# Patient Record
Sex: MALE | Race: BLACK OR AFRICAN AMERICAN | NOT HISPANIC OR LATINO | URBAN - METROPOLITAN AREA
[De-identification: names, ages, dates, MRNs, and addresses within clinical notes are randomized per-mention and may not be internally consistent; named-entity substitution may affect disease eponyms.]

---

## 2019-12-12 ENCOUNTER — INPATIENT (INPATIENT)
Facility: HOSPITAL | Age: 49
LOS: 18 days | Discharge: NURSING, REHAB OR LONG-TERM CARE | End: 2019-12-31
Attending: SURGERY | Admitting: SURGERY
Payer: COMMERCIAL

## 2019-12-12 ENCOUNTER — TRANSCRIPTION ENCOUNTER (OUTPATIENT)
Age: 49
End: 2019-12-12

## 2019-12-12 VITALS
DIASTOLIC BLOOD PRESSURE: 87 MMHG | TEMPERATURE: 98 F | SYSTOLIC BLOOD PRESSURE: 167 MMHG | HEART RATE: 113 BPM | OXYGEN SATURATION: 99 % | RESPIRATION RATE: 18 BRPM

## 2019-12-12 DIAGNOSIS — M72.6 NECROTIZING FASCIITIS: ICD-10-CM

## 2019-12-12 DIAGNOSIS — I70.209 UNSPECIFIED ATHEROSCLEROSIS OF NATIVE ARTERIES OF EXTREMITIES, UNSPECIFIED EXTREMITY: Chronic | ICD-10-CM

## 2019-12-12 LAB
ALBUMIN SERPL ELPH-MCNC: 2.6 G/DL — LOW (ref 3.3–5)
ALP SERPL-CCNC: 344 U/L — HIGH (ref 40–120)
ALT FLD-CCNC: 100 U/L — HIGH (ref 4–41)
ANION GAP SERPL CALC-SCNC: 17 MMO/L — HIGH (ref 7–14)
APPEARANCE UR: CLEAR — SIGNIFICANT CHANGE UP
AST SERPL-CCNC: 45 U/L — HIGH (ref 4–40)
B-OH-BUTYR SERPL-SCNC: 1.9 MMOL/L — HIGH (ref 0–0.4)
BACTERIA # UR AUTO: NEGATIVE — SIGNIFICANT CHANGE UP
BASE EXCESS BLDV CALC-SCNC: -2.2 MMOL/L — SIGNIFICANT CHANGE UP
BASOPHILS # BLD AUTO: 0.03 K/UL — SIGNIFICANT CHANGE UP (ref 0–0.2)
BASOPHILS # BLD AUTO: 0.05 K/UL — SIGNIFICANT CHANGE UP (ref 0–0.2)
BASOPHILS NFR BLD AUTO: 0.2 % — SIGNIFICANT CHANGE UP (ref 0–2)
BASOPHILS NFR BLD AUTO: 0.2 % — SIGNIFICANT CHANGE UP (ref 0–2)
BILIRUB SERPL-MCNC: 0.4 MG/DL — SIGNIFICANT CHANGE UP (ref 0.2–1.2)
BILIRUB UR-MCNC: NEGATIVE — SIGNIFICANT CHANGE UP
BLD GP AB SCN SERPL QL: NEGATIVE — SIGNIFICANT CHANGE UP
BLOOD GAS VENOUS - CREATININE: 1.62 MG/DL — HIGH (ref 0.5–1.3)
BLOOD UR QL VISUAL: SIGNIFICANT CHANGE UP
BUN SERPL-MCNC: 25 MG/DL — HIGH (ref 7–23)
CALCIUM SERPL-MCNC: 9 MG/DL — SIGNIFICANT CHANGE UP (ref 8.4–10.5)
CHLORIDE BLDV-SCNC: 104 MMOL/L — SIGNIFICANT CHANGE UP (ref 96–108)
CHLORIDE SERPL-SCNC: 96 MMOL/L — LOW (ref 98–107)
CO2 SERPL-SCNC: 20 MMOL/L — LOW (ref 22–31)
COLOR SPEC: SIGNIFICANT CHANGE UP
CREAT SERPL-MCNC: 1.67 MG/DL — HIGH (ref 0.5–1.3)
CRP SERPL-MCNC: 372.3 MG/L — HIGH
EOSINOPHIL # BLD AUTO: 0.04 K/UL — SIGNIFICANT CHANGE UP (ref 0–0.5)
EOSINOPHIL # BLD AUTO: 0.08 K/UL — SIGNIFICANT CHANGE UP (ref 0–0.5)
EOSINOPHIL NFR BLD AUTO: 0.2 % — SIGNIFICANT CHANGE UP (ref 0–6)
EOSINOPHIL NFR BLD AUTO: 0.4 % — SIGNIFICANT CHANGE UP (ref 0–6)
ERYTHROCYTE [SEDIMENTATION RATE] IN BLOOD: 94 MM/HR — HIGH (ref 1–15)
GAS PNL BLDV: 132 MMOL/L — LOW (ref 136–146)
GLUCOSE BLDV-MCNC: 355 MG/DL — HIGH (ref 70–99)
GLUCOSE SERPL-MCNC: 400 MG/DL — HIGH (ref 70–99)
GLUCOSE UR-MCNC: >1000 — HIGH
HCO3 BLDV-SCNC: 22 MMOL/L — SIGNIFICANT CHANGE UP (ref 20–27)
HCT VFR BLD CALC: 25.9 % — LOW (ref 39–50)
HCT VFR BLD CALC: 30.7 % — LOW (ref 39–50)
HCT VFR BLDV CALC: 30.5 % — LOW (ref 39–51)
HGB BLD-MCNC: 8.7 G/DL — LOW (ref 13–17)
HGB BLD-MCNC: 9.9 G/DL — LOW (ref 13–17)
HGB BLDV-MCNC: 9.8 G/DL — LOW (ref 13–17)
HYALINE CASTS # UR AUTO: SIGNIFICANT CHANGE UP
IMM GRANULOCYTES NFR BLD AUTO: 0.7 % — SIGNIFICANT CHANGE UP (ref 0–1.5)
IMM GRANULOCYTES NFR BLD AUTO: 1 % — SIGNIFICANT CHANGE UP (ref 0–1.5)
INR BLD: 1.34 — HIGH (ref 0.88–1.17)
KETONES UR-MCNC: HIGH
LACTATE BLDV-MCNC: 2 MMOL/L — SIGNIFICANT CHANGE UP (ref 0.5–2)
LEUKOCYTE ESTERASE UR-ACNC: NEGATIVE — SIGNIFICANT CHANGE UP
LYMPHOCYTES # BLD AUTO: 1.37 K/UL — SIGNIFICANT CHANGE UP (ref 1–3.3)
LYMPHOCYTES # BLD AUTO: 1.42 K/UL — SIGNIFICANT CHANGE UP (ref 1–3.3)
LYMPHOCYTES # BLD AUTO: 6.7 % — LOW (ref 13–44)
LYMPHOCYTES # BLD AUTO: 7.6 % — LOW (ref 13–44)
MCHC RBC-ENTMCNC: 28 PG — SIGNIFICANT CHANGE UP (ref 27–34)
MCHC RBC-ENTMCNC: 28.6 PG — SIGNIFICANT CHANGE UP (ref 27–34)
MCHC RBC-ENTMCNC: 32.2 % — SIGNIFICANT CHANGE UP (ref 32–36)
MCHC RBC-ENTMCNC: 33.6 % — SIGNIFICANT CHANGE UP (ref 32–36)
MCV RBC AUTO: 85.2 FL — SIGNIFICANT CHANGE UP (ref 80–100)
MCV RBC AUTO: 87 FL — SIGNIFICANT CHANGE UP (ref 80–100)
MONOCYTES # BLD AUTO: 1.39 K/UL — HIGH (ref 0–0.9)
MONOCYTES # BLD AUTO: 1.8 K/UL — HIGH (ref 0–0.9)
MONOCYTES NFR BLD AUTO: 6.8 % — SIGNIFICANT CHANGE UP (ref 2–14)
MONOCYTES NFR BLD AUTO: 9.6 % — SIGNIFICANT CHANGE UP (ref 2–14)
NEUTROPHILS # BLD AUTO: 15.28 K/UL — HIGH (ref 1.8–7.4)
NEUTROPHILS # BLD AUTO: 17.35 K/UL — HIGH (ref 1.8–7.4)
NEUTROPHILS NFR BLD AUTO: 81.7 % — HIGH (ref 43–77)
NEUTROPHILS NFR BLD AUTO: 84.9 % — HIGH (ref 43–77)
NITRITE UR-MCNC: NEGATIVE — SIGNIFICANT CHANGE UP
NRBC # FLD: 0 K/UL — SIGNIFICANT CHANGE UP (ref 0–0)
NRBC # FLD: 0 K/UL — SIGNIFICANT CHANGE UP (ref 0–0)
PCO2 BLDV: 44 MMHG — SIGNIFICANT CHANGE UP (ref 41–51)
PH BLDV: 7.34 PH — SIGNIFICANT CHANGE UP (ref 7.32–7.43)
PH UR: 6 — SIGNIFICANT CHANGE UP (ref 5–8)
PLATELET # BLD AUTO: 345 K/UL — SIGNIFICANT CHANGE UP (ref 150–400)
PLATELET # BLD AUTO: 409 K/UL — HIGH (ref 150–400)
PMV BLD: 11.1 FL — SIGNIFICANT CHANGE UP (ref 7–13)
PMV BLD: 11.3 FL — SIGNIFICANT CHANGE UP (ref 7–13)
PO2 BLDV: 32 MMHG — LOW (ref 35–40)
POTASSIUM BLDV-SCNC: 3.5 MMOL/L — SIGNIFICANT CHANGE UP (ref 3.4–4.5)
POTASSIUM SERPL-MCNC: 3.6 MMOL/L — SIGNIFICANT CHANGE UP (ref 3.5–5.3)
POTASSIUM SERPL-SCNC: 3.6 MMOL/L — SIGNIFICANT CHANGE UP (ref 3.5–5.3)
PROT SERPL-MCNC: 8.8 G/DL — HIGH (ref 6–8.3)
PROT UR-MCNC: 100 — HIGH
PROTHROM AB SERPL-ACNC: 15.4 SEC — HIGH (ref 9.8–13.1)
RBC # BLD: 3.04 M/UL — LOW (ref 4.2–5.8)
RBC # BLD: 3.53 M/UL — LOW (ref 4.2–5.8)
RBC # FLD: 12.8 % — SIGNIFICANT CHANGE UP (ref 10.3–14.5)
RBC # FLD: 12.9 % — SIGNIFICANT CHANGE UP (ref 10.3–14.5)
RBC CASTS # UR COMP ASSIST: SIGNIFICANT CHANGE UP (ref 0–?)
RH IG SCN BLD-IMP: POSITIVE — SIGNIFICANT CHANGE UP
RH IG SCN BLD-IMP: POSITIVE — SIGNIFICANT CHANGE UP
SAO2 % BLDV: 56.4 % — LOW (ref 60–85)
SODIUM SERPL-SCNC: 133 MMOL/L — LOW (ref 135–145)
SP GR SPEC: 1.03 — SIGNIFICANT CHANGE UP (ref 1–1.04)
SQUAMOUS # UR AUTO: SIGNIFICANT CHANGE UP
UROBILINOGEN FLD QL: NORMAL — SIGNIFICANT CHANGE UP
WBC # BLD: 18.71 K/UL — HIGH (ref 3.8–10.5)
WBC # BLD: 20.45 K/UL — HIGH (ref 3.8–10.5)
WBC # FLD AUTO: 18.71 K/UL — HIGH (ref 3.8–10.5)
WBC # FLD AUTO: 20.45 K/UL — HIGH (ref 3.8–10.5)
WBC UR QL: SIGNIFICANT CHANGE UP (ref 0–?)

## 2019-12-12 PROCEDURE — 99291 CRITICAL CARE FIRST HOUR: CPT

## 2019-12-12 PROCEDURE — 71045 X-RAY EXAM CHEST 1 VIEW: CPT | Mod: 26

## 2019-12-12 PROCEDURE — 73702 CT LWR EXTREMITY W/O&W/DYE: CPT | Mod: 26,RT

## 2019-12-12 PROCEDURE — 73590 X-RAY EXAM OF LOWER LEG: CPT | Mod: 26,RT

## 2019-12-12 PROCEDURE — 73630 X-RAY EXAM OF FOOT: CPT | Mod: 26,RT

## 2019-12-12 RX ORDER — HEPARIN SODIUM 5000 [USP'U]/ML
5000 INJECTION INTRAVENOUS; SUBCUTANEOUS EVERY 8 HOURS
Refills: 0 | Status: DISCONTINUED | OUTPATIENT
Start: 2019-12-12 | End: 2019-12-31

## 2019-12-12 RX ORDER — PIPERACILLIN AND TAZOBACTAM 4; .5 G/20ML; G/20ML
3.38 INJECTION, POWDER, LYOPHILIZED, FOR SOLUTION INTRAVENOUS EVERY 8 HOURS
Refills: 0 | Status: DISCONTINUED | OUTPATIENT
Start: 2019-12-12 | End: 2019-12-24

## 2019-12-12 RX ORDER — HYDROMORPHONE HYDROCHLORIDE 2 MG/ML
0.5 INJECTION INTRAMUSCULAR; INTRAVENOUS; SUBCUTANEOUS ONCE
Refills: 0 | Status: DISCONTINUED | OUTPATIENT
Start: 2019-12-12 | End: 2019-12-13

## 2019-12-12 RX ORDER — PIPERACILLIN AND TAZOBACTAM 4; .5 G/20ML; G/20ML
3.38 INJECTION, POWDER, LYOPHILIZED, FOR SOLUTION INTRAVENOUS ONCE
Refills: 0 | Status: COMPLETED | OUTPATIENT
Start: 2019-12-12 | End: 2019-12-12

## 2019-12-12 RX ORDER — GLUCAGON INJECTION, SOLUTION 0.5 MG/.1ML
1 INJECTION, SOLUTION SUBCUTANEOUS ONCE
Refills: 0 | Status: DISCONTINUED | OUTPATIENT
Start: 2019-12-12 | End: 2019-12-31

## 2019-12-12 RX ORDER — DEXTROSE 50 % IN WATER 50 %
25 SYRINGE (ML) INTRAVENOUS ONCE
Refills: 0 | Status: DISCONTINUED | OUTPATIENT
Start: 2019-12-12 | End: 2019-12-31

## 2019-12-12 RX ORDER — INSULIN HUMAN 100 [IU]/ML
3 INJECTION, SOLUTION SUBCUTANEOUS
Qty: 100 | Refills: 0 | Status: DISCONTINUED | OUTPATIENT
Start: 2019-12-12 | End: 2019-12-13

## 2019-12-12 RX ORDER — DEXTROSE 50 % IN WATER 50 %
12.5 SYRINGE (ML) INTRAVENOUS ONCE
Refills: 0 | Status: DISCONTINUED | OUTPATIENT
Start: 2019-12-12 | End: 2019-12-31

## 2019-12-12 RX ORDER — ACETAMINOPHEN 500 MG
1000 TABLET ORAL ONCE
Refills: 0 | Status: COMPLETED | OUTPATIENT
Start: 2019-12-12 | End: 2019-12-12

## 2019-12-12 RX ORDER — INSULIN HUMAN 100 [IU]/ML
7.6 INJECTION, SOLUTION SUBCUTANEOUS
Qty: 100 | Refills: 0 | Status: DISCONTINUED | OUTPATIENT
Start: 2019-12-12 | End: 2019-12-12

## 2019-12-12 RX ORDER — SODIUM CHLORIDE 9 MG/ML
1000 INJECTION INTRAMUSCULAR; INTRAVENOUS; SUBCUTANEOUS ONCE
Refills: 0 | Status: COMPLETED | OUTPATIENT
Start: 2019-12-12 | End: 2019-12-12

## 2019-12-12 RX ORDER — SODIUM CHLORIDE 9 MG/ML
2000 INJECTION, SOLUTION INTRAVENOUS ONCE
Refills: 0 | Status: COMPLETED | OUTPATIENT
Start: 2019-12-12 | End: 2019-12-12

## 2019-12-12 RX ORDER — VANCOMYCIN HCL 1 G
1000 VIAL (EA) INTRAVENOUS EVERY 12 HOURS
Refills: 0 | Status: DISCONTINUED | OUTPATIENT
Start: 2019-12-12 | End: 2019-12-14

## 2019-12-12 RX ORDER — INFLUENZA VIRUS VACCINE 15; 15; 15; 15 UG/.5ML; UG/.5ML; UG/.5ML; UG/.5ML
0.5 SUSPENSION INTRAMUSCULAR ONCE
Refills: 0 | Status: DISCONTINUED | OUTPATIENT
Start: 2019-12-12 | End: 2019-12-31

## 2019-12-12 RX ORDER — ONDANSETRON 8 MG/1
4 TABLET, FILM COATED ORAL EVERY 8 HOURS
Refills: 0 | Status: DISCONTINUED | OUTPATIENT
Start: 2019-12-12 | End: 2019-12-31

## 2019-12-12 RX ORDER — SODIUM CHLORIDE 9 MG/ML
1000 INJECTION, SOLUTION INTRAVENOUS
Refills: 0 | Status: DISCONTINUED | OUTPATIENT
Start: 2019-12-12 | End: 2019-12-15

## 2019-12-12 RX ORDER — VANCOMYCIN HCL 1 G
VIAL (EA) INTRAVENOUS
Refills: 0 | Status: DISCONTINUED | OUTPATIENT
Start: 2019-12-12 | End: 2019-12-12

## 2019-12-12 RX ORDER — INSULIN LISPRO 100/ML
10 VIAL (ML) SUBCUTANEOUS ONCE
Refills: 0 | Status: COMPLETED | OUTPATIENT
Start: 2019-12-12 | End: 2019-12-12

## 2019-12-12 RX ORDER — HYDROMORPHONE HYDROCHLORIDE 2 MG/ML
0.5 INJECTION INTRAMUSCULAR; INTRAVENOUS; SUBCUTANEOUS EVERY 4 HOURS
Refills: 0 | Status: DISCONTINUED | OUTPATIENT
Start: 2019-12-12 | End: 2019-12-14

## 2019-12-12 RX ORDER — HYDROMORPHONE HYDROCHLORIDE 2 MG/ML
1 INJECTION INTRAMUSCULAR; INTRAVENOUS; SUBCUTANEOUS EVERY 4 HOURS
Refills: 0 | Status: DISCONTINUED | OUTPATIENT
Start: 2019-12-12 | End: 2019-12-14

## 2019-12-12 RX ORDER — INSULIN LISPRO 100/ML
VIAL (ML) SUBCUTANEOUS EVERY 6 HOURS
Refills: 0 | Status: DISCONTINUED | OUTPATIENT
Start: 2019-12-12 | End: 2019-12-12

## 2019-12-12 RX ORDER — CHLORHEXIDINE GLUCONATE 213 G/1000ML
1 SOLUTION TOPICAL
Refills: 0 | Status: DISCONTINUED | OUTPATIENT
Start: 2019-12-12 | End: 2019-12-18

## 2019-12-12 RX ORDER — DEXTROSE 50 % IN WATER 50 %
15 SYRINGE (ML) INTRAVENOUS ONCE
Refills: 0 | Status: DISCONTINUED | OUTPATIENT
Start: 2019-12-12 | End: 2019-12-31

## 2019-12-12 RX ORDER — VANCOMYCIN HCL 1 G
1000 VIAL (EA) INTRAVENOUS ONCE
Refills: 0 | Status: COMPLETED | OUTPATIENT
Start: 2019-12-12 | End: 2019-12-12

## 2019-12-12 RX ORDER — INSULIN HUMAN 100 [IU]/ML
2 INJECTION, SOLUTION SUBCUTANEOUS ONCE
Refills: 0 | Status: COMPLETED | OUTPATIENT
Start: 2019-12-12 | End: 2019-12-12

## 2019-12-12 RX ORDER — SODIUM CHLORIDE 9 MG/ML
1000 INJECTION, SOLUTION INTRAVENOUS
Refills: 0 | Status: DISCONTINUED | OUTPATIENT
Start: 2019-12-12 | End: 2019-12-13

## 2019-12-12 RX ADMIN — Medication 1000 MILLIGRAM(S): at 22:40

## 2019-12-12 RX ADMIN — Medication 400 MILLIGRAM(S): at 21:20

## 2019-12-12 RX ADMIN — INSULIN HUMAN 3 UNIT(S)/HR: 100 INJECTION, SOLUTION SUBCUTANEOUS at 23:42

## 2019-12-12 RX ADMIN — Medication 100 MILLIGRAM(S): at 21:32

## 2019-12-12 RX ADMIN — SODIUM CHLORIDE 1000 MILLILITER(S): 9 INJECTION INTRAMUSCULAR; INTRAVENOUS; SUBCUTANEOUS at 14:59

## 2019-12-12 RX ADMIN — Medication 250 MILLIGRAM(S): at 16:49

## 2019-12-12 RX ADMIN — HYDROMORPHONE HYDROCHLORIDE 0.5 MILLIGRAM(S): 2 INJECTION INTRAMUSCULAR; INTRAVENOUS; SUBCUTANEOUS at 22:39

## 2019-12-12 RX ADMIN — Medication 10 UNIT(S): at 16:49

## 2019-12-12 RX ADMIN — SODIUM CHLORIDE 2000 MILLILITER(S): 9 INJECTION, SOLUTION INTRAVENOUS at 16:49

## 2019-12-12 RX ADMIN — Medication 100 MILLIGRAM(S): at 15:33

## 2019-12-12 RX ADMIN — INSULIN HUMAN 5 UNIT(S)/HR: 100 INJECTION, SOLUTION SUBCUTANEOUS at 22:41

## 2019-12-12 RX ADMIN — PIPERACILLIN AND TAZOBACTAM 200 GRAM(S): 4; .5 INJECTION, POWDER, LYOPHILIZED, FOR SOLUTION INTRAVENOUS at 22:41

## 2019-12-12 RX ADMIN — HYDROMORPHONE HYDROCHLORIDE 0.5 MILLIGRAM(S): 2 INJECTION INTRAMUSCULAR; INTRAVENOUS; SUBCUTANEOUS at 22:42

## 2019-12-12 RX ADMIN — PIPERACILLIN AND TAZOBACTAM 200 GRAM(S): 4; .5 INJECTION, POWDER, LYOPHILIZED, FOR SOLUTION INTRAVENOUS at 15:00

## 2019-12-12 RX ADMIN — SODIUM CHLORIDE 125 MILLILITER(S): 9 INJECTION, SOLUTION INTRAVENOUS at 18:44

## 2019-12-12 NOTE — CONSULT NOTE ADULT - ASSESSMENT
48 yo male patient w/ necrotizing fasciitis of RLE  - Pt seen and evaluated in ED  - Septic, Tachycardic, WBC 20, ESR/CRP pending, LRINEC score positive for Necrotizing Fasciitis  - Right foot extensive necrosis originating from 4/5th digits to proximal ankle with severe crepitus and fluctuance to distal tibia  - Xrays and CT pending but prior Xrays taken at [facility] from patient's phone showing extensive gas tracking from foot to tibia along extensors. Diffuse soft tissue emphysema in all interspace of right foot.  - Patient consulted at bedside with presence of Vascular team and fellow. Extensive and detailed discussion on salvageability of RLE and importance of assessing life-threatening condition with emergent BKA. Patient to consider and vascular to admit accordingly.  - Foot not salvageable from podiatry standpoint and vascular in agreement  - Podiatry in agreement with vascular plan for emergent surgical intervention  - Podiatry signing off; Please reconsult as needed  - Discussed with attending

## 2019-12-12 NOTE — ED PROVIDER NOTE - PHYSICAL EXAMINATION
R foot- necrotic tissue to 4th and 5th toes- no sensation  dorsal foot w/ large necrotic wound [id number]  proximal erythema to mid shin

## 2019-12-12 NOTE — PATIENT PROFILE ADULT - NSPROHMDIABETBLDGLCTARGETFT_GEN_A_NUR
Depending on morning, afternoon, and evening, however patient reports aiming to keep his sugars under 200, with tighter control in the AM

## 2019-12-12 NOTE — ED PROVIDER NOTE - PROGRESS NOTE DETAILS
surgery evaluating pt, podiatry consulted podiatry w pt now. will need a right BKA. pt admitted for emergent right BKA, surgery to book OR now. pt admitted, care transferred.

## 2019-12-12 NOTE — H&P ADULT - PROBLEM SELECTOR PLAN 1
Admit to surgery, Dr. Ramirez  ABHELIO  NPO  IVF  Insulin drip for hyperglycemia  Patient hesitant to sign consent  Will book OR for emergent caroline GROVES  Patient seen and examined with vascular fellow

## 2019-12-12 NOTE — ED ADULT TRIAGE NOTE - CHIEF COMPLAINT QUOTE
Patient has a wound to his right foot that started two weeks ago. Had went to another hospital and got antibiotics and cream and has gotten actually worse. had an xry and was told he needed a BKA but patient needs a second opinion.

## 2019-12-12 NOTE — ED PROVIDER NOTE - OBJECTIVE STATEMENT
48 y/o male pmh DM type 1 c/o R foot infection x1 week. Pt admits to developing blister to R 5th toe x1 week ago. Pt was seen at [facility] and was given Sentyl cream and PO abx. Pt states that over the week, his wound has rapidly progressed. Pt now admit sto wounds to 5th and 4th toes and skin breakdown to R foot. Pt went back to [facility] ER today and had IV abx labs and xray. Pt was told that he has gas on the xray and needed to be admitted for a BKA. Pt did not agree and signed out AMA and presented to [facility]. Pt admits to taking 20 units of humalog today and did not eat. Pt denies chest pain, sob, abd pain, n/v/d, weakness, dizziness, syncope, fever or chills.

## 2019-12-12 NOTE — H&P ADULT - HISTORY OF PRESENT ILLNESS
49M with right 5th toe wound noticed on 12/2. Saw a physician who prescribed Santyl which he has been using topically. Today, was seen at OSH, told that he has subcutaneous gas and needed a BKA, left AMA and came to [facility] ED for second opinion. Here, he denies HA, CP, SOB, F/C/N/V/D/C. Denies pain in his right foot. Says he walked normally today.

## 2019-12-12 NOTE — H&P ADULT - NSHPPHYSICALEXAM_GEN_ALL_CORE
AAO in mild distress 2/2 stress of diagnosis  No respiratory distress  Tachycardic, normal sinus  Abdomen soft, NTND  Palpable radial pulses  Palpable bilateral popliteal pulses  LLE without wounds, well-healed prior lateral debridement  RLE with gangrenous tissue to lateral and dorsal surface. Crepitus. Non-tender. Palpable PT. Erythema to mid-shin.

## 2019-12-12 NOTE — CONSULT NOTE ADULT - ASSESSMENT
ASSESSMENT:  49 yr old male with necrotizing soft tissue infection of the right lower extremity, planned for emergent guillotine BKA    PLAN:    Neurologic:   - Ofirmev, Dilaudid prn for pain control    Respiratory:   - Monitor respiratory status  - OOBTC daily, encourage use of incentive spirometer    Cardiovascular:   - Monitor vitals, maintain MAP>65    Gastrointestinal/Nutrition:   - NPO, IVF resuscitation    Renal/Genitourinary:   - Monitor urine output  - Trend electrolytes, replete as needed    Hematologic:   - C/w Heparin SQ for DVT ppx  - Trend H/H, transfuse if needed    Infectious Disease:   - Zosyn, Clinda, Vanco for necrotizing soft tissue infection  - Monitor for fevers  - Trend WBC    Endocrine:   - Insulin infusion for hyperglycemia with DKA  - Monitor FS Q1h, titrate insulin per protocol    Disposition:   Continued critical care management    Bang Ureña, PGY 2  [id number]

## 2019-12-12 NOTE — CONSULT NOTE ADULT - SUBJECTIVE AND OBJECTIVE BOX
Podiatry pager #: [phone]    Patient is a 49y old  Male who presents with a chief complaint of right foot necrosis    HPI:    50 y/o male pmh DM type 1 c/o R foot infection x1 week. Pt admits to developing blister to R 5th toe x1 week ago. Pt was seen at [facility] and was given Santyl cream and PO abx. Pt states that over the week, his wound has rapidly progressed. Pt now admit sto wounds to 5th and 4th toes and skin breakdown to R foot. Pt went back to [facility] ER today and had IV abx labs and xray. Pt was told that he has gas on the xray and needed to be admitted for a BKA. Pt did not agree and signed out AMA and presented to [facility]. Pt admits to taking 20 units of humalog today and did not eat. Pt denies chest pain, sob, abd pain, n/v/d, weakness, dizziness, syncope, fever or chills.    Podiatry consulted same day for concern for necrotizing fasciitis.    PAST MEDICAL & SURGICAL HISTORY:  DM (diabetes mellitus)      MEDICATIONS  (STANDING):  clindamycin IVPB 900 milliGRAM(s) IV Intermittent once  vancomycin  IVPB 1000 milliGRAM(s) IV Intermittent once    MEDICATIONS  (PRN):      Allergies    No Known Allergies    Intolerances        VITALS:    Vital Signs Last 24 Hrs  T(C): 36.7 (12 Dec 2019 13:22), Max: 36.7 (12 Dec 2019 13:22)  T(F): 98 (12 Dec 2019 13:22), Max: 98 (12 Dec 2019 13:22)  HR: 113 (12 Dec 2019 13:22) (113 - 113)  BP: 167/87 (12 Dec 2019 13:22) (167/87 - 167/87)  BP(mean): --  RR: 18 (12 Dec 2019 13:22) (18 - 18)  SpO2: 99% (12 Dec 2019 13:22) (99% - 99%)    LABS:                          9.9    20.45 )-----------( 409      ( 12 Dec 2019 14:25 )             30.7               CAPILLARY BLOOD GLUCOSE      POCT Blood Glucose.: 422 mg/dL (12 Dec 2019 13:25)      PT/INR - ( 12 Dec 2019 14:25 )   PT: 15.4 SEC;   INR: 1.34              LOWER EXTREMITY PHYSICAL EXAM:    Vasular: Palpable PT RLE, LF DP/PT 2/4 B/L, CFT < delayed RF because of necrosis of digits, Temperature gradient increased RLE to tibia.   Neuro: Epicritic sensation absent to the level of ankle B/L.  Musculoskeletal/Ortho: RLE with severe increased edema and erythema originating from foot.  Skin:  Extensive Right foot dorso-plantar necrotic patch originating from 4th and 5th digits extending to ankle with severe crepitus and fluctuance on palpation. Erythema and edema of foot extending to distal tibia proximal to midtibia. Malodor present.     Left foot intact and palpable pulses. s/p 5th met head resection closed - 2017 per patient.    RADIOLOGY & ADDITIONAL STUDIES:    Xrays and CT of RLE pending  Prior Xrays taken at [facility] from patient's phone showing extensive gas tracking from foot to tibia along extensors. Diffuse soft tissue emphysema in all interspace of right foot.

## 2019-12-12 NOTE — ED PROVIDER NOTE - ATTENDING CONTRIBUTION TO CARE
Attending Statement: I have personally seen and examined this patient. I have fully participated in the care of this patient. I have reviewed all pertinent clinical information, including history physical exam, plan and the Resident's note and agree except as noted  48yo M hx DM type 1 on insulin pw infected right foot. Pt states "started with a small blister on the right toe" a week and half ago. Given Sentyl  by podiatry at [facility] on 12-6-19 over the last 5-6 days the infection "is getting worse" today had a follow up appointment with [facility]. States he was given IV abx, and had a xray done concerning for gas and advised to have right BKA today. Pt signed out and came to ED. Denies any fever/chills. no pain. no trauma. Adherent to meds, noted to have elevated FS over last week at home. no abx. Denies any polyuria/polydipsia. no abdominal pain no cp or sob. States he has not eaten all day.   Vital signs noted. sitting up,. AO3.  tachycardic. No resp distress. able to speak in full and clear sentences. no wheeze, rales or stridor. thin male nt abdomen. right foot +necrotic tissue especially on the lateral right foot/toes extending to the right mid foot +gangrenous, foul smell. warm to touch. +erythema  to knee. +crepitus of the foot.   plan IV abx, IVF, sepsis labs, xr of right foot, surgery and podiatry consult, admit

## 2019-12-12 NOTE — H&P ADULT - ATTENDING COMMENTS
Pt. presented with R foot necrotizing infection and sepsis  R foot is not salvageable    WBC 20, Tachycardia  Glu 420  A/P: R foot necrotizing infection and sepsis  pt. needs R guillotine amputation at the ankle for source control   will need closure one infection resolves.  Risks and benefits of the procedure were discussed with pt. and family and they agree to proceed. All questions were answered.

## 2019-12-12 NOTE — H&P ADULT - DOES THIS PATIENT HAVE A HISTORY OF OR HAS BEEN DX WITH HEART FAILURE?
Orientation: Alert and oriented x4  VSS. 95% on RA.   Tele: N/A. HR 90.   LS: Clear, denies SOB/CASTILLO.  GI: BS audible/active x4, NPO except ice/meds, intermittent abdominal pain/discomfort. Passing gas. No BM this shift. Denies N/V.   : Adequate urine output. Yes  Skin: Intact  Activity: Independent. Pt slept comfortably throughout shift.   Pain: 7/10 abdominal pain, PRN oxycodone given.  Plan: Pain control, supportive cares, IV abx, discharge TBD. Continue with current cares.     no

## 2019-12-13 ENCOUNTER — RESULT REVIEW (OUTPATIENT)
Age: 49
End: 2019-12-13

## 2019-12-13 LAB
ANION GAP SERPL CALC-SCNC: 10 MMO/L — SIGNIFICANT CHANGE UP (ref 7–14)
ANION GAP SERPL CALC-SCNC: 13 MMO/L — SIGNIFICANT CHANGE UP (ref 7–14)
BASE EXCESS BLDA CALC-SCNC: 0.4 MMOL/L — SIGNIFICANT CHANGE UP
BASE EXCESS BLDA CALC-SCNC: 1.1 MMOL/L — SIGNIFICANT CHANGE UP
BASOPHILS # BLD AUTO: 0.04 K/UL — SIGNIFICANT CHANGE UP (ref 0–0.2)
BASOPHILS NFR BLD AUTO: 0.2 % — SIGNIFICANT CHANGE UP (ref 0–2)
BLOOD GAS ARTERIAL - FIO2: 21 — SIGNIFICANT CHANGE UP
BUN SERPL-MCNC: 17 MG/DL — SIGNIFICANT CHANGE UP (ref 7–23)
BUN SERPL-MCNC: 19 MG/DL — SIGNIFICANT CHANGE UP (ref 7–23)
CA-I BLDA-SCNC: 1.16 MMOL/L — SIGNIFICANT CHANGE UP (ref 1.15–1.29)
CA-I BLDA-SCNC: 1.18 MMOL/L — SIGNIFICANT CHANGE UP (ref 1.15–1.29)
CALCIUM SERPL-MCNC: 8 MG/DL — LOW (ref 8.4–10.5)
CALCIUM SERPL-MCNC: 8.3 MG/DL — LOW (ref 8.4–10.5)
CHLORIDE SERPL-SCNC: 101 MMOL/L — SIGNIFICANT CHANGE UP (ref 98–107)
CHLORIDE SERPL-SCNC: 102 MMOL/L — SIGNIFICANT CHANGE UP (ref 98–107)
CK MB BLD-MCNC: 1.54 NG/ML — SIGNIFICANT CHANGE UP (ref 1–6.6)
CK MB BLD-MCNC: SIGNIFICANT CHANGE UP (ref 0–2.5)
CK SERPL-CCNC: 104 U/L — SIGNIFICANT CHANGE UP (ref 30–200)
CO2 SERPL-SCNC: 20 MMOL/L — LOW (ref 22–31)
CO2 SERPL-SCNC: 23 MMOL/L — SIGNIFICANT CHANGE UP (ref 22–31)
CREAT SERPL-MCNC: 1.62 MG/DL — HIGH (ref 0.5–1.3)
CREAT SERPL-MCNC: 1.86 MG/DL — HIGH (ref 0.5–1.3)
EOSINOPHIL # BLD AUTO: 0.1 K/UL — SIGNIFICANT CHANGE UP (ref 0–0.5)
EOSINOPHIL NFR BLD AUTO: 0.5 % — SIGNIFICANT CHANGE UP (ref 0–6)
GLUCOSE BLDA-MCNC: 142 MG/DL — HIGH (ref 70–99)
GLUCOSE BLDA-MCNC: 185 MG/DL — HIGH (ref 70–99)
GLUCOSE SERPL-MCNC: 195 MG/DL — HIGH (ref 70–99)
GLUCOSE SERPL-MCNC: 206 MG/DL — HIGH (ref 70–99)
HBA1C BLD-MCNC: 12 % — HIGH (ref 4–5.6)
HCO3 BLDA-SCNC: 25 MMOL/L — SIGNIFICANT CHANGE UP (ref 22–26)
HCO3 BLDA-SCNC: 25 MMOL/L — SIGNIFICANT CHANGE UP (ref 22–26)
HCT VFR BLD CALC: 25.1 % — LOW (ref 39–50)
HCT VFR BLD CALC: 25.2 % — LOW (ref 39–50)
HCT VFR BLDA CALC: 26.3 % — LOW (ref 39–51)
HCT VFR BLDA CALC: 26.7 % — LOW (ref 39–51)
HGB BLD-MCNC: 8.4 G/DL — LOW (ref 13–17)
HGB BLD-MCNC: 8.5 G/DL — LOW (ref 13–17)
HGB BLDA-MCNC: 8.5 G/DL — LOW (ref 13–17)
HGB BLDA-MCNC: 8.6 G/DL — LOW (ref 13–17)
IMM GRANULOCYTES NFR BLD AUTO: 1.1 % — SIGNIFICANT CHANGE UP (ref 0–1.5)
LACTATE BLDA-SCNC: 0.8 MMOL/L — SIGNIFICANT CHANGE UP (ref 0.5–2)
LACTATE BLDA-SCNC: 1 MMOL/L — SIGNIFICANT CHANGE UP (ref 0.5–2)
LACTATE SERPL-SCNC: 1 MMOL/L — SIGNIFICANT CHANGE UP (ref 0.5–2)
LYMPHOCYTES # BLD AUTO: 1.66 K/UL — SIGNIFICANT CHANGE UP (ref 1–3.3)
LYMPHOCYTES # BLD AUTO: 8.7 % — LOW (ref 13–44)
MAGNESIUM SERPL-MCNC: 1.6 MG/DL — SIGNIFICANT CHANGE UP (ref 1.6–2.6)
MAGNESIUM SERPL-MCNC: 1.9 MG/DL — SIGNIFICANT CHANGE UP (ref 1.6–2.6)
MCHC RBC-ENTMCNC: 28.7 PG — SIGNIFICANT CHANGE UP (ref 27–34)
MCHC RBC-ENTMCNC: 29 PG — SIGNIFICANT CHANGE UP (ref 27–34)
MCHC RBC-ENTMCNC: 33.3 % — SIGNIFICANT CHANGE UP (ref 32–36)
MCHC RBC-ENTMCNC: 33.9 % — SIGNIFICANT CHANGE UP (ref 32–36)
MCV RBC AUTO: 85.7 FL — SIGNIFICANT CHANGE UP (ref 80–100)
MCV RBC AUTO: 86 FL — SIGNIFICANT CHANGE UP (ref 80–100)
MONOCYTES # BLD AUTO: 2.1 K/UL — HIGH (ref 0–0.9)
MONOCYTES NFR BLD AUTO: 11 % — SIGNIFICANT CHANGE UP (ref 2–14)
NEUTROPHILS # BLD AUTO: 15.04 K/UL — HIGH (ref 1.8–7.4)
NEUTROPHILS NFR BLD AUTO: 78.5 % — HIGH (ref 43–77)
NRBC # FLD: 0 K/UL — SIGNIFICANT CHANGE UP (ref 0–0)
NRBC # FLD: 0 K/UL — SIGNIFICANT CHANGE UP (ref 0–0)
PCO2 BLDA: 35 MMHG — SIGNIFICANT CHANGE UP (ref 35–48)
PCO2 BLDA: 44 MMHG — SIGNIFICANT CHANGE UP (ref 35–48)
PH BLDA: 7.38 PH — SIGNIFICANT CHANGE UP (ref 7.35–7.45)
PH BLDA: 7.45 PH — SIGNIFICANT CHANGE UP (ref 7.35–7.45)
PHOSPHATE SERPL-MCNC: 2.4 MG/DL — LOW (ref 2.5–4.5)
PHOSPHATE SERPL-MCNC: 3.1 MG/DL — SIGNIFICANT CHANGE UP (ref 2.5–4.5)
PHOSPHATE SERPL-MCNC: 3.2 MG/DL — SIGNIFICANT CHANGE UP (ref 2.5–4.5)
PLATELET # BLD AUTO: 315 K/UL — SIGNIFICANT CHANGE UP (ref 150–400)
PLATELET # BLD AUTO: 321 K/UL — SIGNIFICANT CHANGE UP (ref 150–400)
PMV BLD: 10.6 FL — SIGNIFICANT CHANGE UP (ref 7–13)
PMV BLD: 11 FL — SIGNIFICANT CHANGE UP (ref 7–13)
PO2 BLDA: 45 MMHG — CRITICAL LOW (ref 83–108)
PO2 BLDA: 67 MMHG — LOW (ref 83–108)
POTASSIUM BLDA-SCNC: 3.5 MMOL/L — SIGNIFICANT CHANGE UP (ref 3.4–4.5)
POTASSIUM BLDA-SCNC: 3.5 MMOL/L — SIGNIFICANT CHANGE UP (ref 3.4–4.5)
POTASSIUM SERPL-MCNC: 3.3 MMOL/L — LOW (ref 3.5–5.3)
POTASSIUM SERPL-MCNC: 3.6 MMOL/L — SIGNIFICANT CHANGE UP (ref 3.5–5.3)
POTASSIUM SERPL-SCNC: 3.3 MMOL/L — LOW (ref 3.5–5.3)
POTASSIUM SERPL-SCNC: 3.6 MMOL/L — SIGNIFICANT CHANGE UP (ref 3.5–5.3)
RBC # BLD: 2.93 M/UL — LOW (ref 4.2–5.8)
RBC # BLD: 2.93 M/UL — LOW (ref 4.2–5.8)
RBC # FLD: 12.9 % — SIGNIFICANT CHANGE UP (ref 10.3–14.5)
RBC # FLD: 13 % — SIGNIFICANT CHANGE UP (ref 10.3–14.5)
SAO2 % BLDA: 83.8 % — LOW (ref 95–99)
SAO2 % BLDA: 93.6 % — LOW (ref 95–99)
SODIUM BLDA-SCNC: 135 MMOL/L — LOW (ref 136–146)
SODIUM BLDA-SCNC: 137 MMOL/L — SIGNIFICANT CHANGE UP (ref 136–146)
SODIUM SERPL-SCNC: 134 MMOL/L — LOW (ref 135–145)
SODIUM SERPL-SCNC: 135 MMOL/L — SIGNIFICANT CHANGE UP (ref 135–145)
SPECIMEN SOURCE: SIGNIFICANT CHANGE UP
SPECIMEN SOURCE: SIGNIFICANT CHANGE UP
WBC # BLD: 19.15 K/UL — HIGH (ref 3.8–10.5)
WBC # BLD: 21.02 K/UL — HIGH (ref 3.8–10.5)
WBC # FLD AUTO: 19.15 K/UL — HIGH (ref 3.8–10.5)
WBC # FLD AUTO: 21.02 K/UL — HIGH (ref 3.8–10.5)

## 2019-12-13 PROCEDURE — 71045 X-RAY EXAM CHEST 1 VIEW: CPT | Mod: 26,76

## 2019-12-13 PROCEDURE — 27882 AMPUTATION OF LOWER LEG: CPT | Mod: RT

## 2019-12-13 PROCEDURE — 88311 DECALCIFY TISSUE: CPT | Mod: 26

## 2019-12-13 PROCEDURE — 88305 TISSUE EXAM BY PATHOLOGIST: CPT | Mod: 26

## 2019-12-13 PROCEDURE — 99291 CRITICAL CARE FIRST HOUR: CPT | Mod: 25

## 2019-12-13 RX ORDER — LABETALOL HCL 100 MG
10 TABLET ORAL ONCE
Refills: 0 | Status: DISCONTINUED | OUTPATIENT
Start: 2019-12-13 | End: 2019-12-14

## 2019-12-13 RX ORDER — INSULIN HUMAN 100 [IU]/ML
1.5 INJECTION, SOLUTION SUBCUTANEOUS
Qty: 100 | Refills: 0 | Status: DISCONTINUED | OUTPATIENT
Start: 2019-12-13 | End: 2019-12-14

## 2019-12-13 RX ORDER — INSULIN GLARGINE 100 [IU]/ML
15 INJECTION, SOLUTION SUBCUTANEOUS AT BEDTIME
Refills: 0 | Status: DISCONTINUED | OUTPATIENT
Start: 2019-12-13 | End: 2019-12-14

## 2019-12-13 RX ORDER — POTASSIUM CHLORIDE 20 MEQ
10 PACKET (EA) ORAL
Refills: 0 | Status: COMPLETED | OUTPATIENT
Start: 2019-12-13 | End: 2019-12-13

## 2019-12-13 RX ORDER — HYDROMORPHONE HYDROCHLORIDE 2 MG/ML
1 INJECTION INTRAMUSCULAR; INTRAVENOUS; SUBCUTANEOUS ONCE
Refills: 0 | Status: DISCONTINUED | OUTPATIENT
Start: 2019-12-13 | End: 2019-12-13

## 2019-12-13 RX ORDER — INSULIN LISPRO 100/ML
VIAL (ML) SUBCUTANEOUS EVERY 6 HOURS
Refills: 0 | Status: DISCONTINUED | OUTPATIENT
Start: 2019-12-13 | End: 2019-12-13

## 2019-12-13 RX ORDER — INSULIN HUMAN 100 [IU]/ML
3 INJECTION, SOLUTION SUBCUTANEOUS ONCE
Refills: 0 | Status: DISCONTINUED | OUTPATIENT
Start: 2019-12-13 | End: 2019-12-13

## 2019-12-13 RX ORDER — INSULIN HUMAN 100 [IU]/ML
2 INJECTION, SOLUTION SUBCUTANEOUS
Qty: 100 | Refills: 0 | Status: DISCONTINUED | OUTPATIENT
Start: 2019-12-13 | End: 2019-12-13

## 2019-12-13 RX ORDER — SODIUM CHLORIDE 9 MG/ML
1000 INJECTION, SOLUTION INTRAVENOUS ONCE
Refills: 0 | Status: COMPLETED | OUTPATIENT
Start: 2019-12-13 | End: 2019-12-13

## 2019-12-13 RX ORDER — FUROSEMIDE 40 MG
40 TABLET ORAL ONCE
Refills: 0 | Status: COMPLETED | OUTPATIENT
Start: 2019-12-13 | End: 2019-12-13

## 2019-12-13 RX ORDER — INSULIN GLARGINE 100 [IU]/ML
12 INJECTION, SOLUTION SUBCUTANEOUS
Refills: 0 | Status: DISCONTINUED | OUTPATIENT
Start: 2019-12-13 | End: 2019-12-13

## 2019-12-13 RX ORDER — MAGNESIUM SULFATE 500 MG/ML
2 VIAL (ML) INJECTION ONCE
Refills: 0 | Status: COMPLETED | OUTPATIENT
Start: 2019-12-13 | End: 2019-12-13

## 2019-12-13 RX ORDER — POTASSIUM PHOSPHATE, MONOBASIC POTASSIUM PHOSPHATE, DIBASIC 236; 224 MG/ML; MG/ML
15 INJECTION, SOLUTION INTRAVENOUS ONCE
Refills: 0 | Status: COMPLETED | OUTPATIENT
Start: 2019-12-13 | End: 2019-12-13

## 2019-12-13 RX ORDER — HUMAN INSULIN 100 [IU]/ML
3 INJECTION, SUSPENSION SUBCUTANEOUS EVERY 6 HOURS
Refills: 0 | Status: DISCONTINUED | OUTPATIENT
Start: 2019-12-13 | End: 2019-12-13

## 2019-12-13 RX ORDER — SODIUM CHLORIDE 9 MG/ML
1000 INJECTION, SOLUTION INTRAVENOUS
Refills: 0 | Status: DISCONTINUED | OUTPATIENT
Start: 2019-12-13 | End: 2019-12-14

## 2019-12-13 RX ORDER — SODIUM CHLORIDE 9 MG/ML
1000 INJECTION, SOLUTION INTRAVENOUS
Refills: 0 | Status: DISCONTINUED | OUTPATIENT
Start: 2019-12-13 | End: 2019-12-13

## 2019-12-13 RX ORDER — DEXTROSE 50 % IN WATER 50 %
25 SYRINGE (ML) INTRAVENOUS ONCE
Refills: 0 | Status: COMPLETED | OUTPATIENT
Start: 2019-12-13 | End: 2019-12-13

## 2019-12-13 RX ORDER — ACETAMINOPHEN 500 MG
1000 TABLET ORAL ONCE
Refills: 0 | Status: COMPLETED | OUTPATIENT
Start: 2019-12-13 | End: 2019-12-13

## 2019-12-13 RX ADMIN — Medication 25 MILLILITER(S): at 05:00

## 2019-12-13 RX ADMIN — CHLORHEXIDINE GLUCONATE 1 APPLICATION(S): 213 SOLUTION TOPICAL at 05:05

## 2019-12-13 RX ADMIN — Medication 50 GRAM(S): at 01:08

## 2019-12-13 RX ADMIN — Medication 100 MILLIEQUIVALENT(S): at 01:08

## 2019-12-13 RX ADMIN — SODIUM CHLORIDE 75 MILLILITER(S): 9 INJECTION, SOLUTION INTRAVENOUS at 04:37

## 2019-12-13 RX ADMIN — HEPARIN SODIUM 5000 UNIT(S): 5000 INJECTION INTRAVENOUS; SUBCUTANEOUS at 22:16

## 2019-12-13 RX ADMIN — PIPERACILLIN AND TAZOBACTAM 25 GRAM(S): 4; .5 INJECTION, POWDER, LYOPHILIZED, FOR SOLUTION INTRAVENOUS at 20:11

## 2019-12-13 RX ADMIN — INSULIN HUMAN 2 UNIT(S): 100 INJECTION, SOLUTION SUBCUTANEOUS at 00:23

## 2019-12-13 RX ADMIN — HYDROMORPHONE HYDROCHLORIDE 1 MILLIGRAM(S): 2 INJECTION INTRAMUSCULAR; INTRAVENOUS; SUBCUTANEOUS at 05:06

## 2019-12-13 RX ADMIN — HYDROMORPHONE HYDROCHLORIDE 0.5 MILLIGRAM(S): 2 INJECTION INTRAMUSCULAR; INTRAVENOUS; SUBCUTANEOUS at 00:22

## 2019-12-13 RX ADMIN — HYDROMORPHONE HYDROCHLORIDE 1 MILLIGRAM(S): 2 INJECTION INTRAMUSCULAR; INTRAVENOUS; SUBCUTANEOUS at 10:10

## 2019-12-13 RX ADMIN — POTASSIUM PHOSPHATE, MONOBASIC POTASSIUM PHOSPHATE, DIBASIC 62.5 MILLIMOLE(S): 236; 224 INJECTION, SOLUTION INTRAVENOUS at 05:04

## 2019-12-13 RX ADMIN — Medication 250 MILLIGRAM(S): at 05:03

## 2019-12-13 RX ADMIN — SODIUM CHLORIDE 75 MILLILITER(S): 9 INJECTION, SOLUTION INTRAVENOUS at 09:05

## 2019-12-13 RX ADMIN — HYDROMORPHONE HYDROCHLORIDE 1 MILLIGRAM(S): 2 INJECTION INTRAMUSCULAR; INTRAVENOUS; SUBCUTANEOUS at 14:32

## 2019-12-13 RX ADMIN — INSULIN GLARGINE 12 UNIT(S): 100 INJECTION, SOLUTION SUBCUTANEOUS at 03:49

## 2019-12-13 RX ADMIN — Medication 1000 MILLIGRAM(S): at 04:41

## 2019-12-13 RX ADMIN — Medication 100 MILLIEQUIVALENT(S): at 02:07

## 2019-12-13 RX ADMIN — HYDROMORPHONE HYDROCHLORIDE 1 MILLIGRAM(S): 2 INJECTION INTRAMUSCULAR; INTRAVENOUS; SUBCUTANEOUS at 10:25

## 2019-12-13 RX ADMIN — Medication 100 MILLIGRAM(S): at 22:16

## 2019-12-13 RX ADMIN — HEPARIN SODIUM 5000 UNIT(S): 5000 INJECTION INTRAVENOUS; SUBCUTANEOUS at 13:29

## 2019-12-13 RX ADMIN — PIPERACILLIN AND TAZOBACTAM 25 GRAM(S): 4; .5 INJECTION, POWDER, LYOPHILIZED, FOR SOLUTION INTRAVENOUS at 12:15

## 2019-12-13 RX ADMIN — Medication 250 MILLIGRAM(S): at 17:45

## 2019-12-13 RX ADMIN — Medication 400 MILLIGRAM(S): at 04:20

## 2019-12-13 RX ADMIN — Medication 4: at 11:41

## 2019-12-13 RX ADMIN — HYDROMORPHONE HYDROCHLORIDE 0.5 MILLIGRAM(S): 2 INJECTION INTRAMUSCULAR; INTRAVENOUS; SUBCUTANEOUS at 00:07

## 2019-12-13 RX ADMIN — Medication 400 MILLIGRAM(S): at 16:42

## 2019-12-13 RX ADMIN — HYDROMORPHONE HYDROCHLORIDE 1 MILLIGRAM(S): 2 INJECTION INTRAMUSCULAR; INTRAVENOUS; SUBCUTANEOUS at 22:30

## 2019-12-13 RX ADMIN — HYDROMORPHONE HYDROCHLORIDE 1 MILLIGRAM(S): 2 INJECTION INTRAMUSCULAR; INTRAVENOUS; SUBCUTANEOUS at 02:59

## 2019-12-13 RX ADMIN — SODIUM CHLORIDE 75 MILLILITER(S): 9 INJECTION, SOLUTION INTRAVENOUS at 20:24

## 2019-12-13 RX ADMIN — SODIUM CHLORIDE 33.33 MILLILITER(S): 9 INJECTION, SOLUTION INTRAVENOUS at 04:37

## 2019-12-13 RX ADMIN — PIPERACILLIN AND TAZOBACTAM 25 GRAM(S): 4; .5 INJECTION, POWDER, LYOPHILIZED, FOR SOLUTION INTRAVENOUS at 05:03

## 2019-12-13 RX ADMIN — HYDROMORPHONE HYDROCHLORIDE 1 MILLIGRAM(S): 2 INJECTION INTRAMUSCULAR; INTRAVENOUS; SUBCUTANEOUS at 02:44

## 2019-12-13 RX ADMIN — Medication 2: at 06:06

## 2019-12-13 RX ADMIN — INSULIN GLARGINE 15 UNIT(S): 100 INJECTION, SOLUTION SUBCUTANEOUS at 23:12

## 2019-12-13 RX ADMIN — INSULIN HUMAN 2 UNIT(S)/HR: 100 INJECTION, SOLUTION SUBCUTANEOUS at 12:30

## 2019-12-13 RX ADMIN — INSULIN HUMAN 1.5 UNIT(S)/HR: 100 INJECTION, SOLUTION SUBCUTANEOUS at 20:24

## 2019-12-13 RX ADMIN — HYDROMORPHONE HYDROCHLORIDE 1 MILLIGRAM(S): 2 INJECTION INTRAMUSCULAR; INTRAVENOUS; SUBCUTANEOUS at 04:54

## 2019-12-13 RX ADMIN — HYDROMORPHONE HYDROCHLORIDE 1 MILLIGRAM(S): 2 INJECTION INTRAMUSCULAR; INTRAVENOUS; SUBCUTANEOUS at 14:17

## 2019-12-13 RX ADMIN — Medication 1000 MILLIGRAM(S): at 17:00

## 2019-12-13 RX ADMIN — Medication 100 MILLIEQUIVALENT(S): at 03:33

## 2019-12-13 RX ADMIN — Medication 100 MILLIGRAM(S): at 13:28

## 2019-12-13 RX ADMIN — INSULIN HUMAN 3 UNIT(S)/HR: 100 INJECTION, SOLUTION SUBCUTANEOUS at 13:48

## 2019-12-13 RX ADMIN — SODIUM CHLORIDE 75 MILLILITER(S): 9 INJECTION, SOLUTION INTRAVENOUS at 22:15

## 2019-12-13 RX ADMIN — HYDROMORPHONE HYDROCHLORIDE 1 MILLIGRAM(S): 2 INJECTION INTRAMUSCULAR; INTRAVENOUS; SUBCUTANEOUS at 22:16

## 2019-12-13 RX ADMIN — Medication 40 MILLIGRAM(S): at 20:23

## 2019-12-13 NOTE — PHYSICAL THERAPY INITIAL EVALUATION ADULT - PATIENT PROFILE REVIEW, REHAB EVAL
PT orders received: No formal activity order. Consult with AZALEA Hampton, patient may participate in PT evaluation./yes

## 2019-12-13 NOTE — PHYSICAL THERAPY INITIAL EVALUATION ADULT - ADDITIONAL COMMENTS
Patient lives in a one story private house with ~3 steps to enter. Prior to admission, patient was independent with all functional mobility.    Patient left as found, all lines intact and RN aware.

## 2019-12-13 NOTE — PHYSICAL THERAPY INITIAL EVALUATION ADULT - PERTINENT HX OF CURRENT PROBLEM, REHAB EVAL
Patient is a 49 year old male presenting with necrotizing soft tissue infection of the right lower extremity. Patient noticed right 5th toe wound on 12/2, was seen at OSH and was told he needed right BKA, left AMA. Patient presented to [facility] for second opinion. PMH: IVAN

## 2019-12-13 NOTE — PHYSICAL THERAPY INITIAL EVALUATION ADULT - MANUAL MUSCLE TESTING RESULTS, REHAB EVAL
grossly assessed due to/pain; no strength deficits in bilateral UEs, bilateral LEs grossly 3+/5 throughout

## 2019-12-13 NOTE — PROGRESS NOTE ADULT - ASSESSMENT
ASSESSMENT:  49 yr old male with necrotizing soft tissue infection of the RLE, admitted to SICU for resuscitation and management of severe sepsis, awaiting emergent caroline Rt caroline GROVES    PLAN:    Neurologic:   - Ofirmev, Dilaudid prn for pain control    Respiratory:   - Monitor respiratory status  - OOBTC daily, encourage use of incentive spirometer    Cardiovascular:   - Monitor vitals, maintain MAP>65    Gastrointestinal/Nutrition:   - NPO, IVF resuscitation    Renal/Genitourinary:   - Monitor urine output  - Trend electrolytes, replete as needed    Hematologic:   - C/w Heparin SQ for DVT ppx  - Trend H/H, transfuse if needed    Infectious Disease:   - Zosyn, Clinda, Vanco for necrotizing soft tissue infection  - Monitor for fevers  - Trend WBC    Endocrine:   - Insulin infusion for hyperglycemia with DKA  - Monitor FS Q1h, titrate insulin per protocol    Disposition:   Continued critical care management, OR for source control    Bang Ureña, PGY 2  [id number] ASSESSMENT:  49 yr old male with necrotizing soft tissue infection of the RLE, admitted to SICU for resuscitation and management of severe sepsis, awaiting emergent caroline Rt caroline BKDAWNA    PLAN:    Neurologic:   - Ofirmev, Dilaudid prn for pain control    Respiratory:   - Monitor respiratory status  - OOBTC daily, encourage use of incentive spirometer    Cardiovascular:   - Monitor vitals, maintain MAP>65    Gastrointestinal/Nutrition:   - NPO, IVF resuscitation    Renal/Genitourinary:   - Monitor urine output  - Trend electrolytes, replete as needed    Hematologic:   - C/w Heparin SQ for DVT ppx  - Trend H/H, transfuse if needed    Infectious Disease:   - Zosyn, Clinda, Vanco for necrotizing soft tissue infection  - Pending urgent BKA for source control with surgery today, patient consented  - Monitor for fevers  - Trend WBC    Endocrine:   - Insulin infusion for hyperglycemia with DKA, recently transitioned to SQ insulin with good Glu control  - Monitor FS, and continue ISS    Disposition:   Continued critical care management, OR for source control    Bang Ureña, PGY 2  [id number]

## 2019-12-13 NOTE — PROGRESS NOTE ADULT - SUBJECTIVE AND OBJECTIVE BOX
Vascular Surgery Progress Note     SUBJECTIVE / 24H EVENTS  Patient seen and examined on morning rounds. No acute events overnight.    OBJECTIVE:    VITAL SIGNS:  T(C): 37 (19 @ 04:00), Max: 39.3 (19 @ 20:47)  HR: 98 (19 @ 06:00) (95 - 113)  BP: 141/72 (19 @ 06:00) (135/65 - 167/87)  RR: 15 (19 @ 06:00) (11 - 18)  SpO2: 93% (19 @ 06:00) (89% - 99%)  Daily     Daily Weight in k.6 (13 Dec 2019 05:00)  POCT Blood Glucose.: 163 mg/dL (19 @ 06:05)  POCT Blood Glucose.: 185 mg/dL (19 @ 05:28)  POCT Blood Glucose.: 86 mg/dL (19 @ 04:56)      PHYSICAL EXAM:  Gen: Mild distress, anxious  Lungs: Nonlabored respirations  Heart: Tachycardic, normal sinus  GI: Abdomen soft, NTND  Extrem: Palpable radial and popliteal pulses bilaterally, LLE without wounds, RLE with gangrenous tissue to lateral and dorsal surface. Crepitus. Non-tender. Palpable PT. Erythema to mid-shin      19 @ 07:01  -  19 @ 07:00  --------------------------------------------------------  IN:    dextrose 5% + lactated ringers.: 225 mL    insulin regular Infusion: 21.5 mL    IV PiggyBack: 2050 mL    lactated ringers.: 500 mL  Total IN: 2796.5 mL    OUT:    Voided: 275 mL  Total OUT: 275 mL    Total NET: 2521.5 mL          LAB VALUES:      135  |  102  |  19  ----------------------------<  206<H>  3.3<L>   |  20<L>  |  1.62<H>    Ca    8.3<L>      12 Dec 2019 23:00  Phos  3.2       Mg     1.6         TPro  8.8<H>  /  Alb  2.6<L>  /  TBili  0.4  /  DBili  x   /  AST  45<H>  /  ALT  100<H>  /  AlkPhos  344<H>                                 8.4    19.15 )-----------( 315      ( 13 Dec 2019 05:10 )             25.2     LIVER FUNCTIONS - ( 12 Dec 2019 14:25 )  Alb: 2.6 g/dL / Pro: 8.8 g/dL / ALK PHOS: 344 u/L / ALT: 100 u/L / AST: 45 u/L / GGT: x           PT/INR - ( 12 Dec 2019 14:25 )   PT: 15.4 SEC;   INR: 1.34                  Urinalysis Basic - ( 12 Dec 2019 14:46 )    Color: LIGHT YELLOW / Appearance: CLEAR / S.033 / pH: 6.0  Gluc: >1000 / Ketone: MODERATE  / Bili: NEGATIVE / Urobili: NORMAL   Blood: SMALL / Protein: 100 / Nitrite: NEGATIVE   Leuk Esterase: NEGATIVE / RBC: 3-5 / WBC 3-5   Sq Epi: OCC / Non Sq Epi: x / Bacteria: NEGATIVE        MICROBIOLOGY:    No new microbiology data for review.     RADIOLOGY:    CT Lower Extremity w/wo IV Cont, Right (19 @ 17:34)   ******PRELIMINARY REPORT******    ******PRELIMINARY REPORT******            INTERPRETATION:  Extensive soft tissue edema with gas tracking along the   anterolateral subcutaneous tissues and extending through multiple fascial   planes including the deep peripheral fascia into the anterior   intramuscular compartment of the leg and the extensor compartment of the   foot through the 2nd-4th toes, consistent with necrotizing fasciitis.    Clinical team aware per documentation.    f/u official report      Xray Chest 1 View AP/PA (19 @ 18:05)   ******PRELIMINARY REPORT******    ******PRELIMINARY REPORT******            INTERPRETATION:  No focal consolidations.            ******PRELIMINARY REPORT******    ******PRELIMINARY REPORT******             Xray Tibia + Fibula 2 Views, Right (19 @ 17:48)   ******PRELIMINARY REPORT******    ******PRELIMINARY REPORT******            INTERPRETATION:  No acute fractureor dislocation.  Gas again seen in the soft tissues of foot better evaluated on prior foot   xrays, correlate clinically.  MR of the foot can be obtained for more sensitive evaluation for   osteomyelitis.          MEDICATIONS  (STANDING):  chlorhexidine 4% Liquid 1 Application(s) Topical <User Schedule>  clindamycin IVPB 600 milliGRAM(s) IV Intermittent every 8 hours  clindamycin IVPB      dextrose 5% + lactated ringers. 1000 milliLiter(s) (75 mL/Hr) IV Continuous <Continuous>  dextrose 5%. 1000 milliLiter(s) (50 mL/Hr) IV Continuous <Continuous>  dextrose 50% Injectable 12.5 Gram(s) IV Push once  dextrose 50% Injectable 25 Gram(s) IV Push once  dextrose 50% Injectable 25 Gram(s) IV Push once  heparin  Injectable 5000 Unit(s) SubCutaneous every 8 hours  influenza   Vaccine 0.5 milliLiter(s) IntraMuscular once  insulin glargine Injectable (LANTUS) 12 Unit(s) SubCutaneous <User Schedule>  insulin lispro (HumaLOG) corrective regimen sliding scale   SubCutaneous every 6 hours  insulin regular Infusion 3 Unit(s)/Hr (3 mL/Hr) IV Continuous <Continuous>  piperacillin/tazobactam IVPB.. 3.375 Gram(s) IV Intermittent every 8 hours  vancomycin  IVPB 1000 milliGRAM(s) IV Intermittent every 12 hours    MEDICATIONS  (PRN):  dextrose 40% Gel 15 Gram(s) Oral once PRN Blood Glucose LESS THAN 70 milliGRAM(s)/deciliter  glucagon  Injectable 1 milliGRAM(s) IntraMuscular once PRN Glucose LESS THAN 70 milligrams/deciliter  HYDROmorphone  Injectable 0.5 milliGRAM(s) IV Push every 4 hours PRN Moderate Pain (4 - 6)  HYDROmorphone  Injectable 1 milliGRAM(s) IV Push every 4 hours PRN Severe Pain (7 - 10)  ondansetron Injectable 4 milliGRAM(s) IV Push every 8 hours PRN Nausea and/or Vomiting

## 2019-12-13 NOTE — PHYSICAL THERAPY INITIAL EVALUATION ADULT - GENERAL OBSERVATIONS, REHAB EVAL
Patient received semisupine in bed, +oxygen, +IV's, in no apparent distress. Patient agreeable to participate in physical therapy evaluation.

## 2019-12-13 NOTE — PHYSICAL THERAPY INITIAL EVALUATION ADULT - RANGE OF MOTION EXAMINATION, REHAB EVAL
right DF ROM slightly limited secondary to swelling/bilateral upper extremity ROM was WFL (within functional limits)/Left LE ROM was WFL (within functional limits)

## 2019-12-13 NOTE — PROGRESS NOTE ADULT - SUBJECTIVE AND OBJECTIVE BOX
SICU Daily Progress Note  =====================================================  Interval/Overnight Events:     - Hemodynamically stable since arrival to SICU, tachycardia improved with pain control  - Awaiting surgical intervention for necrotizing fasciitis of Rt. foot  - WBC slightly downtrending on broad spectrum Abx, no elevation of lactate. Still febrile to 101.6 F  - Glucose control improving with insulin infusion      HPI:   TARA BLAKE is a 49 yr old male presenting c/o worsening RLE infection. Pt states he first noticed a right 5th toe wound on 12/2, and saw a physician at [facility] who prescribed Santyl collagenase which he has been using topically. States foot infection continued to worsen until today, when he was seen at [facility] ED, told that he has subcutaneous gas seen on Xray and needed a BKA. Pt wanted a second opinion and left AMA after which he presented to [facility] ED. Currently states pain in the right foot is worsening, and he was able to walk today prior to arriving in ED this afternoon. Pt also with DKA. States he is amenable to urgent surgical intervention and SICU consulted for resuscitation.     PMH:  DM I     Allergies: No Known Allergies      MEDICATIONS:   --------------------------------------------------------------------------------------  Neurologic Medications  HYDROmorphone  Injectable 0.5 milliGRAM(s) IV Push every 4 hours PRN Moderate Pain (4 - 6)  HYDROmorphone  Injectable 1 milliGRAM(s) IV Push every 4 hours PRN Severe Pain (7 - 10)  ondansetron Injectable 4 milliGRAM(s) IV Push every 8 hours PRN Nausea and/or Vomiting    Respiratory Medications    Cardiovascular Medications    Gastrointestinal Medications  dextrose 5%. 1000 milliLiter(s) IV Continuous <Continuous>  lactated ringers. 1000 milliLiter(s) IV Continuous <Continuous>    Genitourinary Medications    Hematologic/Oncologic Medications  heparin  Injectable 5000 Unit(s) SubCutaneous every 8 hours  influenza   Vaccine 0.5 milliLiter(s) IntraMuscular once    Antimicrobial/Immunologic Medications  clindamycin IVPB 600 milliGRAM(s) IV Intermittent every 8 hours  clindamycin IVPB      piperacillin/tazobactam IVPB.. 3.375 Gram(s) IV Intermittent every 8 hours  vancomycin  IVPB 1000 milliGRAM(s) IV Intermittent every 12 hours    Endocrine/Metabolic Medications  dextrose 40% Gel 15 Gram(s) Oral once PRN Blood Glucose LESS THAN 70 milliGRAM(s)/deciliter  dextrose 50% Injectable 12.5 Gram(s) IV Push once  dextrose 50% Injectable 25 Gram(s) IV Push once  dextrose 50% Injectable 25 Gram(s) IV Push once  glucagon  Injectable 1 milliGRAM(s) IntraMuscular once PRN Glucose LESS THAN 70 milligrams/deciliter  insulin regular  human recombinant. 2 Unit(s) IV Push once  insulin regular Infusion 3 Unit(s)/Hr IV Continuous <Continuous>    Topical/Other Medications  chlorhexidine 4% Liquid 1 Application(s) Topical <User Schedule>    --------------------------------------------------------------------------------------    VITAL SIGNS, INS/OUTS (last 24 hours):  --------------------------------------------------------------------------------------  T(C): 39.3 (12-12-19 @ 20:47), Max: 39.3 (12-12-19 @ 20:47)  HR: 103 (12-12-19 @ 23:00) (103 - 113)  BP: 154/67 (12-12-19 @ 23:00) (141/69 - 167/87)  BP(mean): 87 (12-12-19 @ 23:00) (87 - 93)  ABP: --  ABP(mean): --  RR: 18 (12-12-19 @ 23:00) (15 - 18)  SpO2: 93% (12-12-19 @ 23:00) (93% - 99%)  Wt(kg): --  CVP(mm Hg): --  CI: --  CAPILLARY BLOOD GLUCOSE      POCT Blood Glucose.: 208 mg/dL (13 Dec 2019 00:12)  POCT Blood Glucose.: 205 mg/dL (12 Dec 2019 23:04)  POCT Blood Glucose.: 311 mg/dL (12 Dec 2019 17:47)  POCT Blood Glucose.: 347 mg/dL (12 Dec 2019 15:40)  POCT Blood Glucose.: 422 mg/dL (12 Dec 2019 13:25)   N/A      12-12 @ 07:01  -  12-13 @ 00:17  --------------------------------------------------------  IN:    insulin regular Infusion: 2 mL    lactated ringers.: 125 mL  Total IN: 127 mL    OUT:  Total OUT: 0 mL    Total NET: 127 mL        --------------------------------------------------------------------------------------    EXAM  NEUROLOGY  RASS: 0  	GCS:  15  Exam: Normal, NAD, alert, oriented x3, no focal deficits.     RESPIRATORY  Exam: Nonlabored, CTABL, no wheezes, ronchi, or rales. Normal respiratory effort.       CARDIOVASCULAR  Exam: Normotensive, RRR, no M/R/G     GI/NUTRITION  Exam: Abdomen soft, NT/ND, no rebound or guarding.  Current Diet:  NPO    EXTREMITIES  B/l UE strength and sensation intact. LLE without wounds, well-healed prior lateral debridement noted.   RLE with dorsal and lateral gangrene, with crepitus, significant TTP, and erythema extending to mid-lower leg anteriorly, unchanged from initial exam.     HEMATOLOGIC  [x] VTE Prophylaxis: heparin  Injectable 5000 Unit(s) SubCutaneous every 8 hours        INFECTIOUS DISEASE  Antimicrobials/Immunologic Medications:  clindamycin IVPB 600 milliGRAM(s) IV Intermittent every 8 hours  clindamycin IVPB      influenza   Vaccine 0.5 milliLiter(s) IntraMuscular once  piperacillin/tazobactam IVPB.. 3.375 Gram(s) IV Intermittent every 8 hours  vancomycin  IVPB 1000 milliGRAM(s) IV Intermittent every 12 hours    Tubes/Lines/Drains    [x] Peripheral IV  [] Central Venous Line     	[] R	[] L	[] IJ	[] Fem	[] SC	Date Placed:   [] Arterial Line		[] R	[] L	[] Fem	[] Rad	[] Ax	Date Placed:   [] PICC		[] Midline		[] Mediport  [] Urinary Catheter		  [x] Necessity of urinary, arterial, and venous catheters discussed    LABS  --------------------------------------------------------------------------------------  ((Insert SICU Labs here))***  -------------------------------------------------------------------------------------- SICU Daily Progress Note  =====================================================  Interval/Overnight Events:     - Hemodynamically stable since arrival to SICU, tachycardia improved with pain control  - Awaiting surgical intervention for necrotizing fasciitis of Rt. foot  - WBC slightly downtrending on broad spectrum Abx, no elevation of lactate. Still febrile to 101.6 F  - Glucose control improving with insulin infusion      HPI:   TARA BLAKE is a 49 yr old male presenting c/o worsening RLE infection. Pt states he first noticed a right 5th toe wound on 12/2, and saw a physician at [facility] who prescribed Santyl collagenase which he has been using topically. States foot infection continued to worsen until today, when he was seen at [facility] ED, told that he has subcutaneous gas seen on Xray and needed a BKA. Pt wanted a second opinion and left AMA after which he presented to [facility] ED. Currently states pain in the right foot is worsening, and he was able to walk today prior to arriving in ED this afternoon. Pt also with DKA. States he is amenable to urgent surgical intervention and SICU consulted for resuscitation.     PMH:  DM I     Allergies: No Known Allergies      MEDICATIONS:   --------------------------------------------------------------------------------------  Neurologic Medications  HYDROmorphone  Injectable 0.5 milliGRAM(s) IV Push every 4 hours PRN Moderate Pain (4 - 6)  HYDROmorphone  Injectable 1 milliGRAM(s) IV Push every 4 hours PRN Severe Pain (7 - 10)  ondansetron Injectable 4 milliGRAM(s) IV Push every 8 hours PRN Nausea and/or Vomiting    Respiratory Medications    Cardiovascular Medications    Gastrointestinal Medications  dextrose 5%. 1000 milliLiter(s) IV Continuous <Continuous>  lactated ringers. 1000 milliLiter(s) IV Continuous <Continuous>    Genitourinary Medications    Hematologic/Oncologic Medications  heparin  Injectable 5000 Unit(s) SubCutaneous every 8 hours  influenza   Vaccine 0.5 milliLiter(s) IntraMuscular once    Antimicrobial/Immunologic Medications  clindamycin IVPB 600 milliGRAM(s) IV Intermittent every 8 hours  clindamycin IVPB      piperacillin/tazobactam IVPB.. 3.375 Gram(s) IV Intermittent every 8 hours  vancomycin  IVPB 1000 milliGRAM(s) IV Intermittent every 12 hours    Endocrine/Metabolic Medications  dextrose 40% Gel 15 Gram(s) Oral once PRN Blood Glucose LESS THAN 70 milliGRAM(s)/deciliter  dextrose 50% Injectable 12.5 Gram(s) IV Push once  dextrose 50% Injectable 25 Gram(s) IV Push once  dextrose 50% Injectable 25 Gram(s) IV Push once  glucagon  Injectable 1 milliGRAM(s) IntraMuscular once PRN Glucose LESS THAN 70 milligrams/deciliter  insulin regular  human recombinant. 2 Unit(s) IV Push once  insulin regular Infusion 3 Unit(s)/Hr IV Continuous <Continuous>    Topical/Other Medications  chlorhexidine 4% Liquid 1 Application(s) Topical <User Schedule>    --------------------------------------------------------------------------------------    VITAL SIGNS, INS/OUTS (last 24 hours):  --------------------------------------------------------------------------------------  T(C): 39.3 (12-12-19 @ 20:47), Max: 39.3 (12-12-19 @ 20:47)  HR: 103 (12-12-19 @ 23:00) (103 - 113)  BP: 154/67 (12-12-19 @ 23:00) (141/69 - 167/87)  BP(mean): 87 (12-12-19 @ 23:00) (87 - 93)  ABP: --  ABP(mean): --  RR: 18 (12-12-19 @ 23:00) (15 - 18)  SpO2: 93% (12-12-19 @ 23:00) (93% - 99%)  Wt(kg): --  CVP(mm Hg): --  CI: --  CAPILLARY BLOOD GLUCOSE      POCT Blood Glucose.: 208 mg/dL (13 Dec 2019 00:12)  POCT Blood Glucose.: 205 mg/dL (12 Dec 2019 23:04)  POCT Blood Glucose.: 311 mg/dL (12 Dec 2019 17:47)  POCT Blood Glucose.: 347 mg/dL (12 Dec 2019 15:40)  POCT Blood Glucose.: 422 mg/dL (12 Dec 2019 13:25)   N/A      12-12 @ 07:01  -  12-13 @ 00:17  --------------------------------------------------------  IN:    insulin regular Infusion: 2 mL    lactated ringers.: 125 mL  Total IN: 127 mL    OUT:  Total OUT: 0 mL    Total NET: 127 mL        --------------------------------------------------------------------------------------    EXAM  NEUROLOGY  RASS: 0  	GCS:  15  Exam: Normal, NAD, alert, oriented x3, no focal deficits.     RESPIRATORY  Exam: Nonlabored, CTABL, no wheezes, ronchi, or rales. Normal respiratory effort.       CARDIOVASCULAR  Exam: Normotensive, RRR, no M/R/G     GI/NUTRITION  Exam: Abdomen soft, NT/ND, no rebound or guarding.  Current Diet:  NPO    EXTREMITIES  B/l UE strength and sensation intact. LLE without wounds, well-healed prior lateral debridement noted.   RLE with dorsal and lateral gangrene, with crepitus, significant TTP, and erythema extending to mid-lower leg anteriorly, unchanged from initial exam.     HEMATOLOGIC  [x] VTE Prophylaxis: heparin  Injectable 5000 Unit(s) SubCutaneous every 8 hours        INFECTIOUS DISEASE  Antimicrobials/Immunologic Medications:  clindamycin IVPB 600 milliGRAM(s) IV Intermittent every 8 hours  clindamycin IVPB      influenza   Vaccine 0.5 milliLiter(s) IntraMuscular once  piperacillin/tazobactam IVPB.. 3.375 Gram(s) IV Intermittent every 8 hours  vancomycin  IVPB 1000 milliGRAM(s) IV Intermittent every 12 hours    Tubes/Lines/Drains    [x] Peripheral IV  [] Central Venous Line     	[] R	[] L	[] IJ	[] Fem	[] SC	Date Placed:   [] Arterial Line		[] R	[] L	[] Fem	[] Rad	[] Ax	Date Placed:   [] PICC		[] Midline		[] Mediport  [] Urinary Catheter		  [x] Necessity of urinary, arterial, and venous catheters discussed    LABS  --------------------------------------------------------------------------------------  CBC (12-12 @ 23:00)                              8.7<L>                         18.71<H>  )----------------(  345        81.7<H>% Neutrophils, 7.6<L>% Lymphocytes, ANC: 15.28<H>                              25.9<L>              CBC (12-12 @ 14:25)                              9.9<L>                         20.45<H>  )----------------(  409<H>     84.9<H>% Neutrophils, 6.7<L>% Lymphocytes, ANC: 17.35<H>                              30.7<L>                BMP (12-12 @ 23:00)             135     |  102     |  19    		Ca++ --      Ca 8.3<L>             ---------------------------------( 206<H>		Mg 1.6                3.3<L>  |  20<L>   |  1.62<H>			Ph 2.4<L>  BMP (12-12 @ 14:25)             133<L>  |  96<L>   |  25<H> 		Ca++ --      Ca 9.0                ---------------------------------( 400<H>		Mg --                 3.6     |  20<L>   |  1.67<H>			Ph --        LFTs (12-12 @ 14:25)      TPro 8.8<H> / Alb 2.6<L> / TBili 0.4 / DBili -- / AST 45<H> / <H> / AlkPhos 344<H>    Coags (12-12 @ 14:25)  aPTT -- / INR 1.34<H> / PT 15.4<H>    ABG (12-12 @ 23:00)      /  /  /  /  / %     Lactate:  1.0   ABG (12-12 @ 14:25)      /  /  /  /  / %     Lactate:   2.0    VBG (12-12 @ 14:25)     7.34 / 44 / 32<L> / 22 / -2.2 / 56.4<L>%      -------------------------------------------------------------------------------------- SICU Daily Progress Note  =====================================================  Interval/Overnight Events:     - Hemodynamically stable since arrival to SICU, tachycardia improved with pain control  - Awaiting surgical intervention for necrotizing fasciitis of Rt. foot  - WBC slightly downtrending on broad spectrum Abx, no elevation of lactate. Still febrile to 101.6 F  - Glucose control improving with insulin infusion      HPI:   TARA BLAKE is a 49 yr old male presenting c/o worsening RLE infection. Pt states he first noticed a right 5th toe wound on , and saw a physician at [facility] who prescribed Santyl collagenase which he has been using topically. States foot infection continued to worsen until today, when he was seen at [facility] ED, told that he has subcutaneous gas seen on Xray and needed a BKA. Pt wanted a second opinion and left AMA after which he presented to [facility] ED. Currently states pain in the right foot is worsening, and he was able to walk today prior to arriving in ED this afternoon. Pt also with DKA. States he is amenable to urgent surgical intervention and SICU consulted for resuscitation.     PMH:  DM I     Allergies: No Known Allergies    MEDICATIONS  (STANDING):  chlorhexidine 4% Liquid 1 Application(s) Topical <User Schedule>  clindamycin IVPB 600 milliGRAM(s) IV Intermittent every 8 hours  clindamycin IVPB      dextrose 5% + lactated ringers. 1000 milliLiter(s) (75 mL/Hr) IV Continuous <Continuous>  dextrose 5%. 1000 milliLiter(s) (50 mL/Hr) IV Continuous <Continuous>  dextrose 50% Injectable 12.5 Gram(s) IV Push once  dextrose 50% Injectable 25 Gram(s) IV Push once  dextrose 50% Injectable 25 Gram(s) IV Push once  heparin  Injectable 5000 Unit(s) SubCutaneous every 8 hours  influenza   Vaccine 0.5 milliLiter(s) IntraMuscular once  insulin glargine Injectable (LANTUS) 12 Unit(s) SubCutaneous <User Schedule>  insulin lispro (HumaLOG) corrective regimen sliding scale   SubCutaneous every 6 hours  piperacillin/tazobactam IVPB.. 3.375 Gram(s) IV Intermittent every 8 hours  vancomycin  IVPB 1000 milliGRAM(s) IV Intermittent every 12 hours    MEDICATIONS  (PRN):  dextrose 40% Gel 15 Gram(s) Oral once PRN Blood Glucose LESS THAN 70 milliGRAM(s)/deciliter  glucagon  Injectable 1 milliGRAM(s) IntraMuscular once PRN Glucose LESS THAN 70 milligrams/deciliter  HYDROmorphone  Injectable 0.5 milliGRAM(s) IV Push every 4 hours PRN Moderate Pain (4 - 6)  HYDROmorphone  Injectable 1 milliGRAM(s) IV Push every 4 hours PRN Severe Pain (7 - 10)  ondansetron Injectable 4 milliGRAM(s) IV Push every 8 hours PRN Nausea and/or Vomiting    ICU Vital Signs Last 24 Hrs  T(C): 37 (13 Dec 2019 09:59), Max: 39.3 (12 Dec 2019 20:47)  T(F): 98.6 (13 Dec 2019 09:59), Max: 102.7 (12 Dec 2019 20:47)  HR: 103 (13 Dec 2019 10:00) (95 - 113)  BP: 183/68 (13 Dec 2019 10:00) (135/65 - 183/68)  BP(mean): 99 (13 Dec 2019 10:00) (81 - 110)  ABP: --  ABP(mean): --  RR: 15 (13 Dec 2019 10:00) (11 - 20)  SpO2: 94% (13 Dec 2019 10:00) (89% - 99%)    CAPILLARY BLOOD GLUCOSE      POCT Blood Glucose.: 164 mg/dL (13 Dec 2019 09:02)  POCT Blood Glucose.: 163 mg/dL (13 Dec 2019 06:05)  POCT Blood Glucose.: 185 mg/dL (13 Dec 2019 05:28)  POCT Blood Glucose.: 86 mg/dL (13 Dec 2019 04:56)  POCT Blood Glucose.: 102 mg/dL (13 Dec 2019 03:59)  POCT Blood Glucose.: 122 mg/dL (13 Dec 2019 02:57)  POCT Blood Glucose.: 147 mg/dL (13 Dec 2019 02:04)  POCT Blood Glucose.: 179 mg/dL (13 Dec 2019 01:10)  POCT Blood Glucose.: 208 mg/dL (13 Dec 2019 00:12)  POCT Blood Glucose.: 205 mg/dL (12 Dec 2019 23:04)  POCT Blood Glucose.: 311 mg/dL (12 Dec 2019 17:47)  POCT Blood Glucose.: 347 mg/dL (12 Dec 2019 15:40)  POCT Blood Glucose.: 422 mg/dL (12 Dec 2019 13:25)    I&O's Detail    12 Dec 2019 07:01  -  13 Dec 2019 07:00  --------------------------------------------------------  IN:    dextrose 5% + lactated ringers.: 225 mL    insulin regular Infusion: 21.5 mL    IV PiggyBack: 2050 mL    lactated ringers.: 500 mL  Total IN: 2796.5 mL    OUT:    Voided: 275 mL  Total OUT: 275 mL    Total NET: 2521.5 mL      13 Dec 2019 07:  -  13 Dec 2019 10:41  --------------------------------------------------------  IN:    dextrose 5% + lactated ringers.: 300 mL  Total IN: 300 mL    OUT:  Total OUT: 0 mL    Total NET: 300 mL      --------------------------------------------------------------------------------------    EXAM  NEUROLOGY  RASS: 0  	GCS:  15  Exam: Normal, NAD, alert, oriented x3, no focal deficits.     RESPIRATORY  Exam: Nonlabored, CTABL, no wheezes, ronchi, or rales. Normal respiratory effort.       CARDIOVASCULAR  Exam: Normotensive, RRR, no M/R/G     GI/NUTRITION  Exam: Abdomen soft, NT/ND, no rebound or guarding.  Current Diet:  NPO    EXTREMITIES  B/l UE strength and sensation intact. LLE without wounds, well-healed prior lateral debridement noted.   RLE with dorsal and lateral gangrene, with crepitus, significant TTP, and erythema extending to mid-lower leg anteriorly, unchanged from initial exam.     HEMATOLOGIC  [x] VTE Prophylaxis: heparin  Injectable 5000 Unit(s) SubCutaneous every 8 hours    INFECTIOUS DISEASE  Antimicrobials/Immunologic Medications:  clindamycin IVPB 600 milliGRAM(s) IV Intermittent every 8 hours  clindamycin IVPB      influenza   Vaccine 0.5 milliLiter(s) IntraMuscular once  piperacillin/tazobactam IVPB.. 3.375 Gram(s) IV Intermittent every 8 hours  vancomycin  IVPB 1000 milliGRAM(s) IV Intermittent every 12 hours    Tubes/Lines/Drains    [x] Peripheral IV  [] Central Venous Line     	[] R	[] L	[] IJ	[] Fem	[] SC	Date Placed:   [] Arterial Line		[] R	[] L	[] Fem	[] Rad	[] Ax	Date Placed:   [] PICC		[] Midline		[] Mediport  [] Urinary Catheter		  [x] Necessity of urinary, arterial, and venous catheters discussed    LABS  --------------------------------------------------------------------------------------                          8.4    19.15 )-----------( 315      ( 13 Dec 2019 05:10 )             25.2     12-    135  |  102  |  19  ----------------------------<  206<H>  3.3<L>   |  20<L>  |  1.62<H>    Ca    8.3<L>      12 Dec 2019 23:00  Phos  3.2     12-13  Mg     1.6         TPro  8.8<H>  /  Alb  2.6<L>  /  TBili  0.4  /  DBili  x   /  AST  45<H>  /  ALT  100<H>  /  AlkPhos  344<H>  12-12        PT/INR - ( 12 Dec 2019 14:25 )   PT: 15.4 SEC;   INR: 1.34       Phosphorus Level, Serum: 3.2 mg/dL (19 @ 05:10)  Calcium, Total Serum: 8.3 mg/dL (19 @ 23:00)  Phosphorus Level, Serum: 2.4 mg/dL (19 @ 23:00)  Magnesium, Serum: 1.6 mg/dL (19 @ 23:00)  Calcium, Total Serum: 9.0 mg/dL (19 @ 14:25)    Urinalysis Basic - ( 12 Dec 2019 14:46 )    Color: LIGHT YELLOW / Appearance: CLEAR / S.033 / pH: 6.0  Gluc: >1000 / Ketone: MODERATE  / Bili: NEGATIVE / Urobili: NORMAL   Blood: SMALL / Protein: 100 / Nitrite: NEGATIVE   Leuk Esterase: NEGATIVE / RBC: 3-5 / WBC 3-5   Sq Epi: OCC / Non Sq Epi: x / Bacteria: NEGATIVE    LIVER FUNCTIONS - ( 12 Dec 2019 14:25 )  Alb: 2.6 g/dL / Pro: 8.8 g/dL / ALK PHOS: 344 u/L / ALT: 100 u/L / AST: 45 u/L / GGT: x             CAPILLARY BLOOD GLUCOSE      POCT Blood Glucose.: 164 mg/dL (13 Dec 2019 09:02)  POCT Blood Glucose.: 163 mg/dL (13 Dec 2019 06:05)  POCT Blood Glucose.: 185 mg/dL (13 Dec 2019 05:28)  POCT Blood Glucose.: 86 mg/dL (13 Dec 2019 04:56)  POCT Blood Glucose.: 102 mg/dL (13 Dec 2019 03:59)  POCT Blood Glucose.: 122 mg/dL (13 Dec 2019 02:57)  POCT Blood Glucose.: 147 mg/dL (13 Dec 2019 02:04)  POCT Blood Glucose.: 179 mg/dL (13 Dec 2019 01:10)  POCT Blood Glucose.: 208 mg/dL (13 Dec 2019 00:12)  POCT Blood Glucose.: 205 mg/dL (12 Dec 2019 23:04)  POCT Blood Glucose.: 311 mg/dL (12 Dec 2019 17:47)  POCT Blood Glucose.: 347 mg/dL (12 Dec 2019 15:40)  POCT Blood Glucose.: 422 mg/dL (12 Dec 2019 13:25)      ABG        RADIOLOGY & OTHER STUDIES    Xray Chest 1 View AP/PA:   EXAM:  XR CHEST AP OR PA 1V        PROCEDURE DATE:  Dec 12 2019         INTERPRETATION:  CLINICAL INFORMATION: Chest pain.    TECHNIQUE: Frontal radiograph of the chest.    COMPARISON: No comparison available.    FINDINGS:    LUNGS: No focal consolidation.    PLEURA: No pleural effusion or pneumothorax.    HEART AND MEDIASTINUM: Heart size is within normal limits.    SKELETON: Unremarkable skeletal structures.      IMPRESSION:     No focal consolidation.    ACE PARDO M.D., RADIOLOGY RESIDENT  This document has been electronically signed.  CONNIE CAMPOS M.D., ATTENDING RADIOLOGIST  This document has been electronically signed. Dec 13 2019  9:25AM

## 2019-12-13 NOTE — CHART NOTE - NSCHARTNOTEFT_GEN_A_CORE
PRE OPERATIVE NOTE    Pre-op Diagnosis: RLE necrotizing fasciitis   Procedure: RLE caroline YANELI   Surgeon: Guillermo/Kassidy                              8.4    19.15 )-----------( 315      ( 13 Dec 2019 05:10 )             25.2     12-12    135  |  102  |  19  ----------------------------<  206<H>  3.3<L>   |  20<L>  |  1.62<H>    Ca    8.3<L>      12 Dec 2019 23:00  Phos  3.2       Mg     1.6         TPro  8.8<H>  /  Alb  2.6<L>  /  TBili  0.4  /  DBili  x   /  AST  45<H>  /  ALT  100<H>  /  AlkPhos  344<H>      LIVER FUNCTIONS - ( 12 Dec 2019 14:25 )  Alb: 2.6 g/dL / Pro: 8.8 g/dL / ALK PHOS: 344 u/L / ALT: 100 u/L / AST: 45 u/L / GGT: x           PT/INR - ( 12 Dec 2019 14:25 )   PT: 15.4 SEC;   INR: 1.34            Urinalysis Basic - ( 12 Dec 2019 14:46 )    Color: LIGHT YELLOW / Appearance: CLEAR / S.033 / pH: 6.0  Gluc: >1000 / Ketone: MODERATE  / Bili: NEGATIVE / Urobili: NORMAL   Blood: SMALL / Protein: 100 / Nitrite: NEGATIVE   Leuk Esterase: NEGATIVE / RBC: 3-5 / WBC 3-5   Sq Epi: OCC / Non Sq Epi: x / Bacteria: NEGATIVE      CAPILLARY BLOOD GLUCOSE      POCT Blood Glucose.: 229 mg/dL (13 Dec 2019 11:33)      Type & Screen:  Type + Screen (19 @ 14:33)    ABO Interpretation: O    Rh Interpretation: Positive    Antibody Screen: Negative    CXR: < from: Xray Chest 1 View AP/PA (19 @ 18:05) >    FINDINGS:    LUNGS: No focal consolidation.    PLEURA: No pleural effusion or pneumothorax.    HEART AND MEDIASTINUM: Heart size is within normal limits.    SKELETON: Unremarkable skeletal structures.      IMPRESSION:     No focal consolidation.      < end of copied text >          A/P: 49y Male planned for above procedure    IVF  Pain/nausea control  Blood on hold, 2 Units  Consent in chart  clindamycin IVPB  piperacillin/tazobactam IVPB..  vancomycin  IVPB PRE OPERATIVE NOTE    Pre-op Diagnosis: RLE necrotizing fasciitis   Procedure: RLE caroline YANELI   Surgeon: Emy                              8.4    19.15 )-----------( 315      ( 13 Dec 2019 05:10 )             25.2     12-12    135  |  102  |  19  ----------------------------<  206<H>  3.3<L>   |  20<L>  |  1.62<H>    Ca    8.3<L>      12 Dec 2019 23:00  Phos  3.2       Mg     1.6         TPro  8.8<H>  /  Alb  2.6<L>  /  TBili  0.4  /  DBili  x   /  AST  45<H>  /  ALT  100<H>  /  AlkPhos  344<H>      LIVER FUNCTIONS - ( 12 Dec 2019 14:25 )  Alb: 2.6 g/dL / Pro: 8.8 g/dL / ALK PHOS: 344 u/L / ALT: 100 u/L / AST: 45 u/L / GGT: x           PT/INR - ( 12 Dec 2019 14:25 )   PT: 15.4 SEC;   INR: 1.34            Urinalysis Basic - ( 12 Dec 2019 14:46 )    Color: LIGHT YELLOW / Appearance: CLEAR / S.033 / pH: 6.0  Gluc: >1000 / Ketone: MODERATE  / Bili: NEGATIVE / Urobili: NORMAL   Blood: SMALL / Protein: 100 / Nitrite: NEGATIVE   Leuk Esterase: NEGATIVE / RBC: 3-5 / WBC 3-5   Sq Epi: OCC / Non Sq Epi: x / Bacteria: NEGATIVE      CAPILLARY BLOOD GLUCOSE      POCT Blood Glucose.: 229 mg/dL (13 Dec 2019 11:33)      Type & Screen:  Type + Screen (19 @ 14:33)    ABO Interpretation: O    Rh Interpretation: Positive    Antibody Screen: Negative    CXR: < from: Xray Chest 1 View AP/PA (19 @ 18:05) >    FINDINGS:    LUNGS: No focal consolidation.    PLEURA: No pleural effusion or pneumothorax.    HEART AND MEDIASTINUM: Heart size is within normal limits.    SKELETON: Unremarkable skeletal structures.      IMPRESSION:     No focal consolidation.      < end of copied text >          A/P: 49y Male planned for above procedure    IVF  Pain/nausea control  Blood on hold, 2 Units  Consent in chart  clindamycin IVPB  piperacillin/tazobactam IVPB..  vancomycin  IVPB

## 2019-12-13 NOTE — PROGRESS NOTE ADULT - ASSESSMENT
49 yr old male with necrotizing soft tissue infection of the RLE, admitted to SICU for resuscitation and management of severe sepsis, awaiting emergent guillotine Rt guillotine BKA. He is tachycardic and febrile to 102.7 but otherwise stable.     PLAN:      - IV Tylenol, Dilaudid prn for pain control  - IS, supplemental O2 as needed  - OR today for Guillotine R BKA   - Monitor vitals, maintain MAP>65  - NPO, IVF resuscitation  - Zosyn, Clinda, Vanco for necrotizing soft tissue infection  - Insulin infusion for hyperglycemia with DKA  - DVT ppx: Heparin

## 2019-12-14 LAB
ANION GAP SERPL CALC-SCNC: 11 MMO/L — SIGNIFICANT CHANGE UP (ref 7–14)
ANION GAP SERPL CALC-SCNC: 13 MMO/L — SIGNIFICANT CHANGE UP (ref 7–14)
BACTERIA UR CULT: SIGNIFICANT CHANGE UP
BASE EXCESS BLDA CALC-SCNC: 0.8 MMOL/L — SIGNIFICANT CHANGE UP
BASE EXCESS BLDA CALC-SCNC: 2.8 MMOL/L — SIGNIFICANT CHANGE UP
BASE EXCESS BLDA CALC-SCNC: 3.2 MMOL/L — SIGNIFICANT CHANGE UP
BASE EXCESS BLDA CALC-SCNC: 3.2 MMOL/L — SIGNIFICANT CHANGE UP
BLOOD GAS ARTERIAL - FIO2: 30 — SIGNIFICANT CHANGE UP
BUN SERPL-MCNC: 16 MG/DL — SIGNIFICANT CHANGE UP (ref 7–23)
BUN SERPL-MCNC: 17 MG/DL — SIGNIFICANT CHANGE UP (ref 7–23)
CA-I BLDA-SCNC: 1.08 MMOL/L — LOW (ref 1.15–1.29)
CA-I BLDA-SCNC: 1.09 MMOL/L — LOW (ref 1.15–1.29)
CA-I BLDA-SCNC: 1.21 MMOL/L — SIGNIFICANT CHANGE UP (ref 1.15–1.29)
CALCIUM SERPL-MCNC: 7.8 MG/DL — LOW (ref 8.4–10.5)
CALCIUM SERPL-MCNC: 7.9 MG/DL — LOW (ref 8.4–10.5)
CHLORIDE BLDA-SCNC: 106 MMOL/L — SIGNIFICANT CHANGE UP (ref 96–108)
CHLORIDE SERPL-SCNC: 98 MMOL/L — SIGNIFICANT CHANGE UP (ref 98–107)
CHLORIDE SERPL-SCNC: 99 MMOL/L — SIGNIFICANT CHANGE UP (ref 98–107)
CK SERPL-CCNC: 110 U/L — SIGNIFICANT CHANGE UP (ref 30–200)
CO2 SERPL-SCNC: 22 MMOL/L — SIGNIFICANT CHANGE UP (ref 22–31)
CO2 SERPL-SCNC: 23 MMOL/L — SIGNIFICANT CHANGE UP (ref 22–31)
CREAT SERPL-MCNC: 1.89 MG/DL — HIGH (ref 0.5–1.3)
CREAT SERPL-MCNC: 1.94 MG/DL — HIGH (ref 0.5–1.3)
GLUCOSE BLDA-MCNC: 145 MG/DL — HIGH (ref 70–99)
GLUCOSE BLDA-MCNC: 158 MG/DL — HIGH (ref 70–99)
GLUCOSE BLDA-MCNC: 164 MG/DL — HIGH (ref 70–99)
GLUCOSE BLDA-MCNC: 184 MG/DL — HIGH (ref 70–99)
GLUCOSE SERPL-MCNC: 152 MG/DL — HIGH (ref 70–99)
GLUCOSE SERPL-MCNC: 187 MG/DL — HIGH (ref 70–99)
HCO3 BLDA-SCNC: 25 MMOL/L — SIGNIFICANT CHANGE UP (ref 22–26)
HCO3 BLDA-SCNC: 27 MMOL/L — HIGH (ref 22–26)
HCT VFR BLD CALC: 25.9 % — LOW (ref 39–50)
HCT VFR BLD CALC: 25.9 % — LOW (ref 39–50)
HCT VFR BLDA CALC: 25.1 % — LOW (ref 39–51)
HCT VFR BLDA CALC: 26.7 % — LOW (ref 39–51)
HCT VFR BLDA CALC: 27.2 % — LOW (ref 39–51)
HCT VFR BLDA CALC: 28 % — LOW (ref 39–51)
HGB BLD-MCNC: 8.5 G/DL — LOW (ref 13–17)
HGB BLD-MCNC: 8.6 G/DL — LOW (ref 13–17)
HGB BLDA-MCNC: 8.1 G/DL — LOW (ref 13–17)
HGB BLDA-MCNC: 8.6 G/DL — LOW (ref 13–17)
HGB BLDA-MCNC: 8.8 G/DL — LOW (ref 13–17)
HGB BLDA-MCNC: 9 G/DL — LOW (ref 13–17)
LACTATE BLDA-SCNC: 0.6 MMOL/L — SIGNIFICANT CHANGE UP (ref 0.5–2)
LACTATE BLDA-SCNC: 0.8 MMOL/L — SIGNIFICANT CHANGE UP (ref 0.5–2)
LACTATE BLDA-SCNC: 1.2 MMOL/L — SIGNIFICANT CHANGE UP (ref 0.5–2)
LACTATE BLDA-SCNC: 1.4 MMOL/L — SIGNIFICANT CHANGE UP (ref 0.5–2)
MAGNESIUM SERPL-MCNC: 1.7 MG/DL — SIGNIFICANT CHANGE UP (ref 1.6–2.6)
MAGNESIUM SERPL-MCNC: 1.9 MG/DL — SIGNIFICANT CHANGE UP (ref 1.6–2.6)
MCHC RBC-ENTMCNC: 28.2 PG — SIGNIFICANT CHANGE UP (ref 27–34)
MCHC RBC-ENTMCNC: 28.5 PG — SIGNIFICANT CHANGE UP (ref 27–34)
MCHC RBC-ENTMCNC: 32.8 % — SIGNIFICANT CHANGE UP (ref 32–36)
MCHC RBC-ENTMCNC: 33.2 % — SIGNIFICANT CHANGE UP (ref 32–36)
MCV RBC AUTO: 85.8 FL — SIGNIFICANT CHANGE UP (ref 80–100)
MCV RBC AUTO: 86 FL — SIGNIFICANT CHANGE UP (ref 80–100)
NRBC # FLD: 0 K/UL — SIGNIFICANT CHANGE UP (ref 0–0)
NRBC # FLD: 0 K/UL — SIGNIFICANT CHANGE UP (ref 0–0)
PCO2 BLDA: 40 MMHG — SIGNIFICANT CHANGE UP (ref 35–48)
PCO2 BLDA: 41 MMHG — SIGNIFICANT CHANGE UP (ref 35–48)
PCO2 BLDA: 42 MMHG — SIGNIFICANT CHANGE UP (ref 35–48)
PCO2 BLDA: 42 MMHG — SIGNIFICANT CHANGE UP (ref 35–48)
PH BLDA: 7.39 PH — SIGNIFICANT CHANGE UP (ref 7.35–7.45)
PH BLDA: 7.43 PH — SIGNIFICANT CHANGE UP (ref 7.35–7.45)
PH BLDA: 7.43 PH — SIGNIFICANT CHANGE UP (ref 7.35–7.45)
PH BLDA: 7.44 PH — SIGNIFICANT CHANGE UP (ref 7.35–7.45)
PHOSPHATE SERPL-MCNC: 2.5 MG/DL — SIGNIFICANT CHANGE UP (ref 2.5–4.5)
PHOSPHATE SERPL-MCNC: 3.8 MG/DL — SIGNIFICANT CHANGE UP (ref 2.5–4.5)
PLATELET # BLD AUTO: 317 K/UL — SIGNIFICANT CHANGE UP (ref 150–400)
PLATELET # BLD AUTO: 327 K/UL — SIGNIFICANT CHANGE UP (ref 150–400)
PMV BLD: 10.3 FL — SIGNIFICANT CHANGE UP (ref 7–13)
PMV BLD: 10.4 FL — SIGNIFICANT CHANGE UP (ref 7–13)
PO2 BLDA: 51 MMHG — LOW (ref 83–108)
PO2 BLDA: 51 MMHG — LOW (ref 83–108)
PO2 BLDA: 66 MMHG — LOW (ref 83–108)
PO2 BLDA: 70 MMHG — LOW (ref 83–108)
POTASSIUM BLDA-SCNC: 3.4 MMOL/L — SIGNIFICANT CHANGE UP (ref 3.4–4.5)
POTASSIUM BLDA-SCNC: 3.5 MMOL/L — SIGNIFICANT CHANGE UP (ref 3.4–4.5)
POTASSIUM BLDA-SCNC: 3.5 MMOL/L — SIGNIFICANT CHANGE UP (ref 3.4–4.5)
POTASSIUM BLDA-SCNC: 3.6 MMOL/L — SIGNIFICANT CHANGE UP (ref 3.4–4.5)
POTASSIUM SERPL-MCNC: 3.3 MMOL/L — LOW (ref 3.5–5.3)
POTASSIUM SERPL-MCNC: 3.6 MMOL/L — SIGNIFICANT CHANGE UP (ref 3.5–5.3)
POTASSIUM SERPL-SCNC: 3.3 MMOL/L — LOW (ref 3.5–5.3)
POTASSIUM SERPL-SCNC: 3.6 MMOL/L — SIGNIFICANT CHANGE UP (ref 3.5–5.3)
RBC # BLD: 3.01 M/UL — LOW (ref 4.2–5.8)
RBC # BLD: 3.02 M/UL — LOW (ref 4.2–5.8)
RBC # FLD: 13.1 % — SIGNIFICANT CHANGE UP (ref 10.3–14.5)
RBC # FLD: 13.2 % — SIGNIFICANT CHANGE UP (ref 10.3–14.5)
SAO2 % BLDA: 85.1 % — LOW (ref 95–99)
SAO2 % BLDA: 86.4 % — LOW (ref 95–99)
SAO2 % BLDA: 93.7 % — LOW (ref 95–99)
SAO2 % BLDA: 94.6 % — LOW (ref 95–99)
SODIUM BLDA-SCNC: 130 MMOL/L — LOW (ref 136–146)
SODIUM BLDA-SCNC: 131 MMOL/L — LOW (ref 136–146)
SODIUM BLDA-SCNC: 132 MMOL/L — LOW (ref 136–146)
SODIUM BLDA-SCNC: 138 MMOL/L — SIGNIFICANT CHANGE UP (ref 136–146)
SODIUM SERPL-SCNC: 132 MMOL/L — LOW (ref 135–145)
SODIUM SERPL-SCNC: 134 MMOL/L — LOW (ref 135–145)
SPECIMEN SOURCE: SIGNIFICANT CHANGE UP
VANCOMYCIN TROUGH SERPL-MCNC: 14.7 UG/ML — SIGNIFICANT CHANGE UP (ref 10–20)
WBC # BLD: 17.27 K/UL — HIGH (ref 3.8–10.5)
WBC # BLD: 18.55 K/UL — HIGH (ref 3.8–10.5)
WBC # FLD AUTO: 17.27 K/UL — HIGH (ref 3.8–10.5)
WBC # FLD AUTO: 18.55 K/UL — HIGH (ref 3.8–10.5)

## 2019-12-14 PROCEDURE — 71045 X-RAY EXAM CHEST 1 VIEW: CPT | Mod: 26

## 2019-12-14 PROCEDURE — 93306 TTE W/DOPPLER COMPLETE: CPT | Mod: 26

## 2019-12-14 PROCEDURE — 99291 CRITICAL CARE FIRST HOUR: CPT | Mod: 24

## 2019-12-14 RX ORDER — SODIUM CHLORIDE 9 MG/ML
1000 INJECTION, SOLUTION INTRAVENOUS
Refills: 0 | Status: DISCONTINUED | OUTPATIENT
Start: 2019-12-14 | End: 2019-12-14

## 2019-12-14 RX ORDER — HYDROMORPHONE HYDROCHLORIDE 2 MG/ML
1 INJECTION INTRAMUSCULAR; INTRAVENOUS; SUBCUTANEOUS ONCE
Refills: 0 | Status: DISCONTINUED | OUTPATIENT
Start: 2019-12-14 | End: 2019-12-14

## 2019-12-14 RX ORDER — ACETAMINOPHEN 500 MG
1000 TABLET ORAL ONCE
Refills: 0 | Status: COMPLETED | OUTPATIENT
Start: 2019-12-14 | End: 2019-12-14

## 2019-12-14 RX ORDER — INSULIN LISPRO 100/ML
VIAL (ML) SUBCUTANEOUS AT BEDTIME
Refills: 0 | Status: DISCONTINUED | OUTPATIENT
Start: 2019-12-14 | End: 2019-12-23

## 2019-12-14 RX ORDER — LABETALOL HCL 100 MG
10 TABLET ORAL ONCE
Refills: 0 | Status: COMPLETED | OUTPATIENT
Start: 2019-12-14 | End: 2019-12-14

## 2019-12-14 RX ORDER — ACETAMINOPHEN 500 MG
650 TABLET ORAL ONCE
Refills: 0 | Status: COMPLETED | OUTPATIENT
Start: 2019-12-14 | End: 2019-12-14

## 2019-12-14 RX ORDER — AMLODIPINE BESYLATE 2.5 MG/1
5 TABLET ORAL DAILY
Refills: 0 | Status: DISCONTINUED | OUTPATIENT
Start: 2019-12-14 | End: 2019-12-24

## 2019-12-14 RX ORDER — POTASSIUM CHLORIDE 20 MEQ
20 PACKET (EA) ORAL
Refills: 0 | Status: COMPLETED | OUTPATIENT
Start: 2019-12-14 | End: 2019-12-14

## 2019-12-14 RX ORDER — POTASSIUM PHOSPHATE, MONOBASIC POTASSIUM PHOSPHATE, DIBASIC 236; 224 MG/ML; MG/ML
15 INJECTION, SOLUTION INTRAVENOUS ONCE
Refills: 0 | Status: COMPLETED | OUTPATIENT
Start: 2019-12-14 | End: 2019-12-14

## 2019-12-14 RX ORDER — MAGNESIUM SULFATE 500 MG/ML
2 VIAL (ML) INJECTION ONCE
Refills: 0 | Status: COMPLETED | OUTPATIENT
Start: 2019-12-14 | End: 2019-12-14

## 2019-12-14 RX ORDER — INSULIN LISPRO 100/ML
5 VIAL (ML) SUBCUTANEOUS ONCE
Refills: 0 | Status: COMPLETED | OUTPATIENT
Start: 2019-12-14 | End: 2019-12-14

## 2019-12-14 RX ORDER — OXYCODONE HYDROCHLORIDE 5 MG/1
5 TABLET ORAL EVERY 4 HOURS
Refills: 0 | Status: DISCONTINUED | OUTPATIENT
Start: 2019-12-14 | End: 2019-12-21

## 2019-12-14 RX ORDER — OXYCODONE HYDROCHLORIDE 5 MG/1
10 TABLET ORAL EVERY 8 HOURS
Refills: 0 | Status: DISCONTINUED | OUTPATIENT
Start: 2019-12-14 | End: 2019-12-15

## 2019-12-14 RX ORDER — HYDROMORPHONE HYDROCHLORIDE 2 MG/ML
0.5 INJECTION INTRAMUSCULAR; INTRAVENOUS; SUBCUTANEOUS ONCE
Refills: 0 | Status: DISCONTINUED | OUTPATIENT
Start: 2019-12-14 | End: 2019-12-14

## 2019-12-14 RX ORDER — INSULIN GLARGINE 100 [IU]/ML
24 INJECTION, SOLUTION SUBCUTANEOUS AT BEDTIME
Refills: 0 | Status: DISCONTINUED | OUTPATIENT
Start: 2019-12-14 | End: 2019-12-15

## 2019-12-14 RX ORDER — INSULIN LISPRO 100/ML
VIAL (ML) SUBCUTANEOUS
Refills: 0 | Status: DISCONTINUED | OUTPATIENT
Start: 2019-12-14 | End: 2019-12-16

## 2019-12-14 RX ORDER — INSULIN LISPRO 100/ML
5 VIAL (ML) SUBCUTANEOUS
Refills: 0 | Status: DISCONTINUED | OUTPATIENT
Start: 2019-12-14 | End: 2019-12-24

## 2019-12-14 RX ORDER — HUMAN INSULIN 100 [IU]/ML
8 INJECTION, SUSPENSION SUBCUTANEOUS
Refills: 0 | Status: COMPLETED | OUTPATIENT
Start: 2019-12-14 | End: 2019-12-14

## 2019-12-14 RX ORDER — HYDROMORPHONE HYDROCHLORIDE 2 MG/ML
0.5 INJECTION INTRAMUSCULAR; INTRAVENOUS; SUBCUTANEOUS DAILY
Refills: 0 | Status: DISCONTINUED | OUTPATIENT
Start: 2019-12-14 | End: 2019-12-14

## 2019-12-14 RX ORDER — VANCOMYCIN HCL 1 G
750 VIAL (EA) INTRAVENOUS EVERY 12 HOURS
Refills: 0 | Status: DISCONTINUED | OUTPATIENT
Start: 2019-12-14 | End: 2019-12-14

## 2019-12-14 RX ADMIN — HYDROMORPHONE HYDROCHLORIDE 1 MILLIGRAM(S): 2 INJECTION INTRAMUSCULAR; INTRAVENOUS; SUBCUTANEOUS at 21:00

## 2019-12-14 RX ADMIN — OXYCODONE HYDROCHLORIDE 10 MILLIGRAM(S): 5 TABLET ORAL at 15:28

## 2019-12-14 RX ADMIN — Medication 5 UNIT(S): at 17:07

## 2019-12-14 RX ADMIN — HYDROMORPHONE HYDROCHLORIDE 1 MILLIGRAM(S): 2 INJECTION INTRAMUSCULAR; INTRAVENOUS; SUBCUTANEOUS at 21:15

## 2019-12-14 RX ADMIN — PIPERACILLIN AND TAZOBACTAM 25 GRAM(S): 4; .5 INJECTION, POWDER, LYOPHILIZED, FOR SOLUTION INTRAVENOUS at 04:38

## 2019-12-14 RX ADMIN — HEPARIN SODIUM 5000 UNIT(S): 5000 INJECTION INTRAVENOUS; SUBCUTANEOUS at 05:53

## 2019-12-14 RX ADMIN — Medication 650 MILLIGRAM(S): at 15:27

## 2019-12-14 RX ADMIN — Medication 1: at 13:44

## 2019-12-14 RX ADMIN — Medication 400 MILLIGRAM(S): at 00:14

## 2019-12-14 RX ADMIN — Medication 400 MILLIGRAM(S): at 23:30

## 2019-12-14 RX ADMIN — SODIUM CHLORIDE 75 MILLILITER(S): 9 INJECTION, SOLUTION INTRAVENOUS at 05:53

## 2019-12-14 RX ADMIN — HUMAN INSULIN 8 UNIT(S): 100 INJECTION, SUSPENSION SUBCUTANEOUS at 09:50

## 2019-12-14 RX ADMIN — Medication 100 MILLIGRAM(S): at 05:53

## 2019-12-14 RX ADMIN — OXYCODONE HYDROCHLORIDE 10 MILLIGRAM(S): 5 TABLET ORAL at 15:04

## 2019-12-14 RX ADMIN — PIPERACILLIN AND TAZOBACTAM 25 GRAM(S): 4; .5 INJECTION, POWDER, LYOPHILIZED, FOR SOLUTION INTRAVENOUS at 13:45

## 2019-12-14 RX ADMIN — PIPERACILLIN AND TAZOBACTAM 25 GRAM(S): 4; .5 INJECTION, POWDER, LYOPHILIZED, FOR SOLUTION INTRAVENOUS at 21:00

## 2019-12-14 RX ADMIN — Medication 20 MILLIEQUIVALENT(S): at 05:52

## 2019-12-14 RX ADMIN — POTASSIUM PHOSPHATE, MONOBASIC POTASSIUM PHOSPHATE, DIBASIC 62.5 MILLIMOLE(S): 236; 224 INJECTION, SOLUTION INTRAVENOUS at 20:57

## 2019-12-14 RX ADMIN — HEPARIN SODIUM 5000 UNIT(S): 5000 INJECTION INTRAVENOUS; SUBCUTANEOUS at 21:24

## 2019-12-14 RX ADMIN — INSULIN GLARGINE 24 UNIT(S): 100 INJECTION, SOLUTION SUBCUTANEOUS at 21:25

## 2019-12-14 RX ADMIN — Medication 5 UNIT(S): at 13:42

## 2019-12-14 RX ADMIN — AMLODIPINE BESYLATE 5 MILLIGRAM(S): 2.5 TABLET ORAL at 13:44

## 2019-12-14 RX ADMIN — HYDROMORPHONE HYDROCHLORIDE 0.5 MILLIGRAM(S): 2 INJECTION INTRAMUSCULAR; INTRAVENOUS; SUBCUTANEOUS at 01:16

## 2019-12-14 RX ADMIN — Medication 250 MILLIGRAM(S): at 06:44

## 2019-12-14 RX ADMIN — Medication 20 MILLIEQUIVALENT(S): at 09:46

## 2019-12-14 RX ADMIN — HEPARIN SODIUM 5000 UNIT(S): 5000 INJECTION INTRAVENOUS; SUBCUTANEOUS at 13:46

## 2019-12-14 RX ADMIN — HUMAN INSULIN 8 UNIT(S): 100 INJECTION, SUSPENSION SUBCUTANEOUS at 17:05

## 2019-12-14 RX ADMIN — Medication 5 UNIT(S): at 22:30

## 2019-12-14 RX ADMIN — HYDROMORPHONE HYDROCHLORIDE 1 MILLIGRAM(S): 2 INJECTION INTRAMUSCULAR; INTRAVENOUS; SUBCUTANEOUS at 17:15

## 2019-12-14 RX ADMIN — Medication 650 MILLIGRAM(S): at 16:00

## 2019-12-14 RX ADMIN — Medication 50 GRAM(S): at 05:52

## 2019-12-14 RX ADMIN — HYDROMORPHONE HYDROCHLORIDE 1 MILLIGRAM(S): 2 INJECTION INTRAMUSCULAR; INTRAVENOUS; SUBCUTANEOUS at 15:45

## 2019-12-14 RX ADMIN — Medication 20 MILLIEQUIVALENT(S): at 08:13

## 2019-12-14 RX ADMIN — HYDROMORPHONE HYDROCHLORIDE 1 MILLIGRAM(S): 2 INJECTION INTRAMUSCULAR; INTRAVENOUS; SUBCUTANEOUS at 04:38

## 2019-12-14 RX ADMIN — Medication 10 MILLIGRAM(S): at 13:45

## 2019-12-14 RX ADMIN — Medication 3: at 08:11

## 2019-12-14 RX ADMIN — HYDROMORPHONE HYDROCHLORIDE 1 MILLIGRAM(S): 2 INJECTION INTRAMUSCULAR; INTRAVENOUS; SUBCUTANEOUS at 04:52

## 2019-12-14 RX ADMIN — Medication 1000 MILLIGRAM(S): at 01:00

## 2019-12-14 RX ADMIN — OXYCODONE HYDROCHLORIDE 10 MILLIGRAM(S): 5 TABLET ORAL at 23:32

## 2019-12-14 RX ADMIN — Medication 10 MILLIGRAM(S): at 19:35

## 2019-12-14 RX ADMIN — Medication 5 UNIT(S): at 08:11

## 2019-12-14 RX ADMIN — HYDROMORPHONE HYDROCHLORIDE 0.5 MILLIGRAM(S): 2 INJECTION INTRAMUSCULAR; INTRAVENOUS; SUBCUTANEOUS at 01:31

## 2019-12-14 RX ADMIN — CHLORHEXIDINE GLUCONATE 1 APPLICATION(S): 213 SOLUTION TOPICAL at 06:43

## 2019-12-14 RX ADMIN — HYDROMORPHONE HYDROCHLORIDE 1 MILLIGRAM(S): 2 INJECTION INTRAMUSCULAR; INTRAVENOUS; SUBCUTANEOUS at 08:10

## 2019-12-14 RX ADMIN — HYDROMORPHONE HYDROCHLORIDE 1 MILLIGRAM(S): 2 INJECTION INTRAMUSCULAR; INTRAVENOUS; SUBCUTANEOUS at 08:25

## 2019-12-14 NOTE — OCCUPATIONAL THERAPY INITIAL EVALUATION ADULT - PERTINENT HX OF CURRENT PROBLEM, REHAB EVAL
49 yr old male with necrotizing soft tissue infection of the RLE with severe sepsis. Pt is now s/p right below knee amputation on 12/13/19. Post-op complicated by ARDs.

## 2019-12-14 NOTE — PROGRESS NOTE ADULT - SUBJECTIVE AND OBJECTIVE BOX
SICU Daily Progress Note  =====================================================  Interval/Overnight Events:   ·	S/p Syme amputation of the right foot  ·	Insulin gtt resumed in the setting of volatile pre-operative serial BGs.  ·	Post-operative hypertension with SBP in the 170's-190's, with concomitant tachycardia (HR > 110) and desaturations on room air. ABG concerning for hypoxemia (PaO2 45 and 67 on RA and 3.0 L NC respectively).  ·	Benign cardiopulmonary exam, no chest pain, no dyspnea or tachypnea. Bedside cardiopulmonary ultrasound notable for diffuse scattered B-lines with thick-walled left ventricle, no focal hypokinesis appreciated. Worsening interval CXR with P/F ratio: 233, concern for early/mild ARDS due to sepsis. UOP approx 50 cc/hr.  ·	S/p 40 mg IV Lasix x1 with increase in UOP > 100 cc/hr.     HPI:   TARA BLAKE is a 49 yr old male presenting c/o worsening RLE infection. Pt states he first noticed a right 5th toe wound on 12/2, and saw a physician at [facility] who prescribed Santyl collagenase which he has been using topically. States foot infection continued to worsen until today, when he was seen at [facility] ED, told that he has subcutaneous gas seen on Xray and needed a BKA. Pt wanted a second opinion and left AMA after which he presented to [facility] ED. Currently states pain in the right foot is worsening, and he was able to walk today prior to arriving in ED this afternoon. Pt also with DKA. States he is amenable to urgent surgical intervention and SICU consulted for resuscitation.     PMH:  Type I Diabetes Mellitus     Allergies: No Known Allergies    MEDICATIONS  (STANDING):  chlorhexidine 4% Liquid 1 Application(s) Topical <User Schedule>  clindamycin IVPB 600 milliGRAM(s) IV Intermittent every 8 hours  clindamycin IVPB      dextrose 5% + sodium chloride 0.45%. 1000 milliLiter(s) (75 mL/Hr) IV Continuous <Continuous>  dextrose 5%. 1000 milliLiter(s) (50 mL/Hr) IV Continuous <Continuous>  dextrose 50% Injectable 12.5 Gram(s) IV Push once  dextrose 50% Injectable 25 Gram(s) IV Push once  dextrose 50% Injectable 25 Gram(s) IV Push once  heparin  Injectable 5000 Unit(s) SubCutaneous every 8 hours  influenza   Vaccine 0.5 milliLiter(s) IntraMuscular once  insulin glargine Injectable (LANTUS) 15 Unit(s) SubCutaneous at bedtime  insulin lispro (HumaLOG) corrective regimen sliding scale   SubCutaneous three times a day before meals  insulin lispro (HumaLOG) corrective regimen sliding scale   SubCutaneous at bedtime  insulin lispro Injectable (HumaLOG) 5 Unit(s) SubCutaneous three times a day before meals  piperacillin/tazobactam IVPB.. 3.375 Gram(s) IV Intermittent every 8 hours  vancomycin  IVPB 1000 milliGRAM(s) IV Intermittent every 12 hours    MEDICATIONS  (PRN):  dextrose 40% Gel 15 Gram(s) Oral once PRN Blood Glucose LESS THAN 70 milliGRAM(s)/deciliter  glucagon  Injectable 1 milliGRAM(s) IntraMuscular once PRN Glucose LESS THAN 70 milligrams/deciliter  HYDROmorphone  Injectable 0.5 milliGRAM(s) IV Push every 4 hours PRN Moderate Pain (4 - 6)  HYDROmorphone  Injectable 1 milliGRAM(s) IV Push every 4 hours PRN Severe Pain (7 - 10)  ondansetron Injectable 4 milliGRAM(s) IV Push every 8 hours PRN Nausea and/or Vomiting        ICU Vital Signs Last 24 Hrs  T(C): 37 (13 Dec 2019 09:59), Max: 39.3 (12 Dec 2019 20:47)  T(F): 98.6 (13 Dec 2019 09:59), Max: 102.7 (12 Dec 2019 20:47)  HR: 103 (13 Dec 2019 10:00) (95 - 113)  BP: 183/68 (13 Dec 2019 10:00) (135/65 - 183/68)  BP(mean): 99 (13 Dec 2019 10:00) (81 - 110)  ABP: --  ABP(mean): --  RR: 15 (13 Dec 2019 10:00) (11 - 20)  SpO2: 94% (13 Dec 2019 10:00) (89% - 99%)    CAPILLARY BLOOD GLUCOSE  POCT Blood Glucose.: 164 mg/dL (13 Dec 2019 09:02)  POCT Blood Glucose.: 163 mg/dL (13 Dec 2019 06:05)  POCT Blood Glucose.: 185 mg/dL (13 Dec 2019 05:28)  POCT Blood Glucose.: 86 mg/dL (13 Dec 2019 04:56)  POCT Blood Glucose.: 102 mg/dL (13 Dec 2019 03:59)  POCT Blood Glucose.: 122 mg/dL (13 Dec 2019 02:57)  POCT Blood Glucose.: 147 mg/dL (13 Dec 2019 02:04)  POCT Blood Glucose.: 179 mg/dL (13 Dec 2019 01:10)  POCT Blood Glucose.: 208 mg/dL (13 Dec 2019 00:12)  POCT Blood Glucose.: 205 mg/dL (12 Dec 2019 23:04)  POCT Blood Glucose.: 311 mg/dL (12 Dec 2019 17:47)  POCT Blood Glucose.: 347 mg/dL (12 Dec 2019 15:40)  POCT Blood Glucose.: 422 mg/dL (12 Dec 2019 13:25)    I&O's Detail    12 Dec 2019 07:01  -  13 Dec 2019 07:00  --------------------------------------------------------  IN:    dextrose 5% + lactated ringers.: 225 mL    insulin regular Infusion: 21.5 mL    IV PiggyBack: 2050 mL    lactated ringers.: 500 mL  Total IN: 2796.5 mL    OUT:    Voided: 275 mL  Total OUT: 275 mL    Total NET: 2521.5 mL      13 Dec 2019 07:01  -  13 Dec 2019 10:41  --------------------------------------------------------  IN:    dextrose 5% + lactated ringers.: 300 mL  Total IN: 300 mL    OUT:  Total OUT: 0 mL  Total NET: 300 mL    --------------------------------------------------------------------------------------    EXAM  NEUROLOGY  RASS: 0  	GCS:  15  Exam: Normal, NAD, alert, oriented x3, no focal deficits.     RESPIRATORY  Exam: Nonlabored, CTABL, no wheezes, ronchi, or rales. Normal respiratory effort.     CARDIOVASCULAR  Exam: Normotensive, RRR, no M/R/G     GI/NUTRITION  Exam: Abdomen soft, NT/ND, no rebound or guarding.  Current Diet:  NPO    EXTREMITIES  B/l UE strength and sensation intact. LLE without wounds, well-healed prior lateral debridement noted.   RLE with dorsal and lateral gangrene, with crepitus, significant TTP, and erythema extending to mid-lower leg anteriorly, unchanged from initial exam.     HEMATOLOGIC  [x] VTE Prophylaxis: heparin  Injectable 5000 Unit(s) SubCutaneous every 8 hours    INFECTIOUS DISEASE  Antimicrobials/Immunologic Medications:  clindamycin IVPB 600 milliGRAM(s) IV Intermittent every 8 hours  clindamycin IVPB      influenza   Vaccine 0.5 milliLiter(s) IntraMuscular once  piperacillin/tazobactam IVPB.. 3.375 Gram(s) IV Intermittent every 8 hours  vancomycin  IVPB 1000 milliGRAM(s) IV Intermittent every 12 hours    Tubes/Lines/Drains    [x] Peripheral IV  [] Central Venous Line     	[] R	[] L	[] IJ	[] Fem	[] SC	Date Placed:   [x] Arterial Line		[] R	[x] L	[] Fem	[x] Rad	[] Ax	Date Placed:   [] PICC		[] Midline		[] Mediport  [] Urinary Catheter		  [x] Necessity of urinary, arterial, and venous catheters discussed    LABS  --------------------------------------------------------------------------------------  CBC (12-13 @ 18:47)                              8.5<L>                         21.02<H>  )----------------(  321        --    % Neutrophils, --    % Lymphocytes, ANC: --                                  25.1<L>              CBC (12-13 @ 05:10)                              8.4<L>                         19.15<H>  )----------------(  315        78.5<H>% Neutrophils, 8.7<L>% Lymphocytes, ANC: 15.04<H>                              25.2<L>                BMP (12-13 @ 18:47)             134<L>  |  101     |  17    		Ca++ --      Ca 8.0<L>             ---------------------------------( 195<H>		Mg 1.9                3.6     |  23      |  1.86<H>			Ph 3.1     BMP (12-13 @ 05:10)             --      |  --      |  --    		Ca++ --      Ca --                 ---------------------------------( --    		Mg --                 --      |  --      |  --    			Ph 3.2       ABG (12-13 @ 18:48)     7.38 / 44 / 67<L> / 25 / 1.1 / 93.6<L>%     Lactate: 0.8    ABG (12-13 @ 18:00)     7.45 / 35 / 45<LL> / 25 / 0.4 / 83.8<L>%     Lactate: 1.0      -> BLOOD PERIPHERAL Culture (12-12 @ 15:36)     NG    NG  NG      RADIOLOGY & OTHER STUDIES  Xray Chest 1 View AP/PA:   EXAM:  XR CHEST AP OR PA 1V    PROCEDURE DATE:  Dec 12 2019   IMPRESSION:   No focal consolidation. SICU Daily Progress Note  =====================================================  Interval/Overnight Events:   ·	S/p Syme amputation of the right foot  ·	Insulin gtt resumed in the setting of volatile pre-operative serial BGs.  ·	Post-operative hypertension with SBP in the 170's-190's, with concomitant tachycardia (HR > 110) and desaturations on room air. ABG concerning for hypoxemia (PaO2 45 and 67 on RA and 3.0 L NC respectively).  ·	Benign cardiopulmonary exam, no chest pain, no dyspnea or tachypnea. Bedside cardiopulmonary ultrasound notable for diffuse scattered B-lines with thick-walled left ventricle, no focal hypokinesis appreciated. Worsening interval CXR with P/F ratio: 233, concern for early/mild ARDS due to sepsis. UOP approx 50 cc/hr.  ·	S/p 40 mg IV Lasix x1 with increase in UOP > 100 cc/hr.     Interval:   - NAD, no shortness of breath, chest pain, mildly tachycardic, decreased on NC requirement to 2L sat on 97%, B-line on U/S,   -- Increased Lantus to 24U, NPH 8U BID for 2dose.   -- D/c clindamycin and vancomycin.   -- stop IVF in setting of mild ARDS    HPI:   TARA BLAKE is a 49 yr old male presenting c/o worsening RLE infection. Pt states he first noticed a right 5th toe wound on 12/2, and saw a physician at [facility] who prescribed Santyl collagenase which he has been using topically. States foot infection continued to worsen until today, when he was seen at [facility] ED, told that he has subcutaneous gas seen on Xray and needed a BKA. Pt wanted a second opinion and left AMA after which he presented to [facility] ED. Currently states pain in the right foot is worsening, and he was able to walk today prior to arriving in ED this afternoon. Pt also with DKA. States he is amenable to urgent surgical intervention and SICU consulted for resuscitation.     PMH:  Type I Diabetes Mellitus     Allergies: No Known Allergies    MEDICATIONS  (STANDING):  chlorhexidine 4% Liquid 1 Application(s) Topical <User Schedule>  clindamycin IVPB 600 milliGRAM(s) IV Intermittent every 8 hours  clindamycin IVPB      dextrose 5% + sodium chloride 0.45%. 1000 milliLiter(s) (75 mL/Hr) IV Continuous <Continuous>  dextrose 5%. 1000 milliLiter(s) (50 mL/Hr) IV Continuous <Continuous>  dextrose 50% Injectable 12.5 Gram(s) IV Push once  dextrose 50% Injectable 25 Gram(s) IV Push once  dextrose 50% Injectable 25 Gram(s) IV Push once  heparin  Injectable 5000 Unit(s) SubCutaneous every 8 hours  influenza   Vaccine 0.5 milliLiter(s) IntraMuscular once  insulin glargine Injectable (LANTUS) 15 Unit(s) SubCutaneous at bedtime  insulin lispro (HumaLOG) corrective regimen sliding scale   SubCutaneous three times a day before meals  insulin lispro (HumaLOG) corrective regimen sliding scale   SubCutaneous at bedtime  insulin lispro Injectable (HumaLOG) 5 Unit(s) SubCutaneous three times a day before meals  piperacillin/tazobactam IVPB.. 3.375 Gram(s) IV Intermittent every 8 hours  vancomycin  IVPB 1000 milliGRAM(s) IV Intermittent every 12 hours    MEDICATIONS  (PRN):  dextrose 40% Gel 15 Gram(s) Oral once PRN Blood Glucose LESS THAN 70 milliGRAM(s)/deciliter  glucagon  Injectable 1 milliGRAM(s) IntraMuscular once PRN Glucose LESS THAN 70 milligrams/deciliter  HYDROmorphone  Injectable 0.5 milliGRAM(s) IV Push every 4 hours PRN Moderate Pain (4 - 6)  HYDROmorphone  Injectable 1 milliGRAM(s) IV Push every 4 hours PRN Severe Pain (7 - 10)  ondansetron Injectable 4 milliGRAM(s) IV Push every 8 hours PRN Nausea and/or Vomiting        ICU Vital Signs Last 24 Hrs  T(C): 37 (13 Dec 2019 09:59), Max: 39.3 (12 Dec 2019 20:47)  T(F): 98.6 (13 Dec 2019 09:59), Max: 102.7 (12 Dec 2019 20:47)  HR: 103 (13 Dec 2019 10:00) (95 - 113)  BP: 183/68 (13 Dec 2019 10:00) (135/65 - 183/68)  BP(mean): 99 (13 Dec 2019 10:00) (81 - 110)  ABP: --  ABP(mean): --  RR: 15 (13 Dec 2019 10:00) (11 - 20)  SpO2: 94% (13 Dec 2019 10:00) (89% - 99%)    CAPILLARY BLOOD GLUCOSE  POCT Blood Glucose.: 164 mg/dL (13 Dec 2019 09:02)  POCT Blood Glucose.: 163 mg/dL (13 Dec 2019 06:05)  POCT Blood Glucose.: 185 mg/dL (13 Dec 2019 05:28)  POCT Blood Glucose.: 86 mg/dL (13 Dec 2019 04:56)  POCT Blood Glucose.: 102 mg/dL (13 Dec 2019 03:59)  POCT Blood Glucose.: 122 mg/dL (13 Dec 2019 02:57)  POCT Blood Glucose.: 147 mg/dL (13 Dec 2019 02:04)  POCT Blood Glucose.: 179 mg/dL (13 Dec 2019 01:10)  POCT Blood Glucose.: 208 mg/dL (13 Dec 2019 00:12)  POCT Blood Glucose.: 205 mg/dL (12 Dec 2019 23:04)  POCT Blood Glucose.: 311 mg/dL (12 Dec 2019 17:47)  POCT Blood Glucose.: 347 mg/dL (12 Dec 2019 15:40)  POCT Blood Glucose.: 422 mg/dL (12 Dec 2019 13:25)    I&O's Detail    12 Dec 2019 07:01  -  13 Dec 2019 07:00  --------------------------------------------------------  IN:    dextrose 5% + lactated ringers.: 225 mL    insulin regular Infusion: 21.5 mL    IV PiggyBack: 2050 mL    lactated ringers.: 500 mL  Total IN: 2796.5 mL    OUT:    Voided: 275 mL  Total OUT: 275 mL    Total NET: 2521.5 mL      13 Dec 2019 07:01  -  13 Dec 2019 10:41  --------------------------------------------------------  IN:    dextrose 5% + lactated ringers.: 300 mL  Total IN: 300 mL    OUT:  Total OUT: 0 mL  Total NET: 300 mL    --------------------------------------------------------------------------------------    EXAM  NEUROLOGY  RASS: 0  	GCS:  15  Exam: Normal, NAD, alert, oriented x3, no focal deficits.     RESPIRATORY  Exam: Nonlabored, CTABL, no wheezes, ronchi, or rales. Normal respiratory effort.     CARDIOVASCULAR  Exam: Normotensive, RRR, no M/R/G     GI/NUTRITION  Exam: Abdomen soft, NT/ND, no rebound or guarding.  Current Diet:  NPO    EXTREMITIES  B/l UE strength and sensation intact. LLE without wounds, well-healed prior lateral debridement noted.   RLE with dorsal and lateral gangrene, with crepitus, significant TTP, and erythema extending to mid-lower leg anteriorly, unchanged from initial exam.     HEMATOLOGIC  [x] VTE Prophylaxis: heparin  Injectable 5000 Unit(s) SubCutaneous every 8 hours    INFECTIOUS DISEASE  Antimicrobials/Immunologic Medications:  clindamycin IVPB 600 milliGRAM(s) IV Intermittent every 8 hours  clindamycin IVPB      influenza   Vaccine 0.5 milliLiter(s) IntraMuscular once  piperacillin/tazobactam IVPB.. 3.375 Gram(s) IV Intermittent every 8 hours  vancomycin  IVPB 1000 milliGRAM(s) IV Intermittent every 12 hours    Tubes/Lines/Drains    [x] Peripheral IV  [] Central Venous Line     	[] R	[] L	[] IJ	[] Fem	[] SC	Date Placed:   [x] Arterial Line		[] R	[x] L	[] Fem	[x] Rad	[] Ax	Date Placed:   [] PICC		[] Midline		[] Mediport  [] Urinary Catheter		  [x] Necessity of urinary, arterial, and venous catheters discussed    LABS  --------------------------------------------------------------------------------------  CBC (12-13 @ 18:47)                              8.5<L>                         21.02<H>  )----------------(  321        --    % Neutrophils, --    % Lymphocytes, ANC: --                                  25.1<L>              CBC (12-13 @ 05:10)                              8.4<L>                         19.15<H>  )----------------(  315        78.5<H>% Neutrophils, 8.7<L>% Lymphocytes, ANC: 15.04<H>                              25.2<L>                BMP (12-13 @ 18:47)             134<L>  |  101     |  17    		Ca++ --      Ca 8.0<L>             ---------------------------------( 195<H>		Mg 1.9                3.6     |  23      |  1.86<H>			Ph 3.1     BMP (12-13 @ 05:10)             --      |  --      |  --    		Ca++ --      Ca --                 ---------------------------------( --    		Mg --                 --      |  --      |  --    			Ph 3.2       ABG (12-13 @ 18:48)     7.38 / 44 / 67<L> / 25 / 1.1 / 93.6<L>%     Lactate: 0.8    ABG (12-13 @ 18:00)     7.45 / 35 / 45<LL> / 25 / 0.4 / 83.8<L>%     Lactate: 1.0      -> BLOOD PERIPHERAL Culture (12-12 @ 15:36)     NG    NG  NG      RADIOLOGY & OTHER STUDIES  Xray Chest 1 View AP/PA:   EXAM:  XR CHEST AP OR PA 1V    PROCEDURE DATE:  Dec 12 2019   IMPRESSION:   No focal consolidation. SICU Daily Progress Note  =====================================================  Interval/Overnight Events:   ·	S/p Syme amputation of the right foot  ·	Insulin gtt resumed in the setting of volatile pre-operative serial BGs.  ·	Post-operative hypertension with SBP in the 170's-190's, with concomitant tachycardia (HR > 110) and desaturations on room air. ABG concerning for hypoxemia (PaO2 45 and 67 on RA and 3.0 L NC respectively).  ·	Benign cardiopulmonary exam, no chest pain, no dyspnea or tachypnea. Bedside cardiopulmonary ultrasound notable for diffuse scattered B-lines with thick-walled left ventricle, no focal hypokinesis appreciated. Worsening interval CXR with P/F ratio: 233, concern for early/mild ARDS due to sepsis. UOP approx 50 cc/hr.  ·	S/p 40 mg IV Lasix x1 with increase in UOP > 100 cc/hr.     Interval:   - NAD, no shortness of breath, chest pain, mildly tachycardic, decreased on NC requirement to 2L sat on 97%, B-line on U/S,   -- Increased Lantus to 24U, NPH 8U BID for 2dose.   -- D/c clindamycin and vancomycin.   -- stop IVF in setting of mild ARDS  -- febrile broke w. tylenol.  -- no sign of cellulitis or purulent discharge at the wound site, no crepitus or necrotizing tissue   -- persistent hypoxic despite increasing O2 on NC - will place on CPAP,    HPI:   TARA BLAKE is a 49 yr old male presenting c/o worsening RLE infection. Pt states he first noticed a right 5th toe wound on 12/2, and saw a physician at [facility] who prescribed Santyl collagenase which he has been using topically. States foot infection continued to worsen until today, when he was seen at [facility] ED, told that he has subcutaneous gas seen on Xray and needed a BKA. Pt wanted a second opinion and left AMA after which he presented to [facility] ED. Currently states pain in the right foot is worsening, and he was able to walk today prior to arriving in ED this afternoon. Pt also with DKA. States he is amenable to urgent surgical intervention and SICU consulted for resuscitation.     PMH:  Type I Diabetes Mellitus     Allergies: No Known Allergies    MEDICATIONS  (STANDING):  chlorhexidine 4% Liquid 1 Application(s) Topical <User Schedule>  clindamycin IVPB 600 milliGRAM(s) IV Intermittent every 8 hours  clindamycin IVPB      dextrose 5% + sodium chloride 0.45%. 1000 milliLiter(s) (75 mL/Hr) IV Continuous <Continuous>  dextrose 5%. 1000 milliLiter(s) (50 mL/Hr) IV Continuous <Continuous>  dextrose 50% Injectable 12.5 Gram(s) IV Push once  dextrose 50% Injectable 25 Gram(s) IV Push once  dextrose 50% Injectable 25 Gram(s) IV Push once  heparin  Injectable 5000 Unit(s) SubCutaneous every 8 hours  influenza   Vaccine 0.5 milliLiter(s) IntraMuscular once  insulin glargine Injectable (LANTUS) 15 Unit(s) SubCutaneous at bedtime  insulin lispro (HumaLOG) corrective regimen sliding scale   SubCutaneous three times a day before meals  insulin lispro (HumaLOG) corrective regimen sliding scale   SubCutaneous at bedtime  insulin lispro Injectable (HumaLOG) 5 Unit(s) SubCutaneous three times a day before meals  piperacillin/tazobactam IVPB.. 3.375 Gram(s) IV Intermittent every 8 hours  vancomycin  IVPB 1000 milliGRAM(s) IV Intermittent every 12 hours    MEDICATIONS  (PRN):  dextrose 40% Gel 15 Gram(s) Oral once PRN Blood Glucose LESS THAN 70 milliGRAM(s)/deciliter  glucagon  Injectable 1 milliGRAM(s) IntraMuscular once PRN Glucose LESS THAN 70 milligrams/deciliter  HYDROmorphone  Injectable 0.5 milliGRAM(s) IV Push every 4 hours PRN Moderate Pain (4 - 6)  HYDROmorphone  Injectable 1 milliGRAM(s) IV Push every 4 hours PRN Severe Pain (7 - 10)  ondansetron Injectable 4 milliGRAM(s) IV Push every 8 hours PRN Nausea and/or Vomiting        ICU Vital Signs Last 24 Hrs  T(C): 37 (13 Dec 2019 09:59), Max: 39.3 (12 Dec 2019 20:47)  T(F): 98.6 (13 Dec 2019 09:59), Max: 102.7 (12 Dec 2019 20:47)  HR: 103 (13 Dec 2019 10:00) (95 - 113)  BP: 183/68 (13 Dec 2019 10:00) (135/65 - 183/68)  BP(mean): 99 (13 Dec 2019 10:00) (81 - 110)  ABP: --  ABP(mean): --  RR: 15 (13 Dec 2019 10:00) (11 - 20)  SpO2: 94% (13 Dec 2019 10:00) (89% - 99%)    CAPILLARY BLOOD GLUCOSE  POCT Blood Glucose.: 164 mg/dL (13 Dec 2019 09:02)  POCT Blood Glucose.: 163 mg/dL (13 Dec 2019 06:05)  POCT Blood Glucose.: 185 mg/dL (13 Dec 2019 05:28)  POCT Blood Glucose.: 86 mg/dL (13 Dec 2019 04:56)  POCT Blood Glucose.: 102 mg/dL (13 Dec 2019 03:59)  POCT Blood Glucose.: 122 mg/dL (13 Dec 2019 02:57)  POCT Blood Glucose.: 147 mg/dL (13 Dec 2019 02:04)  POCT Blood Glucose.: 179 mg/dL (13 Dec 2019 01:10)  POCT Blood Glucose.: 208 mg/dL (13 Dec 2019 00:12)  POCT Blood Glucose.: 205 mg/dL (12 Dec 2019 23:04)  POCT Blood Glucose.: 311 mg/dL (12 Dec 2019 17:47)  POCT Blood Glucose.: 347 mg/dL (12 Dec 2019 15:40)  POCT Blood Glucose.: 422 mg/dL (12 Dec 2019 13:25)    I&O's Detail    12 Dec 2019 07:01  -  13 Dec 2019 07:00  --------------------------------------------------------  IN:    dextrose 5% + lactated ringers.: 225 mL    insulin regular Infusion: 21.5 mL    IV PiggyBack: 2050 mL    lactated ringers.: 500 mL  Total IN: 2796.5 mL    OUT:    Voided: 275 mL  Total OUT: 275 mL    Total NET: 2521.5 mL      13 Dec 2019 07:01  -  13 Dec 2019 10:41  --------------------------------------------------------  IN:    dextrose 5% + lactated ringers.: 300 mL  Total IN: 300 mL    OUT:  Total OUT: 0 mL  Total NET: 300 mL    --------------------------------------------------------------------------------------    EXAM  NEUROLOGY  RASS: 0  	GCS:  15  Exam: Normal, NAD, alert, oriented x3, no focal deficits.     RESPIRATORY  Exam: Nonlabored, CTABL, no wheezes, ronchi, or rales. Normal respiratory effort.     CARDIOVASCULAR  Exam: Normotensive, RRR, no M/R/G     GI/NUTRITION  Exam: Abdomen soft, NT/ND, no rebound or guarding.  Current Diet:  NPO    EXTREMITIES  B/l UE strength and sensation intact. LLE without wounds, well-healed prior lateral debridement noted.   RLE with dorsal and lateral gangrene, with crepitus, significant TTP, and erythema extending to mid-lower leg anteriorly, unchanged from initial exam.     HEMATOLOGIC  [x] VTE Prophylaxis: heparin  Injectable 5000 Unit(s) SubCutaneous every 8 hours    INFECTIOUS DISEASE  Antimicrobials/Immunologic Medications:  clindamycin IVPB 600 milliGRAM(s) IV Intermittent every 8 hours  clindamycin IVPB      influenza   Vaccine 0.5 milliLiter(s) IntraMuscular once  piperacillin/tazobactam IVPB.. 3.375 Gram(s) IV Intermittent every 8 hours  vancomycin  IVPB 1000 milliGRAM(s) IV Intermittent every 12 hours    Tubes/Lines/Drains    [x] Peripheral IV  [] Central Venous Line     	[] R	[] L	[] IJ	[] Fem	[] SC	Date Placed:   [x] Arterial Line		[] R	[x] L	[] Fem	[x] Rad	[] Ax	Date Placed:   [] PICC		[] Midline		[] Mediport  [] Urinary Catheter		  [x] Necessity of urinary, arterial, and venous catheters discussed    LABS  --------------------------------------------------------------------------------------  CBC (12-13 @ 18:47)                              8.5<L>                         21.02<H>  )----------------(  321        --    % Neutrophils, --    % Lymphocytes, ANC: --                                  25.1<L>              CBC (12-13 @ 05:10)                              8.4<L>                         19.15<H>  )----------------(  315        78.5<H>% Neutrophils, 8.7<L>% Lymphocytes, ANC: 15.04<H>                              25.2<L>                BMP (12-13 @ 18:47)             134<L>  |  101     |  17    		Ca++ --      Ca 8.0<L>             ---------------------------------( 195<H>		Mg 1.9                3.6     |  23      |  1.86<H>			Ph 3.1     BMP (12-13 @ 05:10)             --      |  --      |  --    		Ca++ --      Ca --                 ---------------------------------( --    		Mg --                 --      |  --      |  --    			Ph 3.2       ABG (12-13 @ 18:48)     7.38 / 44 / 67<L> / 25 / 1.1 / 93.6<L>%     Lactate: 0.8    ABG (12-13 @ 18:00)     7.45 / 35 / 45<LL> / 25 / 0.4 / 83.8<L>%     Lactate: 1.0      -> BLOOD PERIPHERAL Culture (12-12 @ 15:36)     NG    NG  NG      RADIOLOGY & OTHER STUDIES  Xray Chest 1 View AP/PA:   EXAM:  XR CHEST AP OR PA 1V    PROCEDURE DATE:  Dec 12 2019   IMPRESSION:   No focal consolidation.

## 2019-12-14 NOTE — OCCUPATIONAL THERAPY INITIAL EVALUATION ADULT - PLANNED THERAPY INTERVENTIONS, OT EVAL
ADL retraining/bed mobility training/strengthening/neuromuscular re-education/balance training/transfer training

## 2019-12-14 NOTE — CHART NOTE - NSCHARTNOTEFT_GEN_A_CORE
SURGICAL POST-OP CHECK NOTE:    Procedure: Jerson GROVES    Subjective:     Vital Signs Last 24 Hrs  T(C): 37.8 (13 Dec 2019 20:00), Max: 38.2 (13 Dec 2019 16:16)  T(F): 100.1 (13 Dec 2019 20:00), Max: 100.7 (13 Dec 2019 16:16)  HR: 110 (13 Dec 2019 22:00) (95 - 121)  BP: 134/63 (13 Dec 2019 21:00) (134/63 - 183/68)  BP(mean): 81 (13 Dec 2019 21:00) (81 - 112)  RR: 21 (13 Dec 2019 22:00) (11 - 29)  SpO2: 94% (13 Dec 2019 22:00) (85% - 97%)      I&O's Detail    12 Dec 2019 07:01  -  13 Dec 2019 07:00  --------------------------------------------------------  IN:    dextrose 5% + lactated ringers.: 225 mL    insulin regular Infusion: 21.5 mL    IV PiggyBack: 2050 mL    lactated ringers.: 500 mL  Total IN: 2796.5 mL    OUT:    Voided: 275 mL  Total OUT: 275 mL    Total NET: 2521.5 mL      13 Dec 2019 07:01  -  14 Dec 2019 00:14  --------------------------------------------------------  IN:    dextrose 5% + lactated ringers.: 900 mL    dextrose 5% + sodium chloride 0.45%.: 262.5 mL    insulin regular Infusion: 7.5 mL    insulin regular Infusion: 2 mL    insulin regular Infusion: 12 mL    IV PiggyBack: 400 mL  Total IN: 1584 mL    OUT:    Voided: 1280 mL  Total OUT: 1280 mL    Total NET: 304 mL                                  8.5    21.02 )-----------( 321      ( 13 Dec 2019 18:47 )             25.1     12-13    134<L>  |  101  |  17  ----------------------------<  195<H>  3.6   |  23  |  1.86<H>    Ca    8.0<L>      13 Dec 2019 18:47  Phos  3.1     12-13  Mg     1.9     12-13    TPro  8.8<H>  /  Alb  2.6<L>  /  TBili  0.4  /  DBili  x   /  AST  45<H>  /  ALT  100<H>  /  AlkPhos  344<H>  12-12   PT/INR - ( 12 Dec 2019 14:25 )   PT: 15.4 SEC;   INR: 1.34              PHYSICAL EXAM:  Gen: NAD, well-developed  Neuro: AAOX3,   CV: S1S2, r/r/r, (-)m/r/g  Pulm: LS CTA  GI: abd s/nt/nd  Ext: Dressing c/d/i, WWP, no pitting edema SURGICAL POST-OP CHECK NOTE:    Procedure: Jerson GROVES    Subjective:     Vital Signs Last 24 Hrs  T(C): 37.8 (13 Dec 2019 20:00), Max: 38.2 (13 Dec 2019 16:16)  T(F): 100.1 (13 Dec 2019 20:00), Max: 100.7 (13 Dec 2019 16:16)  HR: 110 (13 Dec 2019 22:00) (95 - 121)  BP: 134/63 (13 Dec 2019 21:00) (134/63 - 183/68)  BP(mean): 81 (13 Dec 2019 21:00) (81 - 112)  RR: 21 (13 Dec 2019 22:00) (11 - 29)  SpO2: 94% (13 Dec 2019 22:00) (85% - 97%)      I&O's Detail    12 Dec 2019 07:01  -  13 Dec 2019 07:00  --------------------------------------------------------  IN:    dextrose 5% + lactated ringers.: 225 mL    insulin regular Infusion: 21.5 mL    IV PiggyBack: 2050 mL    lactated ringers.: 500 mL  Total IN: 2796.5 mL    OUT:    Voided: 275 mL  Total OUT: 275 mL    Total NET: 2521.5 mL      13 Dec 2019 07:01  -  14 Dec 2019 00:14  --------------------------------------------------------  IN:    dextrose 5% + lactated ringers.: 900 mL    dextrose 5% + sodium chloride 0.45%.: 262.5 mL    insulin regular Infusion: 7.5 mL    insulin regular Infusion: 2 mL    insulin regular Infusion: 12 mL    IV PiggyBack: 400 mL  Total IN: 1584 mL    OUT:    Voided: 1280 mL  Total OUT: 1280 mL    Total NET: 304 mL                                  8.5    21.02 )-----------( 321      ( 13 Dec 2019 18:47 )             25.1     12-13    134<L>  |  101  |  17  ----------------------------<  195<H>  3.6   |  23  |  1.86<H>    Ca    8.0<L>      13 Dec 2019 18:47  Phos  3.1     12-13  Mg     1.9     12-13    TPro  8.8<H>  /  Alb  2.6<L>  /  TBili  0.4  /  DBili  x   /  AST  45<H>  /  ALT  100<H>  /  AlkPhos  344<H>  12-12   PT/INR - ( 12 Dec 2019 14:25 )   PT: 15.4 SEC;   INR: 1.34              PHYSICAL EXAM:  Gen: NAD, well-developed  Neuro: AAOX3,   CV: S1S2, r/r/r, (-)m/r/g  Pulm: LS CTA  GI: abd s/nt/nd  Ext: Dressing c/d/i, WWP, no pitting edema      A/P: 48 y/o male with history of poorly controlled diabetes presented with necrotizing fascitis of R foot now s/p Jerson GROVES on 12/14. He is recovering well and is distressed about losing his foot. He is still tachycardic and febrile but otherwise stable.     Plan:    - Completion BKA sometime next week  - Vanc, Clinda, Zosyn   - Insulin drip for hyperglycemia  - Labetalol for HTN  - Dilaudid for pain  - CC diet  - DVT ppx: Heparin SURGICAL POST-OP CHECK NOTE:    Procedure: Jerson GROVES    Subjective: He is recovering well and just having some low grade fevers and pain. He denies chest pain, SOB, N/V, weakness, or numbness    Vital Signs Last 24 Hrs  T(C): 37.8 (13 Dec 2019 20:00), Max: 38.2 (13 Dec 2019 16:16)  T(F): 100.1 (13 Dec 2019 20:00), Max: 100.7 (13 Dec 2019 16:16)  HR: 110 (13 Dec 2019 22:00) (95 - 121)  BP: 134/63 (13 Dec 2019 21:00) (134/63 - 183/68)  BP(mean): 81 (13 Dec 2019 21:00) (81 - 112)  RR: 21 (13 Dec 2019 22:00) (11 - 29)  SpO2: 94% (13 Dec 2019 22:00) (85% - 97%)      I&O's Detail    12 Dec 2019 07:01  -  13 Dec 2019 07:00  --------------------------------------------------------  IN:    dextrose 5% + lactated ringers.: 225 mL    insulin regular Infusion: 21.5 mL    IV PiggyBack: 2050 mL    lactated ringers.: 500 mL  Total IN: 2796.5 mL    OUT:    Voided: 275 mL  Total OUT: 275 mL    Total NET: 2521.5 mL      13 Dec 2019 07:01  -  14 Dec 2019 00:14  --------------------------------------------------------  IN:    dextrose 5% + lactated ringers.: 900 mL    dextrose 5% + sodium chloride 0.45%.: 262.5 mL    insulin regular Infusion: 7.5 mL    insulin regular Infusion: 2 mL    insulin regular Infusion: 12 mL    IV PiggyBack: 400 mL  Total IN: 1584 mL    OUT:    Voided: 1280 mL  Total OUT: 1280 mL    Total NET: 304 mL                                  8.5    21.02 )-----------( 321      ( 13 Dec 2019 18:47 )             25.1     12-13    134<L>  |  101  |  17  ----------------------------<  195<H>  3.6   |  23  |  1.86<H>    Ca    8.0<L>      13 Dec 2019 18:47  Phos  3.1     12-13  Mg     1.9     12-13    TPro  8.8<H>  /  Alb  2.6<L>  /  TBili  0.4  /  DBili  x   /  AST  45<H>  /  ALT  100<H>  /  AlkPhos  344<H>  12-12   PT/INR - ( 12 Dec 2019 14:25 )   PT: 15.4 SEC;   INR: 1.34              PHYSICAL EXAM:  Gen: NAD, well-developed  Neuro: AAOX3,   CV: S1S2, tachycardic with regular rhythm (-)m/r/g  Pulm: Bilateral crackles, no wheezes or rhonchi  GI: abd s/nt/nd  Ext: R BKA Dressing c/d/i, WWP, no pitting edema, calf without tenderness or tension      A/P: 50 y/o male with history of poorly controlled diabetes presented with necrotizing fascitis of R foot now s/p Jerson GROVES on 12/14. He is recovering well and is distressed about losing his foot. He had some ARDs post operatively and was given lasix and supplemental oxygen. His blood sugars are well controlled on insulin drip and given labetalol periodically for hypertension. He is still tachycardic and febrile but otherwise stable.     Plan:    - Completion BKA sometime next week  - Vanc, Clinda, Zosyn   - Insulin drip for hyperglycemia  - Labetalol for HTN  - Supplemental O2 as needed, repeat CXRs to evaluate for resolution ARDs  - Dilaudid for pain  - CC diet  - DVT ppx: Heparin SURGICAL POST-OP CHECK NOTE:    Procedure: Jerson GROVES    Subjective: He is recovering well and just having some low grade fevers and pain. He denies chest pain, SOB, N/V, weakness, or numbness    Vital Signs Last 24 Hrs  T(C): 37.8 (13 Dec 2019 20:00), Max: 38.2 (13 Dec 2019 16:16)  T(F): 100.1 (13 Dec 2019 20:00), Max: 100.7 (13 Dec 2019 16:16)  HR: 110 (13 Dec 2019 22:00) (95 - 121)  BP: 134/63 (13 Dec 2019 21:00) (134/63 - 183/68)  BP(mean): 81 (13 Dec 2019 21:00) (81 - 112)  RR: 21 (13 Dec 2019 22:00) (11 - 29)  SpO2: 94% (13 Dec 2019 22:00) (85% - 97%)      I&O's Detail    12 Dec 2019 07:01  -  13 Dec 2019 07:00  --------------------------------------------------------  IN:    dextrose 5% + lactated ringers.: 225 mL    insulin regular Infusion: 21.5 mL    IV PiggyBack: 2050 mL    lactated ringers.: 500 mL  Total IN: 2796.5 mL    OUT:    Voided: 275 mL  Total OUT: 275 mL    Total NET: 2521.5 mL      13 Dec 2019 07:01  -  14 Dec 2019 00:14  --------------------------------------------------------  IN:    dextrose 5% + lactated ringers.: 900 mL    dextrose 5% + sodium chloride 0.45%.: 262.5 mL    insulin regular Infusion: 7.5 mL    insulin regular Infusion: 2 mL    insulin regular Infusion: 12 mL    IV PiggyBack: 400 mL  Total IN: 1584 mL    OUT:    Voided: 1280 mL  Total OUT: 1280 mL    Total NET: 304 mL                                  8.5    21.02 )-----------( 321      ( 13 Dec 2019 18:47 )             25.1     12-13    134<L>  |  101  |  17  ----------------------------<  195<H>  3.6   |  23  |  1.86<H>    Ca    8.0<L>      13 Dec 2019 18:47  Phos  3.1     12-13  Mg     1.9     12-13    TPro  8.8<H>  /  Alb  2.6<L>  /  TBili  0.4  /  DBili  x   /  AST  45<H>  /  ALT  100<H>  /  AlkPhos  344<H>  12-12   PT/INR - ( 12 Dec 2019 14:25 )   PT: 15.4 SEC;   INR: 1.34              PHYSICAL EXAM:  Gen: NAD, well-developed  Neuro: AAOX3,   CV: S1S2, tachycardic with regular rhythm (-)m/r/g  Pulm: Bilateral crackles, no wheezes or rhonchi  GI: abd s/nt/nd  Ext: R BKA Dressing c/d/i, WWP, no pitting edema, calf without tenderness or tension      A/P: 48 y/o male with history of poorly controlled diabetes presented with necrotizing fascitis of R foot now s/p Jerson GROVES on 12/14. He is recovering well and is distressed about losing his foot. He had some ARDs post operatively and was given lasix and supplemental oxygen. His blood sugars are well controlled on insulin drip and given labetalol periodically for hypertension. He is still tachycardic and febrile but otherwise stable.     Plan:    - Completion BKA sometime next week  - Vanc, Clinda, Zosyn   - Insulin drip for hyperglycemia  - Labetalol for HTN  - Supplemental O2 as needed, repeat CXRs to evaluate for resolution ARDs  - Dilaudid for pain  - CC diet  - DVT ppx: Heparin    Covering Attd:   As above

## 2019-12-14 NOTE — OCCUPATIONAL THERAPY INITIAL EVALUATION ADULT - GENERAL OBSERVATIONS, REHAB EVAL
Pt received semisupine in bed in NAD. + cardiac monitor. + IV. + oxygen via nasal cannula. Per RN [place], pt okay to participate in OT evaluation.

## 2019-12-14 NOTE — PROGRESS NOTE ADULT - ASSESSMENT
ASSESSMENT:  49 yr old male with necrotizing soft tissue infection of the RLE, admitted to SICU for resuscitation and management of severe sepsis s/p right Syme amputation.    PLAN:    Neurologic:   - Ofirmev, Dilaudid prn for pain control    Respiratory:   - Suspected early ARDS secondary to sepsis.   - Monitor respiratory status, wean off supplemental O2 as tolerated.  - OOBTC daily, encourage use of incentive spirometer    Cardiovascular:   - Monitor vitals, maintain MAP>65    Gastrointestinal/Nutrition:   - NPO, IVF resuscitation    Renal/Genitourinary:   - Monitor urine output  - Trend electrolytes, replete as needed    Hematologic:   - C/w Heparin SQ for DVT ppx  - Trend H/H, transfuse if needed    Infectious Disease:   - Zosyn, Clindamycin, Vancomycin for necrotizing soft tissue infection  - Monitor for fevers, RTOR for completion BKA anticipated over the next week   - Trend WBC    Endocrine:   - Insulin infusion for hyperglycemia resumed pre-operatively and maintained post-operatively.  - Transitioned to Lantus + ISS post-operatively.  - Monitor FS, and continue ISS    Disposition:   Continued critical care management ASSESSMENT:  49 yr old male with necrotizing soft tissue infection of the RLE, admitted to SICU for resuscitation and management of severe sepsis s/p right Syme amputation.    PLAN:    Neurologic:   - Ofirmev, Dilaudid prn for pain control    Respiratory:   - Suspected early ARDS secondary to sepsis. P/F 230s   - Monitor respiratory status, wean off supplemental O2 as tolerated.  - OOBTC daily, encourage use of incentive spirometer    Cardiovascular:   - hypertension w. L ventricular hypertrophy concerning for long term HTN, will get echo for further eval  - Monitor vitals, maintain MAP>65    Gastrointestinal/Nutrition:   - advance diet as tolerate     Renal/Genitourinary:   - Monitor urine output  - Trend electrolytes, replete as needed    Hematologic:   - C/w Heparin SQ for DVT ppx  - Trend H/H, transfuse if needed    Infectious Disease:   - s/p Zosyn, Clindamycin (12/12-12/13), Vancomycin (12/12-12/13) and d/c clindamycin and vancomycin after neg Bcx   - Monitor for fevers, RTOR for completion BKA anticipated over the next week   - Trend WBC    Endocrine:   - off insulin ggt placed, on 24 lantus and 8 NPH and ISS   - Monitor FS, and continue ISS    Disposition:   Continued critical care management

## 2019-12-15 LAB
ANION GAP SERPL CALC-SCNC: 11 MMO/L — SIGNIFICANT CHANGE UP (ref 7–14)
BASE EXCESS BLDA CALC-SCNC: 2.7 MMOL/L — SIGNIFICANT CHANGE UP
BLOOD GAS ARTERIAL - FIO2: 21 — SIGNIFICANT CHANGE UP
BUN SERPL-MCNC: 17 MG/DL — SIGNIFICANT CHANGE UP (ref 7–23)
CA-I BLDA-SCNC: 1.09 MMOL/L — LOW (ref 1.15–1.29)
CALCIUM SERPL-MCNC: 7.6 MG/DL — LOW (ref 8.4–10.5)
CHLORIDE SERPL-SCNC: 99 MMOL/L — SIGNIFICANT CHANGE UP (ref 98–107)
CO2 SERPL-SCNC: 22 MMOL/L — SIGNIFICANT CHANGE UP (ref 22–31)
CREAT SERPL-MCNC: 1.84 MG/DL — HIGH (ref 0.5–1.3)
GLUCOSE BLDA-MCNC: 89 MG/DL — SIGNIFICANT CHANGE UP (ref 70–99)
GLUCOSE BLDC GLUCOMTR-MCNC: 208 MG/DL — HIGH (ref 70–99)
GLUCOSE SERPL-MCNC: 93 MG/DL — SIGNIFICANT CHANGE UP (ref 70–99)
HCO3 BLDA-SCNC: 27 MMOL/L — HIGH (ref 22–26)
HCT VFR BLD CALC: 25 % — LOW (ref 39–50)
HCT VFR BLDA CALC: 25.9 % — LOW (ref 39–51)
HGB BLD-MCNC: 8.1 G/DL — LOW (ref 13–17)
HGB BLDA-MCNC: 8.3 G/DL — LOW (ref 13–17)
LACTATE BLDA-SCNC: 0.7 MMOL/L — SIGNIFICANT CHANGE UP (ref 0.5–2)
MAGNESIUM SERPL-MCNC: 1.9 MG/DL — SIGNIFICANT CHANGE UP (ref 1.6–2.6)
MCHC RBC-ENTMCNC: 28.4 PG — SIGNIFICANT CHANGE UP (ref 27–34)
MCHC RBC-ENTMCNC: 32.4 % — SIGNIFICANT CHANGE UP (ref 32–36)
MCV RBC AUTO: 87.7 FL — SIGNIFICANT CHANGE UP (ref 80–100)
NRBC # FLD: 0 K/UL — SIGNIFICANT CHANGE UP (ref 0–0)
PCO2 BLDA: 42 MMHG — SIGNIFICANT CHANGE UP (ref 35–48)
PH BLDA: 7.42 PH — SIGNIFICANT CHANGE UP (ref 7.35–7.45)
PHOSPHATE SERPL-MCNC: 3.8 MG/DL — SIGNIFICANT CHANGE UP (ref 2.5–4.5)
PLATELET # BLD AUTO: 323 K/UL — SIGNIFICANT CHANGE UP (ref 150–400)
PMV BLD: 10.8 FL — SIGNIFICANT CHANGE UP (ref 7–13)
PO2 BLDA: 80 MMHG — LOW (ref 83–108)
POTASSIUM BLDA-SCNC: 3.2 MMOL/L — LOW (ref 3.4–4.5)
POTASSIUM SERPL-MCNC: 3.4 MMOL/L — LOW (ref 3.5–5.3)
POTASSIUM SERPL-SCNC: 3.4 MMOL/L — LOW (ref 3.5–5.3)
RBC # BLD: 2.85 M/UL — LOW (ref 4.2–5.8)
RBC # FLD: 13.3 % — SIGNIFICANT CHANGE UP (ref 10.3–14.5)
SAO2 % BLDA: 95.9 % — SIGNIFICANT CHANGE UP (ref 95–99)
SODIUM BLDA-SCNC: 134 MMOL/L — LOW (ref 136–146)
SODIUM SERPL-SCNC: 132 MMOL/L — LOW (ref 135–145)
WBC # BLD: 14.93 K/UL — HIGH (ref 3.8–10.5)
WBC # FLD AUTO: 14.93 K/UL — HIGH (ref 3.8–10.5)

## 2019-12-15 PROCEDURE — 71045 X-RAY EXAM CHEST 1 VIEW: CPT | Mod: 26

## 2019-12-15 PROCEDURE — 99291 CRITICAL CARE FIRST HOUR: CPT | Mod: 24

## 2019-12-15 RX ORDER — POTASSIUM CHLORIDE 20 MEQ
40 PACKET (EA) ORAL ONCE
Refills: 0 | Status: COMPLETED | OUTPATIENT
Start: 2019-12-15 | End: 2019-12-15

## 2019-12-15 RX ORDER — INSULIN GLARGINE 100 [IU]/ML
20 INJECTION, SOLUTION SUBCUTANEOUS AT BEDTIME
Refills: 0 | Status: DISCONTINUED | OUTPATIENT
Start: 2019-12-15 | End: 2019-12-16

## 2019-12-15 RX ORDER — OXYCODONE HYDROCHLORIDE 5 MG/1
10 TABLET ORAL EVERY 4 HOURS
Refills: 0 | Status: DISCONTINUED | OUTPATIENT
Start: 2019-12-15 | End: 2019-12-22

## 2019-12-15 RX ORDER — SODIUM CHLORIDE 9 MG/ML
1000 INJECTION, SOLUTION INTRAVENOUS
Refills: 0 | Status: DISCONTINUED | OUTPATIENT
Start: 2019-12-15 | End: 2019-12-15

## 2019-12-15 RX ORDER — ACETAMINOPHEN 500 MG
1000 TABLET ORAL ONCE
Refills: 0 | Status: COMPLETED | OUTPATIENT
Start: 2019-12-15 | End: 2019-12-14

## 2019-12-15 RX ORDER — ACETAMINOPHEN 500 MG
1000 TABLET ORAL ONCE
Refills: 0 | Status: COMPLETED | OUTPATIENT
Start: 2019-12-15 | End: 2019-12-15

## 2019-12-15 RX ADMIN — HEPARIN SODIUM 5000 UNIT(S): 5000 INJECTION INTRAVENOUS; SUBCUTANEOUS at 05:29

## 2019-12-15 RX ADMIN — SODIUM CHLORIDE 50 MILLILITER(S): 9 INJECTION, SOLUTION INTRAVENOUS at 06:04

## 2019-12-15 RX ADMIN — SODIUM CHLORIDE 50 MILLILITER(S): 9 INJECTION, SOLUTION INTRAVENOUS at 08:03

## 2019-12-15 RX ADMIN — Medication 5 UNIT(S): at 11:38

## 2019-12-15 RX ADMIN — PIPERACILLIN AND TAZOBACTAM 25 GRAM(S): 4; .5 INJECTION, POWDER, LYOPHILIZED, FOR SOLUTION INTRAVENOUS at 21:17

## 2019-12-15 RX ADMIN — OXYCODONE HYDROCHLORIDE 10 MILLIGRAM(S): 5 TABLET ORAL at 00:15

## 2019-12-15 RX ADMIN — Medication 400 MILLIGRAM(S): at 13:35

## 2019-12-15 RX ADMIN — HEPARIN SODIUM 5000 UNIT(S): 5000 INJECTION INTRAVENOUS; SUBCUTANEOUS at 13:36

## 2019-12-15 RX ADMIN — OXYCODONE HYDROCHLORIDE 10 MILLIGRAM(S): 5 TABLET ORAL at 14:15

## 2019-12-15 RX ADMIN — INSULIN GLARGINE 20 UNIT(S): 100 INJECTION, SOLUTION SUBCUTANEOUS at 21:18

## 2019-12-15 RX ADMIN — OXYCODONE HYDROCHLORIDE 10 MILLIGRAM(S): 5 TABLET ORAL at 21:55

## 2019-12-15 RX ADMIN — Medication 40 MILLIEQUIVALENT(S): at 06:03

## 2019-12-15 RX ADMIN — HEPARIN SODIUM 5000 UNIT(S): 5000 INJECTION INTRAVENOUS; SUBCUTANEOUS at 21:17

## 2019-12-15 RX ADMIN — Medication 5 UNIT(S): at 16:33

## 2019-12-15 RX ADMIN — OXYCODONE HYDROCHLORIDE 10 MILLIGRAM(S): 5 TABLET ORAL at 13:36

## 2019-12-15 RX ADMIN — OXYCODONE HYDROCHLORIDE 10 MILLIGRAM(S): 5 TABLET ORAL at 21:25

## 2019-12-15 RX ADMIN — AMLODIPINE BESYLATE 5 MILLIGRAM(S): 2.5 TABLET ORAL at 05:30

## 2019-12-15 RX ADMIN — OXYCODONE HYDROCHLORIDE 10 MILLIGRAM(S): 5 TABLET ORAL at 08:03

## 2019-12-15 RX ADMIN — OXYCODONE HYDROCHLORIDE 10 MILLIGRAM(S): 5 TABLET ORAL at 08:34

## 2019-12-15 RX ADMIN — Medication 1000 MILLIGRAM(S): at 00:00

## 2019-12-15 RX ADMIN — Medication 1000 MILLIGRAM(S): at 14:10

## 2019-12-15 RX ADMIN — PIPERACILLIN AND TAZOBACTAM 25 GRAM(S): 4; .5 INJECTION, POWDER, LYOPHILIZED, FOR SOLUTION INTRAVENOUS at 05:30

## 2019-12-15 RX ADMIN — PIPERACILLIN AND TAZOBACTAM 25 GRAM(S): 4; .5 INJECTION, POWDER, LYOPHILIZED, FOR SOLUTION INTRAVENOUS at 13:01

## 2019-12-15 NOTE — PROGRESS NOTE ADULT - ASSESSMENT
ASSESSMENT:  49 yr old male with necrotizing soft tissue infection of the RLE, admitted to SICU for resuscitation and management of severe sepsis s/p right Syme amputation.    PLAN:    Neurologic:   - Ofirmev, Dilaudid, oxycodone prn for pain control    Respiratory:   - mild ARDS on CPAP, will get daily cxr     Cardiovascular:   - hypertension on 5mg norvas   - unremarkable echo   - Monitor vitals, maintain MAP>65    Gastrointestinal/Nutrition:   - advance diet as tolerate     Renal/Genitourinary:   - MANN, will monitor UOP, continue to refuse copeland   - Trend electrolytes, replete as needed    Hematologic:   - C/w Heparin SQ for DVT ppx  - Trend H/H, transfuse if needed    Infectious Disease:   - s/p Zosyn, Clindamycin (12/12-12/13), Vancomycin (12/12-12/13) and d/c clindamycin and vancomycin after neg Bcx   - Monitor for fevers, RTOR for completion BKA anticipated over the next week   - Trend WBC    Endocrine:   - off insulin ggt placed, on 24U lantus and 8 NPH and ISS   - Monitor FS, and continue ISS    Disposition:   Continued critical care management

## 2019-12-15 NOTE — PROGRESS NOTE ADULT - SUBJECTIVE AND OBJECTIVE BOX
Vascular Surgery Progress Note     SUBJECTIVE / 24H EVENTS  Patient seen and examined on morning rounds. No acute events overnight. He is persistently hypoxic on pulse ox to high 80s and PO2 at 50s despite being asymptomatic w.o shortness of breath or trouble breathing, PO2 improved on CPAP, Replaced A-line after dysfunction, unremarkable echo w. EF of 70%. He feels he is improving.       OBJECTIVE:    VITAL SIGNS:  T(C): 37.6 (12-15-19 @ 08:00), Max: 38.3 (12-14-19 @ 15:28)  HR: 105 (12-15-19 @ 08:00) (97 - 115)  BP: 154/76 (12-15-19 @ 05:00) (97/81 - 163/66)  RR: 22 (12-15-19 @ 08:00) (14 - 26)  SpO2: 91% (12-15-19 @ 08:00) (83% - 99%)  Daily     Daily   POCT Blood Glucose.: 78 mg/dL (12-15-19 @ 07:35)  POCT Blood Glucose.: 118 mg/dL (12-14-19 @ 22:42)  POCT Blood Glucose.: 138 mg/dL (12-14-19 @ 21:22)      PHYSICAL EXAM:  Gen: NAD, well-developed  Neuro: AAOX3,   CV: S1S2, tachycardic with regular rhythm (-)m/r/g  Pulm: CTAB, no wheezes or rhonchi  GI: abd s/nt/nd  Ext: R BKA Dressing c/d/i, WWP, no pitting edema, calf without tenderness or tension    12-14-19 @ 07:01  -  12-15-19 @ 07:00  --------------------------------------------------------  IN:    dextrose 5% + sodium chloride 0.9%: 75 mL    IV PiggyBack: 450 mL    Oral Fluid: 550 mL  Total IN: 1075 mL    OUT:    Voided: 400 mL  Total OUT: 400 mL    Total NET: 675 mL          LAB VALUES:  12-15    132<L>  |  99  |  17  ----------------------------<  93  3.4<L>   |  22  |  1.84<H>    Ca    7.6<L>      15 Dec 2019 03:20  Phos  3.8     12-15  Mg     1.9     12-15                                 8.1    14.93 )-----------( 323      ( 15 Dec 2019 03:20 )             25.0         ABG - ( 15 Dec 2019 03:20 )  pH, Arterial: 7.42  pH, Blood: x     /  pCO2: 42    /  pO2: 80    / HCO3: 27    / Base Excess: 2.7   /  SaO2: 95.9              CARDIAC MARKERS ( 14 Dec 2019 04:45 )  x     / x     / 110 u/L / x     / x      CARDIAC MARKERS ( 13 Dec 2019 18:47 )  x     / x     / 104 u/L / 1.54 ng/mL / x              MICROBIOLOGY:    No new microbiology data for review.     RADIOLOGY:    No new radiographic images for review.    MEDICATIONS  (STANDING):  amLODIPine   Tablet 5 milliGRAM(s) Oral daily  chlorhexidine 4% Liquid 1 Application(s) Topical <User Schedule>  dextrose 5%. 1000 milliLiter(s) (50 mL/Hr) IV Continuous <Continuous>  dextrose 50% Injectable 12.5 Gram(s) IV Push once  dextrose 50% Injectable 25 Gram(s) IV Push once  dextrose 50% Injectable 25 Gram(s) IV Push once  heparin  Injectable 5000 Unit(s) SubCutaneous every 8 hours  influenza   Vaccine 0.5 milliLiter(s) IntraMuscular once  insulin glargine Injectable (LANTUS) 24 Unit(s) SubCutaneous at bedtime  insulin lispro (HumaLOG) corrective regimen sliding scale   SubCutaneous three times a day before meals  insulin lispro (HumaLOG) corrective regimen sliding scale   SubCutaneous at bedtime  insulin lispro Injectable (HumaLOG) 5 Unit(s) SubCutaneous three times a day before meals  lactated ringers. 1000 milliLiter(s) (50 mL/Hr) IV Continuous <Continuous>  piperacillin/tazobactam IVPB.. 3.375 Gram(s) IV Intermittent every 8 hours    MEDICATIONS  (PRN):  dextrose 40% Gel 15 Gram(s) Oral once PRN Blood Glucose LESS THAN 70 milliGRAM(s)/deciliter  glucagon  Injectable 1 milliGRAM(s) IntraMuscular once PRN Glucose LESS THAN 70 milligrams/deciliter  ondansetron Injectable 4 milliGRAM(s) IV Push every 8 hours PRN Nausea and/or Vomiting  oxyCODONE    IR 5 milliGRAM(s) Oral every 4 hours PRN Moderate Pain (4 - 6)  oxyCODONE    IR 10 milliGRAM(s) Oral every 8 hours PRN Severe Pain (7 - 10)

## 2019-12-15 NOTE — PROGRESS NOTE ADULT - ASSESSMENT
49 yr old male with necrotizing soft tissue infection of the RLE, admitted to SICU for resuscitation and management of severe sepsis, now s/p emergent guillotine Rt caroline GROVES. His postop course has been complicated by ARDS and MANN but both improving. He is tachycardic and febrile to 100.9 but otherwise stable.     PLAN:      - IV Tylenol, Dilaudid prn for pain control  - IS, supplemental O2 as needed, CPAP/BIPAP due to hypoxia  - Monitor vitals, maintain MAP>65  - Regular diet CC, IVF  - Zosyn for necrotizing soft tissue infection  - DVT ppx: Heparin

## 2019-12-15 NOTE — PROCEDURE NOTE - NSINDICATIONS_GEN_A_CORE
blood sampling/critical patient/arterial puncture to obtain ABG's/monitoring purposes/cannulation purposes

## 2019-12-15 NOTE — PROCEDURE NOTE - NSPROCDETAILS_GEN_ALL_CORE
positive blood return obtained via catheter/Seldinger technique/ultrasound guidance/location identified, draped/prepped, sterile technique used, needle inserted/introduced/hemostasis with direct pressure, dressing applied/sutured in place/connected to a pressurized flush line/all materials/supplies accounted for at end of procedure

## 2019-12-15 NOTE — PROGRESS NOTE ADULT - SUBJECTIVE AND OBJECTIVE BOX
SICU Daily Progress Note  =====================================================  Overnight Events:   - persistently hypoxia on pulse ox to high 80s and PO2 at 50s despite being asymptomatic w.o shortness of breath or trouble breathing.   - PO2 improved on CPAP.  - Replaced A-line after dysfunction.   - unremarkable echo w. EF of 70%     HPI:   TARA BLAKE is a 49 yr old male presenting c/o worsening RLE infection. Pt states he first noticed a right 5th toe wound on 12/2, and saw a physician at [facility] who prescribed Santyl collagenase which he has been using topically. States foot infection continued to worsen until today, when he was seen at [facility] ED, told that he has subcutaneous gas seen on Xray and needed a BKA. Pt wanted a second opinion and left AMA after which he presented to [facility] ED. Currently states pain in the right foot is worsening, and he was able to walk today prior to arriving in ED this afternoon. Pt also with DKA. States he is amenable to urgent surgical intervention and SICU consulted for resuscitation.     PMH:  Type I Diabetes Mellitus     Allergies: No Known Allergies    MEDICATIONS  (STANDING):  chlorhexidine 4% Liquid 1 Application(s) Topical <User Schedule>  clindamycin IVPB 600 milliGRAM(s) IV Intermittent every 8 hours  clindamycin IVPB      dextrose 5% + sodium chloride 0.45%. 1000 milliLiter(s) (75 mL/Hr) IV Continuous <Continuous>  dextrose 5%. 1000 milliLiter(s) (50 mL/Hr) IV Continuous <Continuous>  dextrose 50% Injectable 12.5 Gram(s) IV Push once  dextrose 50% Injectable 25 Gram(s) IV Push once  dextrose 50% Injectable 25 Gram(s) IV Push once  heparin  Injectable 5000 Unit(s) SubCutaneous every 8 hours  influenza   Vaccine 0.5 milliLiter(s) IntraMuscular once  insulin glargine Injectable (LANTUS) 15 Unit(s) SubCutaneous at bedtime  insulin lispro (HumaLOG) corrective regimen sliding scale   SubCutaneous three times a day before meals  insulin lispro (HumaLOG) corrective regimen sliding scale   SubCutaneous at bedtime  insulin lispro Injectable (HumaLOG) 5 Unit(s) SubCutaneous three times a day before meals  piperacillin/tazobactam IVPB.. 3.375 Gram(s) IV Intermittent every 8 hours  vancomycin  IVPB 1000 milliGRAM(s) IV Intermittent every 12 hours    MEDICATIONS  (PRN):  dextrose 40% Gel 15 Gram(s) Oral once PRN Blood Glucose LESS THAN 70 milliGRAM(s)/deciliter  glucagon  Injectable 1 milliGRAM(s) IntraMuscular once PRN Glucose LESS THAN 70 milligrams/deciliter  HYDROmorphone  Injectable 0.5 milliGRAM(s) IV Push every 4 hours PRN Moderate Pain (4 - 6)  HYDROmorphone  Injectable 1 milliGRAM(s) IV Push every 4 hours PRN Severe Pain (7 - 10)  ondansetron Injectable 4 milliGRAM(s) IV Push every 8 hours PRN Nausea and/or Vomiting    ICU Vital Signs Last 24 Hrs  T(C): 37.9 (15 Dec 2019 00:00), Max: 38.3 (14 Dec 2019 15:28)  T(F): 100.2 (15 Dec 2019 00:00), Max: 100.9 (14 Dec 2019 15:28)  HR: 106 (15 Dec 2019 00:17) (100 - 115)  BP: 109/44 (15 Dec 2019 00:00) (97/81 - 163/66)  BP(mean): 60 (15 Dec 2019 00:00) (60 - 95)  ABP: 105/71 (15 Dec 2019 00:00) (105/71 - 200/79)  ABP(mean): 82 (15 Dec 2019 00:00) (82 - 118)  RR: 26 (15 Dec 2019 00:00) (11 - 26)  SpO2: 97% (15 Dec 2019 00:17) (83% - 99%)    CAPILLARY BLOOD GLUCOSE  POCT Blood Glucose.: 164 mg/dL (13 Dec 2019 09:02)  POCT Blood Glucose.: 163 mg/dL (13 Dec 2019 06:05)  POCT Blood Glucose.: 185 mg/dL (13 Dec 2019 05:28)  POCT Blood Glucose.: 86 mg/dL (13 Dec 2019 04:56)  POCT Blood Glucose.: 102 mg/dL (13 Dec 2019 03:59)  POCT Blood Glucose.: 122 mg/dL (13 Dec 2019 02:57)  POCT Blood Glucose.: 147 mg/dL (13 Dec 2019 02:04)  POCT Blood Glucose.: 179 mg/dL (13 Dec 2019 01:10)  POCT Blood Glucose.: 208 mg/dL (13 Dec 2019 00:12)  POCT Blood Glucose.: 205 mg/dL (12 Dec 2019 23:04)  POCT Blood Glucose.: 311 mg/dL (12 Dec 2019 17:47)  POCT Blood Glucose.: 347 mg/dL (12 Dec 2019 15:40)  POCT Blood Glucose.: 422 mg/dL (12 Dec 2019 13:25)    I&O's Detail    12 Dec 2019 07:01  -  13 Dec 2019 07:00  --------------------------------------------------------  IN:    dextrose 5% + lactated ringers.: 225 mL    insulin regular Infusion: 21.5 mL    IV PiggyBack: 2050 mL    lactated ringers.: 500 mL  Total IN: 2796.5 mL    OUT:    Voided: 275 mL  Total OUT: 275 mL    Total NET: 2521.5 mL      13 Dec 2019 07:01  -  13 Dec 2019 10:41  --------------------------------------------------------  IN:    dextrose 5% + lactated ringers.: 300 mL  Total IN: 300 mL    OUT:  Total OUT: 0 mL  Total NET: 300 mL    --------------------------------------------------------------------------------------    EXAM  NEUROLOGY  RASS: 0  	GCS:  15  Exam: Normal, NAD, alert, oriented x3, no focal deficits.     RESPIRATORY  Exam: Nonlabored, CTABL, no wheezes, ronchi, or rales. Normal respiratory effort.     CARDIOVASCULAR  Exam: Normotensive, RRR, no M/R/G     GI/NUTRITION  Exam: Abdomen soft, NT/ND, no rebound or guarding.  Current Diet:  NPO    EXTREMITIES  B/l UE strength and sensation intact. LLE without wounds, well-healed prior lateral debridement noted.   RLE with dorsal and lateral gangrene, with crepitus, significant TTP, and erythema extending to mid-lower leg anteriorly, unchanged from initial exam.     HEMATOLOGIC  [x] VTE Prophylaxis: heparin  Injectable 5000 Unit(s) SubCutaneous every 8 hours    INFECTIOUS DISEASE  Antimicrobials/Immunologic Medications:  clindamycin IVPB 600 milliGRAM(s) IV Intermittent every 8 hours  clindamycin IVPB      influenza   Vaccine 0.5 milliLiter(s) IntraMuscular once  piperacillin/tazobactam IVPB.. 3.375 Gram(s) IV Intermittent every 8 hours  vancomycin  IVPB 1000 milliGRAM(s) IV Intermittent every 12 hours    Tubes/Lines/Drains    [x] Peripheral IV  [] Central Venous Line     	[] R	[] L	[] IJ	[] Fem	[] SC	Date Placed:   [x] Arterial Line		[] R	[x] L	[] Fem	[x] Rad	[] Ax	Date Placed:   [] PICC		[] Midline		[] Mediport  [] Urinary Catheter		  [x] Necessity of urinary, arterial, and venous catheters discussed      LABS:  12-14 @ 04:45 Creatine 110 u/L [30 - 200]  12-13 @ 18:47 Creatine 104 u/L [30 - 200]  cret                        8.5    17.27 )-----------( 317      ( 14 Dec 2019 16:40 )             25.9     12-14    134<L>  |  98  |  16  ----------------------------<  152<H>  3.6   |  23  |  1.89<H>    Ca    7.8<L>      14 Dec 2019 16:40  Phos  2.5     12-14  Mg     1.9     12-14

## 2019-12-16 LAB
ANION GAP SERPL CALC-SCNC: 12 MMO/L — SIGNIFICANT CHANGE UP (ref 7–14)
BASE EXCESS BLDA CALC-SCNC: 4.6 MMOL/L — SIGNIFICANT CHANGE UP
BLOOD GAS ARTERIAL - FIO2: 30 — SIGNIFICANT CHANGE UP
BUN SERPL-MCNC: 16 MG/DL — SIGNIFICANT CHANGE UP (ref 7–23)
CA-I BLD-SCNC: 0.99 MMOL/L — LOW (ref 1.03–1.23)
CA-I BLDA-SCNC: 1.07 MMOL/L — LOW (ref 1.15–1.29)
CALCIUM SERPL-MCNC: 7.6 MG/DL — LOW (ref 8.4–10.5)
CHLORIDE SERPL-SCNC: 96 MMOL/L — LOW (ref 98–107)
CO2 SERPL-SCNC: 22 MMOL/L — SIGNIFICANT CHANGE UP (ref 22–31)
CREAT SERPL-MCNC: 1.65 MG/DL — HIGH (ref 0.5–1.3)
GLUCOSE BLDA-MCNC: 241 MG/DL — HIGH (ref 70–99)
GLUCOSE BLDC GLUCOMTR-MCNC: 102 MG/DL — HIGH (ref 70–99)
GLUCOSE BLDC GLUCOMTR-MCNC: 170 MG/DL — HIGH (ref 70–99)
GLUCOSE BLDC GLUCOMTR-MCNC: 201 MG/DL — HIGH (ref 70–99)
GLUCOSE BLDC GLUCOMTR-MCNC: 218 MG/DL — HIGH (ref 70–99)
GLUCOSE BLDC GLUCOMTR-MCNC: 86 MG/DL — SIGNIFICANT CHANGE UP (ref 70–99)
GLUCOSE SERPL-MCNC: 226 MG/DL — HIGH (ref 70–99)
HCO3 BLDA-SCNC: 29 MMOL/L — HIGH (ref 22–26)
HCT VFR BLD CALC: 24.8 % — LOW (ref 39–50)
HCT VFR BLDA CALC: 25.7 % — LOW (ref 39–51)
HGB BLD-MCNC: 8.2 G/DL — LOW (ref 13–17)
HGB BLDA-MCNC: 8.3 G/DL — LOW (ref 13–17)
LACTATE BLDA-SCNC: 0.7 MMOL/L — SIGNIFICANT CHANGE UP (ref 0.5–2)
MAGNESIUM SERPL-MCNC: 1.8 MG/DL — SIGNIFICANT CHANGE UP (ref 1.6–2.6)
MCHC RBC-ENTMCNC: 28.5 PG — SIGNIFICANT CHANGE UP (ref 27–34)
MCHC RBC-ENTMCNC: 33.1 % — SIGNIFICANT CHANGE UP (ref 32–36)
MCV RBC AUTO: 86.1 FL — SIGNIFICANT CHANGE UP (ref 80–100)
NRBC # FLD: 0 K/UL — SIGNIFICANT CHANGE UP (ref 0–0)
PCO2 BLDA: 41 MMHG — SIGNIFICANT CHANGE UP (ref 35–48)
PH BLDA: 7.45 PH — SIGNIFICANT CHANGE UP (ref 7.35–7.45)
PHOSPHATE SERPL-MCNC: 2.3 MG/DL — LOW (ref 2.5–4.5)
PLATELET # BLD AUTO: 305 K/UL — SIGNIFICANT CHANGE UP (ref 150–400)
PMV BLD: 10.4 FL — SIGNIFICANT CHANGE UP (ref 7–13)
PO2 BLDA: 116 MMHG — HIGH (ref 83–108)
POTASSIUM BLDA-SCNC: 4 MMOL/L — SIGNIFICANT CHANGE UP (ref 3.4–4.5)
POTASSIUM SERPL-MCNC: 4.2 MMOL/L — SIGNIFICANT CHANGE UP (ref 3.5–5.3)
POTASSIUM SERPL-SCNC: 4.2 MMOL/L — SIGNIFICANT CHANGE UP (ref 3.5–5.3)
RBC # BLD: 2.88 M/UL — LOW (ref 4.2–5.8)
RBC # FLD: 13.2 % — SIGNIFICANT CHANGE UP (ref 10.3–14.5)
SAO2 % BLDA: 98.3 % — SIGNIFICANT CHANGE UP (ref 95–99)
SODIUM BLDA-SCNC: 129 MMOL/L — LOW (ref 136–146)
SODIUM SERPL-SCNC: 130 MMOL/L — LOW (ref 135–145)
WBC # BLD: 10.17 K/UL — SIGNIFICANT CHANGE UP (ref 3.8–10.5)
WBC # FLD AUTO: 10.17 K/UL — SIGNIFICANT CHANGE UP (ref 3.8–10.5)

## 2019-12-16 PROCEDURE — 99233 SBSQ HOSP IP/OBS HIGH 50: CPT

## 2019-12-16 PROCEDURE — 71045 X-RAY EXAM CHEST 1 VIEW: CPT | Mod: 26

## 2019-12-16 RX ORDER — ACETAMINOPHEN 500 MG
650 TABLET ORAL EVERY 6 HOURS
Refills: 0 | Status: DISCONTINUED | OUTPATIENT
Start: 2019-12-16 | End: 2019-12-16

## 2019-12-16 RX ORDER — ACETAMINOPHEN 500 MG
650 TABLET ORAL EVERY 6 HOURS
Refills: 0 | Status: DISCONTINUED | OUTPATIENT
Start: 2019-12-16 | End: 2019-12-25

## 2019-12-16 RX ORDER — GABAPENTIN 400 MG/1
100 CAPSULE ORAL EVERY 8 HOURS
Refills: 0 | Status: DISCONTINUED | OUTPATIENT
Start: 2019-12-16 | End: 2019-12-25

## 2019-12-16 RX ORDER — CALCIUM GLUCONATE 100 MG/ML
2 VIAL (ML) INTRAVENOUS ONCE
Refills: 0 | Status: COMPLETED | OUTPATIENT
Start: 2019-12-16 | End: 2019-12-16

## 2019-12-16 RX ORDER — INSULIN GLARGINE 100 [IU]/ML
24 INJECTION, SOLUTION SUBCUTANEOUS AT BEDTIME
Refills: 0 | Status: DISCONTINUED | OUTPATIENT
Start: 2019-12-16 | End: 2019-12-18

## 2019-12-16 RX ORDER — MAGNESIUM SULFATE 500 MG/ML
2 VIAL (ML) INJECTION ONCE
Refills: 0 | Status: COMPLETED | OUTPATIENT
Start: 2019-12-16 | End: 2019-12-16

## 2019-12-16 RX ORDER — INSULIN LISPRO 100/ML
VIAL (ML) SUBCUTANEOUS
Refills: 0 | Status: DISCONTINUED | OUTPATIENT
Start: 2019-12-16 | End: 2019-12-19

## 2019-12-16 RX ORDER — HYDROMORPHONE HYDROCHLORIDE 2 MG/ML
0.5 INJECTION INTRAMUSCULAR; INTRAVENOUS; SUBCUTANEOUS ONCE
Refills: 0 | Status: DISCONTINUED | OUTPATIENT
Start: 2019-12-16 | End: 2019-12-16

## 2019-12-16 RX ADMIN — OXYCODONE HYDROCHLORIDE 10 MILLIGRAM(S): 5 TABLET ORAL at 09:49

## 2019-12-16 RX ADMIN — Medication 650 MILLIGRAM(S): at 11:00

## 2019-12-16 RX ADMIN — OXYCODONE HYDROCHLORIDE 10 MILLIGRAM(S): 5 TABLET ORAL at 10:19

## 2019-12-16 RX ADMIN — OXYCODONE HYDROCHLORIDE 10 MILLIGRAM(S): 5 TABLET ORAL at 14:11

## 2019-12-16 RX ADMIN — Medication 50 GRAM(S): at 05:29

## 2019-12-16 RX ADMIN — OXYCODONE HYDROCHLORIDE 10 MILLIGRAM(S): 5 TABLET ORAL at 05:48

## 2019-12-16 RX ADMIN — Medication 5 UNIT(S): at 11:44

## 2019-12-16 RX ADMIN — HYDROMORPHONE HYDROCHLORIDE 0.5 MILLIGRAM(S): 2 INJECTION INTRAMUSCULAR; INTRAVENOUS; SUBCUTANEOUS at 15:55

## 2019-12-16 RX ADMIN — HEPARIN SODIUM 5000 UNIT(S): 5000 INJECTION INTRAVENOUS; SUBCUTANEOUS at 21:40

## 2019-12-16 RX ADMIN — OXYCODONE HYDROCHLORIDE 10 MILLIGRAM(S): 5 TABLET ORAL at 06:18

## 2019-12-16 RX ADMIN — HEPARIN SODIUM 5000 UNIT(S): 5000 INJECTION INTRAVENOUS; SUBCUTANEOUS at 05:48

## 2019-12-16 RX ADMIN — Medication 650 MILLIGRAM(S): at 10:30

## 2019-12-16 RX ADMIN — AMLODIPINE BESYLATE 5 MILLIGRAM(S): 2.5 TABLET ORAL at 05:48

## 2019-12-16 RX ADMIN — PIPERACILLIN AND TAZOBACTAM 25 GRAM(S): 4; .5 INJECTION, POWDER, LYOPHILIZED, FOR SOLUTION INTRAVENOUS at 05:29

## 2019-12-16 RX ADMIN — OXYCODONE HYDROCHLORIDE 10 MILLIGRAM(S): 5 TABLET ORAL at 14:41

## 2019-12-16 RX ADMIN — PIPERACILLIN AND TAZOBACTAM 25 GRAM(S): 4; .5 INJECTION, POWDER, LYOPHILIZED, FOR SOLUTION INTRAVENOUS at 13:00

## 2019-12-16 RX ADMIN — GABAPENTIN 100 MILLIGRAM(S): 400 CAPSULE ORAL at 17:38

## 2019-12-16 RX ADMIN — Medication 63.75 MILLIMOLE(S): at 05:29

## 2019-12-16 RX ADMIN — Medication 4: at 07:30

## 2019-12-16 RX ADMIN — Medication 5 UNIT(S): at 07:30

## 2019-12-16 RX ADMIN — HEPARIN SODIUM 5000 UNIT(S): 5000 INJECTION INTRAVENOUS; SUBCUTANEOUS at 13:00

## 2019-12-16 RX ADMIN — CHLORHEXIDINE GLUCONATE 1 APPLICATION(S): 213 SOLUTION TOPICAL at 07:31

## 2019-12-16 RX ADMIN — PIPERACILLIN AND TAZOBACTAM 25 GRAM(S): 4; .5 INJECTION, POWDER, LYOPHILIZED, FOR SOLUTION INTRAVENOUS at 21:40

## 2019-12-16 RX ADMIN — INSULIN GLARGINE 24 UNIT(S): 100 INJECTION, SOLUTION SUBCUTANEOUS at 21:40

## 2019-12-16 RX ADMIN — HYDROMORPHONE HYDROCHLORIDE 0.5 MILLIGRAM(S): 2 INJECTION INTRAMUSCULAR; INTRAVENOUS; SUBCUTANEOUS at 16:10

## 2019-12-16 RX ADMIN — Medication 200 GRAM(S): at 05:28

## 2019-12-16 NOTE — PROGRESS NOTE ADULT - ASSESSMENT
49 yr old male with necrotizing soft tissue infection of the RLE, admitted to SICU for resuscitation and management of severe sepsis, now s/p emergent guillotine Rt caroline GROVES. His postop course has been complicated by ARDS and MANN but both improving. He is tachycardic and febrile to 100.9 but otherwise stable.     PLAN:      - IV Tylenol, Dilaudid prn for pain control  - IS, supplemental O2 as needed, high flow NC  - Monitor vitals, maintain MAP>65  - Regular diet CC, IVF  - Zosyn for necrotizing soft tissue infection  - DVT ppx: Heparin

## 2019-12-16 NOTE — PROGRESS NOTE ADULT - ASSESSMENT
ASSESSMENT:  49 yr old male with necrotizing soft tissue infection of the RLE, admitted to SICU for resuscitation and management of severe sepsis s/p right Syme amputation.    PLAN:    Neurologic:   - Ofirmev, Dilaudid, oxycodone prn for pain control    Respiratory:   - mild to moderate ARDS off CPAP, now on high flow nasal cannula; wean as tolerated  - daily cxr     Cardiovascular:   - hypertension on 5mg norvasc   - unremarkable echo   - Monitor vitals, maintain MAP>65    Gastrointestinal/Nutrition:   -tolerating reg diet    Renal/Genitourinary:   - MANN, will monitor UOP, continue to refuse copeland   - Trend electrolytes, replete as needed    Hematologic:   - C/w Heparin SQ for DVT ppx  - Trend H/H, transfuse if needed    Infectious Disease:   - s/p Zosyn, Clindamycin (12/12-12/13), Vancomycin (12/12-12/13) and d/c clindamycin and vancomycin after neg Bcx   - Monitor for fevers, RTOR for completion BKA anticipated over the next week   - WBC WNL now    Endocrine:   - off insulin ggt placed  - now from 24U lantus to 20u, lispro 5u with meals   - Monitor FS, and continue ISS  - consult endocrine for further glycemic control    Disposition:   Continued critical care management ASSESSMENT:  49 yr old male with necrotizing soft tissue infection of the RLE, admitted to SICU for resuscitation and management of severe sepsis s/p right Syme amputation.    PLAN:    Neurologic:   - Ofirmev, Dilaudid, oxycodone prn for pain control    Respiratory:   - mild to moderate ARDS off CPAP, now on high flow nasal cannula; wean as tolerated  - daily cxr     Cardiovascular:   - hypertension on 5mg norvasc   - unremarkable echo   - Monitor vitals, maintain MAP>65    Gastrointestinal/Nutrition:   - Continue Consistent Carbohydrate diet    Renal/Genitourinary:   - MANN Improving, Cr downtrending  - Trend electrolytes, replete as needed    Hematologic:   - C/w Heparin SQ for DVT ppx  - Trend H/H, transfuse if needed    Infectious Disease:   - s/p Clindamycin (12/12-12/13) and Vancomycin (12/12-12/13)  - Continuing Zosyn  - Monitor for fevers, Tentatively planning to return to OR on friday for completion BKA.  - WBC WNL now    Endocrine:   - off insulin gtt placed  - Increase Lantus to 24u and continue lispro 5u TID with meals   - Monitor FS, and continue ISS  - will consider endocrine consult for further glycemic control    Disposition:   Floor eligible once off High Flow    Critical Care Diagnoses:  DKA  Respiratory Failure  Sepsis s/p BKA

## 2019-12-16 NOTE — PROGRESS NOTE ADULT - SUBJECTIVE AND OBJECTIVE BOX
SICU Daily Progress Note  =====================================================  Overnight Events:   - currently transitioned from CPAP to high flow nasal cannula yesterday. Wean as tolerated. Decreased lantus from 24u to 20u for hypoglycemia yesterday morning. clinda/vanco d/c'ed/ C/w zosyn, afebrile since yesterday, WBC WNL now    HPI: TARA BLAKE is a 49 yr old male presenting c/o worsening RLE infection. Pt states he first noticed a right 5th toe wound on 12/2, and saw a physician at [facility] who prescribed Santyl collagenase which he has been using topically. States foot infection continued to worsen until today, when he was seen at [facility] ED, told that he has subcutaneous gas seen on Xray and needed a BKA. Pt wanted a second opinion and left AMA after which he presented to [facility] ED. Currently states pain in the right foot is worsening, and he was able to walk today prior to arriving in ED this afternoon. Pt also with DKA. States he is amenable to urgent surgical intervention and SICU consulted for resuscitation.     PMH:  Type I Diabetes Mellitus     Allergies: No Known Allergies    MEDICATIONS  (STANDING):  amLODIPine   Tablet 5 milliGRAM(s) Oral daily  chlorhexidine 4% Liquid 1 Application(s) Topical <User Schedule>  dextrose 50% Injectable 12.5 Gram(s) IV Push once  dextrose 50% Injectable 25 Gram(s) IV Push once  dextrose 50% Injectable 25 Gram(s) IV Push once  heparin  Injectable 5000 Unit(s) SubCutaneous every 8 hours  influenza   Vaccine 0.5 milliLiter(s) IntraMuscular once  insulin glargine Injectable (LANTUS) 20 Unit(s) SubCutaneous at bedtime  insulin lispro (HumaLOG) corrective regimen sliding scale   SubCutaneous three times a day before meals  insulin lispro (HumaLOG) corrective regimen sliding scale   SubCutaneous at bedtime  insulin lispro Injectable (HumaLOG) 5 Unit(s) SubCutaneous three times a day before meals  piperacillin/tazobactam IVPB.. 3.375 Gram(s) IV Intermittent every 8 hours    MEDICATIONS  (PRN):  dextrose 40% Gel 15 Gram(s) Oral once PRN Blood Glucose LESS THAN 70 milliGRAM(s)/deciliter  glucagon  Injectable 1 milliGRAM(s) IntraMuscular once PRN Glucose LESS THAN 70 milligrams/deciliter  ondansetron Injectable 4 milliGRAM(s) IV Push every 8 hours PRN Nausea and/or Vomiting  oxyCODONE    IR 10 milliGRAM(s) Oral every 4 hours PRN Severe Pain (7 - 10)  oxyCODONE    IR 5 milliGRAM(s) Oral every 4 hours PRN Moderate Pain (4 - 6)    ICU Vital Signs Last 24 Hrs  T(C): 37.7 (15 Dec 2019 20:00), Max: 37.7 (15 Dec 2019 20:00)  T(F): 99.8 (15 Dec 2019 20:00), Max: 99.8 (15 Dec 2019 20:00)  HR: 104 (15 Dec 2019 23:24) (97 - 111)  BP: 154/76 (15 Dec 2019 05:00) (127/63 - 158/76)  BP(mean): 94 (15 Dec 2019 05:00) (75 - 94)  ABP: 156/70 (15 Dec 2019 22:00) (136/58 - 171/74)  ABP(mean): 99 (15 Dec 2019 22:00) (82 - 108)  RR: 22 (15 Dec 2019 23:24) (14 - 27)  SpO2: 95% (15 Dec 2019 23:24) (91% - 99%)    I&O's Detail    14 Dec 2019 07:01  -  15 Dec 2019 07:00  --------------------------------------------------------  IN:    dextrose 5% + sodium chloride 0.9%: 75 mL    IV PiggyBack: 450 mL    Oral Fluid: 550 mL  Total IN: 1075 mL    OUT:    Voided: 400 mL  Total OUT: 400 mL    Total NET: 675 mL      15 Dec 2019 07:01  -  16 Dec 2019 00:39  --------------------------------------------------------  IN:    IV PiggyBack: 100 mL    lactated ringers.: 250 mL    Oral Fluid: 250 mL  Total IN: 600 mL    OUT:    Voided: 950 mL  Total OUT: 950 mL    Total NET: -350 mL          EXAM  NEUROLOGY  RASS: 0  	GCS:  15  Exam: Normal, NAD, alert, oriented x3, no focal deficits.     RESPIRATORY  Exam: +high flow nasal cannula, nonlabored breathing, CTA B/L     CARDIOVASCULAR  Exam: Normotensive, RRR, no M/R/G     GI/NUTRITION  Exam: Abdomen soft, NT/ND, no rebound or guarding.  Current Diet:  NPO    EXTREMITIES  Right lower extremity s/p BKA guillotine with dressing clean dry intact, no swelling erythema, swelling, tenderness noted. LLE adequate strength, motor, and sensory     HEMATOLOGIC  [x] VTE Prophylaxis: heparin  Injectable 5000 Unit(s) SubCutaneous every 8 hours      Tubes/Lines/Drains    [x] Peripheral IV  [] Central Venous Line     	[] R	[] L	[] IJ	[] Fem	[] SC	Date Placed:   [x] Arterial Line		[] R	[x] L	[] Fem	[x] Rad	[] Ax	Date Placed:   [] PICC		[] Midline		[] Mediport  [] Urinary Catheter		  [x] Necessity of urinary, arterial, and venous catheters discussed      LABS:  CBC (12-16 @ 00:10)                              8.2<L>                         10.17   )----------------(  305        --    % Neutrophils, --    % Lymphocytes, ANC: --                                  24.8<L>  CBC (12-15 @ 03:20)                              8.1<L>                         14.93<H>  )----------------(  323        --    % Neutrophils, --    % Lymphocytes, ANC: --                                  25.0<L>    BMP (12-15 @ 03:20)             132<L>  |  99      |  17    		Ca++ --      Ca 7.6<L>             ---------------------------------( 93    		Mg 1.9                3.4<L>  |  22      |  1.84<H>			Ph 3.8     BMP (12-14 @ 16:40)             134<L>  |  98      |  16    		Ca++ --      Ca 7.8<L>             ---------------------------------( 152<H>		Mg 1.9                3.6     |  23      |  1.89<H>			Ph 2.5             ABG (12-16 @ 00:10)     7.45 / 41 / 116<H> / 29<H> / 4.6 / 98.3%     Lactate: 0.7   ABG (12-15 @ 03:20)     7.42 / 42 / 80<L> / 27<H> / 2.7 / 95.9%     Lactate: 0.7 SICU Daily Progress Note  =====================================================  Overnight Events:   - Transitioned from CPAP to high flow nasal cannula yesterday. Decreased lantus from 24u to 20u for hypoglycemia yesterday morning. clinda/vanco d/c'ed/ C/w zosyn, afebrile since yesterday, WBC WNL now    HPI: TARA BLAKE is a 49 yr old male presenting c/o worsening RLE infection. Pt states he first noticed a right 5th toe wound on 12/2, and saw a physician at [facility] who prescribed Santyl collagenase which he has been using topically. States foot infection continued to worsen until today, when he was seen at [facility] ED, told that he has subcutaneous gas seen on Xray and needed a BKA. Pt wanted a second opinion and left AMA after which he presented to [facility] ED. Currently states pain in the right foot is worsening, and he was able to walk today prior to arriving in ED this afternoon. Pt also with DKA. States he is amenable to urgent surgical intervention and SICU consulted for resuscitation.     PMH:  Type I Diabetes Mellitus     Allergies: No Known Allergies    MEDICATIONS  (STANDING):  amLODIPine   Tablet 5 milliGRAM(s) Oral daily  chlorhexidine 4% Liquid 1 Application(s) Topical <User Schedule>  dextrose 50% Injectable 12.5 Gram(s) IV Push once  dextrose 50% Injectable 25 Gram(s) IV Push once  dextrose 50% Injectable 25 Gram(s) IV Push once  heparin  Injectable 5000 Unit(s) SubCutaneous every 8 hours  influenza   Vaccine 0.5 milliLiter(s) IntraMuscular once  insulin glargine Injectable (LANTUS) 20 Unit(s) SubCutaneous at bedtime  insulin lispro (HumaLOG) corrective regimen sliding scale   SubCutaneous three times a day before meals  insulin lispro (HumaLOG) corrective regimen sliding scale   SubCutaneous at bedtime  insulin lispro Injectable (HumaLOG) 5 Unit(s) SubCutaneous three times a day before meals  piperacillin/tazobactam IVPB.. 3.375 Gram(s) IV Intermittent every 8 hours    MEDICATIONS  (PRN):  dextrose 40% Gel 15 Gram(s) Oral once PRN Blood Glucose LESS THAN 70 milliGRAM(s)/deciliter  glucagon  Injectable 1 milliGRAM(s) IntraMuscular once PRN Glucose LESS THAN 70 milligrams/deciliter  ondansetron Injectable 4 milliGRAM(s) IV Push every 8 hours PRN Nausea and/or Vomiting  oxyCODONE    IR 10 milliGRAM(s) Oral every 4 hours PRN Severe Pain (7 - 10)  oxyCODONE    IR 5 milliGRAM(s) Oral every 4 hours PRN Moderate Pain (4 - 6)    ICU Vital Signs Last 24 Hrs  T(C): 37.7 (15 Dec 2019 20:00), Max: 37.7 (15 Dec 2019 20:00)  T(F): 99.8 (15 Dec 2019 20:00), Max: 99.8 (15 Dec 2019 20:00)  HR: 104 (15 Dec 2019 23:24) (97 - 111)  BP: 154/76 (15 Dec 2019 05:00) (127/63 - 158/76)  BP(mean): 94 (15 Dec 2019 05:00) (75 - 94)  ABP: 156/70 (15 Dec 2019 22:00) (136/58 - 171/74)  ABP(mean): 99 (15 Dec 2019 22:00) (82 - 108)  RR: 22 (15 Dec 2019 23:24) (14 - 27)  SpO2: 95% (15 Dec 2019 23:24) (91% - 99%)    I&O's Detail    14 Dec 2019 07:01  -  15 Dec 2019 07:00  --------------------------------------------------------  IN:    dextrose 5% + sodium chloride 0.9%: 75 mL    IV PiggyBack: 450 mL    Oral Fluid: 550 mL  Total IN: 1075 mL    OUT:    Voided: 400 mL  Total OUT: 400 mL    Total NET: 675 mL      15 Dec 2019 07:01  -  16 Dec 2019 00:39  --------------------------------------------------------  IN:    IV PiggyBack: 100 mL    lactated ringers.: 250 mL    Oral Fluid: 250 mL  Total IN: 600 mL    OUT:    Voided: 950 mL  Total OUT: 950 mL    Total NET: -350 mL          EXAM  NEUROLOGY  RASS: 0  	GCS:  15  Exam: Normal, NAD, alert, oriented x3, no focal deficits.     RESPIRATORY  Exam: +high flow nasal cannula, nonlabored breathing, CTA B/L     CARDIOVASCULAR  Exam: Normotensive, RRR, no M/R/G     GI/NUTRITION  Exam: Abdomen soft, NT/ND, no rebound or guarding.  Current Diet:  NPO    EXTREMITIES  Right lower extremity s/p BKA guillotine with dressing clean dry intact, no swelling erythema, swelling, tenderness noted. LLE adequate strength, motor, and sensory     HEMATOLOGIC  [x] VTE Prophylaxis: heparin  Injectable 5000 Unit(s) SubCutaneous every 8 hours      Tubes/Lines/Drains    [x] Peripheral IV  [] Central Venous Line     	[] R	[] L	[] IJ	[] Fem	[] SC	Date Placed:   [x] Arterial Line		[] R	[x] L	[] Fem	[x] Rad	[] Ax	Date Placed:   [] PICC		[] Midline		[] Mediport  [] Urinary Catheter		  [x] Necessity of urinary, arterial, and venous catheters discussed      LABS:  CBC (12-16 @ 00:10)                              8.2<L>                         10.17   )----------------(  305        --    % Neutrophils, --    % Lymphocytes, ANC: --                                  24.8<L>  CBC (12-15 @ 03:20)                              8.1<L>                         14.93<H>  )----------------(  323        --    % Neutrophils, --    % Lymphocytes, ANC: --                                  25.0<L>    BMP (12-15 @ 03:20)             132<L>  |  99      |  17    		Ca++ --      Ca 7.6<L>             ---------------------------------( 93    		Mg 1.9                3.4<L>  |  22      |  1.84<H>			Ph 3.8     BMP (12-14 @ 16:40)             134<L>  |  98      |  16    		Ca++ --      Ca 7.8<L>             ---------------------------------( 152<H>		Mg 1.9                3.6     |  23      |  1.89<H>			Ph 2.5             ABG (12-16 @ 00:10)     7.45 / 41 / 116<H> / 29<H> / 4.6 / 98.3%     Lactate: 0.7   ABG (12-15 @ 03:20)     7.42 / 42 / 80<L> / 27<H> / 2.7 / 95.9%     Lactate: 0.7

## 2019-12-17 LAB
ALBUMIN SERPL ELPH-MCNC: 1.8 G/DL — LOW (ref 3.3–5)
ALP SERPL-CCNC: 226 U/L — HIGH (ref 40–120)
ALT FLD-CCNC: 47 U/L — HIGH (ref 4–41)
ANION GAP SERPL CALC-SCNC: 12 MMO/L — SIGNIFICANT CHANGE UP (ref 7–14)
AST SERPL-CCNC: 62 U/L — HIGH (ref 4–40)
BACTERIA BLD CULT: SIGNIFICANT CHANGE UP
BACTERIA BLD CULT: SIGNIFICANT CHANGE UP
BILIRUB DIRECT SERPL-MCNC: < 0.2 MG/DL — SIGNIFICANT CHANGE UP (ref 0.1–0.2)
BILIRUB SERPL-MCNC: < 0.2 MG/DL — LOW (ref 0.2–1.2)
BUN SERPL-MCNC: 13 MG/DL — SIGNIFICANT CHANGE UP (ref 7–23)
CALCIUM SERPL-MCNC: 8.2 MG/DL — LOW (ref 8.4–10.5)
CHLORIDE SERPL-SCNC: 98 MMOL/L — SIGNIFICANT CHANGE UP (ref 98–107)
CO2 SERPL-SCNC: 24 MMOL/L — SIGNIFICANT CHANGE UP (ref 22–31)
CREAT SERPL-MCNC: 1.53 MG/DL — HIGH (ref 0.5–1.3)
GLUCOSE BLDC GLUCOMTR-MCNC: 128 MG/DL — HIGH (ref 70–99)
GLUCOSE BLDC GLUCOMTR-MCNC: 139 MG/DL — HIGH (ref 70–99)
GLUCOSE BLDC GLUCOMTR-MCNC: 170 MG/DL — HIGH (ref 70–99)
GLUCOSE BLDC GLUCOMTR-MCNC: 249 MG/DL — HIGH (ref 70–99)
GLUCOSE SERPL-MCNC: 184 MG/DL — HIGH (ref 70–99)
HCT VFR BLD CALC: 25.1 % — LOW (ref 39–50)
HCT VFR BLD CALC: 25.6 % — LOW (ref 39–50)
HGB BLD-MCNC: 8 G/DL — LOW (ref 13–17)
HGB BLD-MCNC: 8.3 G/DL — LOW (ref 13–17)
MAGNESIUM SERPL-MCNC: 1.8 MG/DL — SIGNIFICANT CHANGE UP (ref 1.6–2.6)
MCHC RBC-ENTMCNC: 28.5 PG — SIGNIFICANT CHANGE UP (ref 27–34)
MCHC RBC-ENTMCNC: 28.5 PG — SIGNIFICANT CHANGE UP (ref 27–34)
MCHC RBC-ENTMCNC: 31.9 % — LOW (ref 32–36)
MCHC RBC-ENTMCNC: 32.4 % — SIGNIFICANT CHANGE UP (ref 32–36)
MCV RBC AUTO: 88 FL — SIGNIFICANT CHANGE UP (ref 80–100)
MCV RBC AUTO: 89.3 FL — SIGNIFICANT CHANGE UP (ref 80–100)
NRBC # FLD: 0 K/UL — SIGNIFICANT CHANGE UP (ref 0–0)
NRBC # FLD: 0 K/UL — SIGNIFICANT CHANGE UP (ref 0–0)
PHOSPHATE SERPL-MCNC: 2.9 MG/DL — SIGNIFICANT CHANGE UP (ref 2.5–4.5)
PLATELET # BLD AUTO: 329 K/UL — SIGNIFICANT CHANGE UP (ref 150–400)
PLATELET # BLD AUTO: 335 K/UL — SIGNIFICANT CHANGE UP (ref 150–400)
PMV BLD: 10.1 FL — SIGNIFICANT CHANGE UP (ref 7–13)
PMV BLD: 10.2 FL — SIGNIFICANT CHANGE UP (ref 7–13)
POTASSIUM SERPL-MCNC: 4.1 MMOL/L — SIGNIFICANT CHANGE UP (ref 3.5–5.3)
POTASSIUM SERPL-SCNC: 4.1 MMOL/L — SIGNIFICANT CHANGE UP (ref 3.5–5.3)
PROT SERPL-MCNC: 7.3 G/DL — SIGNIFICANT CHANGE UP (ref 6–8.3)
RBC # BLD: 2.81 M/UL — LOW (ref 4.2–5.8)
RBC # BLD: 2.91 M/UL — LOW (ref 4.2–5.8)
RBC # FLD: 12.7 % — SIGNIFICANT CHANGE UP (ref 10.3–14.5)
RBC # FLD: 13 % — SIGNIFICANT CHANGE UP (ref 10.3–14.5)
SODIUM SERPL-SCNC: 134 MMOL/L — LOW (ref 135–145)
WBC # BLD: 8.38 K/UL — SIGNIFICANT CHANGE UP (ref 3.8–10.5)
WBC # BLD: 8.45 K/UL — SIGNIFICANT CHANGE UP (ref 3.8–10.5)
WBC # FLD AUTO: 8.38 K/UL — SIGNIFICANT CHANGE UP (ref 3.8–10.5)
WBC # FLD AUTO: 8.45 K/UL — SIGNIFICANT CHANGE UP (ref 3.8–10.5)

## 2019-12-17 PROCEDURE — 99232 SBSQ HOSP IP/OBS MODERATE 35: CPT

## 2019-12-17 PROCEDURE — 71045 X-RAY EXAM CHEST 1 VIEW: CPT | Mod: 26

## 2019-12-17 RX ORDER — FUROSEMIDE 40 MG
20 TABLET ORAL ONCE
Refills: 0 | Status: COMPLETED | OUTPATIENT
Start: 2019-12-17 | End: 2019-12-17

## 2019-12-17 RX ORDER — MAGNESIUM SULFATE 500 MG/ML
2 VIAL (ML) INJECTION ONCE
Refills: 0 | Status: COMPLETED | OUTPATIENT
Start: 2019-12-17 | End: 2019-12-17

## 2019-12-17 RX ORDER — SODIUM,POTASSIUM PHOSPHATES 278-250MG
1 POWDER IN PACKET (EA) ORAL ONCE
Refills: 0 | Status: COMPLETED | OUTPATIENT
Start: 2019-12-17 | End: 2019-12-17

## 2019-12-17 RX ADMIN — AMLODIPINE BESYLATE 5 MILLIGRAM(S): 2.5 TABLET ORAL at 05:52

## 2019-12-17 RX ADMIN — PIPERACILLIN AND TAZOBACTAM 25 GRAM(S): 4; .5 INJECTION, POWDER, LYOPHILIZED, FOR SOLUTION INTRAVENOUS at 21:53

## 2019-12-17 RX ADMIN — OXYCODONE HYDROCHLORIDE 10 MILLIGRAM(S): 5 TABLET ORAL at 08:30

## 2019-12-17 RX ADMIN — Medication 50 GRAM(S): at 05:49

## 2019-12-17 RX ADMIN — Medication 5 UNIT(S): at 16:34

## 2019-12-17 RX ADMIN — Medication 2: at 16:32

## 2019-12-17 RX ADMIN — Medication 5 UNIT(S): at 07:43

## 2019-12-17 RX ADMIN — Medication 1 PACKET(S): at 05:49

## 2019-12-17 RX ADMIN — OXYCODONE HYDROCHLORIDE 10 MILLIGRAM(S): 5 TABLET ORAL at 14:30

## 2019-12-17 RX ADMIN — GABAPENTIN 100 MILLIGRAM(S): 400 CAPSULE ORAL at 02:00

## 2019-12-17 RX ADMIN — HEPARIN SODIUM 5000 UNIT(S): 5000 INJECTION INTRAVENOUS; SUBCUTANEOUS at 21:52

## 2019-12-17 RX ADMIN — GABAPENTIN 100 MILLIGRAM(S): 400 CAPSULE ORAL at 16:34

## 2019-12-17 RX ADMIN — HEPARIN SODIUM 5000 UNIT(S): 5000 INJECTION INTRAVENOUS; SUBCUTANEOUS at 13:36

## 2019-12-17 RX ADMIN — GABAPENTIN 100 MILLIGRAM(S): 400 CAPSULE ORAL at 07:59

## 2019-12-17 RX ADMIN — PIPERACILLIN AND TAZOBACTAM 25 GRAM(S): 4; .5 INJECTION, POWDER, LYOPHILIZED, FOR SOLUTION INTRAVENOUS at 13:37

## 2019-12-17 RX ADMIN — Medication 5 UNIT(S): at 11:42

## 2019-12-17 RX ADMIN — OXYCODONE HYDROCHLORIDE 10 MILLIGRAM(S): 5 TABLET ORAL at 08:00

## 2019-12-17 RX ADMIN — HEPARIN SODIUM 5000 UNIT(S): 5000 INJECTION INTRAVENOUS; SUBCUTANEOUS at 05:50

## 2019-12-17 RX ADMIN — PIPERACILLIN AND TAZOBACTAM 25 GRAM(S): 4; .5 INJECTION, POWDER, LYOPHILIZED, FOR SOLUTION INTRAVENOUS at 05:49

## 2019-12-17 RX ADMIN — GABAPENTIN 100 MILLIGRAM(S): 400 CAPSULE ORAL at 23:37

## 2019-12-17 RX ADMIN — INSULIN GLARGINE 24 UNIT(S): 100 INJECTION, SOLUTION SUBCUTANEOUS at 21:52

## 2019-12-17 RX ADMIN — OXYCODONE HYDROCHLORIDE 10 MILLIGRAM(S): 5 TABLET ORAL at 14:00

## 2019-12-17 RX ADMIN — CHLORHEXIDINE GLUCONATE 1 APPLICATION(S): 213 SOLUTION TOPICAL at 07:43

## 2019-12-17 RX ADMIN — Medication 20 MILLIGRAM(S): at 13:37

## 2019-12-17 NOTE — PROGRESS NOTE ADULT - SUBJECTIVE AND OBJECTIVE BOX
SICU Daily Progress Note  =====================================================  Overnight Events:   Currently on N/C satting well, plan to wean to RA in AM. No other acute issues overnight    HPI: TARA BLAKE is a 49 yr old male presenting c/o worsening RLE infection. Pt states he first noticed a right 5th toe wound on 12/2, and saw a physician at [facility] who prescribed Santyl collagenase which he has been using topically. States foot infection continued to worsen until today, when he was seen at [facility] ED, told that he has subcutaneous gas seen on Xray and needed a BKA. Pt wanted a second opinion and left AMA after which he presented to [facility] ED. Currently states pain in the right foot is worsening, and he was able to walk today prior to arriving in ED this afternoon. Pt also with DKA. States he is amenable to urgent surgical intervention and SICU consulted for resuscitation.     PMH:  Type I Diabetes Mellitus     Allergies: No Known Allergies    MEDICATIONS  (STANDING):  amLODIPine   Tablet 5 milliGRAM(s) Oral daily  chlorhexidine 4% Liquid 1 Application(s) Topical <User Schedule>  dextrose 50% Injectable 12.5 Gram(s) IV Push once  dextrose 50% Injectable 25 Gram(s) IV Push once  dextrose 50% Injectable 25 Gram(s) IV Push once  gabapentin 100 milliGRAM(s) Oral every 8 hours  heparin  Injectable 5000 Unit(s) SubCutaneous every 8 hours  influenza   Vaccine 0.5 milliLiter(s) IntraMuscular once  insulin glargine Injectable (LANTUS) 24 Unit(s) SubCutaneous at bedtime  insulin lispro (HumaLOG) corrective regimen sliding scale   SubCutaneous three times a day before meals  insulin lispro (HumaLOG) corrective regimen sliding scale   SubCutaneous at bedtime  insulin lispro Injectable (HumaLOG) 5 Unit(s) SubCutaneous three times a day before meals  piperacillin/tazobactam IVPB.. 3.375 Gram(s) IV Intermittent every 8 hours    MEDICATIONS  (PRN):  acetaminophen   Tablet .. 650 milliGRAM(s) Oral every 6 hours PRN Mild Pain (1 - 3)  dextrose 40% Gel 15 Gram(s) Oral once PRN Blood Glucose LESS THAN 70 milliGRAM(s)/deciliter  glucagon  Injectable 1 milliGRAM(s) IntraMuscular once PRN Glucose LESS THAN 70 milligrams/deciliter  ondansetron Injectable 4 milliGRAM(s) IV Push every 8 hours PRN Nausea and/or Vomiting  oxyCODONE    IR 10 milliGRAM(s) Oral every 4 hours PRN Severe Pain (7 - 10)  oxyCODONE    IR 5 milliGRAM(s) Oral every 4 hours PRN Moderate Pain (4 - 6)    ICU Vital Signs Last 24 Hrs  T(C): 36.9 (16 Dec 2019 20:00), Max: 37.3 (16 Dec 2019 04:00)  T(F): 98.5 (16 Dec 2019 20:00), Max: 99.1 (16 Dec 2019 04:00)  HR: 98 (16 Dec 2019 23:00) (91 - 104)  BP: --  BP(mean): --  ABP: 144/63 (16 Dec 2019 23:00) (140/63 - 174/71)  ABP(mean): 88 (16 Dec 2019 23:00) (84 - 117)  RR: 19 (16 Dec 2019 23:00) (15 - 21)  SpO2: 98% (16 Dec 2019 23:00) (92% - 100%)      I&O's Detail    15 Dec 2019 07:01  -  16 Dec 2019 07:00  --------------------------------------------------------  IN:    IV PiggyBack: 600 mL    lactated ringers.: 250 mL    Oral Fluid: 250 mL  Total IN: 1100 mL    OUT:    Voided: 1150 mL  Total OUT: 1150 mL    Total NET: -50 mL      16 Dec 2019 07:01  -  17 Dec 2019 00:06  --------------------------------------------------------  IN:    Oral Fluid: 720 mL  Total IN: 720 mL    OUT:    Voided: 1350 mL  Total OUT: 1350 mL    Total NET: -630 mL      EXAM  NEUROLOGY  RASS: 0  	GCS:  15  Exam: Normal, NAD, alert, oriented x3, no focal deficits.     RESPIRATORY  Exam: +nasal canula, nonlabored breathing, CTA B/L     CARDIOVASCULAR  Exam: Normotensive, RRR, no M/R/G     GI/NUTRITION  Exam: Abdomen soft, NT/ND, no rebound or guarding.  Current Diet:  NPO    EXTREMITIES  Right lower extremity s/p BKA guillotine with dressing clean dry intact, no swelling erythema, swelling, tenderness noted. LLE adequate strength, motor, and sensory     HEMATOLOGIC  [x] VTE Prophylaxis: heparin  Injectable 5000 Unit(s) SubCutaneous every 8 hours      Tubes/Lines/Drains    [x] Peripheral IV  [] Central Venous Line     	[] R	[] L	[] IJ	[] Fem	[] SC	Date Placed:   [x] Arterial Line		[] R	[x] L	[] Fem	[x] Rad	[] Ax	Date Placed:   [] PICC		[] Midline		[] Mediport  [] Urinary Catheter		  [x] Necessity of urinary, arterial, and venous catheters discussed      LABS:  CBC (12-16 @ 00:10)                              8.2<L>                         10.17   )----------------(  305        --    % Neutrophils, --    % Lymphocytes, ANC: --                                  24.8<L>    BMP (12-16 @ 00:10)             130<L>  |  96<L>   |  16    		Ca++ 0.99<L>  Ca 7.6<L>             ---------------------------------( 226<H>		Mg 1.8                4.2     |  22      |  1.65<H>			Ph 2.3<L>          ABG (12-16 @ 00:10)     7.45 / 41 / 116<H> / 29<H> / 4.6 / 98.3%     Lactate: 0.7

## 2019-12-17 NOTE — DIETITIAN INITIAL EVALUATION ADULT. - ADD RECOMMEND
Explained consistent carbohydrate menu to patient and grams carbohydrate/meal that is allowed.  Pt states understanding of menu planning.  Recommend follow/up with out-patient RDN for support and optimal glucose control.

## 2019-12-17 NOTE — PROGRESS NOTE ADULT - ASSESSMENT
49 yr old male with necrotizing soft tissue infection of the RLE, admitted to SICU for resuscitation and management of severe sepsis, now s/p emergent guillotine Rt caroline GROVES. His postop course has been complicated by ARDS and MANN but both improving. He is tachycardic and febrile to 100.9 but otherwise stable.     PLAN:      - IV Tylenol, Dilaudid prn for pain control  - IS, supplemental O2 as needed, high flow NC  - Monitor vitals, maintain MAP>65  - Regular diet CC, IVF  - Zosyn for necrotizing soft tissue infection  - DVT ppx: Heparin 49 yr old male with necrotizing soft tissue infection of the RLE, admitted to SICU for resuscitation and management of severe sepsis, now s/p emergent caroline Rt caroline GROVES. His postop course has been complicated by ARDS and MANN but both improving. He is afebrile and otherwise stable.    PLAN:      - Pending SICU, move to floor  - IV Tylenol, Dilaudid prn for pain control  - Monitor vitals, maintain MAP>65  - Regular diet CC, IVF  - Zosyn for necrotizing soft tissue infection  - DVT ppx: Heparin

## 2019-12-17 NOTE — DIETITIAN INITIAL EVALUATION ADULT. - OTHER INFO
Pt admitted w/ dx of necrotizing fascitis of ankle and foot.  S/P Surgery for R caroline GROVES.  Met w/pt who provided nutrition hx.  Pt states his appetite is good and denies food allergies, chewing/swallowing issues.  He denies following a modified diet PTA, but states he does FS prior to each meal and adjusts carb intake for that meal as needed.  He reports that he is followed by an endocrinologist and sees a nutritionist in that office occasionally.  He denies recent wt change.

## 2019-12-17 NOTE — PROGRESS NOTE ADULT - SUBJECTIVE AND OBJECTIVE BOX
Morning Surgical Progress Note  Patient is a 49y old  Male who presents with a chief complaint of Necrotizing soft tissue infection (17 Dec 2019 00:04)      SUBJECTIVE: Patient seen and examined on morning rounds. Patient weened off high flow nasal cannula. Lantus dose was decreased to 20 units. Dressing changed and wrapped with kurlex and ace bandage.    PAST MEDICAL & SURGICAL HISTORY:  DM (diabetes mellitus)  Stenosis of popliteal artery: Left stent placed 2017    FAMILY HISTORY:    REVIEW OF SYSTEMS:    CONSTITUTIONAL: No weakness, fevers or chills  EYES/ENT: No visual changes;  No vertigo or throat pain   NECK: No pain or stiffness  RESPIRATORY: No cough, wheezing, hemoptysis; No shortness of breath  CARDIOVASCULAR: No chest pain or palpitations  GASTROINTESTINAL: No abdominal or epigastric pain. No nausea, vomiting, or hematemesis; No diarrhea or constipation. No melena or hematochezia.  GENITOURINARY: No dysuria, frequency or hematuria  NEUROLOGICAL: No numbness or weakness  SKIN: No itching, rashes    Vital Signs Last 24 Hrs  T(C): 36.9 (17 Dec 2019 04:00), Max: 36.9 (16 Dec 2019 12:00)  T(F): 98.5 (17 Dec 2019 04:00), Max: 98.5 (16 Dec 2019 12:00)  HR: 92 (17 Dec 2019 06:00) (89 - 104)  BP: --  BP(mean): --  RR: 13 (17 Dec 2019 06:00) (13 - 21)  SpO2: 98% (17 Dec 2019 06:00) (92% - 100%)I&O's Detail    16 Dec 2019 07:01  -  17 Dec 2019 07:00  --------------------------------------------------------  IN:    Oral Fluid: 720 mL  Total IN: 720 mL    OUT:    Voided: 2050 mL  Total OUT: 2050 mL    Total NET: -1330 mL      17 Dec 2019 07:01  -  17 Dec 2019 07:53  --------------------------------------------------------  IN:    Oral Fluid: 350 mL  Total IN: 350 mL    OUT:  Total OUT: 0 mL    Total NET: 350 mL        Medications  MEDICATIONS  (STANDING):  amLODIPine   Tablet 5 milliGRAM(s) Oral daily  chlorhexidine 4% Liquid 1 Application(s) Topical <User Schedule>  dextrose 50% Injectable 12.5 Gram(s) IV Push once  dextrose 50% Injectable 25 Gram(s) IV Push once  dextrose 50% Injectable 25 Gram(s) IV Push once  gabapentin 100 milliGRAM(s) Oral every 8 hours  heparin  Injectable 5000 Unit(s) SubCutaneous every 8 hours  influenza   Vaccine 0.5 milliLiter(s) IntraMuscular once  insulin glargine Injectable (LANTUS) 24 Unit(s) SubCutaneous at bedtime  insulin lispro (HumaLOG) corrective regimen sliding scale   SubCutaneous three times a day before meals  insulin lispro (HumaLOG) corrective regimen sliding scale   SubCutaneous at bedtime  insulin lispro Injectable (HumaLOG) 5 Unit(s) SubCutaneous three times a day before meals  piperacillin/tazobactam IVPB.. 3.375 Gram(s) IV Intermittent every 8 hours    MEDICATIONS  (PRN):  acetaminophen   Tablet .. 650 milliGRAM(s) Oral every 6 hours PRN Mild Pain (1 - 3)  dextrose 40% Gel 15 Gram(s) Oral once PRN Blood Glucose LESS THAN 70 milliGRAM(s)/deciliter  glucagon  Injectable 1 milliGRAM(s) IntraMuscular once PRN Glucose LESS THAN 70 milligrams/deciliter  ondansetron Injectable 4 milliGRAM(s) IV Push every 8 hours PRN Nausea and/or Vomiting  oxyCODONE    IR 10 milliGRAM(s) Oral every 4 hours PRN Severe Pain (7 - 10)  oxyCODONE    IR 5 milliGRAM(s) Oral every 4 hours PRN Moderate Pain (4 - 6)      Physical Exam  Constitutional: A&Ox3, NAD  Eyes: Scleras clear, PERRLA/ EOMI, Gross vision intact  Respiratory:  Gastrointestinal: Soft nontender, nondistended  Genitourinary:   Extremities: Moving all extremities, no edema  Vascular:  Tubes:  Skin: No Rashes, Hematoma, Ecchymosis    LABS:                        8.3    8.38  )-----------( 329      ( 17 Dec 2019 00:20 )             25.6     12-17    134<L>  |  98  |  13  ----------------------------<  184<H>  4.1   |  24  |  1.53<H>    Ca    8.2<L>      17 Dec 2019 00:20  Phos  2.9     12-17  Mg     1.8     12-17    TPro  7.3  /  Alb  1.8<L>  /  TBili  < 0.2<L>  /  DBili  < 0.2  /  AST  62<H>  /  ALT  47<H>  /  AlkPhos  226<H>  12-17      LIVER FUNCTIONS - ( 17 Dec 2019 00:20 )  Alb: 1.8 g/dL / Pro: 7.3 g/dL / ALK PHOS: 226 u/L / ALT: 47 u/L / AST: 62 u/L / GGT: x Morning Surgical Progress Note  Patient is a 49y old  Male who presents with a chief complaint of Necrotizing soft tissue infection (17 Dec 2019 00:04)      SUBJECTIVE: Patient seen and examined on morning rounds. Patient weened off high flow nasal cannula. Dressing changed and wrapped with kurlex and ace bandage. Patient is saturating @ 96% on room air.     PAST MEDICAL & SURGICAL HISTORY:  DM (diabetes mellitus)  Stenosis of popliteal artery: Left stent placed 2017    FAMILY HISTORY:    REVIEW OF SYSTEMS:    CONSTITUTIONAL: No weakness, fevers or chills  EYES/ENT: No visual changes;  No vertigo or throat pain   NECK: No pain or stiffness  RESPIRATORY: No cough, wheezing, hemoptysis; No shortness of breath  CARDIOVASCULAR: No chest pain or palpitations  GASTROINTESTINAL: No abdominal or epigastric pain. No nausea, vomiting, or hematemesis; No diarrhea or constipation. No melena or hematochezia.  GENITOURINARY: No dysuria, frequency or hematuria  NEUROLOGICAL: No numbness or weakness  SKIN: No itching, rashes    Vital Signs Last 24 Hrs  T(C): 36.9 (17 Dec 2019 04:00), Max: 36.9 (16 Dec 2019 12:00)  T(F): 98.5 (17 Dec 2019 04:00), Max: 98.5 (16 Dec 2019 12:00)  HR: 92 (17 Dec 2019 06:00) (89 - 104)  BP: --  BP(mean): --  RR: 13 (17 Dec 2019 06:00) (13 - 21)  SpO2: 98% (17 Dec 2019 06:00) (92% - 100%)I&O's Detail    16 Dec 2019 07:01  -  17 Dec 2019 07:00  --------------------------------------------------------  IN:    Oral Fluid: 720 mL  Total IN: 720 mL    OUT:    Voided: 2050 mL  Total OUT: 2050 mL    Total NET: -1330 mL      17 Dec 2019 07:01  -  17 Dec 2019 07:53  --------------------------------------------------------  IN:    Oral Fluid: 350 mL  Total IN: 350 mL    OUT:  Total OUT: 0 mL    Total NET: 350 mL        Medications  MEDICATIONS  (STANDING):  amLODIPine   Tablet 5 milliGRAM(s) Oral daily  chlorhexidine 4% Liquid 1 Application(s) Topical <User Schedule>  dextrose 50% Injectable 12.5 Gram(s) IV Push once  dextrose 50% Injectable 25 Gram(s) IV Push once  dextrose 50% Injectable 25 Gram(s) IV Push once  gabapentin 100 milliGRAM(s) Oral every 8 hours  heparin  Injectable 5000 Unit(s) SubCutaneous every 8 hours  influenza   Vaccine 0.5 milliLiter(s) IntraMuscular once  insulin glargine Injectable (LANTUS) 24 Unit(s) SubCutaneous at bedtime  insulin lispro (HumaLOG) corrective regimen sliding scale   SubCutaneous three times a day before meals  insulin lispro (HumaLOG) corrective regimen sliding scale   SubCutaneous at bedtime  insulin lispro Injectable (HumaLOG) 5 Unit(s) SubCutaneous three times a day before meals  piperacillin/tazobactam IVPB.. 3.375 Gram(s) IV Intermittent every 8 hours    MEDICATIONS  (PRN):  acetaminophen   Tablet .. 650 milliGRAM(s) Oral every 6 hours PRN Mild Pain (1 - 3)  dextrose 40% Gel 15 Gram(s) Oral once PRN Blood Glucose LESS THAN 70 milliGRAM(s)/deciliter  glucagon  Injectable 1 milliGRAM(s) IntraMuscular once PRN Glucose LESS THAN 70 milligrams/deciliter  ondansetron Injectable 4 milliGRAM(s) IV Push every 8 hours PRN Nausea and/or Vomiting  oxyCODONE    IR 10 milliGRAM(s) Oral every 4 hours PRN Severe Pain (7 - 10)  oxyCODONE    IR 5 milliGRAM(s) Oral every 4 hours PRN Moderate Pain (4 - 6)      Physical Exam  Constitutional: A&Ox3, NAD  Eyes: Scleras clear, PERRLA/ EOMI, Gross vision intact  Gastrointestinal: Soft nontender, nondistended  Extremities: Moving all extremities, no edema  Skin: No Rashes, Hematoma, Ecchymosis  Ext: R BKA Dressing c/d/i, WWP, no pitting edema, calf without tenderness or tension    LABS:                        8.3    8.38  )-----------( 329      ( 17 Dec 2019 00:20 )             25.6     12-17    134<L>  |  98  |  13  ----------------------------<  184<H>  4.1   |  24  |  1.53<H>    Ca    8.2<L>      17 Dec 2019 00:20  Phos  2.9     12-17  Mg     1.8     12-17    TPro  7.3  /  Alb  1.8<L>  /  TBili  < 0.2<L>  /  DBili  < 0.2  /  AST  62<H>  /  ALT  47<H>  /  AlkPhos  226<H>  12-17      LIVER FUNCTIONS - ( 17 Dec 2019 00:20 )  Alb: 1.8 g/dL / Pro: 7.3 g/dL / ALK PHOS: 226 u/L / ALT: 47 u/L / AST: 62 u/L / GGT: x

## 2019-12-17 NOTE — PROGRESS NOTE ADULT - ASSESSMENT
ASSESSMENT:  49 yr old male with necrotizing soft tissue infection of the RLE, admitted to SICU for resuscitation and management of severe sepsis s/p right Syme amputation.    PLAN:    Neurologic:   - Ofirmev, Dilaudid, oxycodone prn for pain control    Respiratory:   - mild to moderate ARDS off CPAP  - currently on nasal cannula  - plan to wean to nasal cannula in AM  - daily cxr     Cardiovascular:   - hypertension on 5mg norvasc   - unremarkable echo   - Monitor vitals, maintain MAP>65    Gastrointestinal/Nutrition:   - Continue Consistent Carbohydrate diet    Renal/Genitourinary:   - MANN Improving, Cr downtrending  - Trend electrolytes, replete as needed    Hematologic:   - C/w Heparin SQ for DVT ppx  - Trend H/H, transfuse if needed    Infectious Disease:   - s/p Clindamycin (12/12-12/13) and Vancomycin (12/12-12/13)  - Continuing Zosyn  - Monitor for fevers, Tentatively planning to return to OR on friday for completion BKA.  - WBC WNL now    Endocrine:   - off insulin gtt placed  - cont lantus 24u and continue lispro 5u TID with meals   - Monitor FS, and continue ISS  - will consider endocrine consult for further glycemic control    Disposition:   List floor AM    Critical Care Diagnoses:  DKA  Respiratory Failure  Sepsis s/p BKA ASSESSMENT:  49 yr old male with necrotizing soft tissue infection of the RLE, admitted to SICU for resuscitation and management of severe sepsis s/p right Syme amputation.    PLAN:    Neurologic:   - Ofirmev, Dilaudid, oxycodone prn for pain control    Respiratory:   - mild to moderate ARDS off CPAP - wean NC as tolerated   - Repeat chest US today- consider diuresis   - currently on nasal cannula  - daily cxr     Cardiovascular:   - hypertension on 5mg norvasc   - unremarkable echo   - Monitor vitals, maintain MAP>65    Gastrointestinal/Nutrition:   - Continue Consistent Carbohydrate diet    Renal/Genitourinary:   - MANN Improving, Cr downtrending  - Trend electrolytes, replete as needed    Hematologic:   - C/w Heparin SQ for DVT ppx  - Trend H/H, transfuse if needed    Infectious Disease:   - s/p Clindamycin (12/12-12/13) and Vancomycin (12/12-12/13)  - Continuing Zosyn  - Monitor for fevers  - WBC WNL now    Endocrine:   - off insulin gtt placed  - cont lantus 24u and continue lispro 5u TID with meals   - Monitor FS, and continue ISS  - will consider endocrine consult for further glycemic control    Disposition:   List for floor    Critical Care Diagnoses:  DKA  Respiratory Failure  Sepsis s/p BKA  ARDS

## 2019-12-18 LAB
ANION GAP SERPL CALC-SCNC: 12 MMO/L — SIGNIFICANT CHANGE UP (ref 7–14)
BUN SERPL-MCNC: 15 MG/DL — SIGNIFICANT CHANGE UP (ref 7–23)
CALCIUM SERPL-MCNC: 8.1 MG/DL — LOW (ref 8.4–10.5)
CHLORIDE SERPL-SCNC: 98 MMOL/L — SIGNIFICANT CHANGE UP (ref 98–107)
CO2 SERPL-SCNC: 24 MMOL/L — SIGNIFICANT CHANGE UP (ref 22–31)
CREAT SERPL-MCNC: 1.52 MG/DL — HIGH (ref 0.5–1.3)
GLUCOSE BLDC GLUCOMTR-MCNC: 107 MG/DL — HIGH (ref 70–99)
GLUCOSE BLDC GLUCOMTR-MCNC: 154 MG/DL — HIGH (ref 70–99)
GLUCOSE BLDC GLUCOMTR-MCNC: 254 MG/DL — HIGH (ref 70–99)
GLUCOSE BLDC GLUCOMTR-MCNC: 96 MG/DL — SIGNIFICANT CHANGE UP (ref 70–99)
GLUCOSE SERPL-MCNC: 218 MG/DL — HIGH (ref 70–99)
MAGNESIUM SERPL-MCNC: 2 MG/DL — SIGNIFICANT CHANGE UP (ref 1.6–2.6)
PHOSPHATE SERPL-MCNC: 3.1 MG/DL — SIGNIFICANT CHANGE UP (ref 2.5–4.5)
POTASSIUM SERPL-MCNC: 4.1 MMOL/L — SIGNIFICANT CHANGE UP (ref 3.5–5.3)
POTASSIUM SERPL-SCNC: 4.1 MMOL/L — SIGNIFICANT CHANGE UP (ref 3.5–5.3)
SODIUM SERPL-SCNC: 134 MMOL/L — LOW (ref 135–145)

## 2019-12-18 PROCEDURE — 99232 SBSQ HOSP IP/OBS MODERATE 35: CPT

## 2019-12-18 RX ORDER — INSULIN GLARGINE 100 [IU]/ML
19 INJECTION, SOLUTION SUBCUTANEOUS ONCE
Refills: 0 | Status: DISCONTINUED | OUTPATIENT
Start: 2019-12-18 | End: 2019-12-18

## 2019-12-18 RX ORDER — SODIUM CHLORIDE 9 MG/ML
1000 INJECTION, SOLUTION INTRAVENOUS
Refills: 0 | Status: DISCONTINUED | OUTPATIENT
Start: 2019-12-18 | End: 2019-12-19

## 2019-12-18 RX ORDER — INSULIN GLARGINE 100 [IU]/ML
24 INJECTION, SOLUTION SUBCUTANEOUS AT BEDTIME
Refills: 0 | Status: DISCONTINUED | OUTPATIENT
Start: 2019-12-19 | End: 2019-12-25

## 2019-12-18 RX ADMIN — Medication 6: at 18:17

## 2019-12-18 RX ADMIN — GABAPENTIN 100 MILLIGRAM(S): 400 CAPSULE ORAL at 07:29

## 2019-12-18 RX ADMIN — PIPERACILLIN AND TAZOBACTAM 25 GRAM(S): 4; .5 INJECTION, POWDER, LYOPHILIZED, FOR SOLUTION INTRAVENOUS at 22:21

## 2019-12-18 RX ADMIN — AMLODIPINE BESYLATE 5 MILLIGRAM(S): 2.5 TABLET ORAL at 05:10

## 2019-12-18 RX ADMIN — PIPERACILLIN AND TAZOBACTAM 25 GRAM(S): 4; .5 INJECTION, POWDER, LYOPHILIZED, FOR SOLUTION INTRAVENOUS at 13:50

## 2019-12-18 RX ADMIN — GABAPENTIN 100 MILLIGRAM(S): 400 CAPSULE ORAL at 17:30

## 2019-12-18 RX ADMIN — HEPARIN SODIUM 5000 UNIT(S): 5000 INJECTION INTRAVENOUS; SUBCUTANEOUS at 13:50

## 2019-12-18 RX ADMIN — OXYCODONE HYDROCHLORIDE 10 MILLIGRAM(S): 5 TABLET ORAL at 04:08

## 2019-12-18 RX ADMIN — GABAPENTIN 100 MILLIGRAM(S): 400 CAPSULE ORAL at 23:18

## 2019-12-18 RX ADMIN — Medication 5 UNIT(S): at 18:17

## 2019-12-18 RX ADMIN — OXYCODONE HYDROCHLORIDE 10 MILLIGRAM(S): 5 TABLET ORAL at 10:08

## 2019-12-18 RX ADMIN — Medication 5 UNIT(S): at 07:30

## 2019-12-18 RX ADMIN — OXYCODONE HYDROCHLORIDE 10 MILLIGRAM(S): 5 TABLET ORAL at 03:38

## 2019-12-18 RX ADMIN — PIPERACILLIN AND TAZOBACTAM 25 GRAM(S): 4; .5 INJECTION, POWDER, LYOPHILIZED, FOR SOLUTION INTRAVENOUS at 05:10

## 2019-12-18 RX ADMIN — HEPARIN SODIUM 5000 UNIT(S): 5000 INJECTION INTRAVENOUS; SUBCUTANEOUS at 22:21

## 2019-12-18 RX ADMIN — HEPARIN SODIUM 5000 UNIT(S): 5000 INJECTION INTRAVENOUS; SUBCUTANEOUS at 05:10

## 2019-12-18 RX ADMIN — OXYCODONE HYDROCHLORIDE 10 MILLIGRAM(S): 5 TABLET ORAL at 09:53

## 2019-12-18 NOTE — PROGRESS NOTE ADULT - SUBJECTIVE AND OBJECTIVE BOX
SICU Daily Progress Note  =====================================================  Overnight Events:   Currently on N/C satting well, plan to wean to RA in AM. No other acute issues overnight    HPI: TARA BLAKE is a 49 yr old male presenting c/o worsening RLE infection. Pt states he first noticed a right 5th toe wound on 12/2, and saw a physician at [facility] who prescribed Santyl collagenase which he has been using topically. States foot infection continued to worsen until today, when he was seen at [facility] ED, told that he has subcutaneous gas seen on Xray and needed a BKA. Pt wanted a second opinion and left AMA after which he presented to [facility] ED. Currently states pain in the right foot is worsening, and he was able to walk today prior to arriving in ED this afternoon. Pt also with DKA. States he is amenable to urgent surgical intervention and SICU consulted for resuscitation.     PMH:  Type I Diabetes Mellitus     Allergies: No Known Allergies    MEDICATIONS  (STANDING):  amLODIPine   Tablet 5 milliGRAM(s) Oral daily  chlorhexidine 4% Liquid 1 Application(s) Topical <User Schedule>  dextrose 50% Injectable 12.5 Gram(s) IV Push once  dextrose 50% Injectable 25 Gram(s) IV Push once  dextrose 50% Injectable 25 Gram(s) IV Push once  gabapentin 100 milliGRAM(s) Oral every 8 hours  heparin  Injectable 5000 Unit(s) SubCutaneous every 8 hours  influenza   Vaccine 0.5 milliLiter(s) IntraMuscular once  insulin glargine Injectable (LANTUS) 24 Unit(s) SubCutaneous at bedtime  insulin lispro (HumaLOG) corrective regimen sliding scale   SubCutaneous three times a day before meals  insulin lispro (HumaLOG) corrective regimen sliding scale   SubCutaneous at bedtime  insulin lispro Injectable (HumaLOG) 5 Unit(s) SubCutaneous three times a day before meals  piperacillin/tazobactam IVPB.. 3.375 Gram(s) IV Intermittent every 8 hours    MEDICATIONS  (PRN):  acetaminophen   Tablet .. 650 milliGRAM(s) Oral every 6 hours PRN Mild Pain (1 - 3)  dextrose 40% Gel 15 Gram(s) Oral once PRN Blood Glucose LESS THAN 70 milliGRAM(s)/deciliter  glucagon  Injectable 1 milliGRAM(s) IntraMuscular once PRN Glucose LESS THAN 70 milligrams/deciliter  ondansetron Injectable 4 milliGRAM(s) IV Push every 8 hours PRN Nausea and/or Vomiting  oxyCODONE    IR 10 milliGRAM(s) Oral every 4 hours PRN Severe Pain (7 - 10)  oxyCODONE    IR 5 milliGRAM(s) Oral every 4 hours PRN Moderate Pain (4 - 6)    ICU Vital Signs Last 24 Hrs  T(C): 36.9 (16 Dec 2019 20:00), Max: 37.3 (16 Dec 2019 04:00)  T(F): 98.5 (16 Dec 2019 20:00), Max: 99.1 (16 Dec 2019 04:00)  HR: 98 (16 Dec 2019 23:00) (91 - 104)  BP: --  BP(mean): --  ABP: 144/63 (16 Dec 2019 23:00) (140/63 - 174/71)  ABP(mean): 88 (16 Dec 2019 23:00) (84 - 117)  RR: 19 (16 Dec 2019 23:00) (15 - 21)  SpO2: 98% (16 Dec 2019 23:00) (92% - 100%)      I&O's Detail    15 Dec 2019 07:01  -  16 Dec 2019 07:00  --------------------------------------------------------  IN:    IV PiggyBack: 600 mL    lactated ringers.: 250 mL    Oral Fluid: 250 mL  Total IN: 1100 mL    OUT:    Voided: 1150 mL  Total OUT: 1150 mL    Total NET: -50 mL      16 Dec 2019 07:01  -  17 Dec 2019 00:06  --------------------------------------------------------  IN:    Oral Fluid: 720 mL  Total IN: 720 mL    OUT:    Voided: 1350 mL  Total OUT: 1350 mL    Total NET: -630 mL      EXAM  NEUROLOGY  RASS: 0  	GCS:  15  Exam: Normal, NAD, alert, oriented x3, no focal deficits.     RESPIRATORY  Exam: +nasal canula, nonlabored breathing, CTA B/L     CARDIOVASCULAR  Exam: Normotensive, RRR, no M/R/G     GI/NUTRITION  Exam: Abdomen soft, NT/ND, no rebound or guarding.  Current Diet:  NPO    EXTREMITIES  Right lower extremity s/p BKA guillotine with dressing clean dry intact, no swelling erythema, swelling, tenderness noted. LLE adequate strength, motor, and sensory     HEMATOLOGIC  [x] VTE Prophylaxis: heparin  Injectable 5000 Unit(s) SubCutaneous every 8 hours      Tubes/Lines/Drains    [x] Peripheral IV  [] Central Venous Line     	[] R	[] L	[] IJ	[] Fem	[] SC	Date Placed:   [x] Arterial Line		[] R	[x] L	[] Fem	[x] Rad	[] Ax	Date Placed:   [] PICC		[] Midline		[] Mediport  [] Urinary Catheter		  [x] Necessity of urinary, arterial, and venous catheters discussed      LABS:  CBC (12-16 @ 00:10)                              8.2<L>                         10.17   )----------------(  305        --    % Neutrophils, --    % Lymphocytes, ANC: --                                  24.8<L>    BMP (12-16 @ 00:10)             130<L>  |  96<L>   |  16    		Ca++ 0.99<L>  Ca 7.6<L>             ---------------------------------( 226<H>		Mg 1.8                4.2     |  22      |  1.65<H>			Ph 2.3<L>          ABG (12-16 @ 00:10)     7.45 / 41 / 116<H> / 29<H> / 4.6 / 98.3%     Lactate: 0.7 SICU Daily Progress Note  =====================================================  Overnight Events:   Currently satting well on RA. Effusion noted on bedside U/S during day, s/p 20 lasix given with 2L u/o. No other acute issues overnight. Possible list floor AM    HPI: TARA BLAKE is a 49 yr old male presenting c/o worsening RLE infection. Pt states he first noticed a right 5th toe wound on 12/2, and saw a physician at [facility] who prescribed Santyl collagenase which he has been using topically. States foot infection continued to worsen until today, when he was seen at [facility] ED, told that he has subcutaneous gas seen on Xray and needed a BKA. Pt wanted a second opinion and left AMA after which he presented to [facility] ED. Currently states pain in the right foot is worsening, and he was able to walk today prior to arriving in ED this afternoon. Pt also with DKA. States he is amenable to urgent surgical intervention and SICU consulted for resuscitation.     PMH:  Type I Diabetes Mellitus     Allergies: No Known Allergies    MEDICATIONS  (STANDING):  amLODIPine   Tablet 5 milliGRAM(s) Oral daily  chlorhexidine 4% Liquid 1 Application(s) Topical <User Schedule>  dextrose 50% Injectable 12.5 Gram(s) IV Push once  dextrose 50% Injectable 25 Gram(s) IV Push once  dextrose 50% Injectable 25 Gram(s) IV Push once  gabapentin 100 milliGRAM(s) Oral every 8 hours  heparin  Injectable 5000 Unit(s) SubCutaneous every 8 hours  influenza   Vaccine 0.5 milliLiter(s) IntraMuscular once  insulin glargine Injectable (LANTUS) 24 Unit(s) SubCutaneous at bedtime  insulin lispro (HumaLOG) corrective regimen sliding scale   SubCutaneous three times a day before meals  insulin lispro (HumaLOG) corrective regimen sliding scale   SubCutaneous at bedtime  insulin lispro Injectable (HumaLOG) 5 Unit(s) SubCutaneous three times a day before meals  piperacillin/tazobactam IVPB.. 3.375 Gram(s) IV Intermittent every 8 hours    MEDICATIONS  (PRN):  acetaminophen   Tablet .. 650 milliGRAM(s) Oral every 6 hours PRN Mild Pain (1 - 3)  dextrose 40% Gel 15 Gram(s) Oral once PRN Blood Glucose LESS THAN 70 milliGRAM(s)/deciliter  glucagon  Injectable 1 milliGRAM(s) IntraMuscular once PRN Glucose LESS THAN 70 milligrams/deciliter  ondansetron Injectable 4 milliGRAM(s) IV Push every 8 hours PRN Nausea and/or Vomiting  oxyCODONE    IR 10 milliGRAM(s) Oral every 4 hours PRN Severe Pain (7 - 10)  oxyCODONE    IR 5 milliGRAM(s) Oral every 4 hours PRN Moderate Pain (4 - 6)    ICU Vital Signs Last 24 Hrs  T(C): 37.3 (18 Dec 2019 00:00), Max: 37.3 (17 Dec 2019 20:00)  T(F): 99.1 (18 Dec 2019 00:00), Max: 99.2 (17 Dec 2019 20:00)  HR: 96 (18 Dec 2019 00:00) (89 - 105)  BP: --  BP(mean): --  ABP: 144/63 (18 Dec 2019 00:00) (123/52 - 167/75)  ABP(mean): 88 (18 Dec 2019 00:00) (75 - 105)  RR: 16 (18 Dec 2019 00:00) (12 - 20)  SpO2: 94% (18 Dec 2019 00:00) (90% - 99%)    I&O's Detail    16 Dec 2019 07:01  -  17 Dec 2019 07:00  --------------------------------------------------------  IN:    Oral Fluid: 720 mL  Total IN: 720 mL    OUT:    Voided: 2050 mL  Total OUT: 2050 mL    Total NET: -1330 mL      17 Dec 2019 07:01  -  18 Dec 2019 01:43  --------------------------------------------------------  IN:    IV PiggyBack: 100 mL    Oral Fluid: 940 mL  Total IN: 1040 mL    OUT:    Voided: 1900 mL  Total OUT: 1900 mL    Total NET: -860 mL    EXAM  NEUROLOGY  Exam: Normal, NAD, alert, oriented x3, no focal deficits.     RESPIRATORY  Exam: +nasal canula, nonlabored breathing, CTA B/L     CARDIOVASCULAR  Exam: Normotensive, RRR, no M/R/G     GI/NUTRITION  Exam: Abdomen soft, NT/ND, no rebound or guarding.  Current Diet:  NPO    EXTREMITIES  Right lower extremity s/p BKA guillotine with dressing clean dry intact, no swelling erythema, swelling, tenderness noted. LLE adequate strength, motor, and sensory     HEMATOLOGIC  [x] VTE Prophylaxis: heparin  Injectable 5000 Unit(s) SubCutaneous every 8 hours      Tubes/Lines/Drains    [x] Peripheral IV  [] Central Venous Line     	[] R	[] L	[] IJ	[] Fem	[] SC	Date Placed:   [x] Arterial Line		[] R	[x] L	[] Fem	[x] Rad	[] Ax	Date Placed:   [] PICC		[] Midline		[] Mediport  [] Urinary Catheter		  [x] Necessity of urinary, arterial, and venous catheters discussed      LABS:  CBC (12-17 @ 23:45)                              8.0<L>                         8.45    )----------------(  335        --    % Neutrophils, --    % Lymphocytes, ANC: --                                  25.1<L>  CBC (12-17 @ 00:20)                              8.3<L>                         8.38    )----------------(  329        --    % Neutrophils, --    % Lymphocytes, ANC: --                                  25.6<L>    BMP (12-17 @ 23:45)             134<L>  |  98      |  15    		Ca++ --      Ca 8.1<L>             ---------------------------------( 218<H>		Mg 2.0                4.1     |  24      |  1.52<H>			Ph 3.1     BMP (12-17 @ 00:20)             134<L>  |  98      |  13    		Ca++ --      Ca 8.2<L>             ---------------------------------( 184<H>		Mg 1.8                4.1     |  24      |  1.53<H>			Ph 2.9       LFTs (12-17 @ 00:20)      TPro 7.3 / Alb 1.8<L> / TBili < 0.2<L> / DBili < 0.2 / AST 62<H> / ALT 47<H> / AlkPhos 226<H>

## 2019-12-18 NOTE — PROGRESS NOTE ADULT - SUBJECTIVE AND OBJECTIVE BOX
Vascular Surgery Progress Note    SUBJECTIVE: Patient seen and examined on morning rounds. Resting comfortably in chair, breathing in room air.     Vital Signs Last 24 Hrs  T(C): 36.9 (12-18 @ 18:08), Max: 37.3 (12-17 @ 20:00)  HR: 94 (12-18 @ 18:08) (93 - 98)  BP: 126/58 (12-18 @ 18:08) (120/64 - 129/69)  RR: 18 (12-18 @ 18:08) (16 - 19)  SpO2: 92% (12-18 @ 18:08) (91% - 95%)  12-17-19 @ 07:01  -  12-18-19 @ 07:00  --------------------------------------------------------  IN: 1440 mL / OUT: 2600 mL / NET: -1160 mL    12-18-19 @ 07:01  -  12-18-19 @ 18:39  --------------------------------------------------------  IN: 500 mL / OUT: 600 mL / NET: -100 mL        Physical Exam  General: alert and oriented, NAD  Resp: airway patent, respirations unlabored  CVS: regular rate and rhythm  Abdomen: soft, nontender, nondistended  Extremities: no edema  Skin: warm, dry, appropriate color  Ext: R BKA Dressing c/d/i, WWP, no pitting edema, calf without tenderness or tension    LABS:             CBC (12-17 @ 23:45)                              8.0<L>                         8.45    )----------------(  335        --    % Neutrophils, --    % Lymphocytes, ANC: --                                  25.1<L>                BMP (12-17 @ 23:45)             134<L>  |  98      |  15    		Ca++ --      Ca 8.1<L>             ---------------------------------( 218<H>		Mg 2.0                4.1     |  24      |  1.52<H>			Ph 3.1               -> URINE MIDSTREAM Culture (12-12 @ 18:07)     NG    NG  NG    -> BLOOD PERIPHERAL Culture (12-12 @ 15:36)     NG    NG  NG        MEDICATIONS  (STANDING):  amLODIPine   Tablet 5 milliGRAM(s) Oral daily  dextrose 5% + sodium chloride 0.45%. 1000 milliLiter(s) (100 mL/Hr) IV Continuous <Continuous>  dextrose 50% Injectable 12.5 Gram(s) IV Push once  dextrose 50% Injectable 25 Gram(s) IV Push once  dextrose 50% Injectable 25 Gram(s) IV Push once  gabapentin 100 milliGRAM(s) Oral every 8 hours  heparin  Injectable 5000 Unit(s) SubCutaneous every 8 hours  influenza   Vaccine 0.5 milliLiter(s) IntraMuscular once  insulin glargine Injectable (LANTUS) 19 Unit(s) SubCutaneous once  insulin lispro (HumaLOG) corrective regimen sliding scale   SubCutaneous three times a day before meals  insulin lispro (HumaLOG) corrective regimen sliding scale   SubCutaneous at bedtime  insulin lispro Injectable (HumaLOG) 5 Unit(s) SubCutaneous three times a day before meals  piperacillin/tazobactam IVPB.. 3.375 Gram(s) IV Intermittent every 8 hours    MEDICATIONS  (PRN):  acetaminophen   Tablet .. 650 milliGRAM(s) Oral every 6 hours PRN Mild Pain (1 - 3)  dextrose 40% Gel 15 Gram(s) Oral once PRN Blood Glucose LESS THAN 70 milliGRAM(s)/deciliter  glucagon  Injectable 1 milliGRAM(s) IntraMuscular once PRN Glucose LESS THAN 70 milligrams/deciliter  ondansetron Injectable 4 milliGRAM(s) IV Push every 8 hours PRN Nausea and/or Vomiting  oxyCODONE    IR 5 milliGRAM(s) Oral every 4 hours PRN Moderate Pain (4 - 6)  oxyCODONE    IR 10 milliGRAM(s) Oral every 4 hours PRN Severe Pain (7 - 10)

## 2019-12-18 NOTE — PROGRESS NOTE ADULT - ASSESSMENT
49 yr old male with necrotizing soft tissue infection of the RLE, admitted to SICU for resuscitation and management of severe sepsis, now s/p emergent caroline Rt caroline GROVES. His postop course has been complicated by ARDS and MANN but both improving. He is afebrile and otherwise stable.    PLAN:    - Return to OR possibly tomorrow for completion of BKA  - preop labs: CBC, BMP, coags, type and screen  - Pain control as needed; c/w oxycodone and tylenol  - Regular diet CC  - Zosyn for necrotizing soft tissue infection  - Monitor FS  - DVT ppx: Heparin   - dispo: possible transfer to floor from SICU

## 2019-12-18 NOTE — PROGRESS NOTE ADULT - ASSESSMENT
ASSESSMENT:  49 yr old male with necrotizing soft tissue infection of the RLE, admitted to SICU for resuscitation and management of severe sepsis s/p right Syme amputation.    PLAN:    Neurologic:   - Ofirmev, Dilaudid, oxycodone prn for pain control    Respiratory:   - mild to moderate ARDS off CPAP - wean NC as tolerated   - Repeat chest US today- consider diuresis   - currently on nasal cannula  - daily cxr     Cardiovascular:   - hypertension on 5mg norvasc   - unremarkable echo   - Monitor vitals, maintain MAP>65    Gastrointestinal/Nutrition:   - Continue Consistent Carbohydrate diet    Renal/Genitourinary:   - MANN Improving, Cr downtrending  - Trend electrolytes, replete as needed    Hematologic:   - C/w Heparin SQ for DVT ppx  - Trend H/H, transfuse if needed    Infectious Disease:   - s/p Clindamycin (12/12-12/13) and Vancomycin (12/12-12/13)  - Continuing Zosyn  - Monitor for fevers  - WBC WNL now    Endocrine:   - off insulin gtt placed  - cont lantus 24u and continue lispro 5u TID with meals   - Monitor FS, and continue ISS  - will consider endocrine consult for further glycemic control    Disposition:   List for floor    Critical Care Diagnoses:  DKA  Respiratory Failure  Sepsis s/p BKA  ARDS ASSESSMENT:  49 yr old male with necrotizing soft tissue infection of the RLE, admitted to SICU for resuscitation and management of severe sepsis s/p right guillotine amputation.    PLAN:    Neurologic:   - Ofirmev, Dilaudid, oxycodone prn for pain control    Respiratory:   - 12/17: +effusion on bedside U/S s/p 20 lasix with 2L u/o   - currently satting well on RA  - daily CXR    Cardiovascular:   - hypertension on 5mg norvasc   - unremarkable echo   - Monitor vitals, maintain MAP>65    Gastrointestinal/Nutrition:   - Continue Consistent Carbohydrate diet    Renal/Genitourinary:   - MANN Improving, Cr downtrending  - Trend electrolytes, replete as needed    Hematologic:   - C/w Heparin SQ for DVT ppx  - Trend H/H, transfuse if needed    Infectious Disease:   - s/p Clindamycin (12/12-12/13) and Vancomycin (12/12-12/13)  - Continuing Zosyn  - Monitor for fevers  - WBC WNL now    Endocrine:   - off insulin gtt placed  - cont lantus 24u and continue lispro 5u TID with meals   - Monitor FS, and continue ISS  - will consider endocrine consult for further glycemic control    Disposition:   List for floor    Critical Care Diagnoses:  DKA  Respiratory Failure  Sepsis s/p BKA  ARDS ASSESSMENT:  49 yr old male with necrotizing soft tissue infection of the RLE, admitted to SICU for resuscitation and management of severe sepsis s/p right guillotine amputation.    PLAN:    Neurologic:   - Ofirmev, Dilaudid, oxycodone prn for pain control    Respiratory:   - 12/17: +effusion on bedside U/S s/p 20 lasix with 2L u/o   - currently satting well on RA  - daily CXR    Cardiovascular:   - hypertension on 5mg norvasc   - unremarkable echo   - Monitor vitals, maintain MAP>65    Gastrointestinal/Nutrition:   - Continue Consistent Carbohydrate diet    Renal/Genitourinary:   - MANN Improving, Cr downtrending  - Trend electrolytes, replete as needed    Hematologic:   - C/w Heparin SQ for DVT ppx  - Trend H/H, transfuse if needed    Infectious Disease:   - s/p Clindamycin (12/12-12/13) and Vancomycin (12/12-12/13)  - Continuing Zosyn  - Monitor for fevers  - WBC WNL now    Endocrine:   - off insulin gtt placed  - cont lantus 24u and continue lispro 5u TID with meals   - Monitor FS, and continue ISS    Disposition:   transfer to the floor    Critical Care Diagnoses:  DKA  Respiratory Failure  Sepsis s/p BKA  ARDS

## 2019-12-19 LAB
ANION GAP SERPL CALC-SCNC: 11 MMO/L — SIGNIFICANT CHANGE UP (ref 7–14)
APTT BLD: 34.7 SEC — SIGNIFICANT CHANGE UP (ref 27.5–36.3)
BLD GP AB SCN SERPL QL: NEGATIVE — SIGNIFICANT CHANGE UP
BUN SERPL-MCNC: 17 MG/DL — SIGNIFICANT CHANGE UP (ref 7–23)
CALCIUM SERPL-MCNC: 8.5 MG/DL — SIGNIFICANT CHANGE UP (ref 8.4–10.5)
CHLORIDE SERPL-SCNC: 95 MMOL/L — LOW (ref 98–107)
CO2 SERPL-SCNC: 24 MMOL/L — SIGNIFICANT CHANGE UP (ref 22–31)
CREAT SERPL-MCNC: 1.53 MG/DL — HIGH (ref 0.5–1.3)
GLUCOSE BLDC GLUCOMTR-MCNC: 134 MG/DL — HIGH (ref 70–99)
GLUCOSE BLDC GLUCOMTR-MCNC: 216 MG/DL — HIGH (ref 70–99)
GLUCOSE BLDC GLUCOMTR-MCNC: 231 MG/DL — HIGH (ref 70–99)
GLUCOSE BLDC GLUCOMTR-MCNC: 319 MG/DL — HIGH (ref 70–99)
GLUCOSE SERPL-MCNC: 241 MG/DL — HIGH (ref 70–99)
HCT VFR BLD CALC: 27.9 % — LOW (ref 39–50)
HGB BLD-MCNC: 8.8 G/DL — LOW (ref 13–17)
INR BLD: 1.13 — SIGNIFICANT CHANGE UP (ref 0.88–1.17)
MAGNESIUM SERPL-MCNC: 1.9 MG/DL — SIGNIFICANT CHANGE UP (ref 1.6–2.6)
MCHC RBC-ENTMCNC: 28.7 PG — SIGNIFICANT CHANGE UP (ref 27–34)
MCHC RBC-ENTMCNC: 31.5 % — LOW (ref 32–36)
MCV RBC AUTO: 90.9 FL — SIGNIFICANT CHANGE UP (ref 80–100)
NRBC # FLD: 0 K/UL — SIGNIFICANT CHANGE UP (ref 0–0)
PHOSPHATE SERPL-MCNC: 3.1 MG/DL — SIGNIFICANT CHANGE UP (ref 2.5–4.5)
PLATELET # BLD AUTO: 384 K/UL — SIGNIFICANT CHANGE UP (ref 150–400)
PMV BLD: 10.5 FL — SIGNIFICANT CHANGE UP (ref 7–13)
POTASSIUM SERPL-MCNC: 4.5 MMOL/L — SIGNIFICANT CHANGE UP (ref 3.5–5.3)
POTASSIUM SERPL-SCNC: 4.5 MMOL/L — SIGNIFICANT CHANGE UP (ref 3.5–5.3)
PROTHROM AB SERPL-ACNC: 12.9 SEC — SIGNIFICANT CHANGE UP (ref 9.8–13.1)
RBC # BLD: 3.07 M/UL — LOW (ref 4.2–5.8)
RBC # FLD: 12.6 % — SIGNIFICANT CHANGE UP (ref 10.3–14.5)
RH IG SCN BLD-IMP: POSITIVE — SIGNIFICANT CHANGE UP
SODIUM SERPL-SCNC: 130 MMOL/L — LOW (ref 135–145)
WBC # BLD: 8.1 K/UL — SIGNIFICANT CHANGE UP (ref 3.8–10.5)
WBC # FLD AUTO: 8.1 K/UL — SIGNIFICANT CHANGE UP (ref 3.8–10.5)

## 2019-12-19 RX ORDER — INSULIN LISPRO 100/ML
VIAL (ML) SUBCUTANEOUS EVERY 6 HOURS
Refills: 0 | Status: DISCONTINUED | OUTPATIENT
Start: 2019-12-19 | End: 2019-12-20

## 2019-12-19 RX ADMIN — Medication 8: at 12:21

## 2019-12-19 RX ADMIN — PIPERACILLIN AND TAZOBACTAM 25 GRAM(S): 4; .5 INJECTION, POWDER, LYOPHILIZED, FOR SOLUTION INTRAVENOUS at 06:56

## 2019-12-19 RX ADMIN — HEPARIN SODIUM 5000 UNIT(S): 5000 INJECTION INTRAVENOUS; SUBCUTANEOUS at 21:57

## 2019-12-19 RX ADMIN — OXYCODONE HYDROCHLORIDE 10 MILLIGRAM(S): 5 TABLET ORAL at 10:12

## 2019-12-19 RX ADMIN — GABAPENTIN 100 MILLIGRAM(S): 400 CAPSULE ORAL at 16:28

## 2019-12-19 RX ADMIN — PIPERACILLIN AND TAZOBACTAM 25 GRAM(S): 4; .5 INJECTION, POWDER, LYOPHILIZED, FOR SOLUTION INTRAVENOUS at 16:28

## 2019-12-19 RX ADMIN — HEPARIN SODIUM 5000 UNIT(S): 5000 INJECTION INTRAVENOUS; SUBCUTANEOUS at 16:29

## 2019-12-19 RX ADMIN — OXYCODONE HYDROCHLORIDE 10 MILLIGRAM(S): 5 TABLET ORAL at 10:27

## 2019-12-19 RX ADMIN — AMLODIPINE BESYLATE 5 MILLIGRAM(S): 2.5 TABLET ORAL at 06:56

## 2019-12-19 RX ADMIN — Medication 4: at 17:47

## 2019-12-19 RX ADMIN — Medication 5 UNIT(S): at 17:47

## 2019-12-19 RX ADMIN — HEPARIN SODIUM 5000 UNIT(S): 5000 INJECTION INTRAVENOUS; SUBCUTANEOUS at 06:56

## 2019-12-19 RX ADMIN — INSULIN GLARGINE 24 UNIT(S): 100 INJECTION, SOLUTION SUBCUTANEOUS at 21:56

## 2019-12-19 NOTE — PROGRESS NOTE ADULT - SUBJECTIVE AND OBJECTIVE BOX
SUBJECTIVE: Patient seen and examined on morning rounds. Resting comfortably in chair, breathing in room air. Pt had a fall when walking with crutches, did not hit head and no neuro deficits.    Physical Exam  General: alert and oriented, NAD  Resp: airway patent, respirations unlabored  CVS: regular rate and rhythm  Abdomen: soft, nontender, nondistended  Extremities: no edema  Skin: warm, dry, appropriate color  Ext: R BKA Dressing c/d/i, WWP, no pitting edema, calf without tenderness or tension          Vital Signs:  Vital Signs Last 24 Hrs  T(C): 36.9 (19 Dec 2019 06:56), Max: 37.6 (18 Dec 2019 21:39)  T(F): 98.4 (19 Dec 2019 06:56), Max: 99.6 (18 Dec 2019 21:39)  HR: 90 (19 Dec 2019 06:56) (88 - 100)  BP: 125/71 (19 Dec 2019 06:56) (121/64 - 137/70)  BP(mean): --  RR: 16 (19 Dec 2019 06:56) (16 - 18)  SpO2: 93% (19 Dec 2019 06:56) (91% - 95%)    CAPILLARY BLOOD GLUCOSE      POCT Blood Glucose.: 231 mg/dL (19 Dec 2019 06:13)  POCT Blood Glucose.: 154 mg/dL (18 Dec 2019 21:52)  POCT Blood Glucose.: 254 mg/dL (18 Dec 2019 17:44)  POCT Blood Glucose.: 96 mg/dL (18 Dec 2019 12:37)      I&O's Detail    18 Dec 2019 07:01  -  19 Dec 2019 07:00  --------------------------------------------------------  IN:    dextrose 5% + sodium chloride 0.45%.: 1000 mL    IV PiggyBack: 200 mL    Oral Fluid: 700 mL  Total IN: 1900 mL    OUT:    Voided: 1400 mL  Total OUT: 1400 mL    Total NET: 500 mL          MEDICATIONS  (STANDING):  amLODIPine   Tablet 5 milliGRAM(s) Oral daily  dextrose 5% + sodium chloride 0.45%. 1000 milliLiter(s) (100 mL/Hr) IV Continuous <Continuous>  dextrose 50% Injectable 12.5 Gram(s) IV Push once  dextrose 50% Injectable 25 Gram(s) IV Push once  dextrose 50% Injectable 25 Gram(s) IV Push once  gabapentin 100 milliGRAM(s) Oral every 8 hours  heparin  Injectable 5000 Unit(s) SubCutaneous every 8 hours  influenza   Vaccine 0.5 milliLiter(s) IntraMuscular once  insulin glargine Injectable (LANTUS) 24 Unit(s) SubCutaneous at bedtime  insulin lispro (HumaLOG) corrective regimen sliding scale   SubCutaneous every 6 hours  insulin lispro (HumaLOG) corrective regimen sliding scale   SubCutaneous at bedtime  insulin lispro Injectable (HumaLOG) 5 Unit(s) SubCutaneous three times a day before meals  piperacillin/tazobactam IVPB.. 3.375 Gram(s) IV Intermittent every 8 hours    MEDICATIONS  (PRN):  acetaminophen   Tablet .. 650 milliGRAM(s) Oral every 6 hours PRN Mild Pain (1 - 3)  dextrose 40% Gel 15 Gram(s) Oral once PRN Blood Glucose LESS THAN 70 milliGRAM(s)/deciliter  glucagon  Injectable 1 milliGRAM(s) IntraMuscular once PRN Glucose LESS THAN 70 milligrams/deciliter  ondansetron Injectable 4 milliGRAM(s) IV Push every 8 hours PRN Nausea and/or Vomiting  oxyCODONE    IR 5 milliGRAM(s) Oral every 4 hours PRN Moderate Pain (4 - 6)  oxyCODONE    IR 10 milliGRAM(s) Oral every 4 hours PRN Severe Pain (7 - 10)          Labs:    12-19    130<L>  |  95<L>  |  17  ----------------------------<  241<H>  4.5   |  24  |  1.53<H>    Ca    8.5      19 Dec 2019 06:10  Phos  3.1     12-19  Mg     1.9     12-19                              8.8    8.10  )-----------( 384      ( 19 Dec 2019 06:10 )             27.9     PT/INR - ( 19 Dec 2019 06:10 )   PT: 12.9 SEC;   INR: 1.13          PTT - ( 19 Dec 2019 06:10 )  PTT:34.7 SEC    Imaging:

## 2019-12-19 NOTE — PROVIDER CONTACT NOTE (FALL NOTIFICATION) - ACTION/TREATMENT ORDERED:
MD to bedside to see patient. No CT scan or follow up labs, patient did not hit head or suffer any injuries.

## 2019-12-19 NOTE — CHART NOTE - NSCHARTNOTEFT_GEN_A_CORE
Notified by nurse that patient had fallen returning to bed from the bathroom. Patient is s/p R BKA, is non weightbearing on R, and requires crutches and assistance with ambulation.  Evaluated patient at bedside. Patient states he fell on his buttocks and attributes fall to difficulty manipulating crutches as he has never had to use them previously. Denies dizziness lightheadedness, HA, SOB, or CP prior to, as well as after fall. Patient states he did not hit his head. On exam patient lying comfortably in bed in no apparent distress. Neuro: CN II-XII intact without focal deficits. Sensation and motor function intact b/l. Respirations unlabored, no respiratory distress. CV, RRR. Extremities, WWP, moving all spontaneously without limitations. No tenderness to extremities b/l. No ecchymosis noted. Plan to proceed with OR today. No need for CTH. Continue to monitor vital signs. PT consult.    Kenia Patel PA-C  Vascular/ C Team Surgery  [id number]

## 2019-12-19 NOTE — PROVIDER CONTACT NOTE (FALL NOTIFICATION) - ASSESSMENT
Patient with VSS- see vitals post fall. no complaints of distress. No lightheadedness, pain, s.o.b or discomfort.

## 2019-12-19 NOTE — PROGRESS NOTE ADULT - ASSESSMENT
49 yr old male with necrotizing soft tissue infection of the RLE, admitted to SICU for resuscitation and management of severe sepsis, now s/p emergent guillotine Rt caroline BKA. His postop course has been complicated by ARDS and MANN but both improving. He is afebrile and otherwise stable. Transferred to floor from SICU, recovering well 12/18.    PLAN:    - Return to OR today for completion of BKA  - Pain control as needed; c/w oxycodone and tylenol  - Regular diet CC  - Zosyn for necrotizing soft tissue infection  - Monitor FS  - DVT ppx: Heparin

## 2019-12-20 ENCOUNTER — TRANSCRIPTION ENCOUNTER (OUTPATIENT)
Age: 49
End: 2019-12-20

## 2019-12-20 LAB
ANION GAP SERPL CALC-SCNC: 8 MMO/L — SIGNIFICANT CHANGE UP (ref 7–14)
BUN SERPL-MCNC: 17 MG/DL — SIGNIFICANT CHANGE UP (ref 7–23)
CALCIUM SERPL-MCNC: 8.8 MG/DL — SIGNIFICANT CHANGE UP (ref 8.4–10.5)
CHLORIDE SERPL-SCNC: 99 MMOL/L — SIGNIFICANT CHANGE UP (ref 98–107)
CO2 SERPL-SCNC: 27 MMOL/L — SIGNIFICANT CHANGE UP (ref 22–31)
CREAT SERPL-MCNC: 1.53 MG/DL — HIGH (ref 0.5–1.3)
GLUCOSE BLDC GLUCOMTR-MCNC: 131 MG/DL — HIGH (ref 70–99)
GLUCOSE BLDC GLUCOMTR-MCNC: 138 MG/DL — HIGH (ref 70–99)
GLUCOSE BLDC GLUCOMTR-MCNC: 282 MG/DL — HIGH (ref 70–99)
GLUCOSE BLDC GLUCOMTR-MCNC: 86 MG/DL — SIGNIFICANT CHANGE UP (ref 70–99)
GLUCOSE SERPL-MCNC: 163 MG/DL — HIGH (ref 70–99)
HCT VFR BLD CALC: 25.7 % — LOW (ref 39–50)
HGB BLD-MCNC: 8.2 G/DL — LOW (ref 13–17)
MAGNESIUM SERPL-MCNC: 1.8 MG/DL — SIGNIFICANT CHANGE UP (ref 1.6–2.6)
MCHC RBC-ENTMCNC: 28.6 PG — SIGNIFICANT CHANGE UP (ref 27–34)
MCHC RBC-ENTMCNC: 31.9 % — LOW (ref 32–36)
MCV RBC AUTO: 89.5 FL — SIGNIFICANT CHANGE UP (ref 80–100)
NRBC # FLD: 0 K/UL — SIGNIFICANT CHANGE UP (ref 0–0)
PHOSPHATE SERPL-MCNC: 2.9 MG/DL — SIGNIFICANT CHANGE UP (ref 2.5–4.5)
PLATELET # BLD AUTO: 421 K/UL — HIGH (ref 150–400)
PMV BLD: 10 FL — SIGNIFICANT CHANGE UP (ref 7–13)
POTASSIUM SERPL-MCNC: 4.2 MMOL/L — SIGNIFICANT CHANGE UP (ref 3.5–5.3)
POTASSIUM SERPL-SCNC: 4.2 MMOL/L — SIGNIFICANT CHANGE UP (ref 3.5–5.3)
RBC # BLD: 2.87 M/UL — LOW (ref 4.2–5.8)
RBC # FLD: 12.3 % — SIGNIFICANT CHANGE UP (ref 10.3–14.5)
SODIUM SERPL-SCNC: 134 MMOL/L — LOW (ref 135–145)
WBC # BLD: 6.93 K/UL — SIGNIFICANT CHANGE UP (ref 3.8–10.5)
WBC # FLD AUTO: 6.93 K/UL — SIGNIFICANT CHANGE UP (ref 3.8–10.5)

## 2019-12-20 RX ORDER — MAGNESIUM SULFATE 500 MG/ML
1 VIAL (ML) INJECTION ONCE
Refills: 0 | Status: COMPLETED | OUTPATIENT
Start: 2019-12-20 | End: 2019-12-20

## 2019-12-20 RX ORDER — INSULIN LISPRO 100/ML
VIAL (ML) SUBCUTANEOUS
Refills: 0 | Status: DISCONTINUED | OUTPATIENT
Start: 2019-12-20 | End: 2019-12-20

## 2019-12-20 RX ORDER — INSULIN LISPRO 100/ML
VIAL (ML) SUBCUTANEOUS
Refills: 0 | Status: DISCONTINUED | OUTPATIENT
Start: 2019-12-20 | End: 2019-12-23

## 2019-12-20 RX ADMIN — OXYCODONE HYDROCHLORIDE 10 MILLIGRAM(S): 5 TABLET ORAL at 16:12

## 2019-12-20 RX ADMIN — GABAPENTIN 100 MILLIGRAM(S): 400 CAPSULE ORAL at 00:37

## 2019-12-20 RX ADMIN — OXYCODONE HYDROCHLORIDE 10 MILLIGRAM(S): 5 TABLET ORAL at 10:11

## 2019-12-20 RX ADMIN — Medication 650 MILLIGRAM(S): at 09:12

## 2019-12-20 RX ADMIN — HEPARIN SODIUM 5000 UNIT(S): 5000 INJECTION INTRAVENOUS; SUBCUTANEOUS at 05:35

## 2019-12-20 RX ADMIN — GABAPENTIN 100 MILLIGRAM(S): 400 CAPSULE ORAL at 16:12

## 2019-12-20 RX ADMIN — Medication 650 MILLIGRAM(S): at 08:42

## 2019-12-20 RX ADMIN — Medication 100 GRAM(S): at 10:59

## 2019-12-20 RX ADMIN — OXYCODONE HYDROCHLORIDE 10 MILLIGRAM(S): 5 TABLET ORAL at 16:42

## 2019-12-20 RX ADMIN — Medication 5 UNIT(S): at 12:58

## 2019-12-20 RX ADMIN — OXYCODONE HYDROCHLORIDE 10 MILLIGRAM(S): 5 TABLET ORAL at 06:05

## 2019-12-20 RX ADMIN — OXYCODONE HYDROCHLORIDE 10 MILLIGRAM(S): 5 TABLET ORAL at 05:35

## 2019-12-20 RX ADMIN — PIPERACILLIN AND TAZOBACTAM 25 GRAM(S): 4; .5 INJECTION, POWDER, LYOPHILIZED, FOR SOLUTION INTRAVENOUS at 16:12

## 2019-12-20 RX ADMIN — HEPARIN SODIUM 5000 UNIT(S): 5000 INJECTION INTRAVENOUS; SUBCUTANEOUS at 13:38

## 2019-12-20 RX ADMIN — OXYCODONE HYDROCHLORIDE 10 MILLIGRAM(S): 5 TABLET ORAL at 09:56

## 2019-12-20 RX ADMIN — INSULIN GLARGINE 24 UNIT(S): 100 INJECTION, SOLUTION SUBCUTANEOUS at 22:31

## 2019-12-20 RX ADMIN — PIPERACILLIN AND TAZOBACTAM 25 GRAM(S): 4; .5 INJECTION, POWDER, LYOPHILIZED, FOR SOLUTION INTRAVENOUS at 08:35

## 2019-12-20 RX ADMIN — PIPERACILLIN AND TAZOBACTAM 25 GRAM(S): 4; .5 INJECTION, POWDER, LYOPHILIZED, FOR SOLUTION INTRAVENOUS at 00:38

## 2019-12-20 RX ADMIN — HEPARIN SODIUM 5000 UNIT(S): 5000 INJECTION INTRAVENOUS; SUBCUTANEOUS at 22:33

## 2019-12-20 RX ADMIN — Medication 5 UNIT(S): at 08:49

## 2019-12-20 RX ADMIN — GABAPENTIN 100 MILLIGRAM(S): 400 CAPSULE ORAL at 08:35

## 2019-12-20 RX ADMIN — AMLODIPINE BESYLATE 5 MILLIGRAM(S): 2.5 TABLET ORAL at 05:37

## 2019-12-20 RX ADMIN — Medication 1: at 22:31

## 2019-12-20 NOTE — DISCHARGE NOTE PROVIDER - PROVIDER TOKENS
PROVIDER:[TOKEN:[[id number]],FOLLOWUP:[2 weeks]] PROVIDER:[TOKEN:[[id number]],FOLLOWUP:[2 weeks]],PROVIDER:[TOKEN:[[id number]]],PROVIDER:[TOKEN:[[id number]]]

## 2019-12-20 NOTE — DISCHARGE NOTE PROVIDER - NSDCFUSCHEDAPPT_GEN_ALL_CORE_FT
TARA BLAKE ; 01/21/2020 ; [facility]PP Endocrin [address] [facility] TARA BLAKE ; 01/21/2020 ; NPP Endocrin [address] [facility] TARA BLAKE ; 01/21/2020 ; NPP Endocrin [address] [facility]

## 2019-12-20 NOTE — DISCHARGE NOTE PROVIDER - HOSPITAL COURSE
This patient is a 49M with right 5th toe wound noticed on 12/2. Saw a physician who prescribed Santyl which he has been using topically. On 12/12/19 was seen at OSH, told that he has subcutaneous gas and needed a BKA, left AMA and came to [facility] ED for second opinion. Here, he denies HA, CP, SOB, F/C/N/V/D/C. Denies pain in his right foot. Says he walked normally.        Patient had XR of R foot which revealed: Extensive subcutaneous gas overlying the dorsum of the foot concerning for infection including necrotizing fasciitis.         Patient had CT 1.  Soft tissue swelling and gas throughout the foot extending into the     ankle concerning for gas-forming infection. Skin defect along the lateral     forefoot at the fifth metatarsal head.    2.  Patchy osteopenia within the second through fifth metatarsal heads.     Given the adjacent soft tissue gas and edema, findings may reflect early     osteomyelitis.        Patient was admitted to vascular surgery service under the care of Dr. Quevedo for sepsis secondary to R foot necrotizing fasciitis. Patient was taken to the OR on 12/13/19 for caroline CHERRYA. This patient is a 49M with right 5th toe wound noticed on 12/2. Saw a physician who prescribed Santyl which he has been using topically. On 12/12/19 was seen at OSH, told that he has subcutaneous gas and needed a BKA, left AMA and came to [facility] ED for second opinion. Here, he denies HA, CP, SOB, F/C/N/V/D/C. Denies pain in his right foot. Says he walked normally.        Patient had XR of R foot which revealed: Extensive subcutaneous gas overlying the dorsum of the foot concerning for infection including necrotizing fasciitis.         Patient had CT 1.  Soft tissue swelling and gas throughout the foot extending into the     ankle concerning for gas-forming infection. Skin defect along the lateral     forefoot at the fifth metatarsal head.    2.  Patchy osteopenia within the second through fifth metatarsal heads.     Given the adjacent soft tissue gas and edema, findings may reflect early     osteomyelitis.        Patient was admitted to vascular surgery service under the care of Dr. Quevedo for sepsis secondary to R foot necrotizing fasciitis. Patient was taken to the OR on 12/13/19 for guillotine BKA. Pt tolerated procedure well, without complication. He was transferred to the ICU for early/mild ARDS due to sepsis and an insulin drip.         12/18: Critical care needs resolved. He was transferred to general surgical floor.     12/23: Patient returned to the OR and underwent completion/closure of BKA. Tolerated well, returned to general surgical floor.         He was assessed by PM&R and physical therapy- he was deemed stable for discharge to acute rehab.         Pt currently ambulating with assistance, voiding, tolerating a regular diet, with pain well controlled on PO pain meds. Patient is stable for discharge to rehab to follow up as an outpatient, instructed to call to schedule appointment. This patient is a 49M with right 5th toe wound noticed on 12/2. Saw a physician who prescribed Santyl which he has been using topically. On 12/12/19 was seen at OSH, told that he has subcutaneous gas and needed a BKA, left AMA and came to [facility] ED for second opinion. Here, he denies HA, CP, SOB, F/C/N/V/D/C. Denies pain in his right foot. Says he walked normally.        Patient had XR of R foot which revealed: Extensive subcutaneous gas overlying the dorsum of the foot concerning for infection including necrotizing fasciitis.         Patient had CT 1.  Soft tissue swelling and gas throughout the foot extending into the     ankle concerning for gas-forming infection. Skin defect along the lateral     forefoot at the fifth metatarsal head.    2.  Patchy osteopenia within the second through fifth metatarsal heads.     Given the adjacent soft tissue gas and edema, findings may reflect early     osteomyelitis.        Patient was admitted to vascular surgery service under the care of Dr. Quevedo for sepsis secondary to R foot necrotizing fasciitis. Patient was taken to the OR on 12/13/19 for guillotine BKA. Pt tolerated procedure well, without complication. He was transferred to the ICU for early/mild ARDS due to sepsis and an insulin drip.         12/18: Critical care needs resolved. He was transferred to general surgical floor.     12/23: Patient returned to the OR and underwent completion/closure of BKA. Tolerated well, returned to general surgical floor.     12/27: Endo evaluated pt for hypo and hyperglycemia in setting of type I DM and recommended f/u appointment at [address] [place] [address], [address], [phone]    1. Diabetes educator on 1/21/20 @ 2:30    2. Dr Pena on 4/20/20 @ 5:00    Patient would benefit from a CGM as outpatient. Also expresses interest in learning more about insulin pump options as outpatient. Needs both opthalmology, and podiatry establishment as outpatient.             He was assessed by PM&R and physical therapy- he was deemed stable for discharge to acute rehab.         Pt currently ambulating with assistance, voiding, tolerating a regular diet, with pain well controlled on PO pain meds. Patient is stable for discharge to rehab to follow up as an outpatient, instructed to call to schedule appointment.

## 2019-12-20 NOTE — DISCHARGE NOTE PROVIDER - CARE PROVIDER_API CALL
Gisella Quevedo)  Vascular Surgery  [id number] [place] [address]  Phone: [phone]  Fax: [phone]  Follow Up Time: 2 weeks Gisella Quevedo)  Vascular Surgery  [address] [place] [address]  Phone: [phone]  Fax: [phone]  Follow Up Time: 2 weeks    Lexie Ramirez)  Surgery; Vascular Surgery  [address], [address]  [place] [address]  Phone: [phone]  Fax: [phone]  Follow Up Time:     Heather Mcelroy)  EndocrinologyMetabDiabetes; Internal Medicine  [address] [place] [address]  Phone: [phone]  Fax: [phone]  Follow Up Time:

## 2019-12-20 NOTE — DISCHARGE NOTE PROVIDER - NSDCFUADDAPPT_GEN_ALL_CORE_FT
Please follow-up with an endocrinologist within 1-2 weeks following discharge, you may call [phone] to schedule an appointment at the [facility]. Please follow-up with an endocrinologist within 1-2 weeks following discharge, you may call [phone] to schedule an appointment at the [facility].    1. Diabetes educator on 1/21/20 @ 2:30  2. Dr Pena on 4/20/20 @ 5:00

## 2019-12-20 NOTE — DISCHARGE NOTE PROVIDER - CARE PROVIDERS DIRECT ADDRESSES
,DirectAddress_Unknown ,DirectAddress_Unknown,[email or web address]

## 2019-12-20 NOTE — DISCHARGE NOTE PROVIDER - NSDCCPCAREPLAN_GEN_ALL_CORE_FT
PRINCIPAL DISCHARGE DIAGNOSIS  Diagnosis: Necrotizing fasciitis of ankle and foot  Assessment and Plan of Treatment: PRINCIPAL DISCHARGE DIAGNOSIS  Diagnosis: Necrotizing fasciitis of ankle and foot  Assessment and Plan of Treatment: WOUND CARE:  Please keep incisions clean and dry. Please do not Scrub or rub incisions. Do not use lotion or powder on incisions.   Wound: Cover incision with sterile, 4 x 4 gauze. Wrap with kerlix. Secure with tape.  Change daily.   BATHING: Please do not submerge wound underwater. You may shower and/or sponge bathe.  ACTIVITY: No heavy lifting or straining. Otherwise, you may return to your usual level of physical activity. If you are taking narcotic pain medication (such as Percocet) DO NOT drive a car, operate machinery or make important decisions.  DIET: Return to your usual diet.  NOTIFY YOUR SURGEON IF: You have any bleeding that does not stop, any pus draining from your wound(s), any fever (over 100.4 F) or chills, persistent nausea/vomiting, persistent diarrhea, or if your pain is not controlled on your discharge pain medications.  FOLLOW-UP: Please follow up with your primary care physician in one week regarding your hospitalization. Please follow-up with your surgeon, within 7 days following discharge- please call to schedule an appointment.      SECONDARY DISCHARGE DIAGNOSES  Diagnosis: Diabetes mellitus  Assessment and Plan of Treatment: Continue consistent carbohydrate diet.  Monitor blood glucose levels throughout the day before meals and at bedtime.  Record blood sugars and bring to outpatient providers appointment in order to be reviewed by your doctor for management modifications.  Be aware of diabetes management symptoms including feeling cool and clammy may be related to low glucose levels.  Feeling hot and dry may indicate high glucose levels.  If  you feel these symptoms, check your blood sugar.  Make regular podiatry appointments in order to have feet checked for wounds and toe nails cut by a doctor to prevent infections.

## 2019-12-20 NOTE — PROGRESS NOTE ADULT - SUBJECTIVE AND OBJECTIVE BOX
General Surgery Progress Note  Patient is a 49y old  Male who presents with a chief complaint of Necrotizing soft tissue infection (19 Dec 2019 10:05)      SUBJECTIVE: Patient seen and examined at bedside with surgical team, patient without complaints. Denies fever, chills, CP, SOB nausea, vomiting, abdominal pain. RLE pain well controlled on current regimen.     OBJECTIVE:    Vital Signs Last 24 Hrs  T(C): 37.2 (20 Dec 2019 05:35), Max: 37.2 (20 Dec 2019 05:35)  T(F): 98.9 (20 Dec 2019 05:35), Max: 98.9 (20 Dec 2019 05:35)  HR: 88 (20 Dec 2019 05:35) (88 - 98)  BP: 120/72 (20 Dec 2019 05:35) (101/60 - 136/67)  BP(mean): --  RR: 18 (20 Dec 2019 05:35) (17 - 19)  SpO2: 95% (20 Dec 2019 05:35) (93% - 98%)I&O's Detail    19 Dec 2019 07:01  -  20 Dec 2019 07:00  --------------------------------------------------------  IN:    dextrose 5% + sodium chloride 0.45%.: 800 mL    IV PiggyBack: 100 mL    Oral Fluid: 450 mL  Total IN: 1350 mL    OUT:    Voided: 1100 mL  Total OUT: 1100 mL    Total NET: 250 mL      MEDICATIONS  (STANDING):  amLODIPine   Tablet 5 milliGRAM(s) Oral daily  dextrose 50% Injectable 12.5 Gram(s) IV Push once  dextrose 50% Injectable 25 Gram(s) IV Push once  dextrose 50% Injectable 25 Gram(s) IV Push once  gabapentin 100 milliGRAM(s) Oral every 8 hours  heparin  Injectable 5000 Unit(s) SubCutaneous every 8 hours  influenza   Vaccine 0.5 milliLiter(s) IntraMuscular once  insulin glargine Injectable (LANTUS) 24 Unit(s) SubCutaneous at bedtime  insulin lispro (HumaLOG) corrective regimen sliding scale   SubCutaneous every 6 hours  insulin lispro (HumaLOG) corrective regimen sliding scale   SubCutaneous at bedtime  insulin lispro Injectable (HumaLOG) 5 Unit(s) SubCutaneous three times a day before meals  piperacillin/tazobactam IVPB.. 3.375 Gram(s) IV Intermittent every 8 hours    MEDICATIONS  (PRN):  acetaminophen   Tablet .. 650 milliGRAM(s) Oral every 6 hours PRN Mild Pain (1 - 3)  dextrose 40% Gel 15 Gram(s) Oral once PRN Blood Glucose LESS THAN 70 milliGRAM(s)/deciliter  glucagon  Injectable 1 milliGRAM(s) IntraMuscular once PRN Glucose LESS THAN 70 milligrams/deciliter  ondansetron Injectable 4 milliGRAM(s) IV Push every 8 hours PRN Nausea and/or Vomiting  oxyCODONE    IR 5 milliGRAM(s) Oral every 4 hours PRN Moderate Pain (4 - 6)  oxyCODONE    IR 10 milliGRAM(s) Oral every 4 hours PRN Severe Pain (7 - 10)      PHYSICAL EXAM:  Constitutional: A&Ox3, NAD  Respiratory: Unlabored breathing  Abdomen: Soft, nondistended, NTTP. No rebound or guarding.  Extremities: Moving all 4 spontaneously without limitations. R BKA dressing C/D/I, changed. RLE nontender.    LABS:                        8.2    6.93  )-----------( 421      ( 20 Dec 2019 07:30 )             25.7     12-20    134<L>  |  99  |  17  ----------------------------<  163<H>  4.2   |  27  |  1.53<H>    Ca    8.8      20 Dec 2019 07:30  Phos  2.9     12-20  Mg     1.8     12-20      PT/INR - ( 19 Dec 2019 06:10 )   PT: 12.9 SEC;   INR: 1.13          PTT - ( 19 Dec 2019 06:10 )  PTT:34.7 SEC      ASSESSMENT:  49 yr old male with necrotizing soft tissue infection of the RLE, admitted to SICU for resuscitation and management of severe sepsis, now s/p emergent caroline Rt caroline GROVES. His postop course has been complicated by ARDS and MANN but both improving. He is afebrile and otherwise stable. Transferred to floor from SICU, recovering well 12/18.    PLAN:    - Completion ARNOLDO GROVES will be rescheduled tentativelyfor next weeek  - Pain control as needed; c/w oxycodone and tylenol  - Regular diet CC  - Zosyn for necrotizing soft tissue infection  - Monitor FS,   - DVT ppx: Heparin     C Team/ Vascular Surgery  [id number]

## 2019-12-20 NOTE — DISCHARGE NOTE PROVIDER - NSDCMRMEDTOKEN_GEN_ALL_CORE_FT
Basaglar KwikPen 100 units/mL subcutaneous solution: 20 unit(s) subcutaneous once (at bedtime)  HumaLOG 100 units/mL subcutaneous solution: 20 unit(s) subcutaneous 3 times a day (before meals)  insulin: acetaminophen 325 mg oral tablet: 3 tab(s) orally every 6 hours  amLODIPine 5 mg oral tablet: 1 tab(s) orally once a day  Basaglar KwikPen 100 units/mL subcutaneous solution: 25 unit(s) subcutaneous once (at bedtime)  gabapentin 100 mg oral capsule: 2 cap(s) orally every 8 hours  HumaLOG 100 units/mL subcutaneous solution: 5 unit(s) subcutaneous 3 times a day (before meals)  oxyCODONE 10 mg oral tablet: 1 tab(s) orally every 4 hours, As needed, Severe Pain (7 - 10) acetaminophen 325 mg oral tablet: 3 tab(s) orally every 6 hours  amLODIPine 5 mg oral tablet: 1 tab(s) orally once a day  Basaglar KwikPen 100 units/mL subcutaneous solution: 25 unit(s) subcutaneous once (at bedtime)  gabapentin 100 mg oral capsule: 2 cap(s) orally every 8 hours  HumaLOG 100 units/mL subcutaneous solution: 5 unit(s) subcutaneous 3 times a day (before meals)  insulin glargine: 25 unit(s) subcutaneous once a day (at bedtime)  insulin lispro: 5 unit(s) subcutaneous 4 times a day (before meals and at bedtime)  insulin lispro: 0 Unit(s) if Glucose 0 - 250  1 Unit(s) if Glucose 251 - 300  2 Unit(s) if Glucose 301 - 350  3 Unit(s) if Glucose 351 - 400  4 Unit(s) if Glucose GREATER THAN 400    Give correctional scale insulin REGARDLESS of PO status NOTIFY Provider for blood glucose LESS THAN 70 milliGRAM(s)/deciLiter or GREATER THAN 400 milliGRAM(s)/deciliter.  oxyCODONE 10 mg oral tablet: 1 tab(s) orally every 4 hours, As needed, Severe Pain (7 - 10)

## 2019-12-21 LAB
ANION GAP SERPL CALC-SCNC: 10 MMO/L — SIGNIFICANT CHANGE UP (ref 7–14)
BUN SERPL-MCNC: 17 MG/DL — SIGNIFICANT CHANGE UP (ref 7–23)
CALCIUM SERPL-MCNC: 8.6 MG/DL — SIGNIFICANT CHANGE UP (ref 8.4–10.5)
CHLORIDE SERPL-SCNC: 99 MMOL/L — SIGNIFICANT CHANGE UP (ref 98–107)
CO2 SERPL-SCNC: 24 MMOL/L — SIGNIFICANT CHANGE UP (ref 22–31)
CREAT SERPL-MCNC: 1.57 MG/DL — HIGH (ref 0.5–1.3)
GLUCOSE BLDC GLUCOMTR-MCNC: 115 MG/DL — HIGH (ref 70–99)
GLUCOSE BLDC GLUCOMTR-MCNC: 116 MG/DL — HIGH (ref 70–99)
GLUCOSE BLDC GLUCOMTR-MCNC: 159 MG/DL — HIGH (ref 70–99)
GLUCOSE BLDC GLUCOMTR-MCNC: 58 MG/DL — LOW (ref 70–99)
GLUCOSE BLDC GLUCOMTR-MCNC: 68 MG/DL — LOW (ref 70–99)
GLUCOSE BLDC GLUCOMTR-MCNC: 72 MG/DL — SIGNIFICANT CHANGE UP (ref 70–99)
GLUCOSE BLDC GLUCOMTR-MCNC: 91 MG/DL — SIGNIFICANT CHANGE UP (ref 70–99)
GLUCOSE SERPL-MCNC: 128 MG/DL — HIGH (ref 70–99)
HCT VFR BLD CALC: 27 % — LOW (ref 39–50)
HGB BLD-MCNC: 8.5 G/DL — LOW (ref 13–17)
MAGNESIUM SERPL-MCNC: 2 MG/DL — SIGNIFICANT CHANGE UP (ref 1.6–2.6)
MCHC RBC-ENTMCNC: 28.5 PG — SIGNIFICANT CHANGE UP (ref 27–34)
MCHC RBC-ENTMCNC: 31.5 % — LOW (ref 32–36)
MCV RBC AUTO: 90.6 FL — SIGNIFICANT CHANGE UP (ref 80–100)
NRBC # FLD: 0 K/UL — SIGNIFICANT CHANGE UP (ref 0–0)
PHOSPHATE SERPL-MCNC: 3.4 MG/DL — SIGNIFICANT CHANGE UP (ref 2.5–4.5)
PLATELET # BLD AUTO: 462 K/UL — HIGH (ref 150–400)
PMV BLD: 10.2 FL — SIGNIFICANT CHANGE UP (ref 7–13)
POTASSIUM SERPL-MCNC: 4 MMOL/L — SIGNIFICANT CHANGE UP (ref 3.5–5.3)
POTASSIUM SERPL-SCNC: 4 MMOL/L — SIGNIFICANT CHANGE UP (ref 3.5–5.3)
RBC # BLD: 2.98 M/UL — LOW (ref 4.2–5.8)
RBC # FLD: 12.5 % — SIGNIFICANT CHANGE UP (ref 10.3–14.5)
SODIUM SERPL-SCNC: 133 MMOL/L — LOW (ref 135–145)
WBC # BLD: 5.94 K/UL — SIGNIFICANT CHANGE UP (ref 3.8–10.5)
WBC # FLD AUTO: 5.94 K/UL — SIGNIFICANT CHANGE UP (ref 3.8–10.5)

## 2019-12-21 RX ORDER — OXYCODONE HYDROCHLORIDE 5 MG/1
5 TABLET ORAL EVERY 4 HOURS
Refills: 0 | Status: DISCONTINUED | OUTPATIENT
Start: 2019-12-21 | End: 2019-12-23

## 2019-12-21 RX ORDER — INSULIN GLARGINE 100 [IU]/ML
12 INJECTION, SOLUTION SUBCUTANEOUS ONCE
Refills: 0 | Status: COMPLETED | OUTPATIENT
Start: 2019-12-21 | End: 2019-12-21

## 2019-12-21 RX ADMIN — INSULIN GLARGINE 12 UNIT(S): 100 INJECTION, SOLUTION SUBCUTANEOUS at 22:04

## 2019-12-21 RX ADMIN — Medication 5 UNIT(S): at 18:16

## 2019-12-21 RX ADMIN — OXYCODONE HYDROCHLORIDE 10 MILLIGRAM(S): 5 TABLET ORAL at 00:01

## 2019-12-21 RX ADMIN — PIPERACILLIN AND TAZOBACTAM 25 GRAM(S): 4; .5 INJECTION, POWDER, LYOPHILIZED, FOR SOLUTION INTRAVENOUS at 23:22

## 2019-12-21 RX ADMIN — OXYCODONE HYDROCHLORIDE 10 MILLIGRAM(S): 5 TABLET ORAL at 07:50

## 2019-12-21 RX ADMIN — Medication 2: at 13:07

## 2019-12-21 RX ADMIN — HEPARIN SODIUM 5000 UNIT(S): 5000 INJECTION INTRAVENOUS; SUBCUTANEOUS at 22:04

## 2019-12-21 RX ADMIN — GABAPENTIN 100 MILLIGRAM(S): 400 CAPSULE ORAL at 00:01

## 2019-12-21 RX ADMIN — PIPERACILLIN AND TAZOBACTAM 25 GRAM(S): 4; .5 INJECTION, POWDER, LYOPHILIZED, FOR SOLUTION INTRAVENOUS at 00:01

## 2019-12-21 RX ADMIN — GABAPENTIN 100 MILLIGRAM(S): 400 CAPSULE ORAL at 07:20

## 2019-12-21 RX ADMIN — GABAPENTIN 100 MILLIGRAM(S): 400 CAPSULE ORAL at 18:16

## 2019-12-21 RX ADMIN — Medication 5 UNIT(S): at 13:06

## 2019-12-21 RX ADMIN — OXYCODONE HYDROCHLORIDE 10 MILLIGRAM(S): 5 TABLET ORAL at 00:31

## 2019-12-21 RX ADMIN — PIPERACILLIN AND TAZOBACTAM 25 GRAM(S): 4; .5 INJECTION, POWDER, LYOPHILIZED, FOR SOLUTION INTRAVENOUS at 16:15

## 2019-12-21 RX ADMIN — OXYCODONE HYDROCHLORIDE 10 MILLIGRAM(S): 5 TABLET ORAL at 07:20

## 2019-12-21 RX ADMIN — HEPARIN SODIUM 5000 UNIT(S): 5000 INJECTION INTRAVENOUS; SUBCUTANEOUS at 05:24

## 2019-12-21 RX ADMIN — OXYCODONE HYDROCHLORIDE 10 MILLIGRAM(S): 5 TABLET ORAL at 15:01

## 2019-12-21 RX ADMIN — OXYCODONE HYDROCHLORIDE 10 MILLIGRAM(S): 5 TABLET ORAL at 15:31

## 2019-12-21 RX ADMIN — HEPARIN SODIUM 5000 UNIT(S): 5000 INJECTION INTRAVENOUS; SUBCUTANEOUS at 13:08

## 2019-12-21 RX ADMIN — OXYCODONE HYDROCHLORIDE 10 MILLIGRAM(S): 5 TABLET ORAL at 23:22

## 2019-12-21 RX ADMIN — PIPERACILLIN AND TAZOBACTAM 25 GRAM(S): 4; .5 INJECTION, POWDER, LYOPHILIZED, FOR SOLUTION INTRAVENOUS at 08:56

## 2019-12-21 RX ADMIN — AMLODIPINE BESYLATE 5 MILLIGRAM(S): 2.5 TABLET ORAL at 05:23

## 2019-12-21 NOTE — PROGRESS NOTE ADULT - ASSESSMENT
ASSESSMENT:  49 yr old male with necrotizing soft tissue infection of the RLE, admitted to SICU for resuscitation and management of severe sepsis, s/p emergent guillotine Rt caroline GROVES. His postop course has been complicated by ARDS and MANN but both improving. He is afebrile and otherwise stable.     PLAN:    - Completion R BKA will be rescheduled tentatively for Thursday this week.   - Pain control as needed; c/w oxycodone and Tylenol  - Regular diet   - Zosyn for necrotizing soft tissue infection  - Monitor FS  - DVT ppx: Heparin     C Team/ Vascular Surgery  [id number]

## 2019-12-21 NOTE — PROGRESS NOTE ADULT - SUBJECTIVE AND OBJECTIVE BOX
General Surgery Progress Note  Patient is a 49y old  Male who presents with a chief complaint of Necrotizing soft tissue infection (19 Dec 2019 10:05)    Interval events overnight: Patient removed leg dressing overnight. Stated that dressing was tight and causing discomfort. Surgery was not notified.     SUBJECTIVE: Patient seen and examined at bedside with surgical team, patient without complaints. Denies fever, chills, CP, SOB nausea, vomiting, abdominal pain. RLE pain well controlled on current regimen.     OBJECTIVE:    Vital Signs Last 24 Hrs  T(C): 36.7 (21 Dec 2019 06:00), Max: 36.9 (20 Dec 2019 17:49)  T(F): 98 (21 Dec 2019 06:00), Max: 98.4 (20 Dec 2019 17:49)  HR: 89 (21 Dec 2019 06:00) (89 - 94)  BP: 127/66 (21 Dec 2019 06:00) (110/48 - 129/71)  BP(mean): --  RR: 16 (21 Dec 2019 06:00) (16 - 18)  SpO2: 94% (21 Dec 2019 06:00) (92% - 98%)      PHYSICAL EXAM:  Constitutional: A&Ox3, NAD  Respiratory: Unlabored breathing  Cardio: RRR  Abdomen: Soft, nondistended, NTTP. No rebound or guarding.  Extremities: Moving all 4 spontaneously without limitations. R BKA dressing C/D/I, Dressing changed. RLE nontender       MEDICATIONS  (STANDING):  amLODIPine   Tablet 5 milliGRAM(s) Oral daily  dextrose 50% Injectable 12.5 Gram(s) IV Push once  dextrose 50% Injectable 25 Gram(s) IV Push once  dextrose 50% Injectable 25 Gram(s) IV Push once  gabapentin 100 milliGRAM(s) Oral every 8 hours  heparin  Injectable 5000 Unit(s) SubCutaneous every 8 hours  influenza   Vaccine 0.5 milliLiter(s) IntraMuscular once  insulin glargine Injectable (LANTUS) 24 Unit(s) SubCutaneous at bedtime  insulin lispro (HumaLOG) corrective regimen sliding scale   SubCutaneous three times a day before meals  insulin lispro (HumaLOG) corrective regimen sliding scale   SubCutaneous at bedtime  insulin lispro Injectable (HumaLOG) 5 Unit(s) SubCutaneous three times a day before meals  piperacillin/tazobactam IVPB.. 3.375 Gram(s) IV Intermittent every 8 hours    MEDICATIONS  (PRN):  acetaminophen   Tablet .. 650 milliGRAM(s) Oral every 6 hours PRN Mild Pain (1 - 3)  dextrose 40% Gel 15 Gram(s) Oral once PRN Blood Glucose LESS THAN 70 milliGRAM(s)/deciliter  glucagon  Injectable 1 milliGRAM(s) IntraMuscular once PRN Glucose LESS THAN 70 milligrams/deciliter  ondansetron Injectable 4 milliGRAM(s) IV Push every 8 hours PRN Nausea and/or Vomiting  oxyCODONE    IR 5 milliGRAM(s) Oral every 4 hours PRN Moderate Pain (4 - 6)  oxyCODONE    IR 10 milliGRAM(s) Oral every 4 hours PRN Severe Pain (7 - 10)      I&O's Detail    20 Dec 2019 07:01  -  21 Dec 2019 07:00  --------------------------------------------------------  IN:    IV PiggyBack: 100 mL    Oral Fluid: 800 mL  Total IN: 900 mL    OUT:    Voided: 450 mL  Total OUT: 450 mL    Total NET: 450 mL          Daily     Daily Weight in k.7 (20 Dec 2019 20:00)    LABS:                        8.5    5.94  )-----------( 462      ( 21 Dec 2019 06:30 )             27.0         133<L>  |  99  |  17  ----------------------------<  128<H>  4.0   |  24  |  1.57<H>    Ca    8.6      21 Dec 2019 06:30  Phos  3.4       Mg     2.0

## 2019-12-22 ENCOUNTER — TRANSCRIPTION ENCOUNTER (OUTPATIENT)
Age: 49
End: 2019-12-22

## 2019-12-22 LAB
ANION GAP SERPL CALC-SCNC: 10 MMO/L — SIGNIFICANT CHANGE UP (ref 7–14)
APTT BLD: 35.4 SEC — SIGNIFICANT CHANGE UP (ref 27.5–36.3)
BUN SERPL-MCNC: 18 MG/DL — SIGNIFICANT CHANGE UP (ref 7–23)
CALCIUM SERPL-MCNC: 9 MG/DL — SIGNIFICANT CHANGE UP (ref 8.4–10.5)
CHLORIDE SERPL-SCNC: 97 MMOL/L — LOW (ref 98–107)
CO2 SERPL-SCNC: 25 MMOL/L — SIGNIFICANT CHANGE UP (ref 22–31)
CREAT SERPL-MCNC: 1.71 MG/DL — HIGH (ref 0.5–1.3)
GLUCOSE BLDC GLUCOMTR-MCNC: 150 MG/DL — HIGH (ref 70–99)
GLUCOSE BLDC GLUCOMTR-MCNC: 154 MG/DL — HIGH (ref 70–99)
GLUCOSE BLDC GLUCOMTR-MCNC: 162 MG/DL — HIGH (ref 70–99)
GLUCOSE BLDC GLUCOMTR-MCNC: 230 MG/DL — HIGH (ref 70–99)
GLUCOSE SERPL-MCNC: 170 MG/DL — HIGH (ref 70–99)
HCT VFR BLD CALC: 28 % — LOW (ref 39–50)
HGB BLD-MCNC: 8.9 G/DL — LOW (ref 13–17)
INR BLD: 1.16 — SIGNIFICANT CHANGE UP (ref 0.88–1.17)
MAGNESIUM SERPL-MCNC: 2 MG/DL — SIGNIFICANT CHANGE UP (ref 1.6–2.6)
MCHC RBC-ENTMCNC: 28.4 PG — SIGNIFICANT CHANGE UP (ref 27–34)
MCHC RBC-ENTMCNC: 31.8 % — LOW (ref 32–36)
MCV RBC AUTO: 89.5 FL — SIGNIFICANT CHANGE UP (ref 80–100)
NRBC # FLD: 0 K/UL — SIGNIFICANT CHANGE UP (ref 0–0)
PHOSPHATE SERPL-MCNC: 3.5 MG/DL — SIGNIFICANT CHANGE UP (ref 2.5–4.5)
PLATELET # BLD AUTO: 530 K/UL — HIGH (ref 150–400)
PMV BLD: 9.9 FL — SIGNIFICANT CHANGE UP (ref 7–13)
POTASSIUM SERPL-MCNC: 4.5 MMOL/L — SIGNIFICANT CHANGE UP (ref 3.5–5.3)
POTASSIUM SERPL-SCNC: 4.5 MMOL/L — SIGNIFICANT CHANGE UP (ref 3.5–5.3)
PROTHROM AB SERPL-ACNC: 12.9 SEC — SIGNIFICANT CHANGE UP (ref 9.8–13.1)
RBC # BLD: 3.13 M/UL — LOW (ref 4.2–5.8)
RBC # FLD: 12.9 % — SIGNIFICANT CHANGE UP (ref 10.3–14.5)
SODIUM SERPL-SCNC: 132 MMOL/L — LOW (ref 135–145)
WBC # BLD: 5.85 K/UL — SIGNIFICANT CHANGE UP (ref 3.8–10.5)
WBC # FLD AUTO: 5.85 K/UL — SIGNIFICANT CHANGE UP (ref 3.8–10.5)

## 2019-12-22 RX ADMIN — PIPERACILLIN AND TAZOBACTAM 25 GRAM(S): 4; .5 INJECTION, POWDER, LYOPHILIZED, FOR SOLUTION INTRAVENOUS at 17:17

## 2019-12-22 RX ADMIN — Medication 2: at 08:58

## 2019-12-22 RX ADMIN — HEPARIN SODIUM 5000 UNIT(S): 5000 INJECTION INTRAVENOUS; SUBCUTANEOUS at 22:10

## 2019-12-22 RX ADMIN — Medication 4: at 18:07

## 2019-12-22 RX ADMIN — HEPARIN SODIUM 5000 UNIT(S): 5000 INJECTION INTRAVENOUS; SUBCUTANEOUS at 14:25

## 2019-12-22 RX ADMIN — AMLODIPINE BESYLATE 5 MILLIGRAM(S): 2.5 TABLET ORAL at 05:32

## 2019-12-22 RX ADMIN — GABAPENTIN 100 MILLIGRAM(S): 400 CAPSULE ORAL at 01:57

## 2019-12-22 RX ADMIN — GABAPENTIN 100 MILLIGRAM(S): 400 CAPSULE ORAL at 18:10

## 2019-12-22 RX ADMIN — Medication 5 UNIT(S): at 08:58

## 2019-12-22 RX ADMIN — OXYCODONE HYDROCHLORIDE 10 MILLIGRAM(S): 5 TABLET ORAL at 11:15

## 2019-12-22 RX ADMIN — OXYCODONE HYDROCHLORIDE 10 MILLIGRAM(S): 5 TABLET ORAL at 10:16

## 2019-12-22 RX ADMIN — Medication 5 UNIT(S): at 18:08

## 2019-12-22 RX ADMIN — PIPERACILLIN AND TAZOBACTAM 25 GRAM(S): 4; .5 INJECTION, POWDER, LYOPHILIZED, FOR SOLUTION INTRAVENOUS at 07:03

## 2019-12-22 RX ADMIN — OXYCODONE HYDROCHLORIDE 10 MILLIGRAM(S): 5 TABLET ORAL at 00:22

## 2019-12-22 RX ADMIN — GABAPENTIN 100 MILLIGRAM(S): 400 CAPSULE ORAL at 10:16

## 2019-12-22 RX ADMIN — Medication 5 UNIT(S): at 12:58

## 2019-12-22 RX ADMIN — HEPARIN SODIUM 5000 UNIT(S): 5000 INJECTION INTRAVENOUS; SUBCUTANEOUS at 05:32

## 2019-12-22 RX ADMIN — INSULIN GLARGINE 24 UNIT(S): 100 INJECTION, SOLUTION SUBCUTANEOUS at 22:08

## 2019-12-22 NOTE — PROGRESS NOTE ADULT - ASSESSMENT
ASSESSMENT:  49 yr old male with necrotizing soft tissue infection of the RLE, admitted to SICU for resuscitation and management of severe sepsis, s/p emergent guillotine Rt caroline GROVES. His postop course has been complicated by ARDS and MANN but both improving. He is afebrile and otherwise stable.     PLAN:    - Completion R BKA will be rescheduled tentatively for Thursday this week.   - Pain control as needed; c/w oxycodone and Tylenol  - Regular diet   - Zosyn for necrotizing soft tissue infection  - Monitor FS  - DVT ppx: Heparin     C Team/ Vascular Surgery  [id number]

## 2019-12-22 NOTE — PROGRESS NOTE ADULT - SUBJECTIVE AND OBJECTIVE BOX
General Surgery Progress Note  Patient is a 49y old  Male who presents with a chief complaint of Necrotizing soft tissue infection (19 Dec 2019 10:05)      SUBJECTIVE: Patient seen and examined at bedside with surgical team, patient without complaints. Denies fever, chills, CP, SOB nausea, vomiting, abdominal pain. RLE pain well controlled on current regimen.     OBJECTIVE:    PHYSICAL EXAM:  Constitutional: A&Ox3, NAD  Respiratory: Unlabored breathing  Cardio: RRR  Abdomen: Soft, nondistended, NTTP. No rebound or guarding.  Extremities: Moving all 4 spontaneously without limitations. R BKA dressing C/D/I, Dressing changed. RLE nontender          Vital Signs:  Vital Signs Last 24 Hrs  T(C): 36.8 (22 Dec 2019 05:35), Max: 36.9 (21 Dec 2019 21:35)  T(F): 98.2 (22 Dec 2019 05:35), Max: 98.4 (21 Dec 2019 21:35)  HR: 91 (22 Dec 2019 05:35) (91 - 104)  BP: 127/72 (22 Dec 2019 05:35) (124/68 - 149/86)  BP(mean): --  RR: 16 (22 Dec 2019 05:35) (16 - 19)  SpO2: 94% (22 Dec 2019 05:35) (94% - 100%)    CAPILLARY BLOOD GLUCOSE      POCT Blood Glucose.: 162 mg/dL (22 Dec 2019 08:55)  POCT Blood Glucose.: 115 mg/dL (21 Dec 2019 21:46)  POCT Blood Glucose.: 91 mg/dL (21 Dec 2019 17:22)  POCT Blood Glucose.: 159 mg/dL (21 Dec 2019 12:35)  POCT Blood Glucose.: 116 mg/dL (21 Dec 2019 10:26)      I&O's Detail    21 Dec 2019 07:01  -  22 Dec 2019 07:00  --------------------------------------------------------  IN:    IV PiggyBack: 200 mL    Oral Fluid: 360 mL  Total IN: 560 mL    OUT:    Voided: 1700 mL  Total OUT: 1700 mL    Total NET: -1140 mL          MEDICATIONS  (STANDING):  amLODIPine   Tablet 5 milliGRAM(s) Oral daily  dextrose 50% Injectable 12.5 Gram(s) IV Push once  dextrose 50% Injectable 25 Gram(s) IV Push once  dextrose 50% Injectable 25 Gram(s) IV Push once  gabapentin 100 milliGRAM(s) Oral every 8 hours  heparin  Injectable 5000 Unit(s) SubCutaneous every 8 hours  influenza   Vaccine 0.5 milliLiter(s) IntraMuscular once  insulin glargine Injectable (LANTUS) 24 Unit(s) SubCutaneous at bedtime  insulin lispro (HumaLOG) corrective regimen sliding scale   SubCutaneous three times a day before meals  insulin lispro (HumaLOG) corrective regimen sliding scale   SubCutaneous at bedtime  insulin lispro Injectable (HumaLOG) 5 Unit(s) SubCutaneous three times a day before meals  piperacillin/tazobactam IVPB.. 3.375 Gram(s) IV Intermittent every 8 hours    MEDICATIONS  (PRN):  acetaminophen   Tablet .. 650 milliGRAM(s) Oral every 6 hours PRN Mild Pain (1 - 3)  dextrose 40% Gel 15 Gram(s) Oral once PRN Blood Glucose LESS THAN 70 milliGRAM(s)/deciliter  glucagon  Injectable 1 milliGRAM(s) IntraMuscular once PRN Glucose LESS THAN 70 milligrams/deciliter  ondansetron Injectable 4 milliGRAM(s) IV Push every 8 hours PRN Nausea and/or Vomiting  oxyCODONE    IR 5 milliGRAM(s) Oral every 4 hours PRN Moderate Pain (4 - 6)  oxyCODONE    IR 10 milliGRAM(s) Oral every 4 hours PRN Severe Pain (7 - 10)          Labs:    12-22    132<L>  |  97<L>  |  18  ----------------------------<  170<H>  4.5   |  25  |  1.71<H>    Ca    9.0      22 Dec 2019 05:45  Phos  3.5     12-22  Mg     2.0     12-22                              8.9    5.85  )-----------( 530      ( 22 Dec 2019 05:45 )             28.0     PT/INR - ( 22 Dec 2019 05:45 )   PT: 12.9 SEC;   INR: 1.16          PTT - ( 22 Dec 2019 05:45 )  PTT:35.4 SEC    Imaging:

## 2019-12-23 ENCOUNTER — RESULT REVIEW (OUTPATIENT)
Age: 49
End: 2019-12-23

## 2019-12-23 LAB
ANION GAP SERPL CALC-SCNC: 7 MMO/L — SIGNIFICANT CHANGE UP (ref 7–14)
BLD GP AB SCN SERPL QL: NEGATIVE — SIGNIFICANT CHANGE UP
BUN SERPL-MCNC: 23 MG/DL — SIGNIFICANT CHANGE UP (ref 7–23)
CALCIUM SERPL-MCNC: 9 MG/DL — SIGNIFICANT CHANGE UP (ref 8.4–10.5)
CHLORIDE SERPL-SCNC: 100 MMOL/L — SIGNIFICANT CHANGE UP (ref 98–107)
CO2 SERPL-SCNC: 25 MMOL/L — SIGNIFICANT CHANGE UP (ref 22–31)
CREAT SERPL-MCNC: 1.79 MG/DL — HIGH (ref 0.5–1.3)
GLUCOSE BLDC GLUCOMTR-MCNC: 107 MG/DL — HIGH (ref 70–99)
GLUCOSE BLDC GLUCOMTR-MCNC: 113 MG/DL — HIGH (ref 70–99)
GLUCOSE BLDC GLUCOMTR-MCNC: 129 MG/DL — HIGH (ref 70–99)
GLUCOSE BLDC GLUCOMTR-MCNC: 157 MG/DL — HIGH (ref 70–99)
GLUCOSE SERPL-MCNC: 123 MG/DL — HIGH (ref 70–99)
HCT VFR BLD CALC: 26.9 % — LOW (ref 39–50)
HGB BLD-MCNC: 8.5 G/DL — LOW (ref 13–17)
MAGNESIUM SERPL-MCNC: 1.8 MG/DL — SIGNIFICANT CHANGE UP (ref 1.6–2.6)
MCHC RBC-ENTMCNC: 28.2 PG — SIGNIFICANT CHANGE UP (ref 27–34)
MCHC RBC-ENTMCNC: 31.6 % — LOW (ref 32–36)
MCV RBC AUTO: 89.4 FL — SIGNIFICANT CHANGE UP (ref 80–100)
NRBC # FLD: 0 K/UL — SIGNIFICANT CHANGE UP (ref 0–0)
PHOSPHATE SERPL-MCNC: 3.3 MG/DL — SIGNIFICANT CHANGE UP (ref 2.5–4.5)
PLATELET # BLD AUTO: 540 K/UL — HIGH (ref 150–400)
PMV BLD: 10.1 FL — SIGNIFICANT CHANGE UP (ref 7–13)
POTASSIUM SERPL-MCNC: 4.5 MMOL/L — SIGNIFICANT CHANGE UP (ref 3.5–5.3)
POTASSIUM SERPL-SCNC: 4.5 MMOL/L — SIGNIFICANT CHANGE UP (ref 3.5–5.3)
RBC # BLD: 3.01 M/UL — LOW (ref 4.2–5.8)
RBC # FLD: 12.8 % — SIGNIFICANT CHANGE UP (ref 10.3–14.5)
RH IG SCN BLD-IMP: POSITIVE — SIGNIFICANT CHANGE UP
SODIUM SERPL-SCNC: 132 MMOL/L — LOW (ref 135–145)
WBC # BLD: 6.06 K/UL — SIGNIFICANT CHANGE UP (ref 3.8–10.5)
WBC # FLD AUTO: 6.06 K/UL — SIGNIFICANT CHANGE UP (ref 3.8–10.5)

## 2019-12-23 PROCEDURE — 88311 DECALCIFY TISSUE: CPT | Mod: 26

## 2019-12-23 PROCEDURE — 27884 AMPUTATION FOLLOW-UP SURGERY: CPT | Mod: RT,58

## 2019-12-23 PROCEDURE — 88307 TISSUE EXAM BY PATHOLOGIST: CPT | Mod: 26

## 2019-12-23 RX ORDER — OXYCODONE HYDROCHLORIDE 5 MG/1
10 TABLET ORAL EVERY 4 HOURS
Refills: 0 | Status: DISCONTINUED | OUTPATIENT
Start: 2019-12-23 | End: 2019-12-30

## 2019-12-23 RX ORDER — SODIUM CHLORIDE 9 MG/ML
1000 INJECTION, SOLUTION INTRAVENOUS
Refills: 0 | Status: DISCONTINUED | OUTPATIENT
Start: 2019-12-23 | End: 2019-12-24

## 2019-12-23 RX ORDER — HYDROMORPHONE HYDROCHLORIDE 2 MG/ML
0.5 INJECTION INTRAMUSCULAR; INTRAVENOUS; SUBCUTANEOUS
Refills: 0 | Status: DISCONTINUED | OUTPATIENT
Start: 2019-12-23 | End: 2019-12-23

## 2019-12-23 RX ORDER — METOCLOPRAMIDE HCL 10 MG
10 TABLET ORAL ONCE
Refills: 0 | Status: DISCONTINUED | OUTPATIENT
Start: 2019-12-23 | End: 2019-12-23

## 2019-12-23 RX ORDER — SODIUM CHLORIDE 9 MG/ML
1000 INJECTION, SOLUTION INTRAVENOUS
Refills: 0 | Status: DISCONTINUED | OUTPATIENT
Start: 2019-12-23 | End: 2019-12-23

## 2019-12-23 RX ORDER — ONDANSETRON 8 MG/1
4 TABLET, FILM COATED ORAL ONCE
Refills: 0 | Status: DISCONTINUED | OUTPATIENT
Start: 2019-12-23 | End: 2019-12-23

## 2019-12-23 RX ORDER — INSULIN LISPRO 100/ML
VIAL (ML) SUBCUTANEOUS EVERY 6 HOURS
Refills: 0 | Status: DISCONTINUED | OUTPATIENT
Start: 2019-12-23 | End: 2019-12-23

## 2019-12-23 RX ORDER — INSULIN LISPRO 100/ML
VIAL (ML) SUBCUTANEOUS
Refills: 0 | Status: DISCONTINUED | OUTPATIENT
Start: 2019-12-23 | End: 2019-12-27

## 2019-12-23 RX ADMIN — OXYCODONE HYDROCHLORIDE 5 MILLIGRAM(S): 5 TABLET ORAL at 03:36

## 2019-12-23 RX ADMIN — Medication 5 UNIT(S): at 08:58

## 2019-12-23 RX ADMIN — INSULIN GLARGINE 24 UNIT(S): 100 INJECTION, SOLUTION SUBCUTANEOUS at 21:39

## 2019-12-23 RX ADMIN — SODIUM CHLORIDE 75 MILLILITER(S): 9 INJECTION, SOLUTION INTRAVENOUS at 22:54

## 2019-12-23 RX ADMIN — Medication 650 MILLIGRAM(S): at 05:32

## 2019-12-23 RX ADMIN — GABAPENTIN 100 MILLIGRAM(S): 400 CAPSULE ORAL at 01:43

## 2019-12-23 RX ADMIN — GABAPENTIN 100 MILLIGRAM(S): 400 CAPSULE ORAL at 09:01

## 2019-12-23 RX ADMIN — OXYCODONE HYDROCHLORIDE 10 MILLIGRAM(S): 5 TABLET ORAL at 08:07

## 2019-12-23 RX ADMIN — HEPARIN SODIUM 5000 UNIT(S): 5000 INJECTION INTRAVENOUS; SUBCUTANEOUS at 05:03

## 2019-12-23 RX ADMIN — OXYCODONE HYDROCHLORIDE 10 MILLIGRAM(S): 5 TABLET ORAL at 07:37

## 2019-12-23 RX ADMIN — HEPARIN SODIUM 5000 UNIT(S): 5000 INJECTION INTRAVENOUS; SUBCUTANEOUS at 21:38

## 2019-12-23 RX ADMIN — Medication 650 MILLIGRAM(S): at 05:02

## 2019-12-23 RX ADMIN — OXYCODONE HYDROCHLORIDE 5 MILLIGRAM(S): 5 TABLET ORAL at 04:06

## 2019-12-23 RX ADMIN — PIPERACILLIN AND TAZOBACTAM 25 GRAM(S): 4; .5 INJECTION, POWDER, LYOPHILIZED, FOR SOLUTION INTRAVENOUS at 00:06

## 2019-12-23 RX ADMIN — Medication 2: at 17:21

## 2019-12-23 RX ADMIN — HEPARIN SODIUM 5000 UNIT(S): 5000 INJECTION INTRAVENOUS; SUBCUTANEOUS at 14:12

## 2019-12-23 RX ADMIN — PIPERACILLIN AND TAZOBACTAM 25 GRAM(S): 4; .5 INJECTION, POWDER, LYOPHILIZED, FOR SOLUTION INTRAVENOUS at 07:41

## 2019-12-23 RX ADMIN — AMLODIPINE BESYLATE 5 MILLIGRAM(S): 2.5 TABLET ORAL at 05:03

## 2019-12-23 RX ADMIN — PIPERACILLIN AND TAZOBACTAM 25 GRAM(S): 4; .5 INJECTION, POWDER, LYOPHILIZED, FOR SOLUTION INTRAVENOUS at 17:10

## 2019-12-23 NOTE — BRIEF OPERATIVE NOTE - OPERATION/FINDINGS
Previous right caroline BKA revised to a formal BKA.
Necrotizing fasciitis of Right foot, 10 blade used to dissect down to bone, bone saw then used to amputate foot from lower extremity, bleeding vessels tied with 3.0 silk. Amputation site left open and dressed with guaze, kerlix and coban

## 2019-12-23 NOTE — BRIEF OPERATIVE NOTE - NSICDXBRIEFPREOP_GEN_ALL_CORE_FT
PRE-OP DIAGNOSIS:  Necrotizing fasciitis 23-Dec-2019 21:09:37  Palomo Bagley
PRE-OP DIAGNOSIS:  Necrotizing fasciitis 13-Dec-2019 16:22:50  Lexus Montilla

## 2019-12-23 NOTE — CHART NOTE - NSCHARTNOTEFT_GEN_A_CORE
S: Patient underwent and tolerated procedure without issue and sent to PACU.  Patient denies chest pain, shortness of breath, nausea, vomiting, lightheadedness, or dizziness.  Pain was well controlled.      O:T(C): 36.5 (12-23-19 @ 22:52), Max: 36.8 (12-23-19 @ 17:29)  HR: 81 (12-23-19 @ 22:52) (73 - 92)  BP: 110/67 (12-23-19 @ 22:52) (89/44 - 144/65)  RR: 16 (12-23-19 @ 22:52) (12 - 16)  SpO2: 99% (12-23-19 @ 22:52) (95% - 100%)  Wt(kg): --    Gen: NAD  RLE: in knee immobilizer. ACE wrap CDI.                         8.5    6.06  )-----------( 540      ( 23 Dec 2019 05:30 )             26.9     12-23    132<L>  |  100  |  23  ----------------------------<  123<H>  4.5   |  25  |  1.79<H>        Assessment/Plan:  49y Male s/p Revision, BKA   Pain control  Reg Diet  PT/OOB

## 2019-12-23 NOTE — PROGRESS NOTE ADULT - SUBJECTIVE AND OBJECTIVE BOX
General Surgery Progress Note  Patient is a 49y old  Male who presents with a chief complaint of Necrotizing soft tissue infection (22 Dec 2019 09:54)      SUBJECTIVE: Patient seen and examined at bedside with surgical team, patient without complaints. Denies fever, chills, CP, SOB nausea, vomiting, abdominal pain. RLE pain well controlled.       OBJECTIVE:    Vital Signs Last 24 Hrs  T(C): 36.8 (23 Dec 2019 10:00), Max: 37.1 (22 Dec 2019 22:04)  T(F): 98.2 (23 Dec 2019 10:00), Max: 98.8 (22 Dec 2019 22:04)  HR: 89 (23 Dec 2019 10:00) (66 - 92)  BP: 114/60 (23 Dec 2019 10:00) (114/60 - 139/91)  BP(mean): --  RR: 18 (23 Dec 2019 10:00) (17 - 18)  SpO2: 96% (23 Dec 2019 10:00) (96% - 98%)I&O's Detail    22 Dec 2019 07:01  -  23 Dec 2019 07:00  --------------------------------------------------------  IN:    Oral Fluid: 240 mL  Total IN: 240 mL    OUT:    Voided: 1000 mL  Total OUT: 1000 mL    Total NET: -760 mL      MEDICATIONS  (STANDING):  amLODIPine   Tablet 5 milliGRAM(s) Oral daily  dextrose 5% + sodium chloride 0.45%. 1000 milliLiter(s) (125 mL/Hr) IV Continuous <Continuous>  dextrose 50% Injectable 12.5 Gram(s) IV Push once  dextrose 50% Injectable 25 Gram(s) IV Push once  dextrose 50% Injectable 25 Gram(s) IV Push once  gabapentin 100 milliGRAM(s) Oral every 8 hours  heparin  Injectable 5000 Unit(s) SubCutaneous every 8 hours  influenza   Vaccine 0.5 milliLiter(s) IntraMuscular once  insulin glargine Injectable (LANTUS) 24 Unit(s) SubCutaneous at bedtime  insulin lispro (HumaLOG) corrective regimen sliding scale   SubCutaneous three times a day before meals  insulin lispro (HumaLOG) corrective regimen sliding scale   SubCutaneous at bedtime  insulin lispro Injectable (HumaLOG) 5 Unit(s) SubCutaneous three times a day before meals  piperacillin/tazobactam IVPB.. 3.375 Gram(s) IV Intermittent every 8 hours    MEDICATIONS  (PRN):  acetaminophen   Tablet .. 650 milliGRAM(s) Oral every 6 hours PRN Mild Pain (1 - 3)  dextrose 40% Gel 15 Gram(s) Oral once PRN Blood Glucose LESS THAN 70 milliGRAM(s)/deciliter  glucagon  Injectable 1 milliGRAM(s) IntraMuscular once PRN Glucose LESS THAN 70 milligrams/deciliter  ondansetron Injectable 4 milliGRAM(s) IV Push every 8 hours PRN Nausea and/or Vomiting  oxyCODONE    IR 5 milliGRAM(s) Oral every 4 hours PRN Moderate Pain (4 - 6)  oxyCODONE    IR 10 milliGRAM(s) Oral every 4 hours PRN Severe Pain (7 - 10)    PHYSICAL EXAM:  Constitutional: A&Ox3, NAD  Respiratory: Unlabored breathing  Cardio: RRR  Abdomen: Soft, nondistended, NTTP. No rebound or guarding.  Extremities: Moving all 4 spontaneously without limitations. R BKA dressing C/D/I, Dressing changed. RLE nontender      LABS:                        8.5    6.06  )-----------( 540      ( 23 Dec 2019 05:30 )             26.9     12-23    132<L>  |  100  |  23  ----------------------------<  123<H>  4.5   |  25  |  1.79<H>    Ca    9.0      23 Dec 2019 05:30  Phos  3.3     12-23  Mg     1.8     12-23      PT/INR - ( 22 Dec 2019 05:45 )   PT: 12.9 SEC;   INR: 1.16          PTT - ( 22 Dec 2019 05:45 )  PTT:35.4 SEC    ASSESSMENT:  49 yr old male with necrotizing soft tissue infection of the RLE, admitted to SICU for resuscitation and management of severe sepsis, s/p emergent caroline Rt caroline GROVES. His postop course has been complicated by ARDS and MANN but both improving. He is afebrile and otherwise stable.     PLAN:    - Added on for completion R BKA today  - Pain control as needed; c/w oxycodone and Tylenol  - NPO for OR  - Zosyn for necrotizing soft tissue infection  - Monitor FS  - DVT ppx: Heparin   - PM&R consult    C Team/ Vascular Surgery  [id number]

## 2019-12-23 NOTE — BRIEF OPERATIVE NOTE - NSICDXBRIEFPROCEDURE_GEN_ALL_CORE_FT
PROCEDURES:  Revision, BKA 23-Dec-2019 21:06:52  Palomo Bagley
PROCEDURES:  Amputation below knee 13-Dec-2019 16:22:26 Right Lexus Wiggins

## 2019-12-24 LAB
ANION GAP SERPL CALC-SCNC: 12 MMO/L — SIGNIFICANT CHANGE UP (ref 7–14)
APTT BLD: 34.7 SEC — SIGNIFICANT CHANGE UP (ref 27.5–36.3)
BUN SERPL-MCNC: 26 MG/DL — HIGH (ref 7–23)
CALCIUM SERPL-MCNC: 9.2 MG/DL — SIGNIFICANT CHANGE UP (ref 8.4–10.5)
CHLORIDE SERPL-SCNC: 96 MMOL/L — LOW (ref 98–107)
CO2 SERPL-SCNC: 23 MMOL/L — SIGNIFICANT CHANGE UP (ref 22–31)
CREAT SERPL-MCNC: 1.74 MG/DL — HIGH (ref 0.5–1.3)
GLUCOSE BLDC GLUCOMTR-MCNC: 118 MG/DL — HIGH (ref 70–99)
GLUCOSE BLDC GLUCOMTR-MCNC: 228 MG/DL — HIGH (ref 70–99)
GLUCOSE BLDC GLUCOMTR-MCNC: 277 MG/DL — HIGH (ref 70–99)
GLUCOSE BLDC GLUCOMTR-MCNC: 304 MG/DL — HIGH (ref 70–99)
GLUCOSE SERPL-MCNC: 259 MG/DL — HIGH (ref 70–99)
HCT VFR BLD CALC: 27.5 % — LOW (ref 39–50)
HGB BLD-MCNC: 8.8 G/DL — LOW (ref 13–17)
INR BLD: 1.27 — HIGH (ref 0.88–1.17)
MAGNESIUM SERPL-MCNC: 1.8 MG/DL — SIGNIFICANT CHANGE UP (ref 1.6–2.6)
MCHC RBC-ENTMCNC: 28.5 PG — SIGNIFICANT CHANGE UP (ref 27–34)
MCHC RBC-ENTMCNC: 32 % — SIGNIFICANT CHANGE UP (ref 32–36)
MCV RBC AUTO: 89 FL — SIGNIFICANT CHANGE UP (ref 80–100)
NRBC # FLD: 0 K/UL — SIGNIFICANT CHANGE UP (ref 0–0)
PHOSPHATE SERPL-MCNC: 3.6 MG/DL — SIGNIFICANT CHANGE UP (ref 2.5–4.5)
PLATELET # BLD AUTO: 584 K/UL — HIGH (ref 150–400)
PMV BLD: 10 FL — SIGNIFICANT CHANGE UP (ref 7–13)
POTASSIUM SERPL-MCNC: 4.7 MMOL/L — SIGNIFICANT CHANGE UP (ref 3.5–5.3)
POTASSIUM SERPL-SCNC: 4.7 MMOL/L — SIGNIFICANT CHANGE UP (ref 3.5–5.3)
PROTHROM AB SERPL-ACNC: 14.2 SEC — HIGH (ref 9.8–13.1)
RBC # BLD: 3.09 M/UL — LOW (ref 4.2–5.8)
RBC # FLD: 12.8 % — SIGNIFICANT CHANGE UP (ref 10.3–14.5)
SODIUM SERPL-SCNC: 131 MMOL/L — LOW (ref 135–145)
WBC # BLD: 10.03 K/UL — SIGNIFICANT CHANGE UP (ref 3.8–10.5)
WBC # FLD AUTO: 10.03 K/UL — SIGNIFICANT CHANGE UP (ref 3.8–10.5)

## 2019-12-24 PROCEDURE — 99221 1ST HOSP IP/OBS SF/LOW 40: CPT

## 2019-12-24 RX ORDER — HYDROMORPHONE HYDROCHLORIDE 2 MG/ML
0.5 INJECTION INTRAMUSCULAR; INTRAVENOUS; SUBCUTANEOUS EVERY 4 HOURS
Refills: 0 | Status: DISCONTINUED | OUTPATIENT
Start: 2019-12-24 | End: 2019-12-24

## 2019-12-24 RX ORDER — MAGNESIUM SULFATE 500 MG/ML
2 VIAL (ML) INJECTION ONCE
Refills: 0 | Status: COMPLETED | OUTPATIENT
Start: 2019-12-24 | End: 2019-12-24

## 2019-12-24 RX ORDER — HYDROMORPHONE HYDROCHLORIDE 2 MG/ML
0.5 INJECTION INTRAMUSCULAR; INTRAVENOUS; SUBCUTANEOUS ONCE
Refills: 0 | Status: DISCONTINUED | OUTPATIENT
Start: 2019-12-24 | End: 2019-12-24

## 2019-12-24 RX ORDER — ACETAMINOPHEN 500 MG
1000 TABLET ORAL ONCE
Refills: 0 | Status: COMPLETED | OUTPATIENT
Start: 2019-12-24 | End: 2019-12-24

## 2019-12-24 RX ORDER — AMLODIPINE BESYLATE 2.5 MG/1
5 TABLET ORAL DAILY
Refills: 0 | Status: DISCONTINUED | OUTPATIENT
Start: 2019-12-24 | End: 2019-12-31

## 2019-12-24 RX ORDER — INSULIN LISPRO 100/ML
7 VIAL (ML) SUBCUTANEOUS
Refills: 0 | Status: DISCONTINUED | OUTPATIENT
Start: 2019-12-24 | End: 2019-12-27

## 2019-12-24 RX ADMIN — HYDROMORPHONE HYDROCHLORIDE 0.5 MILLIGRAM(S): 2 INJECTION INTRAMUSCULAR; INTRAVENOUS; SUBCUTANEOUS at 17:11

## 2019-12-24 RX ADMIN — HEPARIN SODIUM 5000 UNIT(S): 5000 INJECTION INTRAVENOUS; SUBCUTANEOUS at 14:22

## 2019-12-24 RX ADMIN — Medication 1000 MILLIGRAM(S): at 19:27

## 2019-12-24 RX ADMIN — Medication 7 UNIT(S): at 12:21

## 2019-12-24 RX ADMIN — Medication 5 UNIT(S): at 08:32

## 2019-12-24 RX ADMIN — OXYCODONE HYDROCHLORIDE 10 MILLIGRAM(S): 5 TABLET ORAL at 18:47

## 2019-12-24 RX ADMIN — HEPARIN SODIUM 5000 UNIT(S): 5000 INJECTION INTRAVENOUS; SUBCUTANEOUS at 05:35

## 2019-12-24 RX ADMIN — OXYCODONE HYDROCHLORIDE 10 MILLIGRAM(S): 5 TABLET ORAL at 22:47

## 2019-12-24 RX ADMIN — HYDROMORPHONE HYDROCHLORIDE 0.5 MILLIGRAM(S): 2 INJECTION INTRAMUSCULAR; INTRAVENOUS; SUBCUTANEOUS at 21:23

## 2019-12-24 RX ADMIN — GABAPENTIN 100 MILLIGRAM(S): 400 CAPSULE ORAL at 10:38

## 2019-12-24 RX ADMIN — GABAPENTIN 100 MILLIGRAM(S): 400 CAPSULE ORAL at 01:13

## 2019-12-24 RX ADMIN — Medication 400 MILLIGRAM(S): at 18:52

## 2019-12-24 RX ADMIN — GABAPENTIN 100 MILLIGRAM(S): 400 CAPSULE ORAL at 17:34

## 2019-12-24 RX ADMIN — HYDROMORPHONE HYDROCHLORIDE 0.5 MILLIGRAM(S): 2 INJECTION INTRAMUSCULAR; INTRAVENOUS; SUBCUTANEOUS at 21:38

## 2019-12-24 RX ADMIN — HEPARIN SODIUM 5000 UNIT(S): 5000 INJECTION INTRAVENOUS; SUBCUTANEOUS at 22:54

## 2019-12-24 RX ADMIN — PIPERACILLIN AND TAZOBACTAM 25 GRAM(S): 4; .5 INJECTION, POWDER, LYOPHILIZED, FOR SOLUTION INTRAVENOUS at 01:13

## 2019-12-24 RX ADMIN — HYDROMORPHONE HYDROCHLORIDE 0.5 MILLIGRAM(S): 2 INJECTION INTRAMUSCULAR; INTRAVENOUS; SUBCUTANEOUS at 16:56

## 2019-12-24 RX ADMIN — OXYCODONE HYDROCHLORIDE 10 MILLIGRAM(S): 5 TABLET ORAL at 23:17

## 2019-12-24 RX ADMIN — OXYCODONE HYDROCHLORIDE 10 MILLIGRAM(S): 5 TABLET ORAL at 15:00

## 2019-12-24 RX ADMIN — OXYCODONE HYDROCHLORIDE 10 MILLIGRAM(S): 5 TABLET ORAL at 14:22

## 2019-12-24 RX ADMIN — Medication 8: at 22:47

## 2019-12-24 RX ADMIN — OXYCODONE HYDROCHLORIDE 10 MILLIGRAM(S): 5 TABLET ORAL at 18:11

## 2019-12-24 RX ADMIN — Medication 50 GRAM(S): at 12:20

## 2019-12-24 RX ADMIN — AMLODIPINE BESYLATE 5 MILLIGRAM(S): 2.5 TABLET ORAL at 05:36

## 2019-12-24 RX ADMIN — Medication 6: at 08:31

## 2019-12-24 RX ADMIN — Medication 4: at 12:21

## 2019-12-24 RX ADMIN — INSULIN GLARGINE 24 UNIT(S): 100 INJECTION, SOLUTION SUBCUTANEOUS at 22:46

## 2019-12-24 NOTE — PROGRESS NOTE ADULT - SUBJECTIVE AND OBJECTIVE BOX
General Surgery Progress Note    SUBJECTIVE: Patient seen and examined at bedside with surgical team, patient without complaints. Denies fever, chills, CP, SOB nausea, vomiting, abdominal pain. RLE pain well controlled.   - s/p completion of BKA last night. No issues.     OBJECTIVE:  Vital Signs  T(C): 36.9 (12-24 @ 09:22), Max: 36.9 (12-24 @ 09:22)  HR: 88 (12-24 @ 09:22) (73 - 92)  BP: 105/53 (12-24 @ 09:22) (89/44 - 144/65)  RR: 18 (12-24 @ 09:22) (12 - 18)  SpO2: 100% (12-24 @ 09:22) (95% - 100%)  12-23-19 @ 07:01  -  12-24-19 @ 07:00  --------------------------------------------------------  IN: 2295 mL / OUT: 1200 mL / NET: 1095 mL    12-24-19 @ 07:01  -  12-24-19 @ 09:29  --------------------------------------------------------  IN: 0 mL / OUT: 400 mL / NET: -400 mL      MEDICATIONS  (STANDING):  amLODIPine   Tablet 5 milliGRAM(s) Oral daily  dextrose 50% Injectable 12.5 Gram(s) IV Push once  dextrose 50% Injectable 25 Gram(s) IV Push once  dextrose 50% Injectable 25 Gram(s) IV Push once  gabapentin 100 milliGRAM(s) Oral every 8 hours  heparin  Injectable 5000 Unit(s) SubCutaneous every 8 hours  influenza   Vaccine 0.5 milliLiter(s) IntraMuscular once  insulin glargine Injectable (LANTUS) 24 Unit(s) SubCutaneous at bedtime  insulin lispro (HumaLOG) corrective regimen sliding scale   SubCutaneous Before meals and at bedtime  insulin lispro Injectable (HumaLOG) 5 Unit(s) SubCutaneous three times a day before meals  magnesium sulfate  IVPB 2 Gram(s) IV Intermittent once    MEDICATIONS  (PRN):  acetaminophen   Tablet .. 650 milliGRAM(s) Oral every 6 hours PRN Mild Pain (1 - 3)  dextrose 40% Gel 15 Gram(s) Oral once PRN Blood Glucose LESS THAN 70 milliGRAM(s)/deciliter  glucagon  Injectable 1 milliGRAM(s) IntraMuscular once PRN Glucose LESS THAN 70 milligrams/deciliter  ondansetron Injectable 4 milliGRAM(s) IV Push every 8 hours PRN Nausea and/or Vomiting  oxyCODONE    IR 5 milliGRAM(s) Oral every 4 hours PRN Moderate Pain (4 - 6)  oxyCODONE    IR 10 milliGRAM(s) Oral every 4 hours PRN Severe Pain (7 - 10)        PHYSICAL EXAM:  Constitutional: A&Ox3, NAD  Respiratory: Unlabored breathing  Cardio: RRR  Abdomen: Soft, nondistended, NTTP. No rebound or guarding.  Extremities: Moving all 4 spontaneously without limitations. R BKA dressing C/D/I. Knee immobilizer in place.       LABS:                        8.5    6.06  )-----------( 540      ( 23 Dec 2019 05:30 )             26.9     12-23    132<L>  |  100  |  23  ----------------------------<  123<H>  4.5   |  25  |  1.79<H>    Ca    9.0      23 Dec 2019 05:30  Phos  3.3     12-23  Mg     1.8     12-23      PT/INR - ( 22 Dec 2019 05:45 )   PT: 12.9 SEC;   INR: 1.16          PTT - ( 22 Dec 2019 05:45 )  PTT:35.4 SEC

## 2019-12-24 NOTE — CONSULT NOTE ADULT - SUBJECTIVE AND OBJECTIVE BOX
Patient is a 49y old  Male who presents with a chief complaint of Necrotizing soft tissue infection (24 Dec 2019 09:28)      HPI:  49 yr old male with necrotizing soft tissue infection of the RLE, admitted to SICU 12/12/19 for resuscitation and management of severe sepsis, s/p emergent caroline Rt caroline CHERRYA. His postop course has been complicated by ARDS and MANN but both improving. s/p BKA completion (12/23).     Currently POD1.  Off antibiotics.        ------------------------------------      REVIEW OF SYSTEMS  Constitutional - No fever, No fatigue  HEENT - No visual disturbances, No difficulty hearing, No tinnitus, No vertigo  Respiratory - No cough, No wheezing, No shortness of breath  Cardiovascular - No chest pain, No palpitations  Gastrointestinal - No abdominal pain, No nausea, No vomiting, No diarrhea, No constipation  Genitourinary - No dysuria, No frequency, No hematuria, No incontinence  Neurological - No headaches, No memory loss, No loss of strength, No numbness  Skin -  No lesions   Musculoskeletal - No joint pain, No joint swelling, No muscle pain  Psychiatric - No depression, No anxiety    PAST MEDICAL & SURGICAL HISTORY  DM (diabetes mellitus)  Stenosis of popliteal artery      SOCIAL HISTORY  Smoking - Denied  EtOH - Denied   Drugs - Denied    FUNCTIONAL HISTORY  Patient lives in a one story private house with ~3 steps to enter. Prior to admission, patient was independent with all functional mobility.    CURRENT FUNCTIONAL STATUS as of 12/22  BM - CG  Transfers - CG  Gait - pt. ambulated 100ft with axillary crutches xCG    FAMILY HISTORY       ALLERGIES  No Known Allergies      MEDICATIONS   acetaminophen   Tablet .. 650 milliGRAM(s) Oral every 6 hours PRN  amLODIPine   Tablet 5 milliGRAM(s) Oral daily  dextrose 40% Gel 15 Gram(s) Oral once PRN  dextrose 50% Injectable 12.5 Gram(s) IV Push once  dextrose 50% Injectable 25 Gram(s) IV Push once  dextrose 50% Injectable 25 Gram(s) IV Push once  gabapentin 100 milliGRAM(s) Oral every 8 hours  glucagon  Injectable 1 milliGRAM(s) IntraMuscular once PRN  heparin  Injectable 5000 Unit(s) SubCutaneous every 8 hours  influenza   Vaccine 0.5 milliLiter(s) IntraMuscular once  insulin glargine Injectable (LANTUS) 24 Unit(s) SubCutaneous at bedtime  insulin lispro (HumaLOG) corrective regimen sliding scale   SubCutaneous Before meals and at bedtime  insulin lispro Injectable (HumaLOG) 5 Unit(s) SubCutaneous three times a day before meals  magnesium sulfate  IVPB 2 Gram(s) IV Intermittent once  ondansetron Injectable 4 milliGRAM(s) IV Push every 8 hours PRN  oxyCODONE    IR 5 milliGRAM(s) Oral every 4 hours PRN  oxyCODONE    IR 10 milliGRAM(s) Oral every 4 hours PRN          VITALS  T(C): 36.9 (12-24-19 @ 09:22), Max: 36.9 (12-24-19 @ 09:22)  HR: 88 (12-24-19 @ 09:22) (73 - 92)  BP: 105/53 (12-24-19 @ 09:22) (89/44 - 144/65)  RR: 18 (12-24-19 @ 09:22) (12 - 18)  SpO2: 100% (12-24-19 @ 09:22) (95% - 100%)  Wt(kg): --    RECENT LABS/IMAGING  CBC Full  -  ( 24 Dec 2019 06:50 )  WBC Count : 10.03 K/uL  RBC Count : 3.09 M/uL  Hemoglobin : 8.8 g/dL  Hematocrit : 27.5 %  Platelet Count - Automated : 584 K/uL  Mean Cell Volume : 89.0 fL  Mean Cell Hemoglobin : 28.5 pg  Mean Cell Hemoglobin Concentration : 32.0 %  Auto Neutrophil # : x  Auto Lymphocyte # : x  Auto Monocyte # : x  Auto Eosinophil # : x  Auto Basophil # : x  Auto Neutrophil % : x  Auto Lymphocyte % : x  Auto Monocyte % : x  Auto Eosinophil % : x  Auto Basophil % : x    12-24    131<L>  |  96<L>  |  26<H>  ----------------------------<  259<H>  4.7   |  23  |  1.74<H>    Ca    9.2      24 Dec 2019 06:50  Phos  3.6     12-24  Mg     1.8     12-24 Patient is a 49y old  Male who presents with a chief complaint of Necrotizing soft tissue infection (24 Dec 2019 09:28)      HPI:  49 yr old male with PMH DM1 necrotizing soft tissue infection of the RLE, admitted to SICU 12/12/19 for resuscitation and management of severe sepsis, s/p emergent guillotine Rt guillotine BKA. His postop course has been complicated by ARDS and MANN but both improving. s/p BKA completion (12/23).     Currently POD1.  Off antibiotics.    Pt seen and examined at bedside.  He reports feeling well. 0/10 pain. No n/v/d, constipation HA or dizziness.     ------------------------------------      REVIEW OF SYSTEMS  Constitutional - No fever, No fatigue  HEENT - No visual disturbances, No difficulty hearing, No tinnitus, No vertigo  Respiratory - No cough, No wheezing, No shortness of breath  Cardiovascular - No chest pain, No palpitations  Gastrointestinal - No abdominal pain, No nausea, No vomiting, No diarrhea, No constipation  Genitourinary - No dysuria, No frequency, No hematuria, No incontinence  Neurological - No headaches, No memory loss, No loss of strength, No numbness  Skin -  No lesions   Musculoskeletal - No joint pain, No joint swelling, No muscle pain  Psychiatric - No depression, No anxiety    PAST MEDICAL & SURGICAL HISTORY  DM (diabetes mellitus)  Stenosis of popliteal artery      SOCIAL HISTORY  Smoking - Denied  EtOH - Denied   Drugs - Denied    FUNCTIONAL HISTORY  Patient lives in a one story private house with ~3 steps to enter. Prior to admission, patient was independent with all functional mobility. he works at a [employer]. Mom, brother, and son all live locally and are able to help.      CURRENT FUNCTIONAL STATUS as of 12/22  with resident on 12/24, patient was independent for BM but mod assist with sit/stand transfer due to impaired balance.  He did not equilibrate after several seconds standing, continuing to knee min/mod support.  did not attempt gait out of concern for balance impairment.      FAMILY HISTORY       ALLERGIES  No Known Allergies      MEDICATIONS   acetaminophen   Tablet .. 650 milliGRAM(s) Oral every 6 hours PRN  amLODIPine   Tablet 5 milliGRAM(s) Oral daily  dextrose 40% Gel 15 Gram(s) Oral once PRN  dextrose 50% Injectable 12.5 Gram(s) IV Push once  dextrose 50% Injectable 25 Gram(s) IV Push once  dextrose 50% Injectable 25 Gram(s) IV Push once  gabapentin 100 milliGRAM(s) Oral every 8 hours  glucagon  Injectable 1 milliGRAM(s) IntraMuscular once PRN  heparin  Injectable 5000 Unit(s) SubCutaneous every 8 hours  influenza   Vaccine 0.5 milliLiter(s) IntraMuscular once  insulin glargine Injectable (LANTUS) 24 Unit(s) SubCutaneous at bedtime  insulin lispro (HumaLOG) corrective regimen sliding scale   SubCutaneous Before meals and at bedtime  insulin lispro Injectable (HumaLOG) 5 Unit(s) SubCutaneous three times a day before meals  magnesium sulfate  IVPB 2 Gram(s) IV Intermittent once  ondansetron Injectable 4 milliGRAM(s) IV Push every 8 hours PRN  oxyCODONE    IR 5 milliGRAM(s) Oral every 4 hours PRN  oxyCODONE    IR 10 milliGRAM(s) Oral every 4 hours PRN          VITALS  T(C): 36.9 (12-24-19 @ 09:22), Max: 36.9 (12-24-19 @ 09:22)  HR: 88 (12-24-19 @ 09:22) (73 - 92)  BP: 105/53 (12-24-19 @ 09:22) (89/44 - 144/65)  RR: 18 (12-24-19 @ 09:22) (12 - 18)  SpO2: 100% (12-24-19 @ 09:22) (95% - 100%)  Wt(kg): --    RECENT LABS/IMAGING  CBC Full  -  ( 24 Dec 2019 06:50 )  WBC Count : 10.03 K/uL  RBC Count : 3.09 M/uL  Hemoglobin : 8.8 g/dL  Hematocrit : 27.5 %  Platelet Count - Automated : 584 K/uL  Mean Cell Volume : 89.0 fL  Mean Cell Hemoglobin : 28.5 pg  Mean Cell Hemoglobin Concentration : 32.0 %  Auto Neutrophil # : x  Auto Lymphocyte # : x  Auto Monocyte # : x  Auto Eosinophil # : x  Auto Basophil # : x  Auto Neutrophil % : x  Auto Lymphocyte % : x  Auto Monocyte % : x  Auto Eosinophil % : x  Auto Basophil % : x    12-24    131<L>  |  96<L>  |  26<H>  ----------------------------<  259<H>  4.7   |  23  |  1.74<H>    Ca    9.2      24 Dec 2019 06:50  Phos  3.6     12-24  Mg     1.8     12-24      ----------------------------------------------------------------------------------------  PHYSICAL EXAM  Constitutional - NAD, Comfortable  HEENT - NCAT, EOMI  Neck - Supple, No limited ROM  Chest - Breathing comfortably, No wheezing  Cardiovascular - S1S2   Abdomen - Soft   Extremities - No C/C/E, No calf tenderness; RLE dressing c/d/i, +knee immobilizer   Neurologic Exam -                    Cognitive - Awake, Alert, AAO to self, place, date, year, situation     Communication - Fluent, No dysarthria     Cranial Nerves - CN 2-12 intact     Motor -                     LEFT    UE - ShAB 5/5, EF 5/5, EE 5/5, WE 5/5,  5/5                    RIGHT UE - ShAB 5/5, EF 5/5, EE 5/5, WE 5/5,  5/5                    LEFT    LE - HF 4/5, KE 5/5, DF 5/5, PF 5/5                    RIGHT LE - HF 4/5       Sensory - Intact to LT     Reflexes - DTR Intact, No primitive reflexive       Balance - impaired  Psychiatric - Mood stable, Affect WNL  ----------------------------------------------------------------------------------------  ASSESSMENT/PLAN  49y Male with functional deficits after R. BKA 2/2 necrotizing fasciitis.    BKA - mgmt per ortho. continue knee immobilizer. continue daily therapies as below. Please indicate duration of knee immobilizer in discharge instructions.   Necrotizing Fasciitis - off antibiotics. monitor incision site.   Pain - oxycodone, tylenol PRN  DM1 - continue long-acting, premeal, and SS insulin.   PT/OT - daily therapies for ROM, increased mobility, and decreased debility related to hospitalization.  Balance, gait training, ADLs, transfers, and bracing/assistive devices as needed.    DVT PPX - heparin  Rehab -  Recommend ACUTE inpatient rehabilitation for the functional deficits consisting of 3 hours of therapy/day & 24 hour RN/daily PMR physician for comorbid medical management. Will continue to follow for ongoing rehab needs and recommendations.

## 2019-12-24 NOTE — PROGRESS NOTE ADULT - ASSESSMENT
ASSESSMENT:  49 yr old male with necrotizing soft tissue infection of the RLE, admitted to SICU for resuscitation and management of severe sepsis, s/p emergent guillotine Rt caroline GROVES. His postop course has been complicated by ARDS and MANN but both improving. s/p BKA completion (12/23).     PLAN:    - Pain control as needed; c/w oxycodone and Tylenol  - DC antibiotics  - Monitor FS  - DVT ppx: Heparin   - PM&R consult    C Team/ Vascular Surgery  [id number]

## 2019-12-25 LAB
ANION GAP SERPL CALC-SCNC: 10 MMO/L — SIGNIFICANT CHANGE UP (ref 7–14)
BUN SERPL-MCNC: 21 MG/DL — SIGNIFICANT CHANGE UP (ref 7–23)
CALCIUM SERPL-MCNC: 9.1 MG/DL — SIGNIFICANT CHANGE UP (ref 8.4–10.5)
CHLORIDE SERPL-SCNC: 97 MMOL/L — LOW (ref 98–107)
CO2 SERPL-SCNC: 23 MMOL/L — SIGNIFICANT CHANGE UP (ref 22–31)
CREAT SERPL-MCNC: 1.3 MG/DL — SIGNIFICANT CHANGE UP (ref 0.5–1.3)
GLUCOSE BLDC GLUCOMTR-MCNC: 113 MG/DL — HIGH (ref 70–99)
GLUCOSE BLDC GLUCOMTR-MCNC: 150 MG/DL — HIGH (ref 70–99)
GLUCOSE BLDC GLUCOMTR-MCNC: 151 MG/DL — HIGH (ref 70–99)
GLUCOSE BLDC GLUCOMTR-MCNC: 258 MG/DL — HIGH (ref 70–99)
GLUCOSE SERPL-MCNC: 227 MG/DL — HIGH (ref 70–99)
HCT VFR BLD CALC: 26.6 % — LOW (ref 39–50)
HGB BLD-MCNC: 8.5 G/DL — LOW (ref 13–17)
MAGNESIUM SERPL-MCNC: 1.8 MG/DL — SIGNIFICANT CHANGE UP (ref 1.6–2.6)
MCHC RBC-ENTMCNC: 28.1 PG — SIGNIFICANT CHANGE UP (ref 27–34)
MCHC RBC-ENTMCNC: 32 % — SIGNIFICANT CHANGE UP (ref 32–36)
MCV RBC AUTO: 88.1 FL — SIGNIFICANT CHANGE UP (ref 80–100)
NRBC # FLD: 0 K/UL — SIGNIFICANT CHANGE UP (ref 0–0)
PHOSPHATE SERPL-MCNC: 2.6 MG/DL — SIGNIFICANT CHANGE UP (ref 2.5–4.5)
PLATELET # BLD AUTO: 524 K/UL — HIGH (ref 150–400)
PMV BLD: 10.1 FL — SIGNIFICANT CHANGE UP (ref 7–13)
POTASSIUM SERPL-MCNC: 4 MMOL/L — SIGNIFICANT CHANGE UP (ref 3.5–5.3)
POTASSIUM SERPL-SCNC: 4 MMOL/L — SIGNIFICANT CHANGE UP (ref 3.5–5.3)
RBC # BLD: 3.02 M/UL — LOW (ref 4.2–5.8)
RBC # FLD: 13 % — SIGNIFICANT CHANGE UP (ref 10.3–14.5)
SODIUM SERPL-SCNC: 130 MMOL/L — LOW (ref 135–145)
WBC # BLD: 9.44 K/UL — SIGNIFICANT CHANGE UP (ref 3.8–10.5)
WBC # FLD AUTO: 9.44 K/UL — SIGNIFICANT CHANGE UP (ref 3.8–10.5)

## 2019-12-25 RX ORDER — GABAPENTIN 400 MG/1
200 CAPSULE ORAL EVERY 8 HOURS
Refills: 0 | Status: DISCONTINUED | OUTPATIENT
Start: 2019-12-25 | End: 2019-12-31

## 2019-12-25 RX ORDER — HYDROMORPHONE HYDROCHLORIDE 2 MG/ML
1 INJECTION INTRAMUSCULAR; INTRAVENOUS; SUBCUTANEOUS EVERY 4 HOURS
Refills: 0 | Status: DISCONTINUED | OUTPATIENT
Start: 2019-12-25 | End: 2019-12-26

## 2019-12-25 RX ORDER — INSULIN GLARGINE 100 [IU]/ML
28 INJECTION, SOLUTION SUBCUTANEOUS AT BEDTIME
Refills: 0 | Status: DISCONTINUED | OUTPATIENT
Start: 2019-12-25 | End: 2019-12-27

## 2019-12-25 RX ORDER — ACETAMINOPHEN 500 MG
975 TABLET ORAL EVERY 6 HOURS
Refills: 0 | Status: DISCONTINUED | OUTPATIENT
Start: 2019-12-25 | End: 2019-12-31

## 2019-12-25 RX ORDER — ACETAMINOPHEN 500 MG
1000 TABLET ORAL ONCE
Refills: 0 | Status: COMPLETED | OUTPATIENT
Start: 2019-12-25 | End: 2019-12-25

## 2019-12-25 RX ADMIN — OXYCODONE HYDROCHLORIDE 10 MILLIGRAM(S): 5 TABLET ORAL at 06:08

## 2019-12-25 RX ADMIN — Medication 2: at 12:31

## 2019-12-25 RX ADMIN — GABAPENTIN 200 MILLIGRAM(S): 400 CAPSULE ORAL at 21:36

## 2019-12-25 RX ADMIN — Medication 7 UNIT(S): at 12:31

## 2019-12-25 RX ADMIN — Medication 975 MILLIGRAM(S): at 12:38

## 2019-12-25 RX ADMIN — OXYCODONE HYDROCHLORIDE 10 MILLIGRAM(S): 5 TABLET ORAL at 21:37

## 2019-12-25 RX ADMIN — Medication 7 UNIT(S): at 17:33

## 2019-12-25 RX ADMIN — GABAPENTIN 100 MILLIGRAM(S): 400 CAPSULE ORAL at 08:40

## 2019-12-25 RX ADMIN — Medication 975 MILLIGRAM(S): at 17:34

## 2019-12-25 RX ADMIN — AMLODIPINE BESYLATE 5 MILLIGRAM(S): 2.5 TABLET ORAL at 06:08

## 2019-12-25 RX ADMIN — INSULIN GLARGINE 28 UNIT(S): 100 INJECTION, SOLUTION SUBCUTANEOUS at 21:36

## 2019-12-25 RX ADMIN — Medication 1000 MILLIGRAM(S): at 03:07

## 2019-12-25 RX ADMIN — HEPARIN SODIUM 5000 UNIT(S): 5000 INJECTION INTRAVENOUS; SUBCUTANEOUS at 21:37

## 2019-12-25 RX ADMIN — HYDROMORPHONE HYDROCHLORIDE 1 MILLIGRAM(S): 2 INJECTION INTRAMUSCULAR; INTRAVENOUS; SUBCUTANEOUS at 19:09

## 2019-12-25 RX ADMIN — HYDROMORPHONE HYDROCHLORIDE 1 MILLIGRAM(S): 2 INJECTION INTRAMUSCULAR; INTRAVENOUS; SUBCUTANEOUS at 14:07

## 2019-12-25 RX ADMIN — HYDROMORPHONE HYDROCHLORIDE 1 MILLIGRAM(S): 2 INJECTION INTRAMUSCULAR; INTRAVENOUS; SUBCUTANEOUS at 13:37

## 2019-12-25 RX ADMIN — OXYCODONE HYDROCHLORIDE 10 MILLIGRAM(S): 5 TABLET ORAL at 06:38

## 2019-12-25 RX ADMIN — OXYCODONE HYDROCHLORIDE 10 MILLIGRAM(S): 5 TABLET ORAL at 22:07

## 2019-12-25 RX ADMIN — OXYCODONE HYDROCHLORIDE 10 MILLIGRAM(S): 5 TABLET ORAL at 11:30

## 2019-12-25 RX ADMIN — HEPARIN SODIUM 5000 UNIT(S): 5000 INJECTION INTRAVENOUS; SUBCUTANEOUS at 13:37

## 2019-12-25 RX ADMIN — HYDROMORPHONE HYDROCHLORIDE 1 MILLIGRAM(S): 2 INJECTION INTRAMUSCULAR; INTRAVENOUS; SUBCUTANEOUS at 19:39

## 2019-12-25 RX ADMIN — GABAPENTIN 100 MILLIGRAM(S): 400 CAPSULE ORAL at 02:37

## 2019-12-25 RX ADMIN — HYDROMORPHONE HYDROCHLORIDE 1 MILLIGRAM(S): 2 INJECTION INTRAMUSCULAR; INTRAVENOUS; SUBCUTANEOUS at 09:48

## 2019-12-25 RX ADMIN — Medication 400 MILLIGRAM(S): at 02:37

## 2019-12-25 RX ADMIN — Medication 975 MILLIGRAM(S): at 12:08

## 2019-12-25 RX ADMIN — OXYCODONE HYDROCHLORIDE 10 MILLIGRAM(S): 5 TABLET ORAL at 16:20

## 2019-12-25 RX ADMIN — Medication 7 UNIT(S): at 08:39

## 2019-12-25 RX ADMIN — OXYCODONE HYDROCHLORIDE 10 MILLIGRAM(S): 5 TABLET ORAL at 11:00

## 2019-12-25 RX ADMIN — HEPARIN SODIUM 5000 UNIT(S): 5000 INJECTION INTRAVENOUS; SUBCUTANEOUS at 06:08

## 2019-12-25 RX ADMIN — HYDROMORPHONE HYDROCHLORIDE 1 MILLIGRAM(S): 2 INJECTION INTRAMUSCULAR; INTRAVENOUS; SUBCUTANEOUS at 09:18

## 2019-12-25 RX ADMIN — Medication 975 MILLIGRAM(S): at 18:04

## 2019-12-25 RX ADMIN — Medication 6: at 08:39

## 2019-12-25 RX ADMIN — OXYCODONE HYDROCHLORIDE 10 MILLIGRAM(S): 5 TABLET ORAL at 15:50

## 2019-12-25 NOTE — PROGRESS NOTE ADULT - ASSESSMENT
ASSESSMENT:  49 yr old male with necrotizing soft tissue infection of the RLE, admitted to SICU for resuscitation and management of severe sepsis, s/p emergent guillotine Rt caroline GROVES. His postop course has been complicated by ARDS and MANN but both improving. s/p BKA completion (12/23).     PLAN:    - Pain control as needed; c/w oxycodone and Tylenol standing, Dilaudid prn for breakthrough  - Monitor FS- increase Lantus to 28  - DVT ppx: Heparin   - PM&R consult    C Team/ Vascular Surgery  [id number]

## 2019-12-25 NOTE — PROGRESS NOTE ADULT - SUBJECTIVE AND OBJECTIVE BOX
General Surgery Progress Note    SUBJECTIVE: Patient seen and examined at bedside with surgical team, patient without complaints. Denies fever, chills, CP, SOB nausea, vomiting, abdominal pain. RLE pain overnight.      OBJECTIVE:      PHYSICAL EXAM:  Constitutional: A&Ox3, NAD  Respiratory: Unlabored breathing  Cardio: RRR  Abdomen: Soft, nondistended, NTTP. No rebound or guarding.  Extremities: Moving all 4 spontaneously without limitations. R BKA dressing C/D/I. Knee immobilizer in place.             Vital Signs:  Vital Signs Last 24 Hrs  T(C): 36.7 (25 Dec 2019 06:06), Max: 37.2 (24 Dec 2019 22:07)  T(F): 98.1 (25 Dec 2019 06:06), Max: 98.9 (24 Dec 2019 22:07)  HR: 98 (25 Dec 2019 06:06) (88 - 104)  BP: 151/63 (25 Dec 2019 06:06) (105/53 - 151/63)  BP(mean): --  RR: 18 (25 Dec 2019 06:06) (16 - 20)  SpO2: 97% (25 Dec 2019 06:06) (97% - 100%)    CAPILLARY BLOOD GLUCOSE      POCT Blood Glucose.: 304 mg/dL (24 Dec 2019 22:10)  POCT Blood Glucose.: 118 mg/dL (24 Dec 2019 17:21)  POCT Blood Glucose.: 228 mg/dL (24 Dec 2019 12:10)  POCT Blood Glucose.: 277 mg/dL (24 Dec 2019 08:19)      I&O's Detail    24 Dec 2019 07:01  -  25 Dec 2019 07:00  --------------------------------------------------------  IN:    IV PiggyBack: 150 mL    Oral Fluid: 1200 mL  Total IN: 1350 mL    OUT:    Voided: 1400 mL  Total OUT: 1400 mL    Total NET: -50 mL          MEDICATIONS  (STANDING):  acetaminophen   Tablet .. 975 milliGRAM(s) Oral every 6 hours  amLODIPine   Tablet 5 milliGRAM(s) Oral daily  dextrose 50% Injectable 12.5 Gram(s) IV Push once  dextrose 50% Injectable 25 Gram(s) IV Push once  dextrose 50% Injectable 25 Gram(s) IV Push once  gabapentin 100 milliGRAM(s) Oral every 8 hours  heparin  Injectable 5000 Unit(s) SubCutaneous every 8 hours  influenza   Vaccine 0.5 milliLiter(s) IntraMuscular once  insulin glargine Injectable (LANTUS) 28 Unit(s) SubCutaneous at bedtime  insulin lispro (HumaLOG) corrective regimen sliding scale   SubCutaneous Before meals and at bedtime  insulin lispro Injectable (HumaLOG) 7 Unit(s) SubCutaneous three times a day before meals    MEDICATIONS  (PRN):  dextrose 40% Gel 15 Gram(s) Oral once PRN Blood Glucose LESS THAN 70 milliGRAM(s)/deciliter  glucagon  Injectable 1 milliGRAM(s) IntraMuscular once PRN Glucose LESS THAN 70 milligrams/deciliter  HYDROmorphone  Injectable 1 milliGRAM(s) IV Push every 4 hours PRN breakthrough pain  ondansetron Injectable 4 milliGRAM(s) IV Push every 8 hours PRN Nausea and/or Vomiting  oxyCODONE    IR 5 milliGRAM(s) Oral every 4 hours PRN Moderate Pain (4 - 6)  oxyCODONE    IR 10 milliGRAM(s) Oral every 4 hours PRN Severe Pain (7 - 10)          Labs:    12-24    131<L>  |  96<L>  |  26<H>  ----------------------------<  259<H>  4.7   |  23  |  1.74<H>    Ca    9.2      24 Dec 2019 06:50  Phos  3.6     12-24  Mg     1.8     12-24                              8.5    9.44  )-----------( 524      ( 25 Dec 2019 05:55 )             26.6     PT/INR - ( 24 Dec 2019 06:50 )   PT: 14.2 SEC;   INR: 1.27          PTT - ( 24 Dec 2019 06:50 )  PTT:34.7 SEC    Imaging:

## 2019-12-26 LAB
ANION GAP SERPL CALC-SCNC: 11 MMO/L — SIGNIFICANT CHANGE UP (ref 7–14)
BUN SERPL-MCNC: 21 MG/DL — SIGNIFICANT CHANGE UP (ref 7–23)
CALCIUM SERPL-MCNC: 9.1 MG/DL — SIGNIFICANT CHANGE UP (ref 8.4–10.5)
CHLORIDE SERPL-SCNC: 95 MMOL/L — LOW (ref 98–107)
CO2 SERPL-SCNC: 25 MMOL/L — SIGNIFICANT CHANGE UP (ref 22–31)
CREAT SERPL-MCNC: 1.38 MG/DL — HIGH (ref 0.5–1.3)
GLUCOSE BLDC GLUCOMTR-MCNC: 101 MG/DL — HIGH (ref 70–99)
GLUCOSE BLDC GLUCOMTR-MCNC: 103 MG/DL — HIGH (ref 70–99)
GLUCOSE BLDC GLUCOMTR-MCNC: 239 MG/DL — HIGH (ref 70–99)
GLUCOSE BLDC GLUCOMTR-MCNC: 291 MG/DL — HIGH (ref 70–99)
GLUCOSE BLDC GLUCOMTR-MCNC: 302 MG/DL — HIGH (ref 70–99)
GLUCOSE BLDC GLUCOMTR-MCNC: 59 MG/DL — LOW (ref 70–99)
GLUCOSE BLDC GLUCOMTR-MCNC: 70 MG/DL — SIGNIFICANT CHANGE UP (ref 70–99)
GLUCOSE SERPL-MCNC: 109 MG/DL — HIGH (ref 70–99)
HCT VFR BLD CALC: 26.1 % — LOW (ref 39–50)
HGB BLD-MCNC: 8.2 G/DL — LOW (ref 13–17)
MAGNESIUM SERPL-MCNC: 1.9 MG/DL — SIGNIFICANT CHANGE UP (ref 1.6–2.6)
MCHC RBC-ENTMCNC: 28.1 PG — SIGNIFICANT CHANGE UP (ref 27–34)
MCHC RBC-ENTMCNC: 31.4 % — LOW (ref 32–36)
MCV RBC AUTO: 89.4 FL — SIGNIFICANT CHANGE UP (ref 80–100)
NRBC # FLD: 0 K/UL — SIGNIFICANT CHANGE UP (ref 0–0)
PHOSPHATE SERPL-MCNC: 3.3 MG/DL — SIGNIFICANT CHANGE UP (ref 2.5–4.5)
PLATELET # BLD AUTO: 511 K/UL — HIGH (ref 150–400)
PMV BLD: 10 FL — SIGNIFICANT CHANGE UP (ref 7–13)
POTASSIUM SERPL-MCNC: 3.7 MMOL/L — SIGNIFICANT CHANGE UP (ref 3.5–5.3)
POTASSIUM SERPL-SCNC: 3.7 MMOL/L — SIGNIFICANT CHANGE UP (ref 3.5–5.3)
RBC # BLD: 2.92 M/UL — LOW (ref 4.2–5.8)
RBC # FLD: 12.8 % — SIGNIFICANT CHANGE UP (ref 10.3–14.5)
SODIUM SERPL-SCNC: 131 MMOL/L — LOW (ref 135–145)
WBC # BLD: 8.52 K/UL — SIGNIFICANT CHANGE UP (ref 3.8–10.5)
WBC # FLD AUTO: 8.52 K/UL — SIGNIFICANT CHANGE UP (ref 3.8–10.5)

## 2019-12-26 PROCEDURE — 99232 SBSQ HOSP IP/OBS MODERATE 35: CPT | Mod: GC

## 2019-12-26 RX ORDER — SODIUM CHLORIDE 9 MG/ML
1 INJECTION INTRAMUSCULAR; INTRAVENOUS; SUBCUTANEOUS ONCE
Refills: 0 | Status: COMPLETED | OUTPATIENT
Start: 2019-12-26 | End: 2019-12-26

## 2019-12-26 RX ADMIN — Medication 975 MILLIGRAM(S): at 00:42

## 2019-12-26 RX ADMIN — Medication 7 UNIT(S): at 17:34

## 2019-12-26 RX ADMIN — Medication 6: at 17:33

## 2019-12-26 RX ADMIN — Medication 975 MILLIGRAM(S): at 07:45

## 2019-12-26 RX ADMIN — OXYCODONE HYDROCHLORIDE 10 MILLIGRAM(S): 5 TABLET ORAL at 04:25

## 2019-12-26 RX ADMIN — OXYCODONE HYDROCHLORIDE 10 MILLIGRAM(S): 5 TABLET ORAL at 15:01

## 2019-12-26 RX ADMIN — OXYCODONE HYDROCHLORIDE 10 MILLIGRAM(S): 5 TABLET ORAL at 04:55

## 2019-12-26 RX ADMIN — Medication 7 UNIT(S): at 08:48

## 2019-12-26 RX ADMIN — OXYCODONE HYDROCHLORIDE 10 MILLIGRAM(S): 5 TABLET ORAL at 16:09

## 2019-12-26 RX ADMIN — SODIUM CHLORIDE 1 GRAM(S): 9 INJECTION INTRAMUSCULAR; INTRAVENOUS; SUBCUTANEOUS at 08:12

## 2019-12-26 RX ADMIN — Medication 975 MILLIGRAM(S): at 19:49

## 2019-12-26 RX ADMIN — HEPARIN SODIUM 5000 UNIT(S): 5000 INJECTION INTRAVENOUS; SUBCUTANEOUS at 07:15

## 2019-12-26 RX ADMIN — INSULIN GLARGINE 28 UNIT(S): 100 INJECTION, SOLUTION SUBCUTANEOUS at 22:09

## 2019-12-26 RX ADMIN — OXYCODONE HYDROCHLORIDE 10 MILLIGRAM(S): 5 TABLET ORAL at 22:10

## 2019-12-26 RX ADMIN — Medication 975 MILLIGRAM(S): at 07:15

## 2019-12-26 RX ADMIN — Medication 975 MILLIGRAM(S): at 13:34

## 2019-12-26 RX ADMIN — HEPARIN SODIUM 5000 UNIT(S): 5000 INJECTION INTRAVENOUS; SUBCUTANEOUS at 21:28

## 2019-12-26 RX ADMIN — OXYCODONE HYDROCHLORIDE 10 MILLIGRAM(S): 5 TABLET ORAL at 22:30

## 2019-12-26 RX ADMIN — OXYCODONE HYDROCHLORIDE 10 MILLIGRAM(S): 5 TABLET ORAL at 09:48

## 2019-12-26 RX ADMIN — Medication 8: at 22:10

## 2019-12-26 RX ADMIN — AMLODIPINE BESYLATE 5 MILLIGRAM(S): 2.5 TABLET ORAL at 07:15

## 2019-12-26 RX ADMIN — HYDROMORPHONE HYDROCHLORIDE 1 MILLIGRAM(S): 2 INJECTION INTRAMUSCULAR; INTRAVENOUS; SUBCUTANEOUS at 00:28

## 2019-12-26 RX ADMIN — Medication 975 MILLIGRAM(S): at 23:35

## 2019-12-26 RX ADMIN — OXYCODONE HYDROCHLORIDE 10 MILLIGRAM(S): 5 TABLET ORAL at 11:00

## 2019-12-26 RX ADMIN — GABAPENTIN 200 MILLIGRAM(S): 400 CAPSULE ORAL at 07:15

## 2019-12-26 RX ADMIN — HYDROMORPHONE HYDROCHLORIDE 1 MILLIGRAM(S): 2 INJECTION INTRAMUSCULAR; INTRAVENOUS; SUBCUTANEOUS at 00:13

## 2019-12-26 RX ADMIN — Medication 975 MILLIGRAM(S): at 00:12

## 2019-12-26 RX ADMIN — GABAPENTIN 200 MILLIGRAM(S): 400 CAPSULE ORAL at 21:28

## 2019-12-26 RX ADMIN — GABAPENTIN 200 MILLIGRAM(S): 400 CAPSULE ORAL at 13:34

## 2019-12-26 RX ADMIN — HEPARIN SODIUM 5000 UNIT(S): 5000 INJECTION INTRAVENOUS; SUBCUTANEOUS at 13:34

## 2019-12-26 RX ADMIN — Medication 975 MILLIGRAM(S): at 15:01

## 2019-12-26 NOTE — PROGRESS NOTE ADULT - SUBJECTIVE AND OBJECTIVE BOX
Pt now off IV pain medications; tolerating well.      Pt seen and examined at bedside.  Currently reports 7/10 pain, which he states is "tolerable."  He notes his dressing was changed this morning and that he was told his incision "looks good."  he has been using crutches for ambulation and states he is "getting better." He reiterates his desire to go to [facility] for rehab as it is closest to his family.     FUNCTIONAL PROGRESS as of 12/24  BM - supervision/CG  Transfers/gait - not attempted due to poor balance and fatigue.        REVIEW OF SYSTEMS  Constitutional - No fever,  No fatigue  HEENT - No vertigo, No neck pain  Neurological - No headaches, No memory loss, No loss of strength, No numbness, No tremors  Skin - No rashes, No lesions   Psychiatric - No depression, No anxiety    VITALS  T(C): 36.8 (12-26-19 @ 09:50), Max: 37.1 (12-26-19 @ 01:55)  HR: 99 (12-26-19 @ 09:50) (98 - 104)  BP: 131/59 (12-26-19 @ 09:50) (131/59 - 148/79)  RR: 16 (12-26-19 @ 09:50) (16 - 18)  SpO2: 97% (12-26-19 @ 09:50) (93% - 99%)  Wt(kg): --    MEDICATIONS   acetaminophen   Tablet .. 975 milliGRAM(s) every 6 hours  amLODIPine   Tablet 5 milliGRAM(s) daily  dextrose 40% Gel 15 Gram(s) once PRN  dextrose 50% Injectable 12.5 Gram(s) once  dextrose 50% Injectable 25 Gram(s) once  dextrose 50% Injectable 25 Gram(s) once  gabapentin 200 milliGRAM(s) every 8 hours  glucagon  Injectable 1 milliGRAM(s) once PRN  heparin  Injectable 5000 Unit(s) every 8 hours  influenza   Vaccine 0.5 milliLiter(s) once  insulin glargine Injectable (LANTUS) 28 Unit(s) at bedtime  insulin lispro (HumaLOG) corrective regimen sliding scale   Before meals and at bedtime  insulin lispro Injectable (HumaLOG) 7 Unit(s) three times a day before meals  ondansetron Injectable 4 milliGRAM(s) every 8 hours PRN  oxyCODONE    IR 5 milliGRAM(s) every 4 hours PRN  oxyCODONE    IR 10 milliGRAM(s) every 4 hours PRN      RECENT LABS/IMAGING  CBC Full  -  ( 26 Dec 2019 06:15 )  WBC Count : 8.52 K/uL  RBC Count : 2.92 M/uL  Hemoglobin : 8.2 g/dL  Hematocrit : 26.1 %  Platelet Count - Automated : 511 K/uL  Mean Cell Volume : 89.4 fL  Mean Cell Hemoglobin : 28.1 pg  Mean Cell Hemoglobin Concentration : 31.4 %  Auto Neutrophil # : x  Auto Lymphocyte # : x  Auto Monocyte # : x  Auto Eosinophil # : x  Auto Basophil # : x  Auto Neutrophil % : x  Auto Lymphocyte % : x  Auto Monocyte % : x  Auto Eosinophil % : x  Auto Basophil % : x    12-26    131<L>  |  95<L>  |  21  ----------------------------<  109<H>  3.7   |  25  |  1.38<H>    Ca    9.1      26 Dec 2019 06:15  Phos  3.3     12-26  Mg     1.9     12-26    PHYSICAL EXAM  Constitutional - NAD, Comfortable  HEENT - NCAT, EOMI  Neck - Supple, No limited ROM  Chest - Breathing comfortably, No wheezing  Cardiovascular - S1S2   Abdomen - Soft   Extremities - No C/C/E, No calf tenderness; RLE dressing c/d/i, +knee immobilizer   Neurologic Exam -                    Cognitive - Awake, Alert, AAO to self, place, date, year, situation     Communication - Fluent, No dysarthria     Cranial Nerves - CN 2-12 intact     Motor -                     LEFT    UE - ShAB 5/5, EF 5/5, EE 5/5, WE 5/5,  5/5                    RIGHT UE - ShAB 5/5, EF 5/5, EE 5/5, WE 5/5,  5/5                    LEFT    LE - HF 4/5, KE 5/5, DF 5/5, PF 5/5                    RIGHT LE - HF 4/5       Sensory - Intact to LT     Reflexes - DTR Intact, No primitive reflexive       Balance - impaired  Psychiatric - Mood stable, Affect WNL  ----------------------------------------------------------------------------------------

## 2019-12-26 NOTE — PROGRESS NOTE ADULT - SUBJECTIVE AND OBJECTIVE BOX
General Surgery Progress Note    SUBJECTIVE: Patient seen and examined at bedside with surgical team, patient without complaints. Denies fever, chills, CP, SOB nausea, vomiting, abdominal pain. RLE pain overnight. pain improved yesterday and overnight.       OBJECTIVE:      PHYSICAL EXAM:  Constitutional: A&Ox3, NAD  Respiratory: Unlabored breathing  Cardio: RRR  Abdomen: Soft, nondistended, NTTP. No rebound or guarding.  Extremities: Moving all 4 spontaneously without limitations. R BKA dressing C/D/I. Dressing changed. Knee immobilizer in place.     Vital Signs Last 24 Hrs  T(C): 37.1 (26 Dec 2019 07:00), Max: 37.1 (26 Dec 2019 01:55)  T(F): 98.7 (26 Dec 2019 07:00), Max: 98.8 (26 Dec 2019 01:55)  HR: 98 (26 Dec 2019 07:00) (98 - 104)  BP: 137/82 (26 Dec 2019 07:00) (135/63 - 148/79)  BP(mean): --  RR: 16 (26 Dec 2019 07:00) (15 - 18)  SpO2: 99% (26 Dec 2019 07:00) (93% - 99%)        I&O's Detail    25 Dec 2019 07:01  -  26 Dec 2019 07:00  --------------------------------------------------------  IN:    Oral Fluid: 360 mL  Total IN: 360 mL    OUT:    Voided: 1375 mL  Total OUT: 1375 mL    Total NET: -1015 mL          LABS:                        8.2    8.52  )-----------( 511      ( 26 Dec 2019 06:15 )             26.1     12-26    131<L>  |  95<L>  |  21  ----------------------------<  109<H>  3.7   |  25  |  1.38<H>    Ca    9.1      26 Dec 2019 06:15  Phos  3.3     12-26  Mg     1.9     12-26      MEDICATIONS  (STANDING):  acetaminophen   Tablet .. 975 milliGRAM(s) Oral every 6 hours  amLODIPine   Tablet 5 milliGRAM(s) Oral daily  dextrose 50% Injectable 12.5 Gram(s) IV Push once  dextrose 50% Injectable 25 Gram(s) IV Push once  dextrose 50% Injectable 25 Gram(s) IV Push once  gabapentin 200 milliGRAM(s) Oral every 8 hours  heparin  Injectable 5000 Unit(s) SubCutaneous every 8 hours  influenza   Vaccine 0.5 milliLiter(s) IntraMuscular once  insulin glargine Injectable (LANTUS) 28 Unit(s) SubCutaneous at bedtime  insulin lispro (HumaLOG) corrective regimen sliding scale   SubCutaneous Before meals and at bedtime  insulin lispro Injectable (HumaLOG) 7 Unit(s) SubCutaneous three times a day before meals    MEDICATIONS  (PRN):  dextrose 40% Gel 15 Gram(s) Oral once PRN Blood Glucose LESS THAN 70 milliGRAM(s)/deciliter  glucagon  Injectable 1 milliGRAM(s) IntraMuscular once PRN Glucose LESS THAN 70 milligrams/deciliter  ondansetron Injectable 4 milliGRAM(s) IV Push every 8 hours PRN Nausea and/or Vomiting  oxyCODONE    IR 5 milliGRAM(s) Oral every 4 hours PRN Moderate Pain (4 - 6)  oxyCODONE    IR 10 milliGRAM(s) Oral every 4 hours PRN Severe Pain (7 - 10)

## 2019-12-26 NOTE — PROGRESS NOTE ADULT - ASSESSMENT
ASSESSMENT/PLAN  49y Male with functional deficits after R. BKA 2/2 necrotizing fasciitis.    BKA - mgmt per ortho. continue knee immobilizer. continue daily therapies as below. Please indicate duration of knee immobilizer in discharge instructions as well as incision instructions.   Necrotizing Fasciitis - off antibiotics. monitor incision site.   Pain - oxycodone, tylenol PRN  DM1 - continue long-acting, premeal, and SS insulin.   PT/OT - daily therapies for ROM, increased mobility, and decreased debility related to hospitalization.  Balance, gait training, ADLs, transfers, and bracing/assistive devices as needed.    DVT PPX - heparin  Rehab -  Recommend ACUTE inpatient rehabilitation for the functional deficits consisting of 3 hours of therapy/day & 24 hour RN/daily PMR physician for comorbid medical management. Will continue to follow for ongoing rehab needs and recommendations.

## 2019-12-26 NOTE — PROVIDER CONTACT NOTE (MEDICATION) - ACTION/TREATMENT ORDERED:
PA notified. will repeat fingerstick in 15 minutes and until fingersticks are 100 or above x2 fingersticks.

## 2019-12-26 NOTE — PROVIDER CONTACT NOTE (MEDICATION) - ASSESSMENT
Patient lunch fingerstick was 59, repeated fingerstick was 70. Patient is A&O 4, denies any symptoms of hypoglycemia. Patient was eating an orange and fruit cup.

## 2019-12-26 NOTE — CHART NOTE - NSCHARTNOTEFT_GEN_A_CORE
Source: Patient [X]    Family [ ]     other [ ]    Diet : Diet, Consistent Carbohydrate w/Evening Snack (12-23-19 @ 21:06)      RD met with patient today. S/p R BKA revision on 12/23. Patient reports some RLE pain but appetite remains excellent at this time. Denies GI distress (nausea/vomiting/diarrhea/constipation) or chewing/swallowing problems. Tolerated diet well. Patient has no complaint or questions about his food/diet at this time, states that he is "happy" with his meals.       Current Weight: (12/23) 76kg, (12/22) 75.4kg, (12/21) 75.6kg, (12/19) 76kg, (12/18) 88kg   Wt has been stable from 12/19-12/23, ? wt down from 12/18 related to wt scale difference vs wt adj to R BKA surgery, will cont to monitor wt trend.     Pertinent Medications: MEDICATIONS  (STANDING):  acetaminophen   Tablet .. 975 milliGRAM(s) Oral every 6 hours  amLODIPine   Tablet 5 milliGRAM(s) Oral daily  dextrose 50% Injectable 12.5 Gram(s) IV Push once  dextrose 50% Injectable 25 Gram(s) IV Push once  dextrose 50% Injectable 25 Gram(s) IV Push once  gabapentin 200 milliGRAM(s) Oral every 8 hours  heparin  Injectable 5000 Unit(s) SubCutaneous every 8 hours  influenza   Vaccine 0.5 milliLiter(s) IntraMuscular once  insulin glargine Injectable (LANTUS) 28 Unit(s) SubCutaneous at bedtime  insulin lispro (HumaLOG) corrective regimen sliding scale   SubCutaneous Before meals and at bedtime  insulin lispro Injectable (HumaLOG) 7 Unit(s) SubCutaneous three times a day before meals    MEDICATIONS  (PRN):  dextrose 40% Gel 15 Gram(s) Oral once PRN Blood Glucose LESS THAN 70 milliGRAM(s)/deciliter  glucagon  Injectable 1 milliGRAM(s) IntraMuscular once PRN Glucose LESS THAN 70 milligrams/deciliter  ondansetron Injectable 4 milliGRAM(s) IV Push every 8 hours PRN Nausea and/or Vomiting  oxyCODONE    IR 5 milliGRAM(s) Oral every 4 hours PRN Moderate Pain (4 - 6)  oxyCODONE    IR 10 milliGRAM(s) Oral every 4 hours PRN Severe Pain (7 - 10)    Pertinent Labs:  12-26 Na131 mmol/L<L> Glu 109 mg/dL<H> K+ 3.7 mmol/L Cr  1.38 mg/dL<H> BUN 21 mg/dL 12-26 Phos 3.3 mg/dL 12-13 [id number] 12.0 %<H>      Skin: R BKA surgical incision; No pressure injuries. No edema per flowsheet.      Estimated Needs:   [X] no change since previous assessment  [ ] recalculated:       Previous Nutrition Diagnosis:     [X] Altered Nutrition Related Lab Values      Nutrition Diagnosis is [X] ongoing  [ ] resolved [ ] not applicable            Recommendation:  1. Continue with current diet order, which remains appropriate at this time.  2. Monitor weights, labs, skin integrity, PO intake/tolerance.   3. Recommend follow-up with out-patient RDN for diet support and optimal glucose control.  4. RD will follow patient per nutrition policy and protocols.

## 2019-12-26 NOTE — PROGRESS NOTE ADULT - ASSESSMENT
ASSESSMENT:  49 yr old male with necrotizing soft tissue infection of the RLE, admitted to SICU for resuscitation and management of severe sepsis, s/p emergent guillotine Rt caroline GROVES. His postop course has been complicated by ARDS and MANN but both improving. s/p BKA completion (12/23).     PLAN:    - Pain control as needed; c/w oxycodone and Tylenol standing, DC Dilaudid   - Monitor FS  - DVT ppx: Heparin   - Dispo planning - rehab     C Team/ Vascular Surgery  [id number]

## 2019-12-27 DIAGNOSIS — E10.59 TYPE 1 DIABETES MELLITUS WITH OTHER CIRCULATORY COMPLICATIONS: ICD-10-CM

## 2019-12-27 LAB
GLUCOSE BLDC GLUCOMTR-MCNC: 156 MG/DL — HIGH (ref 70–99)
GLUCOSE BLDC GLUCOMTR-MCNC: 201 MG/DL — HIGH (ref 70–99)
GLUCOSE BLDC GLUCOMTR-MCNC: 208 MG/DL — HIGH (ref 70–99)
GLUCOSE BLDC GLUCOMTR-MCNC: 227 MG/DL — HIGH (ref 70–99)
GLUCOSE BLDC GLUCOMTR-MCNC: 54 MG/DL — LOW (ref 70–99)
GLUCOSE BLDC GLUCOMTR-MCNC: 99 MG/DL — SIGNIFICANT CHANGE UP (ref 70–99)

## 2019-12-27 PROCEDURE — 99223 1ST HOSP IP/OBS HIGH 75: CPT

## 2019-12-27 RX ORDER — INSULIN GLARGINE 100 [IU]/ML
22 INJECTION, SOLUTION SUBCUTANEOUS AT BEDTIME
Refills: 0 | Status: DISCONTINUED | OUTPATIENT
Start: 2019-12-27 | End: 2019-12-28

## 2019-12-27 RX ORDER — INSULIN LISPRO 100/ML
1 VIAL (ML) SUBCUTANEOUS ONCE
Refills: 0 | Status: COMPLETED | OUTPATIENT
Start: 2019-12-27 | End: 2019-12-27

## 2019-12-27 RX ORDER — INSULIN LISPRO 100/ML
VIAL (ML) SUBCUTANEOUS
Refills: 0 | Status: DISCONTINUED | OUTPATIENT
Start: 2019-12-27 | End: 2019-12-31

## 2019-12-27 RX ORDER — INSULIN LISPRO 100/ML
VIAL (ML) SUBCUTANEOUS AT BEDTIME
Refills: 0 | Status: DISCONTINUED | OUTPATIENT
Start: 2019-12-27 | End: 2019-12-31

## 2019-12-27 RX ORDER — INSULIN LISPRO 100/ML
5 VIAL (ML) SUBCUTANEOUS
Refills: 0 | Status: DISCONTINUED | OUTPATIENT
Start: 2019-12-27 | End: 2019-12-28

## 2019-12-27 RX ADMIN — Medication 975 MILLIGRAM(S): at 05:04

## 2019-12-27 RX ADMIN — Medication 975 MILLIGRAM(S): at 12:41

## 2019-12-27 RX ADMIN — OXYCODONE HYDROCHLORIDE 10 MILLIGRAM(S): 5 TABLET ORAL at 23:14

## 2019-12-27 RX ADMIN — GABAPENTIN 200 MILLIGRAM(S): 400 CAPSULE ORAL at 05:05

## 2019-12-27 RX ADMIN — Medication 2: at 17:29

## 2019-12-27 RX ADMIN — GABAPENTIN 200 MILLIGRAM(S): 400 CAPSULE ORAL at 13:18

## 2019-12-27 RX ADMIN — Medication 4: at 13:00

## 2019-12-27 RX ADMIN — Medication 975 MILLIGRAM(S): at 00:00

## 2019-12-27 RX ADMIN — OXYCODONE HYDROCHLORIDE 10 MILLIGRAM(S): 5 TABLET ORAL at 04:42

## 2019-12-27 RX ADMIN — Medication 5 UNIT(S): at 17:30

## 2019-12-27 RX ADMIN — OXYCODONE HYDROCHLORIDE 10 MILLIGRAM(S): 5 TABLET ORAL at 13:21

## 2019-12-27 RX ADMIN — Medication 975 MILLIGRAM(S): at 06:17

## 2019-12-27 RX ADMIN — OXYCODONE HYDROCHLORIDE 10 MILLIGRAM(S): 5 TABLET ORAL at 12:41

## 2019-12-27 RX ADMIN — AMLODIPINE BESYLATE 5 MILLIGRAM(S): 2.5 TABLET ORAL at 05:05

## 2019-12-27 RX ADMIN — INSULIN GLARGINE 22 UNIT(S): 100 INJECTION, SOLUTION SUBCUTANEOUS at 23:11

## 2019-12-27 RX ADMIN — Medication 975 MILLIGRAM(S): at 19:40

## 2019-12-27 RX ADMIN — HEPARIN SODIUM 5000 UNIT(S): 5000 INJECTION INTRAVENOUS; SUBCUTANEOUS at 23:15

## 2019-12-27 RX ADMIN — Medication 1 UNIT(S): at 13:18

## 2019-12-27 RX ADMIN — OXYCODONE HYDROCHLORIDE 10 MILLIGRAM(S): 5 TABLET ORAL at 05:05

## 2019-12-27 RX ADMIN — Medication 975 MILLIGRAM(S): at 13:21

## 2019-12-27 RX ADMIN — HEPARIN SODIUM 5000 UNIT(S): 5000 INJECTION INTRAVENOUS; SUBCUTANEOUS at 05:04

## 2019-12-27 RX ADMIN — GABAPENTIN 200 MILLIGRAM(S): 400 CAPSULE ORAL at 23:12

## 2019-12-27 RX ADMIN — Medication 975 MILLIGRAM(S): at 23:16

## 2019-12-27 NOTE — PROVIDER CONTACT NOTE (MEDICATION) - ASSESSMENT
patient denies any symptoms of hypoglycemia . patient refusing apple juice was seen eating orange and cup of freshcut fruit . Md notified . hypoglycemic protocol in use .

## 2019-12-27 NOTE — CONSULT NOTE ADULT - SUBJECTIVE AND OBJECTIVE BOX
Reason For Consult:     HPI:  49M admitted with LI on 12/12/19 for severe sepsis, requiring SICU level care, emergent right BKA and now BKA revision on 12/23. Endocrine being call now, on 12/27, for assistance with diabetes management / glycemic control and coordination of outpatient care.     Endocrine history: Patient reports being diagnosed with DM 1 at age 15. He has most recently been seeing an endocrinologist in [place], but cannot recall name. At home he reports taking Basaglar 20 units at night along with the following Humalog scale (reports he "found the scale himself" and follows as below):  Glucose    - 0 units  151-200 - 3 units  201-250 - 5 units  251-300 - 8 units  301-350 - 10 units  351-400 - 12 units  >400 - 15 units  He reports checking fingersticks 3-4 times daily with readings generally 120-200 range. Endorses peripheral neuropathy but denies polydipsia, polyuria, polyphagia. Has not had an opthalmology nor podiatry exam as outpatient.   Review of glucose since admission showing hyperglycemia along with hypoglycemia. Currently: patient is ordered for Lantus 28 units alone with Humalog 7 units TID before meals. Patient is also ordered for Humalog Moderate correctional scales before meals and bedtime. Most recent hypoglycemic episode this am, 54 mg/dL. Patient endorses he does have symptomatic hypoglycemia.     PAST MEDICAL & SURGICAL HISTORY:  DM (diabetes mellitus)  Stenosis of popliteal artery: Left stent placed 2017    FAMILY HISTORY: denies    Social History: denies     Outpatient Medications: Basaglar 20 units along with Humalog correctional scale as outlined above before meals     MEDICATIONS  (STANDING):  acetaminophen   Tablet .. 975 milliGRAM(s) Oral every 6 hours  amLODIPine   Tablet 5 milliGRAM(s) Oral daily  dextrose 50% Injectable 12.5 Gram(s) IV Push once  dextrose 50% Injectable 25 Gram(s) IV Push once  dextrose 50% Injectable 25 Gram(s) IV Push once  gabapentin 200 milliGRAM(s) Oral every 8 hours  heparin  Injectable 5000 Unit(s) SubCutaneous every 8 hours  influenza   Vaccine 0.5 milliLiter(s) IntraMuscular once  insulin glargine Injectable (LANTUS) 28 Unit(s) SubCutaneous at bedtime  insulin lispro (HumaLOG) corrective regimen sliding scale   SubCutaneous three times a day before meals and bedtime  insulin lispro Injectable (HumaLOG) 7 Unit(s) SubCutaneous three times a day before meals    MEDICATIONS  (PRN):  dextrose 40% Gel 15 Gram(s) Oral once PRN Blood Glucose LESS THAN 70 milliGRAM(s)/deciliter  glucagon  Injectable 1 milliGRAM(s) IntraMuscular once PRN Glucose LESS THAN 70 milligrams/deciliter  ondansetron Injectable 4 milliGRAM(s) IV Push every 8 hours PRN Nausea and/or Vomiting  oxyCODONE    IR 5 milliGRAM(s) Oral every 4 hours PRN Moderate Pain (4 - 6)  oxyCODONE    IR 10 milliGRAM(s) Oral every 4 hours PRN Severe Pain (7 - 10)      No Known Allergies      Review of Systems:  Constitutional: No fever  Eyes: No blurry vision  Neuro: No tremors  HEENT: No pain  Cardiovascular: No chest pain, palpitations  Respiratory: No SOB, no cough  GI: No nausea, vomiting, abdominal pain  : No dysuria  Skin: no rash  Psych: no depression  Endocrine: no polyuria, polydipsia  Hem/lymph: no swelling  Osteoporosis: no fractures    ALL OTHER SYSTEMS REVIEWED AND NEGATIVE    PHYSICAL EXAM:  VITALS: T(C): 37.1 (12-27-19 @ 09:39)  T(F): 98.7 (12-27-19 @ 09:39), Max: 98.8 (12-27-19 @ 05:02)  HR: 101 (12-27-19 @ 09:39) (88 - 106)  BP: 122/70 (12-27-19 @ 09:39) (113/67 - 145/65)  RR:  (16 - 18)  SpO2:  (94% - 99%)  Wt(kg): --  GENERAL: NAD, well-groomed  EYES: No proptosis, no lid lag, anicteric  HEENT:  Atraumatic, Normocephalic, moist mucous membranes  RESPIRATORY: Clear to auscultation bilaterally; No rales, rhonchi, wheezing, or rubs  CARDIOVASCULAR: Regular rate and rhythm; No murmurs; no peripheral edema  GI: Soft, nontender, non distended, normal bowel sounds  SKIN: Right leg: BKA site not visualized - obscured by dressing. Defer exam to primary team. Left LE: no open wounds observed on leg / foot   MUSCULOSKELETAL: defer exam to primary team   NEURO: sensation intact, extraocular movements intact, no tremor  PSYCH: Alert and oriented x 3, normal affect, normal mood      POCT Blood Glucose.: 201 mg/dL (12-27-19 @ 12:26)  POCT Blood Glucose.: 156 mg/dL (12-27-19 @ 09:20)  POCT Blood Glucose.: 99 mg/dL (12-27-19 @ 09:01)  POCT Blood Glucose.: 54 mg/dL (12-27-19 @ 08:36)  POCT Blood Glucose.: 302 mg/dL (12-26-19 @ 21:55)  POCT Blood Glucose.: 291 mg/dL (12-26-19 @ 17:21)  POCT Blood Glucose.: 239 mg/dL (12-26-19 @ 15:03)  POCT Blood Glucose.: 101 mg/dL (12-26-19 @ 13:11)  POCT Blood Glucose.: 70 mg/dL (12-26-19 @ 12:39)  POCT Blood Glucose.: 59 mg/dL (12-26-19 @ 12:21)  POCT Blood Glucose.: 103 mg/dL (12-26-19 @ 08:31)  POCT Blood Glucose.: 113 mg/dL (12-25-19 @ 21:17)  POCT Blood Glucose.: 150 mg/dL (12-25-19 @ 17:22)  POCT Blood Glucose.: 151 mg/dL (12-25-19 @ 12:20)  POCT Blood Glucose.: 258 mg/dL (12-25-19 @ 08:31)  POCT Blood Glucose.: 304 mg/dL (12-24-19 @ 22:10)  POCT Blood Glucose.: 118 mg/dL (12-24-19 @ 17:21)                            8.2    8.52  )-----------( 511      ( 26 Dec 2019 06:15 )             26.1       12-26    131<L>  |  95<L>  |  21  ----------------------------<  109<H>  3.7   |  25  |  1.38<H>    EGFR if : 70  EGFR if non : 60    Ca    9.1      12-26  Mg     1.9     12-26  Phos  3.3     12-26            Hemoglobin A1C, Whole Blood: 12.0 % <H> [4.0 - 5.6] (12-13-19 @ 18:31) Reason For Consult: uncontrolled type 1 diabetes with both hypoglycemia and hyperglycemia   A1C 12%    HPI:  49M admitted with LIJ on 12/12/19 for severe sepsis, requiring SICU level care, emergent right BKA and now BKA revision on 12/23. Endocrine being call now, on 12/27, for assistance with diabetes management / glycemic control and coordination of outpatient care.     Endocrine history: Patient reports being diagnosed with DM 1 at age 15. He has most recently been seeing an endocrinologist in [place], but cannot recall name. At home he reports taking Basaglar 20 units at night along with the following Humalog scale (reports he "found the scale himself" and follows as below):  Glucose    - 0 units  151-200 - 3 units  201-250 - 5 units  251-300 - 8 units  301-350 - 10 units  351-400 - 12 units  >400 - 15 units  He reports checking fingersticks 3-4 times daily with readings generally 120-200 range. Endorses peripheral neuropathy but denies polydipsia, polyuria, polyphagia. Has not had an opthalmology nor podiatry exam as outpatient.   Review of glucose since admission showing hyperglycemia along with hypoglycemia. Currently: patient is ordered for Lantus 28 units alone with Humalog 7 units TID before meals. Patient is also ordered for Humalog Moderate correctional scales before meals and bedtime. Most recent hypoglycemic episode this am, 54 mg/dL. Patient endorses he does have symptomatic hypoglycemia.     PAST MEDICAL & SURGICAL HISTORY:  DM (diabetes mellitus)  Stenosis of popliteal artery: Left stent placed 2017    FAMILY HISTORY: denies    Social History: denies     Outpatient Medications: Basaglar 20 units along with Humalog correctional scale as outlined above before meals     MEDICATIONS  (STANDING):  acetaminophen   Tablet .. 975 milliGRAM(s) Oral every 6 hours  amLODIPine   Tablet 5 milliGRAM(s) Oral daily  dextrose 50% Injectable 12.5 Gram(s) IV Push once  dextrose 50% Injectable 25 Gram(s) IV Push once  dextrose 50% Injectable 25 Gram(s) IV Push once  gabapentin 200 milliGRAM(s) Oral every 8 hours  heparin  Injectable 5000 Unit(s) SubCutaneous every 8 hours  influenza   Vaccine 0.5 milliLiter(s) IntraMuscular once  insulin glargine Injectable (LANTUS) 28 Unit(s) SubCutaneous at bedtime  insulin lispro (HumaLOG) corrective regimen sliding scale   SubCutaneous three times a day before meals and bedtime  insulin lispro Injectable (HumaLOG) 7 Unit(s) SubCutaneous three times a day before meals    MEDICATIONS  (PRN):  dextrose 40% Gel 15 Gram(s) Oral once PRN Blood Glucose LESS THAN 70 milliGRAM(s)/deciliter  glucagon  Injectable 1 milliGRAM(s) IntraMuscular once PRN Glucose LESS THAN 70 milligrams/deciliter  ondansetron Injectable 4 milliGRAM(s) IV Push every 8 hours PRN Nausea and/or Vomiting  oxyCODONE    IR 5 milliGRAM(s) Oral every 4 hours PRN Moderate Pain (4 - 6)  oxyCODONE    IR 10 milliGRAM(s) Oral every 4 hours PRN Severe Pain (7 - 10)      No Known Allergies      Review of Systems:  Constitutional: No fever  Eyes: No blurry vision  Neuro: No tremors  HEENT: No pain  Cardiovascular: No chest pain, palpitations  Respiratory: No SOB, no cough  GI: No nausea, vomiting, abdominal pain  : No dysuria  Skin: no rash  Psych: no depression  Endocrine: no polyuria, polydipsia  Hem/lymph: no swelling  Osteoporosis: no fractures    ALL OTHER SYSTEMS REVIEWED AND NEGATIVE    PHYSICAL EXAM:  VITALS: T(C): 37.1 (12-27-19 @ 09:39)  T(F): 98.7 (12-27-19 @ 09:39), Max: 98.8 (12-27-19 @ 05:02)  HR: 101 (12-27-19 @ 09:39) (88 - 106)  BP: 122/70 (12-27-19 @ 09:39) (113/67 - 145/65)  RR:  (16 - 18)  SpO2:  (94% - 99%)  Wt(kg): --  GENERAL: NAD, well-groomed  EYES: No proptosis, no lid lag, anicteric  HEENT:  Atraumatic, Normocephalic, moist mucous membranes  RESPIRATORY: Clear to auscultation bilaterally; No rales, rhonchi, wheezing, or rubs  CARDIOVASCULAR: Regular rate and rhythm; No murmurs; no peripheral edema  GI: Soft, nontender, non distended, normal bowel sounds  SKIN: Right leg: BKA site not visualized - obscured by dressing. Defer exam to primary team. Left LE: no open wounds observed on leg / foot   MUSCULOSKELETAL: defer exam to primary team   NEURO: sensation intact, extraocular movements intact, no tremor  PSYCH: Alert and oriented x 3, normal affect, normal mood      POCT Blood Glucose.: 201 mg/dL (12-27-19 @ 12:26)  POCT Blood Glucose.: 156 mg/dL (12-27-19 @ 09:20)  POCT Blood Glucose.: 99 mg/dL (12-27-19 @ 09:01)  POCT Blood Glucose.: 54 mg/dL (12-27-19 @ 08:36)  POCT Blood Glucose.: 302 mg/dL (12-26-19 @ 21:55)  POCT Blood Glucose.: 291 mg/dL (12-26-19 @ 17:21)  POCT Blood Glucose.: 239 mg/dL (12-26-19 @ 15:03)  POCT Blood Glucose.: 101 mg/dL (12-26-19 @ 13:11)  POCT Blood Glucose.: 70 mg/dL (12-26-19 @ 12:39)  POCT Blood Glucose.: 59 mg/dL (12-26-19 @ 12:21)  POCT Blood Glucose.: 103 mg/dL (12-26-19 @ 08:31)  POCT Blood Glucose.: 113 mg/dL (12-25-19 @ 21:17)  POCT Blood Glucose.: 150 mg/dL (12-25-19 @ 17:22)  POCT Blood Glucose.: 151 mg/dL (12-25-19 @ 12:20)  POCT Blood Glucose.: 258 mg/dL (12-25-19 @ 08:31)  POCT Blood Glucose.: 304 mg/dL (12-24-19 @ 22:10)  POCT Blood Glucose.: 118 mg/dL (12-24-19 @ 17:21)                            8.2    8.52  )-----------( 511      ( 26 Dec 2019 06:15 )             26.1       12-26    131<L>  |  95<L>  |  21  ----------------------------<  109<H>  3.7   |  25  |  1.38<H>    EGFR if : 70  EGFR if non : 60    Ca    9.1      12-26  Mg     1.9     12-26  Phos  3.3     12-26            Hemoglobin A1C, Whole Blood: 12.0 % <H> [4.0 - 5.6] (12-13-19 @ 18:31) Reason For Consult: uncontrolled type 1 diabetes with both hypoglycemia and hyperglycemia   A1C 12%    HPI:  49M admitted with LIJ on 12/12/19 for severe sepsis, requiring SICU level care, emergent right BKA and now BKA revision on 12/23. Endocrine being called now, on 12/27, for assistance with diabetes management / glycemic control and coordination of outpatient care.     Endocrine history: Patient reports being diagnosed with DM 1 at age 15. He has most recently been seeing an endocrinologist in [place], but cannot recall name. At home he reports taking Basaglar 20 units at night along with the following Humalog scale (reports he "found the scale himself" and follows as below):  Glucose    - 0 units  151-200 - 3 units  201-250 - 5 units  251-300 - 8 units  301-350 - 10 units  351-400 - 12 units  >400 - 15 units  He reports checking fingersticks 3-4 times daily with readings generally 120-200 range. Endorses peripheral neuropathy but denies polydipsia, polyuria, polyphagia. Has not had an opthalmology nor podiatry exam as outpatient.   Review of glucose since admission showing hyperglycemia along with hypoglycemia. Currently: patient is ordered for Lantus 28 units alone with Humalog 7 units TID before meals. Patient is also ordered for Humalog Moderate correctional scales before meals and bedtime. Most recent hypoglycemic episode this am, 54 mg/dL. Patient endorses he does have symptomatic hypoglycemia.     PAST MEDICAL & SURGICAL HISTORY:  DM (diabetes mellitus)  Stenosis of popliteal artery: Left stent placed 2017    FAMILY HISTORY: denies    Social History: denies     Outpatient Medications: Basaglar 20 units along with Humalog correctional scale as outlined above before meals     MEDICATIONS  (STANDING):  acetaminophen   Tablet .. 975 milliGRAM(s) Oral every 6 hours  amLODIPine   Tablet 5 milliGRAM(s) Oral daily  dextrose 50% Injectable 12.5 Gram(s) IV Push once  dextrose 50% Injectable 25 Gram(s) IV Push once  dextrose 50% Injectable 25 Gram(s) IV Push once  gabapentin 200 milliGRAM(s) Oral every 8 hours  heparin  Injectable 5000 Unit(s) SubCutaneous every 8 hours  influenza   Vaccine 0.5 milliLiter(s) IntraMuscular once  insulin glargine Injectable (LANTUS) 28 Unit(s) SubCutaneous at bedtime  insulin lispro (HumaLOG) corrective regimen sliding scale   SubCutaneous three times a day before meals and bedtime  insulin lispro Injectable (HumaLOG) 7 Unit(s) SubCutaneous three times a day before meals    MEDICATIONS  (PRN):  dextrose 40% Gel 15 Gram(s) Oral once PRN Blood Glucose LESS THAN 70 milliGRAM(s)/deciliter  glucagon  Injectable 1 milliGRAM(s) IntraMuscular once PRN Glucose LESS THAN 70 milligrams/deciliter  ondansetron Injectable 4 milliGRAM(s) IV Push every 8 hours PRN Nausea and/or Vomiting  oxyCODONE    IR 5 milliGRAM(s) Oral every 4 hours PRN Moderate Pain (4 - 6)  oxyCODONE    IR 10 milliGRAM(s) Oral every 4 hours PRN Severe Pain (7 - 10)      No Known Allergies      Review of Systems:  Constitutional: No fever  Eyes: No blurry vision  Neuro: No tremors  HEENT: No pain  Cardiovascular: No chest pain, palpitations  Respiratory: No SOB, no cough  GI: No nausea, vomiting, abdominal pain  : No dysuria  Skin: no rash  Psych: no depression  Endocrine: no polyuria, polydipsia  Hem/lymph: no swelling  Osteoporosis: no fractures    ALL OTHER SYSTEMS REVIEWED AND NEGATIVE    PHYSICAL EXAM:  VITALS: T(C): 37.1 (12-27-19 @ 09:39)  T(F): 98.7 (12-27-19 @ 09:39), Max: 98.8 (12-27-19 @ 05:02)  HR: 101 (12-27-19 @ 09:39) (88 - 106)  BP: 122/70 (12-27-19 @ 09:39) (113/67 - 145/65)  RR:  (16 - 18)  SpO2:  (94% - 99%)  Wt(kg): --  GENERAL: NAD, well-groomed  EYES: No proptosis, no lid lag, anicteric  HEENT:  Atraumatic, Normocephalic, moist mucous membranes  RESPIRATORY: Clear to auscultation bilaterally; No rales, rhonchi, wheezing, or rubs  CARDIOVASCULAR: Regular rate and rhythm; No murmurs; no peripheral edema  GI: Soft, nontender, non distended, normal bowel sounds  SKIN: Right leg: BKA site not visualized - obscured by dressing. Defer exam to primary team. Left LE: no open wounds observed on leg / foot   MUSCULOSKELETAL: defer exam to primary team   NEURO: sensation intact, extraocular movements intact, no tremor  PSYCH: Alert and oriented x 3, normal affect, normal mood      POCT Blood Glucose.: 201 mg/dL (12-27-19 @ 12:26)  POCT Blood Glucose.: 156 mg/dL (12-27-19 @ 09:20)  POCT Blood Glucose.: 99 mg/dL (12-27-19 @ 09:01)  POCT Blood Glucose.: 54 mg/dL (12-27-19 @ 08:36)  POCT Blood Glucose.: 302 mg/dL (12-26-19 @ 21:55)  POCT Blood Glucose.: 291 mg/dL (12-26-19 @ 17:21)  POCT Blood Glucose.: 239 mg/dL (12-26-19 @ 15:03)  POCT Blood Glucose.: 101 mg/dL (12-26-19 @ 13:11)  POCT Blood Glucose.: 70 mg/dL (12-26-19 @ 12:39)  POCT Blood Glucose.: 59 mg/dL (12-26-19 @ 12:21)  POCT Blood Glucose.: 103 mg/dL (12-26-19 @ 08:31)  POCT Blood Glucose.: 113 mg/dL (12-25-19 @ 21:17)  POCT Blood Glucose.: 150 mg/dL (12-25-19 @ 17:22)  POCT Blood Glucose.: 151 mg/dL (12-25-19 @ 12:20)  POCT Blood Glucose.: 258 mg/dL (12-25-19 @ 08:31)  POCT Blood Glucose.: 304 mg/dL (12-24-19 @ 22:10)  POCT Blood Glucose.: 118 mg/dL (12-24-19 @ 17:21)                            8.2    8.52  )-----------( 511      ( 26 Dec 2019 06:15 )             26.1       12-26    131<L>  |  95<L>  |  21  ----------------------------<  109<H>  3.7   |  25  |  1.38<H>    EGFR if : 70  EGFR if non : 60    Ca    9.1      12-26  Mg     1.9     12-26  Phos  3.3     12-26            Hemoglobin A1C, Whole Blood: 12.0 % <H> [4.0 - 5.6] (12-13-19 @ 18:31)

## 2019-12-27 NOTE — CONSULT NOTE ADULT - ASSESSMENT
49M admitted with LIJ on 12/12/19 for severe sepsis, requiring SICU level care, emergent right BKA and now BKA revision on 12/23. Endocrine being call now, on 12/27, for assistance with diabetes management / glycemic control and coordination of outpatient care. 49M admitted with LIJ on 12/12/19 for severe sepsis, requiring SICU level care, emergent right BKA and now BKA revision on 12/23. Endocrine being called today,12/27, for assistance with diabetes management / glycemic control and coordination of outpatient care.

## 2019-12-27 NOTE — PROGRESS NOTE ADULT - ASSESSMENT
ASSESSMENT:  49 yr old male with necrotizing soft tissue infection of the RLE, admitted to SICU for resuscitation and management of severe sepsis, s/p emergent guillotine Rt caroline GROVES. His postop course has been complicated by ARDS and MANN but both improving. s/p BKA completion (12/23).     PLAN:    - Pain control as needed; c/w oxycodone and Tylenol standing  - Monitor FS  - DVT ppx: Heparin   - Endo consult   - Dispo planning - rehab       C Team/ Vascular Surgery  [id number]

## 2019-12-27 NOTE — CONSULT NOTE ADULT - ATTENDING COMMENTS
Critical Care Dx    I96 Gangrene of lower extremity   A41.9 Sepsis with acute renal failure without septic shock, due to unspecified organism, unspecified acute renal failure type   -awaiting BKA with vascular surgery  -broad spec abx as ordered   -fluid resuscitation  E87.6 Hypokalemia   -replete aggressively, will continue to fall with insulin gtt  E11.10 Diabetic ketoacidosis without coma associated with type 2 diabetes mellitus   -insulin gtt, send A1c, needs source control to sidney infection realted hyperglycemia  R79.89 Elevated serum creatinine   -unclear baseline creatinine. Trend u/o, creatinine       The patient is a critical care patient with life threatening sepsis in SICU.  I have personally interviewed and examined the patient, reviewed data and laboratory tests/x-rays and all pertinent electronic images.  The SICU team has a constant risk benefit analyzes discussion with the primary team, all consultants, House Staff and PA's on all decisions.   Time involved in performance of separately billable procedures was not counted toward my critical care time. There is no overlap.    I have personally provided 65 minutes ([id number]) (12/12/19) of critical care time concurrently with the resident/fellow. This time excludes time spent on separate procedures and time spent teaching. I have reviewed the resident's/fellow's documentation and agree with the assessment and plan of care.  I was physically present for the key portions of the evaluation and management (E/M) service provided.      Justo Parker MD  Acute and Critical Care Surgery
49M uncontrolled Type 1 DM HbA1c 12% now s/p BKA.  After discussing with patient it seems that his prior endocrine care was inadequate.  Discussed changing to basal bolus plan including a premeal component plus a correction scale, as well as basal insulin.  Discussed addition of a continuous glucose monitor in the outpatient setting and consideration for insulin pump in the outpatient setting.  Patient is motivated and would like to improve on his DM1 management.  Please discuss with endocrine team for final insulin dose recommendations prior to dc.  He can follow up with [facility] endocrinology after dc.

## 2019-12-27 NOTE — CONSULT NOTE ADULT - PROBLEM SELECTOR RECOMMENDATION 2
Check fasting lipid panel   LDL goal is less than 70 Would check fasting lipid panel   LDL goal is less than 70

## 2019-12-27 NOTE — CONSULT NOTE ADULT - PROBLEM SELECTOR RECOMMENDATION 9
DM 1 : Target glucose 100-180 mg/dL  Both above goal with hyperglycemia and below goal with hypoglycemia  Decreased Lantus to 22 units for tonight (DM 1 - do not hold basal insulin, can result in DKA. If glucose is low/hypoglycemia, contact endocrine for dosing assistance/adjustments)  Decreased pre-meal Humalog to 5 units for tonight   Decreased correctional scale to Humalog LOW before meals and bedtime   Check fingersticks before meals and bedtime  Consistent carbohydrate diet   Keep hypoglycemia protocol active   RD is already following patient     Discharge plan:  Basal / bolus insulin along with LOW Humalog correctional scale before meals (Do NOT discharge with bedtime scale)  Patient will be going to rehab and glycemic needs will likely change   Final doses pending glucose data on currently adjusted regimen  Please contact endocrine @ [phone] before discharging patient for final recommendations. Patient needs close endocrine follow up as outpatient. He would like to follow with Cornelio. Appointment scheduled for him- please include in DC plan  [address] [facility], [address], [phone]  1. Diabetes educator on 1/21/20 @ 3:30  2. Dr Pena on 4/20/20 @ 5:45  Patient would benefit from a CGM as outpatient. Also expresses interest in learning more about insulin pump options as outpatient   Discussed about with C Team surgery ([id number]) Dr Victorino Catherine DM 1 : Target glucose 100-180 mg/dL  Both above goal with hyperglycemia and below goal with hypoglycemia  Decreased Lantus to 22 units for tonight (DM 1 - do not hold basal insulin, can result in DKA. If glucose is low/hypoglycemia, contact endocrine for dosing assistance/adjustments)  Decreased pre-meal Humalog to 5 units for tonight   Decreased correctional scale to Humalog LOW before meals and bedtime   Check fingersticks before meals and bedtime  Consistent carbohydrate diet   Keep hypoglycemia protocol active   RD is already following patient     Discharge plan:  Basal / bolus insulin along with LOW Humalog correctional scale before meals (Do NOT discharge with bedtime scale)  Patient will be going to rehab and glycemic needs will likely change   Final doses pending glucose data on currently adjusted regimen  Please contact endocrine @ [phone] before discharging patient for final recommendations. Patient needs close endocrine follow up as outpatient. He would like to follow with Cornelio. Appointment scheduled for him- please include in DC plan  [address] [place] [address], [address], [phone]  1. Diabetes educator on 1/21/20 @ 3:30  2. Dr Pena on 4/20/20 @ 5:45  Patient would benefit from a CGM as outpatient. Also expresses interest in learning more about insulin pump options as outpatient. Needs both opthalmology, and podiatry establishment as outpatient.   Discussed about with C Team surgery ([id number]) Dr Victorino Catherine DM 1 : Target glucose 100-180 mg/dL  Both above goal with hyperglycemia and below goal with hypoglycemia  Decreased Lantus to 22 units for tonight (DM 1 - do not hold basal insulin, can result in DKA. If glucose is low/hypoglycemia, contact endocrine for dosing assistance/adjustments)  Decreased pre-meal Humalog to 5 units (HOLD if not eating)  Decreased correctional scale to Humalog LOW before meals and bedtime   Check fingersticks before meals and bedtime  Consistent carbohydrate diet   Keep hypoglycemia protocol active   RD is already following patient     Discharge plan:  Basal / bolus insulin along with LOW Humalog correctional scale before meals (Do NOT discharge with bedtime scale)  Patient will be going to rehab and glycemic needs will likely change   Final doses pending glucose data on currently adjusted regimen  Please contact endocrine @ [phone] before discharging patient for final recommendations. Patient needs close endocrine follow up as outpatient. He would like to follow with Cornelio. Appointment scheduled for him- please include in DC plan  [address] [facility], [address], [phone]  1. Diabetes educator on 1/21/20 @ 3:30  2. Dr Pena on 4/20/20 @ 5:45  Patient would benefit from a CGM as outpatient. Also expresses interest in learning more about insulin pump options as outpatient. Needs both opthalmology, and podiatry establishment as outpatient.   Discussed about with C Team surgery ([id number]) Dr Victorino Catherine DM 1 : Target glucose 100-180 mg/dL  Both above goal with hyperglycemia and below goal with hypoglycemia  Decreased Lantus to 22 units for tonight (DM 1 - do not hold basal insulin, can result in DKA. If glucose is low/hypoglycemia, contact endocrine for dosing assistance/adjustments)  Decreased pre-meal Humalog to 5 units (HOLD if not eating)  Decreased correctional scale to Humalog LOW before meals and bedtime   Check fingersticks before meals and bedtime  Consistent carbohydrate diet   Keep hypoglycemia protocol active   RD is already following patient     Discharge plan:  Basal / bolus insulin along with LOW Humalog correctional scale before meals (Do NOT discharge with bedtime scale)  Patient will be going to rehab and glycemic needs will likely change   Final doses pending glucose data on currently adjusted regimen  Please contact endocrine @ [phone] before discharging patient for final recommendations. Patient needs close endocrine follow up as outpatient. He would like to follow with Cornelio. Appointments scheduled for him- please include in DC plan  [address] [place] [address], [address], [phone]  1. Diabetes educator on 1/21/20 @ 3:30  2. Dr Pena on 4/20/20 @ 5:45  Patient would benefit from a CGM as outpatient. Also expresses interest in learning more about insulin pump options as outpatient. Needs both opthalmology, and podiatry establishment as outpatient.   Discussed about with C Team surgery ([id number]) Dr Victorino Catherine DM 1 : Target glucose 100-180 mg/dL  Both above goal with hyperglycemia and below goal with hypoglycemia  Decreased Lantus to 22 units for tonight (DM 1 - do not hold basal insulin, can result in DKA. If glucose is low/hypoglycemia, contact endocrine for dosing assistance/adjustments)  Decreased pre-meal Humalog to 5 units (HOLD if not eating)  Decreased correctional scale to Humalog LOW before meals and bedtime   Check fingersticks before meals and bedtime  Consistent carbohydrate diet   Keep hypoglycemia protocol active   RD is already following patient     Discharge plan:  Basal / bolus insulin along with LOW Humalog correctional scale before meals (Do NOT discharge with bedtime scale)  Patient will be going to rehab and glycemic needs will likely change   Final doses pending glucose data on currently adjusted regimen  Please contact endocrine @ [phone] before discharging patient for final recommendations. Patient needs close endocrine follow up as outpatient. He would like to follow with Cornelio. Appointments scheduled for him- please include in DC plan  [address] [place] [address], [address], [phone]  1. Diabetes educator on 1/21/20 @ 2:30  2. Dr Pena on 4/20/20 @ 5:00  Patient would benefit from a CGM as outpatient. Also expresses interest in learning more about insulin pump options as outpatient. Needs both opthalmology, and podiatry establishment as outpatient.   Discussed about with C Team surgery ([id number]) Dr Victorino Catherine

## 2019-12-27 NOTE — CONSULT NOTE ADULT - REASON FOR ADMISSION
Necrotizing soft tissue infection

## 2019-12-27 NOTE — PROGRESS NOTE ADULT - SUBJECTIVE AND OBJECTIVE BOX
General Surgery Progress Note    SUBJECTIVE: Patient seen and examined at bedside with surgical team, patient without complaints. Fingersticks up to 300's. Pain well controlled. Denies fever, chills, CP, SOB nausea, vomiting, abdominal pain.       OBJECTIVE:      PHYSICAL EXAM:  Constitutional: A&Ox3, NAD  Respiratory: Unlabored breathing  Cardio: RRR  Abdomen: Soft, nondistended, NTTP. No rebound or guarding.  Extremities: Moving all 4 spontaneously without limitations. R BKA dressing C/D/I. Dressing changed. Knee immobilizer in place.     Vital Signs:  Vital Signs Last 24 Hrs  T(C): 37.1 (27 Dec 2019 05:02), Max: 37.1 (26 Dec 2019 15:06)  T(F): 98.8 (27 Dec 2019 05:02), Max: 98.8 (26 Dec 2019 15:06)  HR: 88 (27 Dec 2019 05:02) (88 - 106)  BP: 113/67 (27 Dec 2019 05:02) (113/67 - 145/65)  BP(mean): --  RR: 17 (27 Dec 2019 05:02) (16 - 18)  SpO2: 98% (27 Dec 2019 05:02) (94% - 100%)    CAPILLARY BLOOD GLUCOSE      POCT Blood Glucose.: 302 mg/dL (26 Dec 2019 21:55)  POCT Blood Glucose.: 291 mg/dL (26 Dec 2019 17:21)  POCT Blood Glucose.: 239 mg/dL (26 Dec 2019 15:03)  POCT Blood Glucose.: 101 mg/dL (26 Dec 2019 13:11)  POCT Blood Glucose.: 70 mg/dL (26 Dec 2019 12:39)  POCT Blood Glucose.: 59 mg/dL (26 Dec 2019 12:21)  POCT Blood Glucose.: 103 mg/dL (26 Dec 2019 08:31)      I&O's Detail    26 Dec 2019 07:01  -  27 Dec 2019 07:00  --------------------------------------------------------  IN:    Oral Fluid: 100 mL  Total IN: 100 mL    OUT:  Total OUT: 0 mL    Total NET: 100 mL          MEDICATIONS  (STANDING):  acetaminophen   Tablet .. 975 milliGRAM(s) Oral every 6 hours  amLODIPine   Tablet 5 milliGRAM(s) Oral daily  dextrose 50% Injectable 12.5 Gram(s) IV Push once  dextrose 50% Injectable 25 Gram(s) IV Push once  dextrose 50% Injectable 25 Gram(s) IV Push once  gabapentin 200 milliGRAM(s) Oral every 8 hours  heparin  Injectable 5000 Unit(s) SubCutaneous every 8 hours  influenza   Vaccine 0.5 milliLiter(s) IntraMuscular once  insulin glargine Injectable (LANTUS) 28 Unit(s) SubCutaneous at bedtime  insulin lispro (HumaLOG) corrective regimen sliding scale   SubCutaneous Before meals and at bedtime  insulin lispro Injectable (HumaLOG) 7 Unit(s) SubCutaneous three times a day before meals    MEDICATIONS  (PRN):  dextrose 40% Gel 15 Gram(s) Oral once PRN Blood Glucose LESS THAN 70 milliGRAM(s)/deciliter  glucagon  Injectable 1 milliGRAM(s) IntraMuscular once PRN Glucose LESS THAN 70 milligrams/deciliter  ondansetron Injectable 4 milliGRAM(s) IV Push every 8 hours PRN Nausea and/or Vomiting  oxyCODONE    IR 5 milliGRAM(s) Oral every 4 hours PRN Moderate Pain (4 - 6)  oxyCODONE    IR 10 milliGRAM(s) Oral every 4 hours PRN Severe Pain (7 - 10)          Labs:    12-26    131<L>  |  95<L>  |  21  ----------------------------<  109<H>  3.7   |  25  |  1.38<H>    Ca    9.1      26 Dec 2019 06:15  Phos  3.3     12-26  Mg     1.9     12-26                              8.2    8.52  )-----------( 511      ( 26 Dec 2019 06:15 )             26.1         Imaging:

## 2019-12-28 LAB
CHOLEST SERPL-MCNC: 157 MG/DL — SIGNIFICANT CHANGE UP (ref 120–199)
GLUCOSE BLDC GLUCOMTR-MCNC: 112 MG/DL — HIGH (ref 70–99)
GLUCOSE BLDC GLUCOMTR-MCNC: 195 MG/DL — HIGH (ref 70–99)
GLUCOSE BLDC GLUCOMTR-MCNC: 234 MG/DL — HIGH (ref 70–99)
GLUCOSE BLDC GLUCOMTR-MCNC: 244 MG/DL — HIGH (ref 70–99)
GLUCOSE BLDC GLUCOMTR-MCNC: 261 MG/DL — HIGH (ref 70–99)
GLUCOSE BLDC GLUCOMTR-MCNC: 57 MG/DL — LOW (ref 70–99)
GLUCOSE BLDC GLUCOMTR-MCNC: 60 MG/DL — LOW (ref 70–99)
HDLC SERPL-MCNC: 36 MG/DL — SIGNIFICANT CHANGE UP (ref 35–55)
LIPID PNL WITH DIRECT LDL SERPL: 108 MG/DL — SIGNIFICANT CHANGE UP
TRIGL SERPL-MCNC: 90 MG/DL — SIGNIFICANT CHANGE UP (ref 10–149)

## 2019-12-28 PROCEDURE — 99232 SBSQ HOSP IP/OBS MODERATE 35: CPT

## 2019-12-28 RX ORDER — INSULIN LISPRO 100/ML
5 VIAL (ML) SUBCUTANEOUS
Refills: 0 | Status: DISCONTINUED | OUTPATIENT
Start: 2019-12-28 | End: 2019-12-31

## 2019-12-28 RX ORDER — INSULIN GLARGINE 100 [IU]/ML
25 INJECTION, SOLUTION SUBCUTANEOUS AT BEDTIME
Refills: 0 | Status: DISCONTINUED | OUTPATIENT
Start: 2019-12-28 | End: 2019-12-31

## 2019-12-28 RX ADMIN — Medication 975 MILLIGRAM(S): at 12:30

## 2019-12-28 RX ADMIN — HEPARIN SODIUM 5000 UNIT(S): 5000 INJECTION INTRAVENOUS; SUBCUTANEOUS at 13:25

## 2019-12-28 RX ADMIN — Medication 5 UNIT(S): at 17:34

## 2019-12-28 RX ADMIN — Medication 5 UNIT(S): at 09:09

## 2019-12-28 RX ADMIN — Medication 975 MILLIGRAM(S): at 17:39

## 2019-12-28 RX ADMIN — Medication 975 MILLIGRAM(S): at 06:03

## 2019-12-28 RX ADMIN — GABAPENTIN 200 MILLIGRAM(S): 400 CAPSULE ORAL at 13:25

## 2019-12-28 RX ADMIN — Medication 975 MILLIGRAM(S): at 18:15

## 2019-12-28 RX ADMIN — HEPARIN SODIUM 5000 UNIT(S): 5000 INJECTION INTRAVENOUS; SUBCUTANEOUS at 06:03

## 2019-12-28 RX ADMIN — Medication 1: at 09:09

## 2019-12-28 RX ADMIN — Medication 2: at 17:34

## 2019-12-28 RX ADMIN — INSULIN GLARGINE 25 UNIT(S): 100 INJECTION, SOLUTION SUBCUTANEOUS at 21:17

## 2019-12-28 RX ADMIN — GABAPENTIN 200 MILLIGRAM(S): 400 CAPSULE ORAL at 06:03

## 2019-12-28 RX ADMIN — HEPARIN SODIUM 5000 UNIT(S): 5000 INJECTION INTRAVENOUS; SUBCUTANEOUS at 21:14

## 2019-12-28 RX ADMIN — GABAPENTIN 200 MILLIGRAM(S): 400 CAPSULE ORAL at 21:15

## 2019-12-28 RX ADMIN — Medication 975 MILLIGRAM(S): at 11:52

## 2019-12-28 RX ADMIN — AMLODIPINE BESYLATE 5 MILLIGRAM(S): 2.5 TABLET ORAL at 06:03

## 2019-12-28 RX ADMIN — OXYCODONE HYDROCHLORIDE 10 MILLIGRAM(S): 5 TABLET ORAL at 00:14

## 2019-12-28 NOTE — PROGRESS NOTE ADULT - ASSESSMENT
49M admitted with uncontrolled type 1 DM, admitted to [facility] on 12/12/19 for severe sepsis, requiring SICU level care, emergent right BKA and now BKA revision on 12/23. Endocrine being called today,12/27, for assistance with diabetes management / glycemic control and coordination of outpatient care. had hypoglycemia pre lunch today    1) Type 1 DM, uncontrolled  target bg 100-180 mg/dl  target a1c 6-7%, patient a1c is 12 %  Patient with hypoglycemia pre lunch liekly 2/2 to limited carbs at breakfast and receiving full dose of pre-nutritional lispro 5 units plus 1 unit correction.  Patient hypoglycemia treated with apple juice and hypoglycemia corrected to  mg/dl.  At this time patient ate lunch without receiving pre-nutritional lispro  Therefore, would expect FS to be elevated at next check as patient is type 1 dm and is not able to produce any endogenous insulin.  Patient fasting FS elevated this AM, likely 2/2 to snack at HS last night  Therefore would increase lantus to 25 units sq qhs.  c/w humalog 5 units sq tid ac  c/w humalog low correction scale for AC and for HS  If fasting FS not improved tomorrow AM, can consider adding humalog 2 units SQ qhs for HS snack- If not eating snack, please hold dose.       dc plan- basal bolus- doses TBD    please inform endocrine if plans to dc patient.    Appointments Scheduled at [address] [place] [address] [phone]  1/21/20 @ 230 pm with diabetes educator  4/20/20 @ 5 pm with Dr. Pena

## 2019-12-28 NOTE — PROGRESS NOTE ADULT - SUBJECTIVE AND OBJECTIVE BOX
General Surgery Progress Note    SUBJECTIVE: Patient seen and examined at bedside with surgical team, patient without complaints. Pain well controlled. Denies fever, chills, CP, SOB nausea, vomiting, abdominal pain.       OBJECTIVE:      PHYSICAL EXAM:  Constitutional: A&Ox3, NAD  Respiratory: Unlabored breathing  Cardio: RRR  Abdomen: Soft, nondistended, NTTP. No rebound or guarding.  Extremities: Moving all 4 spontaneously without limitations. R BKA dressing C/D/I. Dressing changed. Knee immobilizer in place.           Vital Signs:  Vital Signs Last 24 Hrs  T(C): 36.9 (28 Dec 2019 05:59), Max: 37.1 (27 Dec 2019 09:39)  T(F): 98.4 (28 Dec 2019 05:59), Max: 98.7 (27 Dec 2019 09:39)  HR: 90 (28 Dec 2019 05:59) (90 - 101)  BP: 144/78 (28 Dec 2019 05:59) (122/70 - 144/78)  BP(mean): --  RR: 18 (28 Dec 2019 05:59) (17 - 18)  SpO2: 97% (28 Dec 2019 05:59) (94% - 99%)    CAPILLARY BLOOD GLUCOSE      POCT Blood Glucose.: 195 mg/dL (28 Dec 2019 08:55)  POCT Blood Glucose.: 261 mg/dL (28 Dec 2019 06:24)  POCT Blood Glucose.: 208 mg/dL (27 Dec 2019 22:06)  POCT Blood Glucose.: 227 mg/dL (27 Dec 2019 17:20)  POCT Blood Glucose.: 201 mg/dL (27 Dec 2019 12:26)  POCT Blood Glucose.: 156 mg/dL (27 Dec 2019 09:20)      I&O's Detail    27 Dec 2019 07:01  -  28 Dec 2019 07:00  --------------------------------------------------------  IN:  Total IN: 0 mL    OUT:    Voided: 300 mL  Total OUT: 300 mL    Total NET: -300 mL          MEDICATIONS  (STANDING):  acetaminophen   Tablet .. 975 milliGRAM(s) Oral every 6 hours  amLODIPine   Tablet 5 milliGRAM(s) Oral daily  dextrose 50% Injectable 12.5 Gram(s) IV Push once  dextrose 50% Injectable 25 Gram(s) IV Push once  dextrose 50% Injectable 25 Gram(s) IV Push once  gabapentin 200 milliGRAM(s) Oral every 8 hours  heparin  Injectable 5000 Unit(s) SubCutaneous every 8 hours  influenza   Vaccine 0.5 milliLiter(s) IntraMuscular once  insulin glargine Injectable (LANTUS) 22 Unit(s) SubCutaneous at bedtime  insulin lispro (HumaLOG) corrective regimen sliding scale   SubCutaneous three times a day before meals  insulin lispro (HumaLOG) corrective regimen sliding scale   SubCutaneous at bedtime  insulin lispro Injectable (HumaLOG) 5 Unit(s) SubCutaneous three times a day before meals    MEDICATIONS  (PRN):  dextrose 40% Gel 15 Gram(s) Oral once PRN Blood Glucose LESS THAN 70 milliGRAM(s)/deciliter  glucagon  Injectable 1 milliGRAM(s) IntraMuscular once PRN Glucose LESS THAN 70 milligrams/deciliter  ondansetron Injectable 4 milliGRAM(s) IV Push every 8 hours PRN Nausea and/or Vomiting  oxyCODONE    IR 5 milliGRAM(s) Oral every 4 hours PRN Moderate Pain (4 - 6)  oxyCODONE    IR 10 milliGRAM(s) Oral every 4 hours PRN Severe Pain (7 - 10)          Labs:                  Imaging:

## 2019-12-28 NOTE — PROGRESS NOTE ADULT - SUBJECTIVE AND OBJECTIVE BOX
Chief Complaint:   uncontrolled Dm1    History: patient with hypoglycemia pre lunch.  pt reports that he ordered several items on menu but has been being brought much less.  For breakfast patient ate eggs and a roll.  No other items except for coffee was included for breakfast.  Pt also reports having bedtime snacks.  And usually would take pre nutrtional insulin for this at home    MEDICATIONS  (STANDING):  acetaminophen   Tablet .. 975 milliGRAM(s) Oral every 6 hours  amLODIPine   Tablet 5 milliGRAM(s) Oral daily  dextrose 50% Injectable 12.5 Gram(s) IV Push once  dextrose 50% Injectable 25 Gram(s) IV Push once  dextrose 50% Injectable 25 Gram(s) IV Push once  gabapentin 200 milliGRAM(s) Oral every 8 hours  heparin  Injectable 5000 Unit(s) SubCutaneous every 8 hours  influenza   Vaccine 0.5 milliLiter(s) IntraMuscular once  insulin glargine Injectable (LANTUS) 25 Unit(s) SubCutaneous at bedtime  insulin lispro (HumaLOG) corrective regimen sliding scale   SubCutaneous three times a day before meals  insulin lispro (HumaLOG) corrective regimen sliding scale   SubCutaneous at bedtime  insulin lispro Injectable (HumaLOG) 5 Unit(s) SubCutaneous three times a day before meals    MEDICATIONS  (PRN):  dextrose 40% Gel 15 Gram(s) Oral once PRN Blood Glucose LESS THAN 70 milliGRAM(s)/deciliter  glucagon  Injectable 1 milliGRAM(s) IntraMuscular once PRN Glucose LESS THAN 70 milligrams/deciliter  ondansetron Injectable 4 milliGRAM(s) IV Push every 8 hours PRN Nausea and/or Vomiting  oxyCODONE    IR 5 milliGRAM(s) Oral every 4 hours PRN Moderate Pain (4 - 6)  oxyCODONE    IR 10 milliGRAM(s) Oral every 4 hours PRN Severe Pain (7 - 10)      Allergies    No Known Allergies    Intolerances      Review of Systems:  Constitutional: No fever  Eyes: No blurry vision      ALL OTHER SYSTEMS REVIEWED AND NEGATIVE        PHYSICAL EXAM:  VITALS: T(C): 36.7 (12-28-19 @ 13:37)  T(F): 98 (12-28-19 @ 13:37), Max: 98.7 (12-27-19 @ 21:52)  HR: 96 (12-28-19 @ 13:37) (90 - 100)  BP: 118/61 (12-28-19 @ 13:37) (118/61 - 144/78)  RR:  (17 - 18)  SpO2:  (94% - 100%)  Wt(kg): --  GENERAL: NAD, well-developed  GI: Soft, nontender, non distended  PSYCH: Alert and oriented x 3, normal affect, normal mood      CAPILLARY BLOOD GLUCOSE      POCT Blood Glucose.: 112 mg/dL (28 Dec 2019 13:19)  POCT Blood Glucose.: 57 mg/dL (28 Dec 2019 12:59)  POCT Blood Glucose.: 60 mg/dL (28 Dec 2019 12:43)  POCT Blood Glucose.: 195 mg/dL (28 Dec 2019 08:55)  POCT Blood Glucose.: 261 mg/dL (28 Dec 2019 06:24)  POCT Blood Glucose.: 208 mg/dL (27 Dec 2019 22:06)  POCT Blood Glucose.: 227 mg/dL (27 Dec 2019 17:20)      12-26    131<L>  |  95<L>  |  21  ----------------------------<  109<H>  3.7   |  25  |  1.38<H>    EGFR if : 70  EGFR if non : 60    Ca    9.1      12-26  Mg     1.9     12-26  Phos  3.3     12-26          Hemoglobin A1C, Whole Blood: 12.0 % <H> [4.0 - 5.6] (12-13-19 @ 18:31)

## 2019-12-28 NOTE — PROGRESS NOTE ADULT - ASSESSMENT
ASSESSMENT:  49 yr old male with necrotizing soft tissue infection of the RLE, admitted to SICU for resuscitation and management of severe sepsis, s/p emergent guillotine Rt caroline GROVES. His postop course has been complicated by ARDS and MANN but both improving. s/p BKA completion (12/23).     PLAN:    - Pain control as needed; c/w oxycodone and Tylenol standing  - Monitor FS  - DVT ppx: Heparin   - Appreciate Endo recs   - Dispo planning - rehab       C Team/ Vascular Surgery  [id number]

## 2019-12-29 LAB
GLUCOSE BLDC GLUCOMTR-MCNC: 126 MG/DL — HIGH (ref 70–99)
GLUCOSE BLDC GLUCOMTR-MCNC: 133 MG/DL — HIGH (ref 70–99)
GLUCOSE BLDC GLUCOMTR-MCNC: 181 MG/DL — HIGH (ref 70–99)
GLUCOSE BLDC GLUCOMTR-MCNC: 184 MG/DL — HIGH (ref 70–99)

## 2019-12-29 PROCEDURE — 99232 SBSQ HOSP IP/OBS MODERATE 35: CPT

## 2019-12-29 RX ADMIN — INSULIN GLARGINE 25 UNIT(S): 100 INJECTION, SOLUTION SUBCUTANEOUS at 21:40

## 2019-12-29 RX ADMIN — Medication 975 MILLIGRAM(S): at 01:05

## 2019-12-29 RX ADMIN — Medication 1: at 09:05

## 2019-12-29 RX ADMIN — HEPARIN SODIUM 5000 UNIT(S): 5000 INJECTION INTRAVENOUS; SUBCUTANEOUS at 13:20

## 2019-12-29 RX ADMIN — Medication 975 MILLIGRAM(S): at 00:35

## 2019-12-29 RX ADMIN — Medication 5 UNIT(S): at 09:06

## 2019-12-29 RX ADMIN — GABAPENTIN 200 MILLIGRAM(S): 400 CAPSULE ORAL at 13:20

## 2019-12-29 RX ADMIN — AMLODIPINE BESYLATE 5 MILLIGRAM(S): 2.5 TABLET ORAL at 05:28

## 2019-12-29 RX ADMIN — OXYCODONE HYDROCHLORIDE 10 MILLIGRAM(S): 5 TABLET ORAL at 00:35

## 2019-12-29 RX ADMIN — GABAPENTIN 200 MILLIGRAM(S): 400 CAPSULE ORAL at 05:28

## 2019-12-29 RX ADMIN — Medication 975 MILLIGRAM(S): at 06:00

## 2019-12-29 RX ADMIN — Medication 975 MILLIGRAM(S): at 17:32

## 2019-12-29 RX ADMIN — HEPARIN SODIUM 5000 UNIT(S): 5000 INJECTION INTRAVENOUS; SUBCUTANEOUS at 21:40

## 2019-12-29 RX ADMIN — Medication 975 MILLIGRAM(S): at 05:28

## 2019-12-29 RX ADMIN — Medication 5 UNIT(S): at 17:32

## 2019-12-29 RX ADMIN — OXYCODONE HYDROCHLORIDE 10 MILLIGRAM(S): 5 TABLET ORAL at 01:05

## 2019-12-29 RX ADMIN — Medication 5 UNIT(S): at 12:38

## 2019-12-29 RX ADMIN — GABAPENTIN 200 MILLIGRAM(S): 400 CAPSULE ORAL at 21:40

## 2019-12-29 RX ADMIN — HEPARIN SODIUM 5000 UNIT(S): 5000 INJECTION INTRAVENOUS; SUBCUTANEOUS at 05:29

## 2019-12-29 RX ADMIN — Medication 975 MILLIGRAM(S): at 12:15

## 2019-12-29 RX ADMIN — Medication 975 MILLIGRAM(S): at 11:27

## 2019-12-29 RX ADMIN — Medication 975 MILLIGRAM(S): at 18:23

## 2019-12-29 NOTE — PROGRESS NOTE ADULT - ASSESSMENT
ASSESSMENT:  49 yr old male with necrotizing soft tissue infection of the RLE, admitted to SICU for resuscitation and management of severe sepsis, s/p emergent guillotine Rt caroline GROVES. His postop course has been complicated by ARDS and MANN but both improving. s/p BKA completion (12/23).     PLAN:    - Pain control as needed; c/w oxycodone and Tylenol standing  - Monitor FS  - DVT ppx: Heparin   - Appreciate Endo recs- notify prior to d/c  - Dispo planning - rehab       C Team/ Vascular Surgery  [id number]

## 2019-12-29 NOTE — PROGRESS NOTE ADULT - SUBJECTIVE AND OBJECTIVE BOX
General Surgery Progress Note    SUBJECTIVE: Patient seen and examined at bedside with surgical team, patient without complaints. Pain well controlled. Denies fever, chills, CP, SOB nausea, vomiting, abdominal pain.       OBJECTIVE:      PHYSICAL EXAM:  Constitutional: A&Ox3, NAD  Respiratory: Unlabored breathing  Cardio: RRR  Abdomen: Soft, nondistended, NTTP. No rebound or guarding.  Extremities: Moving all 4 spontaneously without limitations. R BKA dressing C/D/I. Dressing changed. Knee immobilizer in place.           Vital Signs:  Vital Signs Last 24 Hrs  T(C): 36.7 (29 Dec 2019 05:24), Max: 37 (28 Dec 2019 10:00)  T(F): 98.1 (29 Dec 2019 05:24), Max: 98.6 (28 Dec 2019 10:00)  HR: 90 (29 Dec 2019 05:24) (90 - 96)  BP: 139/84 (29 Dec 2019 05:24) (118/61 - 139/84)  BP(mean): --  RR: 16 (29 Dec 2019 05:24) (16 - 18)  SpO2: 99% (29 Dec 2019 05:24) (99% - 100%)    CAPILLARY BLOOD GLUCOSE      POCT Blood Glucose.: 234 mg/dL (28 Dec 2019 21:02)  POCT Blood Glucose.: 244 mg/dL (28 Dec 2019 17:13)  POCT Blood Glucose.: 112 mg/dL (28 Dec 2019 13:19)  POCT Blood Glucose.: 57 mg/dL (28 Dec 2019 12:59)  POCT Blood Glucose.: 60 mg/dL (28 Dec 2019 12:43)  POCT Blood Glucose.: 195 mg/dL (28 Dec 2019 08:55)      I&O's Detail    28 Dec 2019 07:01  -  29 Dec 2019 07:00  --------------------------------------------------------  IN:    Oral Fluid: 360 mL  Total IN: 360 mL    OUT:    Voided: 3000 mL  Total OUT: 3000 mL    Total NET: -2640 mL          MEDICATIONS  (STANDING):  acetaminophen   Tablet .. 975 milliGRAM(s) Oral every 6 hours  amLODIPine   Tablet 5 milliGRAM(s) Oral daily  dextrose 50% Injectable 12.5 Gram(s) IV Push once  dextrose 50% Injectable 25 Gram(s) IV Push once  dextrose 50% Injectable 25 Gram(s) IV Push once  gabapentin 200 milliGRAM(s) Oral every 8 hours  heparin  Injectable 5000 Unit(s) SubCutaneous every 8 hours  influenza   Vaccine 0.5 milliLiter(s) IntraMuscular once  insulin glargine Injectable (LANTUS) 25 Unit(s) SubCutaneous at bedtime  insulin lispro (HumaLOG) corrective regimen sliding scale   SubCutaneous three times a day before meals  insulin lispro (HumaLOG) corrective regimen sliding scale   SubCutaneous at bedtime  insulin lispro Injectable (HumaLOG) 5 Unit(s) SubCutaneous three times a day before meals    MEDICATIONS  (PRN):  dextrose 40% Gel 15 Gram(s) Oral once PRN Blood Glucose LESS THAN 70 milliGRAM(s)/deciliter  glucagon  Injectable 1 milliGRAM(s) IntraMuscular once PRN Glucose LESS THAN 70 milligrams/deciliter  ondansetron Injectable 4 milliGRAM(s) IV Push every 8 hours PRN Nausea and/or Vomiting  oxyCODONE    IR 10 milliGRAM(s) Oral every 4 hours PRN Severe Pain (7 - 10)          Labs:                  Imaging:

## 2019-12-29 NOTE — PROGRESS NOTE ADULT - SUBJECTIVE AND OBJECTIVE BOX
Chief Complaint:   uncontrolled Dm1    History: FS improved today.  Good appetite, denies n/v, tolerates po, no hypoglycemia.      MEDICATIONS  (STANDING):  acetaminophen   Tablet .. 975 milliGRAM(s) Oral every 6 hours  amLODIPine   Tablet 5 milliGRAM(s) Oral daily  dextrose 50% Injectable 12.5 Gram(s) IV Push once  dextrose 50% Injectable 25 Gram(s) IV Push once  dextrose 50% Injectable 25 Gram(s) IV Push once  gabapentin 200 milliGRAM(s) Oral every 8 hours  heparin  Injectable 5000 Unit(s) SubCutaneous every 8 hours  influenza   Vaccine 0.5 milliLiter(s) IntraMuscular once  insulin glargine Injectable (LANTUS) 25 Unit(s) SubCutaneous at bedtime  insulin lispro (HumaLOG) corrective regimen sliding scale   SubCutaneous three times a day before meals  insulin lispro (HumaLOG) corrective regimen sliding scale   SubCutaneous at bedtime  insulin lispro Injectable (HumaLOG) 5 Unit(s) SubCutaneous three times a day before meals    MEDICATIONS  (PRN):  dextrose 40% Gel 15 Gram(s) Oral once PRN Blood Glucose LESS THAN 70 milliGRAM(s)/deciliter  glucagon  Injectable 1 milliGRAM(s) IntraMuscular once PRN Glucose LESS THAN 70 milligrams/deciliter  ondansetron Injectable 4 milliGRAM(s) IV Push every 8 hours PRN Nausea and/or Vomiting  oxyCODONE    IR 5 milliGRAM(s) Oral every 4 hours PRN Moderate Pain (4 - 6)  oxyCODONE    IR 10 milliGRAM(s) Oral every 4 hours PRN Severe Pain (7 - 10)      Allergies    No Known Allergies    Intolerances      Review of Systems:  Constitutional: No fever  Eyes: No blurry vision      ALL OTHER SYSTEMS REVIEWED AND NEGATIVE      Vital Signs Last 24 Hrs  T(C): 36.7 (29 Dec 2019 13:36), Max: 36.9 (29 Dec 2019 01:34)  T(F): 98 (29 Dec 2019 13:36), Max: 98.4 (29 Dec 2019 01:34)  HR: 96 (29 Dec 2019 13:36) (90 - 96)  BP: 104/61 (29 Dec 2019 13:36) (104/61 - 139/84)  BP(mean): --  RR: 18 (29 Dec 2019 13:36) (16 - 18)  SpO2: 100% (29 Dec 2019 13:36) (99% - 100%)  GENERAL: NAD, well-developed  GI: Soft, nontender, non distended  PSYCH: Alert and oriented x 3, normal affect, normal mood      CAPILLARY BLOOD GLUCOSE      POCT Blood Glucose.: 133 mg/dL (29 Dec 2019 12:12)  POCT Blood Glucose.: 181 mg/dL (29 Dec 2019 08:52)  POCT Blood Glucose.: 234 mg/dL (28 Dec 2019 21:02)  POCT Blood Glucose.: 244 mg/dL (28 Dec 2019 17:13)    POCT Blood Glucose.: 112 mg/dL (28 Dec 2019 13:19)  POCT Blood Glucose.: 57 mg/dL (28 Dec 2019 12:59)  POCT Blood Glucose.: 60 mg/dL (28 Dec 2019 12:43)  POCT Blood Glucose.: 195 mg/dL (28 Dec 2019 08:55)  POCT Blood Glucose.: 261 mg/dL (28 Dec 2019 06:24)  POCT Blood Glucose.: 208 mg/dL (27 Dec 2019 22:06)  POCT Blood Glucose.: 227 mg/dL (27 Dec 2019 17:20)            Hemoglobin A1C, Whole Blood: 12.0 % <H> [4.0 - 5.6] (12-13-19 @ 18:31)

## 2019-12-29 NOTE — PROGRESS NOTE ADULT - ASSESSMENT
49M admitted with uncontrolled type 1 DM, admitted to [facility] on 12/12/19 for severe sepsis, requiring SICU level care, emergent right BKA and now BKA revision on 12/23. Endocrine being called today,12/27, for assistance with diabetes management / glycemic control and coordination of outpatient care. had hypoglycemia pre lunch today    1) Type 1 DM, uncontrolled  target bg 100-180 mg/dl  target a1c 6-7%, patient a1c is 12 %    Can c/w lantus 25 units sq qhs.  c/w humalog 5 units sq tid ac  c/w humalog low correction scale for AC and for HS   can consider adding humalog 2 units SQ qhs for HS snack- If not eating snack, please hold dose.       dc plan- basal bolus- doses TBD    please inform endocrine if plans to dc patient.    Appointments Scheduled at [address] [place] [address] [phone]  1/21/20 @ 230 pm with diabetes educator  4/20/20 @ 5 pm with Dr. Pena

## 2019-12-30 RX ORDER — INSULIN GLARGINE 100 [IU]/ML
20 INJECTION, SOLUTION SUBCUTANEOUS
Qty: 0 | Refills: 0 | DISCHARGE
Start: 2019-12-30

## 2019-12-30 RX ORDER — INSULIN LISPRO 100/ML
0 VIAL (ML) SUBCUTANEOUS
Qty: 0 | Refills: 0 | DISCHARGE
Start: 2019-12-30

## 2019-12-30 RX ORDER — GABAPENTIN 400 MG/1
2 CAPSULE ORAL
Qty: 0 | Refills: 0 | DISCHARGE
Start: 2019-12-30

## 2019-12-30 RX ORDER — OXYCODONE HYDROCHLORIDE 5 MG/1
1 TABLET ORAL
Qty: 0 | Refills: 0 | DISCHARGE
Start: 2019-12-30

## 2019-12-30 RX ORDER — INSULIN GLARGINE 100 [IU]/ML
25 INJECTION, SOLUTION SUBCUTANEOUS
Qty: 0 | Refills: 0 | DISCHARGE
Start: 2019-12-30

## 2019-12-30 RX ORDER — AMLODIPINE BESYLATE 2.5 MG/1
1 TABLET ORAL
Qty: 0 | Refills: 0 | DISCHARGE
Start: 2019-12-30

## 2019-12-30 RX ORDER — ACETAMINOPHEN 500 MG
3 TABLET ORAL
Qty: 0 | Refills: 0 | DISCHARGE
Start: 2019-12-30

## 2019-12-30 RX ORDER — INSULIN GLARGINE 100 [IU]/ML
20 INJECTION, SOLUTION SUBCUTANEOUS
Qty: 0 | Refills: 0 | DISCHARGE

## 2019-12-30 RX ORDER — INSULIN LISPRO 100/ML
20 VIAL (ML) SUBCUTANEOUS
Qty: 0 | Refills: 0 | DISCHARGE

## 2019-12-30 RX ORDER — INSULIN LISPRO 100/ML
5 VIAL (ML) SUBCUTANEOUS
Qty: 0 | Refills: 0 | DISCHARGE
Start: 2019-12-30

## 2019-12-30 RX ADMIN — Medication 1: at 12:48

## 2019-12-30 RX ADMIN — AMLODIPINE BESYLATE 5 MILLIGRAM(S): 2.5 TABLET ORAL at 05:56

## 2019-12-30 RX ADMIN — Medication 975 MILLIGRAM(S): at 05:56

## 2019-12-30 RX ADMIN — OXYCODONE HYDROCHLORIDE 10 MILLIGRAM(S): 5 TABLET ORAL at 05:59

## 2019-12-30 RX ADMIN — HEPARIN SODIUM 5000 UNIT(S): 5000 INJECTION INTRAVENOUS; SUBCUTANEOUS at 05:56

## 2019-12-30 RX ADMIN — Medication 975 MILLIGRAM(S): at 00:40

## 2019-12-30 RX ADMIN — Medication 975 MILLIGRAM(S): at 06:26

## 2019-12-30 RX ADMIN — INSULIN GLARGINE 25 UNIT(S): 100 INJECTION, SOLUTION SUBCUTANEOUS at 22:15

## 2019-12-30 RX ADMIN — GABAPENTIN 200 MILLIGRAM(S): 400 CAPSULE ORAL at 13:09

## 2019-12-30 RX ADMIN — GABAPENTIN 200 MILLIGRAM(S): 400 CAPSULE ORAL at 05:56

## 2019-12-30 RX ADMIN — Medication 5 UNIT(S): at 17:49

## 2019-12-30 RX ADMIN — Medication 1: at 08:50

## 2019-12-30 RX ADMIN — Medication 975 MILLIGRAM(S): at 11:28

## 2019-12-30 RX ADMIN — Medication 975 MILLIGRAM(S): at 17:34

## 2019-12-30 RX ADMIN — Medication 5 UNIT(S): at 12:48

## 2019-12-30 RX ADMIN — Medication 2: at 22:14

## 2019-12-30 RX ADMIN — Medication 975 MILLIGRAM(S): at 18:12

## 2019-12-30 RX ADMIN — Medication 975 MILLIGRAM(S): at 00:10

## 2019-12-30 RX ADMIN — GABAPENTIN 200 MILLIGRAM(S): 400 CAPSULE ORAL at 22:14

## 2019-12-30 RX ADMIN — HEPARIN SODIUM 5000 UNIT(S): 5000 INJECTION INTRAVENOUS; SUBCUTANEOUS at 22:15

## 2019-12-30 RX ADMIN — Medication 5 UNIT(S): at 08:51

## 2019-12-30 RX ADMIN — HEPARIN SODIUM 5000 UNIT(S): 5000 INJECTION INTRAVENOUS; SUBCUTANEOUS at 13:09

## 2019-12-30 RX ADMIN — OXYCODONE HYDROCHLORIDE 10 MILLIGRAM(S): 5 TABLET ORAL at 06:26

## 2019-12-30 RX ADMIN — Medication 975 MILLIGRAM(S): at 12:15

## 2019-12-30 NOTE — CHART NOTE - NSCHARTNOTEFT_GEN_A_CORE
49M admitted with uncontrolled type 1 DM, admitted to [facility] on 12/12/19 for severe sepsis, requiring SICU level care, emergent right BKA and now BKA revision on 12/23. Endocrine being called today,12/27, for assistance with diabetes management / glycemic control and coordination of outpatient care. had hypoglycemia pre lunch today    CAPILLARY BLOOD GLUCOSE  POCT Blood Glucose.: 165 mg/dL (30 Dec 2019 12:11)  POCT Blood Glucose.: 187 mg/dL (30 Dec 2019 08:41)  POCT Blood Glucose.: 184 mg/dL (29 Dec 2019 21:25)  POCT Blood Glucose.: 126 mg/dL (29 Dec 2019 17:13)      Called for dc recs    [facility]  Sign out patient history to  diabetes NP    1) Type 1 DM, uncontrolled  target bg 100-180 mg/dl  target a1c 6-7%, patient a1c is 12 %    Transfer to rehab with following doses  Can c/w lantus 25 units sq qhs.  c/w humalog 5 units sq tid ac  c/w humalog low correction scale for AC and for HS    Patient is on Basaglar and Humalog scale at home.  Recommend Basaglar and Humalog standing dose at home, this was discussed with patient.  The doses can be determined at time of dc from rehab      Appointments Scheduled at [address] [place] [address] [phone]  1/21/20 @ 230 pm with diabetes educator  4/20/20 @ 5 pm with Dr. Pena

## 2019-12-30 NOTE — PROGRESS NOTE ADULT - SUBJECTIVE AND OBJECTIVE BOX
No acute events overnight    SUBJECTIVE:  Reports no pain this AM, patient comfortable in bed and releived the [employer] clinched a playoff berth. He feels ready for discharge today if it is able to be set up.  Denies fever, chills, CP, SOB nausea, vomiting, abdominal pain.    OBJECTIVE:  Vital Signs Last 24 Hrs  T(C): 36.7 (30 Dec 2019 05:53), Max: 37.7 (29 Dec 2019 21:38)  T(F): 98 (30 Dec 2019 05:53), Max: 99.8 (29 Dec 2019 21:38)  HR: 88 (30 Dec 2019 05:53) (88 - 96)  BP: 136/74 (30 Dec 2019 05:53) (104/61 - 137/86)  BP(mean): --  RR: 18 (30 Dec 2019 05:53) (17 - 18)  SpO2: 100% (30 Dec 2019 05:53) (98% - 100%)      PHYSICAL EXAM:  Constitutional: A&Ox3, NAD  Respiratory: Unlabored breathing  Cardio: RRR  Abdomen: Soft, nondistended, NTTP. No rebound or guarding.  Extremities: Moving all 4 spontaneously without limitations. R BKA C/D/I. Dressing changed. Knee immobilizer in place.

## 2019-12-30 NOTE — PROGRESS NOTE ADULT - ASSESSMENT
49 yr old male with necrotizing soft tissue infection of the RLE, admitted to SICU for resuscitation and management of severe sepsis, s/p emergent guillotine Rt caroline GROVES. His postop course has been complicated by ARDS and MANN but both improving. s/p BKA completion (12/23).     PLAN:    - Pain control as needed; c/w oxycodone and Tylenol standing  - Monitor FS  - DVT ppx: Heparin   - Appreciate o/p Endo recs- likely d/c today  - Dispo planning - rehab hopefully today      C Team/ Vascular Surgery  [id number]

## 2019-12-31 ENCOUNTER — INPATIENT (INPATIENT)
Facility: HOSPITAL | Age: 49
LOS: 10 days | Discharge: HOME HEALTH | DRG: 949 | End: 2020-01-11
Attending: PHYSICAL MEDICINE & REHABILITATION | Admitting: PHYSICAL MEDICINE & REHABILITATION
Payer: COMMERCIAL

## 2019-12-31 ENCOUNTER — TRANSCRIPTION ENCOUNTER (OUTPATIENT)
Age: 49
End: 2019-12-31

## 2019-12-31 VITALS
OXYGEN SATURATION: 99 % | RESPIRATION RATE: 16 BRPM | HEART RATE: 85 BPM | SYSTOLIC BLOOD PRESSURE: 133 MMHG | TEMPERATURE: 98 F | DIASTOLIC BLOOD PRESSURE: 78 MMHG

## 2019-12-31 VITALS
HEART RATE: 97 BPM | RESPIRATION RATE: 16 BRPM | DIASTOLIC BLOOD PRESSURE: 68 MMHG | WEIGHT: 162.92 LBS | SYSTOLIC BLOOD PRESSURE: 145 MMHG | OXYGEN SATURATION: 98 % | HEIGHT: 72 IN | TEMPERATURE: 98 F

## 2019-12-31 DIAGNOSIS — Z89.511 ACQUIRED ABSENCE OF RIGHT LEG BELOW KNEE: ICD-10-CM

## 2019-12-31 DIAGNOSIS — I70.209 UNSPECIFIED ATHEROSCLEROSIS OF NATIVE ARTERIES OF EXTREMITIES, UNSPECIFIED EXTREMITY: Chronic | ICD-10-CM

## 2019-12-31 DIAGNOSIS — Z89.519 ACQUIRED ABSENCE OF UNSPECIFIED LEG BELOW KNEE: ICD-10-CM

## 2019-12-31 LAB
GLUCOSE BLDC GLUCOMTR-MCNC: 188 MG/DL — HIGH (ref 70–99)
GLUCOSE BLDC GLUCOMTR-MCNC: 213 MG/DL — HIGH (ref 70–99)
SURGICAL PATHOLOGY STUDY: SIGNIFICANT CHANGE UP

## 2019-12-31 PROCEDURE — 99232 SBSQ HOSP IP/OBS MODERATE 35: CPT

## 2019-12-31 PROCEDURE — 99223 1ST HOSP IP/OBS HIGH 75: CPT | Mod: GC

## 2019-12-31 RX ORDER — INSULIN GLARGINE 100 [IU]/ML
20 INJECTION, SOLUTION SUBCUTANEOUS AT BEDTIME
Refills: 0 | Status: DISCONTINUED | OUTPATIENT
Start: 2019-12-31 | End: 2019-12-31

## 2019-12-31 RX ORDER — DEXTROSE 50 % IN WATER 50 %
25 SYRINGE (ML) INTRAVENOUS ONCE
Refills: 0 | Status: DISCONTINUED | OUTPATIENT
Start: 2019-12-31 | End: 2020-01-11

## 2019-12-31 RX ORDER — INSULIN LISPRO 100/ML
VIAL (ML) SUBCUTANEOUS
Refills: 0 | Status: DISCONTINUED | OUTPATIENT
Start: 2019-12-31 | End: 2020-01-11

## 2019-12-31 RX ORDER — GLUCAGON INJECTION, SOLUTION 0.5 MG/.1ML
1 INJECTION, SOLUTION SUBCUTANEOUS ONCE
Refills: 0 | Status: DISCONTINUED | OUTPATIENT
Start: 2019-12-31 | End: 2020-01-11

## 2019-12-31 RX ORDER — ACETAMINOPHEN 500 MG
650 TABLET ORAL EVERY 6 HOURS
Refills: 0 | Status: DISCONTINUED | OUTPATIENT
Start: 2019-12-31 | End: 2020-01-11

## 2019-12-31 RX ORDER — INSULIN GLARGINE 100 [IU]/ML
25 INJECTION, SOLUTION SUBCUTANEOUS
Qty: 0 | Refills: 0 | DISCHARGE

## 2019-12-31 RX ORDER — AMLODIPINE BESYLATE 2.5 MG/1
5 TABLET ORAL DAILY
Refills: 0 | Status: DISCONTINUED | OUTPATIENT
Start: 2020-01-01 | End: 2020-01-11

## 2019-12-31 RX ORDER — SODIUM CHLORIDE 9 MG/ML
1000 INJECTION, SOLUTION INTRAVENOUS
Refills: 0 | Status: DISCONTINUED | OUTPATIENT
Start: 2019-12-31 | End: 2020-01-11

## 2019-12-31 RX ORDER — DEXTROSE 50 % IN WATER 50 %
12.5 SYRINGE (ML) INTRAVENOUS ONCE
Refills: 0 | Status: DISCONTINUED | OUTPATIENT
Start: 2019-12-31 | End: 2020-01-11

## 2019-12-31 RX ORDER — DEXTROSE 50 % IN WATER 50 %
15 SYRINGE (ML) INTRAVENOUS ONCE
Refills: 0 | Status: DISCONTINUED | OUTPATIENT
Start: 2019-12-31 | End: 2020-01-11

## 2019-12-31 RX ORDER — INSULIN GLARGINE 100 [IU]/ML
20 INJECTION, SOLUTION SUBCUTANEOUS AT BEDTIME
Refills: 0 | Status: DISCONTINUED | OUTPATIENT
Start: 2019-12-31 | End: 2020-01-02

## 2019-12-31 RX ORDER — INSULIN LISPRO 100/ML
5 VIAL (ML) SUBCUTANEOUS
Qty: 0 | Refills: 0 | DISCHARGE

## 2019-12-31 RX ORDER — HEPARIN SODIUM 5000 [USP'U]/ML
5000 INJECTION INTRAVENOUS; SUBCUTANEOUS EVERY 8 HOURS
Refills: 0 | Status: DISCONTINUED | OUTPATIENT
Start: 2019-12-31 | End: 2020-01-01

## 2019-12-31 RX ORDER — INSULIN GLARGINE 100 [IU]/ML
20 INJECTION, SOLUTION SUBCUTANEOUS
Qty: 0 | Refills: 0 | DISCHARGE

## 2019-12-31 RX ORDER — INSULIN LISPRO 100/ML
5 VIAL (ML) SUBCUTANEOUS
Refills: 0 | Status: DISCONTINUED | OUTPATIENT
Start: 2019-12-31 | End: 2020-01-07

## 2019-12-31 RX ORDER — INSULIN LISPRO 100/ML
VIAL (ML) SUBCUTANEOUS AT BEDTIME
Refills: 0 | Status: DISCONTINUED | OUTPATIENT
Start: 2019-12-31 | End: 2020-01-09

## 2019-12-31 RX ORDER — GABAPENTIN 400 MG/1
200 CAPSULE ORAL THREE TIMES A DAY
Refills: 0 | Status: DISCONTINUED | OUTPATIENT
Start: 2019-12-31 | End: 2020-01-11

## 2019-12-31 RX ADMIN — Medication 1: at 17:31

## 2019-12-31 RX ADMIN — Medication 975 MILLIGRAM(S): at 00:35

## 2019-12-31 RX ADMIN — Medication 2: at 11:52

## 2019-12-31 RX ADMIN — Medication 650 MILLIGRAM(S): at 23:45

## 2019-12-31 RX ADMIN — Medication 975 MILLIGRAM(S): at 01:05

## 2019-12-31 RX ADMIN — Medication 650 MILLIGRAM(S): at 22:28

## 2019-12-31 RX ADMIN — Medication 975 MILLIGRAM(S): at 06:14

## 2019-12-31 RX ADMIN — INSULIN GLARGINE 20 UNIT(S): 100 INJECTION, SOLUTION SUBCUTANEOUS at 22:22

## 2019-12-31 RX ADMIN — HEPARIN SODIUM 5000 UNIT(S): 5000 INJECTION INTRAVENOUS; SUBCUTANEOUS at 22:21

## 2019-12-31 RX ADMIN — Medication 975 MILLIGRAM(S): at 05:44

## 2019-12-31 RX ADMIN — Medication 5 UNIT(S): at 11:52

## 2019-12-31 RX ADMIN — GABAPENTIN 200 MILLIGRAM(S): 400 CAPSULE ORAL at 14:14

## 2019-12-31 RX ADMIN — Medication 975 MILLIGRAM(S): at 11:52

## 2019-12-31 RX ADMIN — Medication 1: at 09:57

## 2019-12-31 RX ADMIN — GABAPENTIN 200 MILLIGRAM(S): 400 CAPSULE ORAL at 22:21

## 2019-12-31 RX ADMIN — AMLODIPINE BESYLATE 5 MILLIGRAM(S): 2.5 TABLET ORAL at 05:45

## 2019-12-31 RX ADMIN — GABAPENTIN 200 MILLIGRAM(S): 400 CAPSULE ORAL at 05:44

## 2019-12-31 RX ADMIN — HEPARIN SODIUM 5000 UNIT(S): 5000 INJECTION INTRAVENOUS; SUBCUTANEOUS at 14:14

## 2019-12-31 RX ADMIN — HEPARIN SODIUM 5000 UNIT(S): 5000 INJECTION INTRAVENOUS; SUBCUTANEOUS at 05:45

## 2019-12-31 RX ADMIN — Medication 975 MILLIGRAM(S): at 12:22

## 2019-12-31 RX ADMIN — Medication 5 UNIT(S): at 17:31

## 2019-12-31 NOTE — PROVIDER CONTACT NOTE (OTHER) - ASSESSMENT
asymptomatic, no c/o headache, no dizziness; pt given two 4 oz of apple juices
A&Ox4. complaints of severe pain to RLE
Asymptomatic, alert and orientedx4 denies pain.
Asymptomatic, alert and orientedx4 denies pain.
Asymptomatic, alert and orientedx4 denies pain. IVF order due to be stopped. pt was due to void 4am.
Asymptomatic, alert and orientedx4 denies pain. pt did not void since midnight. pt was DTV by 4am. Had 1 unsaved void after having a BM at midnight. pt denies pain/discomfort at suprapubic area. denies sensation to void at this time.
PT HR elevated, skin warm to touch. PT c/o of foot pain.
Patient with blood glucose at 17:29 of 86, pre meal insulin was held as per day shift nurse.
Patient with blood glucose of 134, ordered for Lantus 24 units
Pt is asymptomatic-no c/o Cp, dizziness or HA. Pt is in pain, pain scale 6/10. /77, , RR-20 and O2 sats are at 100% on RA.
Pt. blood glucose at dinner was 86, sliding scale held, standing premeal 5 units in place, VS stable, no complaints of pain at present time
asymptomatic, no c/o headache, no dizziness.
asymptomatic, no c/o headache, no dizziness.
f/s - 261

## 2019-12-31 NOTE — DISCHARGE NOTE NURSING/CASE MANAGEMENT/SOCIAL WORK - NSDCCRNAME_GEN_ALL_CORE_FT
[facility].  [address] [facility] [phone]   [facility] [phone], 2pm

## 2019-12-31 NOTE — PROVIDER CONTACT NOTE (OTHER) - RECOMMENDATIONS
notify MD/team;
4 ounces apple juice given , and repeat fs 15mt went up to 72 and repeat again, fs 116.
Adjust Lantus rate to <24 units if blood glucose at bedtime is less than 100.
Adjust dose of Lantus?
Gaby SALEEM, IV Tylenol 1000mg.
IV tylenol
MD notified. cont to monitor BP? cont with IVF? cont to monitor urine output? reorder amlodipine with hold parameters?
MD notified. cont to monitor urine output? bladder scan?
Provider to be made aware, hold premeal 5 units
increased pain management. Patient requesting to see team as well
notify MD/team; give pt apple juice
reduce lantus dose per current f/s reading/ episode of hypoglycemia this morning

## 2019-12-31 NOTE — H&P ADULT - ASSESSMENT
ASSESSMENT/PLAN  49y Male with functional deficits after R. BKA 2/2 necrotizing fasciitis.  PT/OT - daily therapies for ROM, increased mobility, and decreased debility related to hospitalization.  Balance, gait training, ADLs, transfers, and bracing/assistive devices as needed.    Rehab -  Recommend ACUTE inpatient rehabilitation for the functional deficits consisting of 3 hours of therapy/day & 24 hour RN/daily PMR physician for comorbid medical management. Will continue to follow for ongoing rehab needs and recommendation    #Right BKA - mgmt per ortho. continue knee immobilizer. continue daily therapies as below. Please indicate duration of knee immobilizer in discharge instructions as well as incision care instructions.     #DM1 - continue long-acting, premeal, and SS insulin.     #Necrotizing Fasciitis - off antibiotics. monitor incision site.     #Pain - oxycodone, tylenol PRN    #DVT PPX - heparin      -Basal/bolus to rehab, current doses if discharged today, Lantus 20 units SQ qHS and Humalog 5 units SQ TID before meals (Hold if not eating meal), continue Humalog LOW correction scales before meals and bedtime (as currently ordered).   -Post rehab: Patient previously on insulin pens, Basaglar and Humalog scale at home. Recommend Basaglar and Humalog standing dose at home, this was discussed with patient previously by our team. The final home doses can be determined at time of dc from rehab.  -Endocrine follow up appointments scheduled with [facility], [address] [place] [address], [address], [place], [phone]  1/21/20 @ 230 PM with Diabetes educator  4/20/20 @ 5 PM with Dr. Pena Pt is a 50 y/o M with PMHx of DM who presented to [facility] on 12/12/19 c/o worsening RLE infection and found to have necrotizing soft tissue infection of the RLE, admitted to SICU for resuscitation and management of severe sepsis, s/p emergent guillotine Rt guillani BKA.    #Right BKA secondary to necrotizing fasciitis  - PT/OT  - Continue knee immobilizer  - Pain control  - Wound change daily with sterile 4x4 gauze and wrap with kerlix  - off antibiotics for the nec fas. Monitor incision site.     #DM1   - Lantus 20units at bedtime  - Humalog 5 units before meals  - ISS    #HTN  - Norvasc 5mg daily    #Pain   - Tylenol PRN  - Gabapentin    #Diet  - Consistent Carb    #DVT PPX   - heparin subq      MEDICAL PROGNOSIS: GOOD                                   REHAB POTENTIAL: GOOD  ESTIMATED DISPOSITION: HOME                             ELOS: 10-14 Days   EXPECTED THERAPY:     P.T. 2 hr/day       O.T. 1 hr/day           EXP FREQUENCY: 5 days per 7 day period     PRESCREEN COMPARISON I have reviewed the prescreen information and I have found no relevant changes between the preadmission screening and my post admission evaluation     RATIONALE FOR INPATIENT ADMISSION - Patient demonstrates the following: (check all that apply)  [X] Medically appropriate for rehabilitation admission  [X] Has attainable rehab goals with an appropriate initial discharge plan  [X] Has rehabilitation potential (expected to make a significant improvement within a reasonable period of time)   [X] Requires close medical management by a rehab physician, rehab nursing care, Hospitalist and comprehensive interdisciplinary team (including PT, OT, & or SLP, Prosthetics and Orthotics)

## 2019-12-31 NOTE — PROVIDER CONTACT NOTE (OTHER) - REASON
Blood sugar
Blood sugar 115 due for 24 units of lantus/ pt had episode of hypoglycemia this am.
Blood sugar 68
Can be adjust bedtime Lanthus to a rate <24 if blood glucose at bedtime is <100.
Elevated BP &Tachycardic
FS 12:43pm 60
FS 830AM 55, 49
Patient with blood glucose of 134, ordered for Lantus 24 units
Pt Febrile 102.7
early finger stick
patient still complaining of severe pain after 1x dose of 0.5mg dilaudid
pt BP 99/57/ had 1 void w/o save after arrival from PACU
pt did not void since midnight
FS 830AM 55, 49, 70, 134, 178

## 2019-12-31 NOTE — PROVIDER CONTACT NOTE (OTHER) - BACKGROUND
S/P BKA completion
BKA
Pt diabetic and with gangrene R foot. PT for RBKA surgery scheduled.
S/P BKA completion
S/P BKA completion
S/p ARNOLDO GROVES 12/13
S/p ARNOLDO GROVES 12/13
S/p R BKA revision 12/23
s/p BKA closure
s/p R BKA
s/p r foot amputation
s/p r foot amputation. Uncontrolled type 1 DM

## 2019-12-31 NOTE — PROGRESS NOTE ADULT - ASSESSMENT
ASSESSMENT/PLAN  49y Male with functional deficits after R. BKA 2/2 necrotizing fasciitis.    BKA - mgmt per ortho. continue knee immobilizer. continue daily therapies as below. Please indicate duration of knee immobilizer in discharge instructions as well as incision care instructions.   Necrotizing Fasciitis - off antibiotics. monitor incision site.   Pain - oxycodone, tylenol PRN  DM1 - continue long-acting, premeal, and SS insulin.   PT/OT - daily therapies for ROM, increased mobility, and decreased debility related to hospitalization.  Balance, gait training, ADLs, transfers, and bracing/assistive devices as needed.    DVT PPX - heparin  Rehab -  Recommend ACUTE inpatient rehabilitation for the functional deficits consisting of 3 hours of therapy/day & 24 hour RN/daily PMR physician for comorbid medical management. Will continue to follow for ongoing rehab needs and recommendations.

## 2019-12-31 NOTE — PROVIDER CONTACT NOTE (OTHER) - DATE AND TIME:
12-Dec-2019 20:53
19-Dec-2019 21:50
20-Dec-2019 18:00
20-Dec-2019 20:35
21-Dec-2019 09:12
21-Dec-2019 21:45
24-Dec-2019 01:25
24-Dec-2019 06:57
24-Dec-2019 17:27
25-Dec-2019 02:46
28-Dec-2019
28-Dec-2019 06:33
31-Dec-2019 09:01
31-Dec-2019 10:00

## 2019-12-31 NOTE — H&P ADULT - HISTORY OF PRESENT ILLNESS
HPI:   TARA BLAKE is a 49 yr old male presenting c/o worsening RLE infection. Pt states he first noticed a right 5th toe wound on 12/2, and saw a physician at [facility] who prescribed Santyl collagenase which he has been using topically. States foot infection continued to worsen until today, when he was seen at [facility] ED, told that he has subcutaneous gas seen on Xray and needed a BKA. Pt wanted a second opinion and left AMA after which he presented to [facility] ED. Currently states pain in the right foot is worsening, and he was able to walk today prior to arriving in ED this afternoon. Pt also with DKA. States he is amenable to urgent surgical intervention and SICU consulted for resuscitation - necrotizing soft tissue infection of the RLE, admitted to SICU for resuscitation and management of severe sepsis, s/p emergent guillotine Rt guillotine BKA. His postop course has been complicated by ARDS and MANN but both improving. s/p BKA completion (12/23). HPI:   TARA BLAKE is a 49 yr old male presenting c/o worsening RLE infection. Pt states he first noticed a right 5th toe wound on 12/2, and saw a physician at [facility] who prescribed Santyl collagenase which he has been using topically. States foot infection continued to worsen until today, when he was seen at [facility] ED, told that he has subcutaneous gas seen on Xray and needed a BKA. Pt wanted a second opinion and left AMA after which he presented to [facility] ED. Currently states pain in the right foot is worsening, and he was able to walk on day of admission prior to arriving in ED. Pt also with DKA. States he is amenable to urgent surgical intervention and SICU consulted for resuscitation - necrotizing soft tissue infection of the RLE, admitted to SICU for resuscitation and management of severe sepsis, s/p emergent guillotine Rt guillotine BKA. His postop course has been complicated by ARDS and MANN but both improving. s/p BKA completion (12/23). Course was also complicated by labile sugar levels. Pt recommended for acute inpatient rehabilitation and was stable for discharge to [facility] on 12/[facility]/19. Pt is a 48 y/o M with PMHx of DM who presented to [facility] on 12/12/19 c/o worsening RLE infection. Pt stated he first noticed a right 5th toe wound on 12/2, and saw a physician at [facility] who prescribed Santyl collagenase which he has been using topically. He stated that his foot infection continued to worsen until day of admission, when he was seen at [facility] ED, told that he had subcutaneous gas seen on Xray and needed a BKA. Pt wanted a second opinion and left AMA after which he presented to [facility] ED. He stated pain in the right foot is worsening, and he was able to walk on day of admission prior to arriving in ED. Pt also with DKA. He was amenable to urgent surgical intervention and SICU consulted for resuscitation - necrotizing soft tissue infection of the RLE, admitted to SICU for resuscitation and management of severe sepsis, s/p emergent guillotine Rt guillotine BKA. His postop course was complicated by ARDS and MANN but both improved. He is s/p BKA completion (12/23). Course was also complicated by labile sugar levels. Pt recommended for acute inpatient rehabilitation and was stable for discharge to [facility] on 12/31/19.

## 2019-12-31 NOTE — H&P ADULT - NSHPPHYSICALEXAM_GEN_ALL_CORE
PHYSICAL EXAM:  Constitutional: A&Ox3, NAD  Respiratory: Unlabored breathing  Cardio: RRR  Abdomen: Soft, nondistended, NTTP. No rebound or guarding.  Extremities: Moving all 4 spontaneously without limitations. R BKA dressing -c/d/i, +knee immobilizer   Neurologic Exam -                    Cognitive - Awake, Alert, AAO to self, place, date, year, situation     Communication - Fluent, No dysarthria     Cranial Nerves - CN 2-12 intact     Motor -                     LEFT    UE - ShAB 5/5, EF 5/5, EE 5/5, WE 5/5,  5/5                    RIGHT UE - ShAB 5/5, EF 5/5, EE 5/5, WE 5/5,  5/5                    LEFT    LE - HF 4/5, KE 5/5, DF 5/5, PF 5/5                    RIGHT LE - HF 4/5       Sensory - Intact to LT     Reflexes - DTR Intact, No primitive reflexive       Balance - impaired  Psychiatric - Mood stable, Affect WNL  ---------------------------------------------  FUNCTIONAL PROGRESS as of 12/30  Bed Mobility - independent  Transfers - contact guard  Gait - amb 60 ft with crutches and CG. balance remains poor PHYSICAL EXAM:  Constitutional: A&Ox3, NAD  Respiratory: Unlabored breathing  Cardio: RRR  Abdomen: Soft, nondistended, NTTP. No rebound or guarding.  Extremities: Moving all 4 spontaneously without limitations. R BKA dressing -c/d/i, +knee immobilizer   Neurologic Exam -                    Cognitive - Awake, Alert, AAO to self, place, date, year, situation     Communication - Fluent, No dysarthria     Cranial Nerves - CN 2-12 intact     Motor -                     LEFT    UE - ShAB 5/5, EF 5/5, EE 5/5, WE 5/5,  5/5                    RIGHT UE - ShAB 5/5, EF 5/5, EE 5/5, WE 5/5,  5/5                    LEFT    LE - HF 4/5, KE 5/5, DF 5/5, PF 5/5                    RIGHT LE - HF 4/5       Sensory - Intact to LT     Reflexes - DTR Intact, No primitive reflexive       Balance - impaired  Psychiatric - Mood stable, Affect WNL  --------------------------------------------- Constitutional - NAD, Comfortably lying in bed  HEENT - NCAT, EOMI  Neck - Supple, No limited ROM  Chest - Breathing comfortably, No wheezing, clear to auscultation bilaterally  Cardiovascular - S1S2, RRR  Abdomen - Soft, nontender, nondistended, normoactive bowel sounds  Extremities - No C/C/E, No calf tenderness, Right BKA incision site with staples is clean, dry, intact, no erythema, minimal swelling  Skin: Scars on LLE, pt state from prior diabetic ulcers, nontender, nonraised  Neurologic Exam -                    Cognitive - Awake, Alert, AAO x4     Communication - Fluent, No dysarthria     Motor -                     LEFT    UE - ShAB 5/5, EF 5/5, EE 5/5, WE 5/5,  5/5                    RIGHT UE - ShAB 5/5, EF 5/5, EE 5/5, WE 5/5,  5/5                    LEFT    LE - HF 5/5, KE 5/5, DF 5/5, PF 5/5                    RIGHT LE - HF 5/5, KE 5/5      Sensory - Intact to LT  Psychiatric - Mood stable, Affect WNL

## 2019-12-31 NOTE — H&P ADULT - NSHPLABSRESULTS_GEN_ALL_CORE
12-26    131<L>  |  95<L>  |  21  ----------------------------<  109<H>  3.7   |  25  |  1.38<H>    Ca    9.1      26 Dec 2019 06:15  Phos  3.3     12-26  Mg     1.9     12-26    CAPILLARY BLOOD GLUCOSE    POCT Blood Glucose.: 221 mg/dL (31 Dec 2019 11:30)  POCT Blood Glucose.: 178 mg/dL (31 Dec 2019 09:46)  POCT Blood Glucose.: 134 mg/dL (31 Dec 2019 09:28)  POCT Blood Glucose.: 70 mg/dL (31 Dec 2019 09:06)  POCT Blood Glucose.: 49 mg/dL (31 Dec 2019 08:39)  POCT Blood Glucose.: 55 mg/dL (31 Dec 2019 08:37)  POCT Blood Glucose.: 319 mg/dL (30 Dec 2019 22:07)        Hemoglobin A1C, Whole Blood: 12.0 % <H> [4.0 - 5.6] (12-13-19 @ 18:31).

## 2019-12-31 NOTE — PROVIDER CONTACT NOTE (OTHER) - NAME OF MD/NP/PA/DO NOTIFIED:
Alfa Bernal MD
Alfa Bernal MD
Dr Jessica Gleason MD
Dr. Bernal
ERIKA MURILLO MD
Gallito Bowen MD
Jessi Mackey
Kenia Patel
Kenia Patel x [phone]
Nadeen Hagen
Team Pili RANDHAWA
Kenia Patel x [phone]

## 2019-12-31 NOTE — DISCHARGE NOTE NURSING/CASE MANAGEMENT/SOCIAL WORK - PATIENT PORTAL LINK FT
You can access the FollowMyHealth Patient Portal offered by [facility] by registering at the following website: [email or web address]. By joining [employer]’s FollowMyHealth portal, you will also be able to view your health information using other applications (apps) compatible with our system.

## 2019-12-31 NOTE — PROVIDER CONTACT NOTE (OTHER) - ACTION/TREATMENT ORDERED:
MD made aware; Keina Patel x [phone], told MD that latest fingerstick was 178; instructed nurse to give one sliding scale dose of 1 unit at 10am and restart the standing dose before lunch time.
Followed hypoglygemic protocol. - Apple juice 4ounce given, 15 min after repeat FS 57, pt refused glucose gel, given another apple juice 4ounce, 15 min after .
MD notified. Repeat stick in 2 hours
As per provider do not hold Lantus insulin.
MD aware. no need to bladder scan at this time. cont to monitor pt. encourage PO fluids. Notify MD if pt does not void after breakfast (9am)
MD aware. ok to keep pt off IVF as ordered. cont to monitor BP as ordered. cont to monitor urine output/ encourage pt to save voids. amlodipine to be reordered with hold parameters.
MD made aware, IV Tylenol given, will reassess in 30mins.
MD made aware; Kenia Patel x [phone], pt denied to drink apple juice at first but decided to drink it with meal; followed hypoglycemic protocol; will recheck sugar level and continue to monitor.
Provider aware, ok to hold premeal 5 units, continue to monitor pt
Provider does not want to adjust dose of Lantus, administer full dose.
Will order IV tylenol and cont monitoring pt.
lanus 12 units ordered for tonight. order to hold 24unit dose in place. cont to monitor. no new orders at this time.
no further pain management orders at this time, team will come assess patient.

## 2019-12-31 NOTE — PROGRESS NOTE ADULT - ATTENDING COMMENTS
Agree with above.  Recommend acute in-patient rehab.
Seen and examined with resident. Agree with note.   Patient will need acute rehabilitation when stable.
BKA stump is clean/dry/intact  pt is pending dispo to rehab
I have personally interviewed and examined this patient, reviewed pertinent labs and imaging, and discussed the case with resident, physician assistant, and nurse on SICU rounds.    35   minutes in total were spent in providing direct critical care for the diagnoses, assessment and plan outlined below.  These diagnoses are unrelated to the surgical procedure.  Additionally, time spent in the performance of separately billable procedures was not counted toward the critical care time.  There is no overlap.    The active critical care issues are:  1. severe sepsis with fever, tachycardia, hypoxemia, hyperglycemia and MANN in setting of longstanding type 1 DM  -fever curve and wbc improving after surgical source control and narrowing antibiotics, continue local wound care and zosyn  -glycemic control improved with increased basal insulin regimen for insulin-dependent state; encourage intermeal snacks for mild hypoglycemia  -NIV CPAP transitioned to hiflo NC O2 support for hypoxemia/fluffy infiltrates on CXR c/w ARDS, continue to minimize IVF and PT for mobilization, should improve now that infection is decreasing  -oliguric RF, MANN vs. CKD due to longstanding DM, no acute indx for RRT, avoid nephrotoxic meds, allow autoregulation for now  -pain control and local wound care.
Agree with notes of health care providers on my service.  I have personally examined the patient, reviewed data and laboratory tests/x-rays and all pertinent electronic images.  The assessment and plan is specified. The patient is not a critical care patient in SICU with diagnosis mentioned above.  The SICU team has a constant risk benefit analyzes discussion with the primary team, all consultants, House Staff and PA's.     49 yr old male with necrotizing soft tissue infection of the RLE, admitted to SICU for resuscitation and management of severe sepsis s/p right Syme amputation.  EXAM  NEUROLOGY  Exam: Normal, NAD, alert, oriented x3, no focal deficits.   RESPIRATORY  Exam: +nasal canula, nonlabored breathing, CTA B/L   CARDIOVASCULAR  Exam: Normotensive, RRR, no M/R/G   GI/NUTRITION  Exam: Abdomen soft, NT/ND, no rebound or guarding.  Current Diet:  NPO  EXTREMITIES  Right lower extremity s/p BKA guillotine with dressing clean dry intact, no swelling erythema, swelling, tenderness noted. LLE adequate strength, motor, and sensory     PLAN:    Neurologic:   - Ofirmev, Dilaudid, oxycodone prn for pain control  Respiratory:   - currently on nasal cannula  Cardiovascular:   - hypertension on 5mg norvasc   Gastrointestinal/Nutrition:   - Continue Consistent Carbohydrate diet  Renal/Genitourinary:   - MANN Improving, Cr downtrending  -Hematologic:   - C/w Heparin SQ for DVT ppx  Infectious Disease:   - s/p Clindamycin (12/12-12/13) and Vancomycin (12/12-12/13)  - Continuing Zosyn  Endocrine:   - cont lantus 24u and continue lispro 5u TID with meals     Disposition:   List for floor    Critical Care Diagnoses:  DKA  Respiratory Failure  Sepsis s/p BKA  ARDS
Agree with notes of health care providers on my service.  I have personally examined the patient, reviewed data and laboratory tests/x-rays and all pertinent electronic images.  The assessment and plan is specified. The patient is not a critical care patient in SICU with diagnosis mentioned above.  The SICU team has a constant risk benefit analyzes discussion with the primary team, all consultants, House Staff and PA's.     49 yr old male with necrotizing soft tissue infection of the RLE, admitted to SICU for resuscitation and management of severe sepsis s/p right amputation.  EXAM  NEUROLOGY  Exam: Normal, NAD, alert, oriented x3, no focal deficits.   RESPIRATORY  Exam: +high flow nasal cannula, nonlabored breathing, CTA B/L   CARDIOVASCULAR  Exam: Normotensive, RRR, no M/R/G   GI/NUTRITION  Exam: Abdomen soft, NT/ND, no rebound or guarding.  Current Diet:  clears  EXTREMITIES  Right lower extremity s/p BKA guillotine with dressing clean dry intact, no swelling erythema, swelling, tenderness noted. LLE adequate strength, motor, and sensory   HEMATOLOGIC  heparin  Injectable 5000 Unit(s) SQ 8 hours    PLAN:    Neurologic:   - Ofirmev, Dilaudid, oxycodone prn for pain control  Respiratory:   - mild to moderate ARDS off CPAP, now on high flow nasal cannula; wean as tolerated  Cardiovascular:   - hypertension on 5mg norvasc   Gastrointestinal/Nutrition:   - Continue Consistent Carbohydrate diet  Renal/Genitourinary:   - MANN Improving, Cr downtrending  - Trend electrolytes, replete as needed  Hematologic:   - C/w Heparin SQ for DVT ppx  - Trend H/H, transfuse if needed  Infectious Disease:   - s/p Clindamycin (12/12-12/13) and Vancomycin (12/12-12/13)  - Continuing Zosyn  Endocrine:   - off insulin gtt placed  - Increase Lantus to 24u and continue lispro 5u TID with meals   Disposition:   Floor eligible once off High Flow    Critical Care Diagnoses:  DKA  Respiratory Failure  Sepsis s/p BKA
I have personally interviewed and examined this patient, reviewed pertinent labs and imaging, and discussed the case with colleagues, residents, physician assistants, and nurses on SICU rounds.    45   minutes in total were spent in providing direct critical care for the diagnoses, assessment and plan outlined below.  These diagnoses are unrelated to the surgical procedure.  Additionally, time spent in the performance of separately billable procedures was not counted toward the critical care time.  There is no overlap.    The active critical care issues are:  1. severe sepsis with fever, tachycardia, hypoxemia, hyperglycemia and MANN in setting of longstanding type 1 DM  -not nec fasc, this is wet gangrene, d/c clinda and hold vanco (elevated creatinine), continue zosyn for likely polymicrobial diabetic foot infection  -increase basal/bolus insulin regimen for insulin-dependent state with transient insulin resistance due to sepsis (add NPH today to hold over until able to increase nocturnal lantus dose)  -d/c IVF given B lines on pulmonary POCUS and increased oxygen requirement  -pain control and local wound care
Agree with notes of health care providers on my service.  I have personally examined the patient, reviewed data and laboratory tests/x-rays and all pertinent electronic images.  The assessment and plan is specified. The patient is not a critical care patient in SICU with diagnosis mentioned above.  The SICU team has a constant risk benefit analyzes discussion with the primary team, all consultants, House Staff and PA's.     49 yr old male with necrotizing soft tissue infection of the RLE, admitted to SICU for resuscitation and management of severe sepsis s/p right guillotine amputation.  EXAM  NEUROLOGY  Exam: Normal, NAD, alert, oriented x3, no focal deficits.   RESPIRATORY  Exam:+nasal canula, CTA B/L   CARDIOVASCULAR  Exam: Normotensive, RRR, no M/R/G   GI/NUTRITION  Exam: Abdomen soft, NT/ND, no rebound or guarding.  EXTREMITIES  Right lower extremity s/p BKA guillotine with dressing clean dry intact, no swelling erythema, swelling, tenderness noted. LLE adequate strength, motor, and sensory     PLAN:    Neurologic:   - Ofirmev, Dilaudid, oxycodone prn for pain control    Respiratory:   - 12/17: +effusion on bedside U/S s/p 20 lasix with 2L u/o   - currently on RA  Cardiovascular:   - hypertension on 5mg norvasc   Gastrointestinal/Nutrition:   - Continue Consistent Carbohydrate diet  Renal/Genitourinary:   - MANN Improving, Cr downtrending  - Trend electrolytes, replete as needed  Hematologic:   - C/w Heparin SQ for DVT ppx  - Trend H/H, transfuse if needed  Infectious Disease:   - s/p Clindamycin (12/12-12/13) and Vancomycin (12/12-12/13)  - Continuing Zosyn  Endocrine:   - off insulin gtt placed  - cont lantus 24u and continue lispro 5u TID with meals   - Monitor FS, and continue ISS    Disposition:   transfer to the floor    Critical Care Diagnoses:  DKA  Respiratory Failure  Sepsis s/p BKA  ARDS
I have personally interviewed and examined this patient, reviewed pertinent labs and imaging, and discussed the case with colleagues, residents, physician assistants, and nurses on SICU rounds.     35  minutes in total were spent in providing direct critical care for the diagnoses, assessment and plan outlined below.  These diagnoses are unrelated to the surgical procedure.  Additionally, time spent in the performance of separately billable procedures was not counted toward the critical care time.  There is no overlap.    The active critical care issues are:  1. gangrene with hyperglycemia, acidosis in type 1 diabetic    Antibiotics, dextrose ivf, insulin drip with goal glc<180 awaiting definitive operative source management.

## 2019-12-31 NOTE — PROGRESS NOTE ADULT - REASON FOR ADMISSION
Necrotizing soft tissue infection

## 2019-12-31 NOTE — PROGRESS NOTE ADULT - ASSESSMENT
49M admitted with uncontrolled type 1 DM, admitted to [facility] on 12/12/19 for severe sepsis, requiring SICU level care, emergent right BKA and now BKA revision on 12/23. Endocrine being called today,12/27, for assistance with diabetes management / glycemic control and coordination of outpatient care. Patient with fasting hypoglycemia today.    1. Type 1 DM, uncontrolled  -Inpatient BG target 100-180 mg/dl, patient below target this AM  -Target A1c 6-7%, patient A1c is 12 %  -Would recommend decreasing Lantus to 20 units SQ qHS  -Continue Humalog 5 units SQ TID before meals (Hold if NPO/bit eating meal)   -Continue Humalog LOW dose correction SQ TID before meals and Humalog LOW dose correction before bed   -Can consider adding Humalog 2 units SQ qhs for HS snack- If not eating snack, hold dose.     -Check BG before meals and bedtime  -Consistent carbohydrate diet, ok to include bedtime snack    Discharge Plan:  -Basal/bolus to rehab, current doses if discharged today, Lantus 20 units SQ qHS and Humalog 5 units SQ TID before meals (Hold if not eating meal), continue Humalog LOW correction scales before meals and bedtime (as currently ordered).   -Post rehab: Patient previously on insulin pens, Basaglar and Humalog scale at home. Recommend Basaglar and Humalog standing dose at home, this was discussed with patient previously by our team. The final home doses can be determined at time of dc from rehab.  -Endocrine follow up appointments scheduled with [facility], [address] [place] [address] [address] [facility], [place], [phone]  1/21/20 @ 230 PM with Diabetes educator  4/20/20 @ 5 PM with Dr. Pena    Discharge recommendation reviewed with primary team c-team surgery pager [phone], also reviewed with patient and primary RN

## 2019-12-31 NOTE — PROVIDER CONTACT NOTE (OTHER) - SITUATION
Pt had hypoglycemic fingerstick of 55, 49 this morning, gave one apple juice and rechecked fingerstick of 70, given one more apple juice; fingerstick 134; rechecked 15 mins later and was 178.
Blood sugar 115 due for 24 units of lantus/ pt had episode of hypoglycemia this am.
Blood sugar 68
FS 12:43pm 60
FS 830AM 55, 49.
Notified provider of early finger stick
Patient received 10 mg of oxycodone at 14:22 and received 1 x dose of dilaudid and is still complaining of severe pain
Patient with blood glucose at 17:29 of 86, pre meal insulin was held as per day shift nurse.
Patient with blood glucose of 134, ordered for Lantus 24 units
Pt BP is elevated and Tachycardic
Pt with high temp of 102.7. Hr 112
Pt. blood glucose at dinner was 86, sliding scale held, standing premeal in place
pt BP 99/57, remaining VSS. denies dizziness/ lightheadedness, ordered for amlodipine in am- no hold parameters for BP.no IVF order.pt also had 1 void w/o save after arrival from PACU b/c pt had a BM.
pt did not void since midnight. pt was DTV by 4am. Had 1 unsaved void after having a BM at midnight. pt denies pain/discomfort at suprapubic area. denies sensation to void at this time.

## 2019-12-31 NOTE — PROGRESS NOTE ADULT - SUBJECTIVE AND OBJECTIVE BOX
INTERVAL EVENTS: largely stable.  labile blood sugars ( in last 24 hours.) Patient reports feeling well today; his pain is well-controlled on just Tylenol. No new complaints; looking forward to rehab.     FUNCTIONAL PROGRESS as of 12/30  Bed Mobility - independent  Transfers - contact guard  Gait - amb 60 ft with crutches and CG. balance remains poor.       REVIEW OF SYSTEMS  Constitutional - No fever,  No fatigue  HEENT - No vertigo, No neck pain  Neurological - No headaches, No memory loss, No loss of strength, No numbness, No tremors  Skin - No rashes, No lesions   Musculoskeletal - No joint pain, No joint swelling, No muscle pain  Psychiatric - No depression, No anxiety    VITALS  T(C): 36.9 (12-31-19 @ 09:27), Max: 37 (12-30-19 @ 17:32)  HR: 104 (12-31-19 @ 09:27) (59 - 104)  BP: 112/68 (12-31-19 @ 09:27) (111/59 - 139/77)  RR: 17 (12-31-19 @ 09:27) (17 - 18)  SpO2: 100% (12-31-19 @ 09:27) (97% - 100%)  Wt(kg): --    MEDICATIONS   acetaminophen   Tablet .. 975 milliGRAM(s) every 6 hours  amLODIPine   Tablet 5 milliGRAM(s) daily  dextrose 40% Gel 15 Gram(s) once PRN  dextrose 50% Injectable 12.5 Gram(s) once  dextrose 50% Injectable 25 Gram(s) once  dextrose 50% Injectable 25 Gram(s) once  gabapentin 200 milliGRAM(s) every 8 hours  glucagon  Injectable 1 milliGRAM(s) once PRN  heparin  Injectable 5000 Unit(s) every 8 hours  influenza   Vaccine 0.5 milliLiter(s) once  insulin glargine Injectable (LANTUS) 25 Unit(s) at bedtime  insulin lispro (HumaLOG) corrective regimen sliding scale   three times a day before meals  insulin lispro (HumaLOG) corrective regimen sliding scale   at bedtime  insulin lispro Injectable (HumaLOG) 5 Unit(s) three times a day before meals  ondansetron Injectable 4 milliGRAM(s) every 8 hours PRN      RECENT LABS/IMAGING        PHYSICAL EXAM  Constitutional - NAD, Comfortable  HEENT - NCAT, EOMI  Neck - Supple, No limited ROM  Chest - Breathing comfortably, No wheezing  Cardiovascular - S1S2   Abdomen - Soft   Extremities - No C/C/E, No calf tenderness; RLE dressing c/d/i, +knee immobilizer   Neurologic Exam -                    Cognitive - Awake, Alert, AAO to self, place, date, year, situation     Communication - Fluent, No dysarthria     Cranial Nerves - CN 2-12 intact     Motor -                     LEFT    UE - ShAB 5/5, EF 5/5, EE 5/5, WE 5/5,  5/5                    RIGHT UE - ShAB 5/5, EF 5/5, EE 5/5, WE 5/5,  5/5                    LEFT    LE - HF 4/5, KE 5/5, DF 5/5, PF 5/5                    RIGHT LE - HF 4/5       Sensory - Intact to LT     Reflexes - DTR Intact, No primitive reflexive       Balance - impaired  Psychiatric - Mood stable, Affect WNL  ----------------------------------------------------------------------------------------

## 2019-12-31 NOTE — PROGRESS NOTE ADULT - SUBJECTIVE AND OBJECTIVE BOX
General Surgery Progress Note  Patient is a 49y old  Male who presents with a chief complaint of Necrotizing soft tissue infection (30 Dec 2019 06:44)      SUBJECTIVE: Patient seen and examined at bedside with surgical team, patient without complaints. Denies RLE pain. Denies fever, chills, CP, SOB nausea, vomiting, abdominal pain. No acute events overnight.    OBJECTIVE:    Vital Signs Last 24 Hrs  T(C): 36.6 (31 Dec 2019 05:42), Max: 37 (30 Dec 2019 17:32)  T(F): 97.9 (31 Dec 2019 05:42), Max: 98.6 (30 Dec 2019 17:32)  HR: 88 (31 Dec 2019 05:42) (59 - 91)  BP: 120/70 (31 Dec 2019 05:42) (111/59 - 139/77)  BP(mean): --  RR: 18 (31 Dec 2019 05:42) (18 - 18)  SpO2: 98% (31 Dec 2019 05:42) (97% - 100%)I&O's Detail    30 Dec 2019 07:01  -  31 Dec 2019 07:00  --------------------------------------------------------  IN:    Oral Fluid: 120 mL  Total IN: 120 mL    OUT:    Voided: 1700 mL  Total OUT: 1700 mL    Total NET: -1580 mL      MEDICATIONS  (STANDING):  acetaminophen   Tablet .. 975 milliGRAM(s) Oral every 6 hours  amLODIPine   Tablet 5 milliGRAM(s) Oral daily  dextrose 50% Injectable 12.5 Gram(s) IV Push once  dextrose 50% Injectable 25 Gram(s) IV Push once  dextrose 50% Injectable 25 Gram(s) IV Push once  gabapentin 200 milliGRAM(s) Oral every 8 hours  heparin  Injectable 5000 Unit(s) SubCutaneous every 8 hours  influenza   Vaccine 0.5 milliLiter(s) IntraMuscular once  insulin glargine Injectable (LANTUS) 25 Unit(s) SubCutaneous at bedtime  insulin lispro (HumaLOG) corrective regimen sliding scale   SubCutaneous three times a day before meals  insulin lispro (HumaLOG) corrective regimen sliding scale   SubCutaneous at bedtime  insulin lispro Injectable (HumaLOG) 5 Unit(s) SubCutaneous three times a day before meals    MEDICATIONS  (PRN):  dextrose 40% Gel 15 Gram(s) Oral once PRN Blood Glucose LESS THAN 70 milliGRAM(s)/deciliter  glucagon  Injectable 1 milliGRAM(s) IntraMuscular once PRN Glucose LESS THAN 70 milligrams/deciliter  ondansetron Injectable 4 milliGRAM(s) IV Push every 8 hours PRN Nausea and/or Vomiting    PHYSICAL EXAM:  Constitutional: A&Ox3, NAD  Respiratory: Unlabored breathing  Cardio: RRR  Abdomen: Soft, nondistended, NTTP. No rebound or guarding.  Extremities: Moving all 4 spontaneously without limitations. R BKA C/D/I. Dressing changed. Knee immobilizer in place.     ASSESSMENT:  49 yr old male with necrotizing soft tissue infection of the RLE, admitted to SICU for resuscitation and management of severe sepsis, s/p emergent caroline Rt caroline GROVES. His postop course has been complicated by ARDS and MANN but both improving. s/p BKA completion (12/23).     PLAN:    - Pain control as needed; c/w oxycodone and Tylenol standing  - Monitor FS  - DVT ppx: Heparin   - Appreciate o/p Endo recs- likely d/c today  - Dispo planning - rehab hopefully today    C Team/ Vascular Surgery  [id number]

## 2019-12-31 NOTE — PROGRESS NOTE ADULT - SUBJECTIVE AND OBJECTIVE BOX
Chief Complaint: DM 1    History: Patient seen at bedside. Noted with episode of hypoglycemia this AM, patient denies s/s of hypoglycemia at time of episode, reports receiving 2 cups apple juice and ate breakfast. Denies n/v. Plan for dc to rehab today.    MEDICATIONS  (STANDING):  acetaminophen   Tablet .. 975 milliGRAM(s) Oral every 6 hours  amLODIPine   Tablet 5 milliGRAM(s) Oral daily  dextrose 50% Injectable 12.5 Gram(s) IV Push once  dextrose 50% Injectable 25 Gram(s) IV Push once  dextrose 50% Injectable 25 Gram(s) IV Push once  gabapentin 200 milliGRAM(s) Oral every 8 hours  heparin  Injectable 5000 Unit(s) SubCutaneous every 8 hours  influenza   Vaccine 0.5 milliLiter(s) IntraMuscular once  insulin glargine Injectable (LANTUS) 25 Unit(s) SubCutaneous at bedtime  insulin lispro (HumaLOG) corrective regimen sliding scale   SubCutaneous three times a day before meals  insulin lispro (HumaLOG) corrective regimen sliding scale   SubCutaneous at bedtime  insulin lispro Injectable (HumaLOG) 5 Unit(s) SubCutaneous three times a day before meals    MEDICATIONS  (PRN):  dextrose 40% Gel 15 Gram(s) Oral once PRN Blood Glucose LESS THAN 70 milliGRAM(s)/deciliter  glucagon  Injectable 1 milliGRAM(s) IntraMuscular once PRN Glucose LESS THAN 70 milligrams/deciliter  ondansetron Injectable 4 milliGRAM(s) IV Push every 8 hours PRN Nausea and/or Vomiting      No Known Allergies    Review of Systems:  HEENT: No pain  Cardiovascular: No chest pain  Respiratory: No SOB  GI: No nausea, vomiting    PHYSICAL EXAM:  VITALS: T(C): 36.9 (12-31-19 @ 09:27)  T(F): 98.4 (12-31-19 @ 09:27), Max: 98.6 (12-30-19 @ 17:32)  HR: 104 (12-31-19 @ 09:27) (59 - 104)  BP: 112/68 (12-31-19 @ 09:27) (111/59 - 139/77)  RR:  (17 - 18)  SpO2:  (97% - 100%)  Wt(kg): --  GENERAL: NAD  EYES: No proptosis, anicteric  HEENT:  Atraumatic, Normocephalic, moist mucous membranes  RESPIRATORY: unlabored respirations   PSYCH: Alert and oriented x 3, normal affect, normal mood    CAPILLARY BLOOD GLUCOSE    POCT Blood Glucose.: 221 mg/dL (31 Dec 2019 11:30)  POCT Blood Glucose.: 178 mg/dL (31 Dec 2019 09:46)  POCT Blood Glucose.: 134 mg/dL (31 Dec 2019 09:28)  POCT Blood Glucose.: 70 mg/dL (31 Dec 2019 09:06)  POCT Blood Glucose.: 49 mg/dL (31 Dec 2019 08:39)  POCT Blood Glucose.: 55 mg/dL (31 Dec 2019 08:37)  POCT Blood Glucose.: 319 mg/dL (30 Dec 2019 22:07)  POCT Blood Glucose.: 192 mg/dL (30 Dec 2019 17:45)  POCT Blood Glucose.: 79 mg/dL (30 Dec 2019 17:20)        Hemoglobin A1C, Whole Blood: 12.0 % <H> [4.0 - 5.6] (12-13-19 @ 18:31)

## 2019-12-31 NOTE — DISCHARGE NOTE NURSING/CASE MANAGEMENT/SOCIAL WORK - NSDCFUADDAPPT_GEN_ALL_CORE_FT
Please follow-up with an endocrinologist within 1-2 weeks following discharge, you may call [phone] to schedule an appointment at the [facility].    1. Diabetes educator on 1/21/20 @ 2:30  2. Dr Pena on 4/20/20 @ 5:00

## 2019-12-31 NOTE — H&P ADULT - NSHPSOCIALHISTORY_GEN_ALL_CORE
No smoking, no etoh SOCIAL HISTORY  - Smoking: denied  - Alcohol: denied  - Drug Use: denied    FUNCTIONAL HISTORY  Pt lives in a one story private house with 3 steps to enter. Prior to admission, patient was independent with all functional mobility and ADLs.    CURRENT FUNCTIONAL STATUS  FUNCTIONAL PROGRESS as of 12/30  Bed Mobility - independent  Transfers - contact guard  Gait - amb 60 ft with crutches and CG. balance remains poor

## 2019-12-31 NOTE — H&P ADULT - NSHPREVIEWOFSYSTEMS_GEN_ALL_CORE
REVIEW OF SYSTEMS  Constitutional: No fever, No Chills, No fatigue  HEENT: No eye pain, No visual disturbances, No difficulty hearing  Pulm: No cough,  No shortness of breath  Cardio: No chest pain, No palpitations  GI:  No abdominal pain, No nausea, No vomiting, No diarrhea, No constipation  : No dysuria, No frequency, No hematuria  Neuro: No headaches, No memory loss, No loss of strength, No numbness, No tremors  Skin: No itching, No rashes, No lesions   Endo: No temperature intolerance  MSK: No joint pain, No joint swelling, No muscle pain, No Neck or back pain  Psych:  No depression, No anxiety REVIEW OF SYSTEMS  Constitutional: No fever, No Chills, No fatigue  HEENT: No eye pain, No visual disturbances, No difficulty hearing  Pulm: No cough,  No shortness of breath  Cardio: No chest pain, No palpitations  GI:  No abdominal pain, No nausea, No vomiting, No diarrhea, No constipation  : No dysuria, No frequency, No hematuria  Neuro: No headaches, No memory loss, No loss of strength, No numbness, No tremors  Skin: No itching, No rashes, +scarring on left leg  MSK: + right BKA, No joint pain, No joint swelling, No muscle pain, No Neck or back pain  Psych:  No depression, No anxiety

## 2019-12-31 NOTE — H&P ADULT - NSHPOUTPATIENTPROVIDERS_GEN_ALL_CORE
Dr. Pena Gisella Quevedo (MD)  Vascular Surgery  [address] [place] [place] [address]  Phone: [phone]  Lexie Ramirez (MD)  Surgery; Vascular Surgery  [address] [facility] [address], [address]  [place] [address]  Phone: [phone]  Heather Mcelroy (MD)  EndocrinologyMetabDiabetes; Internal Medicine  [address] [place] [address]  Phone: [phone]

## 2020-01-01 LAB
ANION GAP SERPL CALC-SCNC: 3 MMOL/L — LOW (ref 5–17)
BUN SERPL-MCNC: 32 MG/DL — HIGH (ref 7–23)
CALCIUM SERPL-MCNC: 8.9 MG/DL — SIGNIFICANT CHANGE UP (ref 8.4–10.5)
CHLORIDE SERPL-SCNC: 104 MMOL/L — SIGNIFICANT CHANGE UP (ref 96–108)
CO2 SERPL-SCNC: 30 MMOL/L — SIGNIFICANT CHANGE UP (ref 22–31)
CREAT SERPL-MCNC: 1.43 MG/DL — HIGH (ref 0.5–1.3)
GLUCOSE BLDC GLUCOMTR-MCNC: 116 MG/DL — HIGH (ref 70–99)
GLUCOSE BLDC GLUCOMTR-MCNC: 130 MG/DL — HIGH (ref 70–99)
GLUCOSE BLDC GLUCOMTR-MCNC: 155 MG/DL — HIGH (ref 70–99)
GLUCOSE BLDC GLUCOMTR-MCNC: 172 MG/DL — HIGH (ref 70–99)
GLUCOSE BLDC GLUCOMTR-MCNC: 75 MG/DL — SIGNIFICANT CHANGE UP (ref 70–99)
GLUCOSE BLDC GLUCOMTR-MCNC: 88 MG/DL — SIGNIFICANT CHANGE UP (ref 70–99)
GLUCOSE SERPL-MCNC: 148 MG/DL — HIGH (ref 70–99)
HCT VFR BLD CALC: 28.3 % — LOW (ref 39–50)
HGB BLD-MCNC: 8.9 G/DL — LOW (ref 13–17)
MCHC RBC-ENTMCNC: 27.5 PG — SIGNIFICANT CHANGE UP (ref 27–34)
MCHC RBC-ENTMCNC: 31.4 GM/DL — LOW (ref 32–36)
MCV RBC AUTO: 87.3 FL — SIGNIFICANT CHANGE UP (ref 80–100)
NRBC # BLD: 0 /100 WBCS — SIGNIFICANT CHANGE UP (ref 0–0)
PLATELET # BLD AUTO: 376 K/UL — SIGNIFICANT CHANGE UP (ref 150–400)
POTASSIUM SERPL-MCNC: 3.9 MMOL/L — SIGNIFICANT CHANGE UP (ref 3.5–5.3)
POTASSIUM SERPL-SCNC: 3.9 MMOL/L — SIGNIFICANT CHANGE UP (ref 3.5–5.3)
RBC # BLD: 3.24 M/UL — LOW (ref 4.2–5.8)
RBC # FLD: 13.2 % — SIGNIFICANT CHANGE UP (ref 10.3–14.5)
SODIUM SERPL-SCNC: 137 MMOL/L — SIGNIFICANT CHANGE UP (ref 135–145)
WBC # BLD: 3.54 K/UL — LOW (ref 3.8–10.5)
WBC # FLD AUTO: 3.54 K/UL — LOW (ref 3.8–10.5)

## 2020-01-01 PROCEDURE — 99221 1ST HOSP IP/OBS SF/LOW 40: CPT

## 2020-01-01 PROCEDURE — 99232 SBSQ HOSP IP/OBS MODERATE 35: CPT

## 2020-01-01 RX ORDER — HEPARIN SODIUM 5000 [USP'U]/ML
5000 INJECTION INTRAVENOUS; SUBCUTANEOUS EVERY 12 HOURS
Refills: 0 | Status: DISCONTINUED | OUTPATIENT
Start: 2020-01-01 | End: 2020-01-06

## 2020-01-01 RX ADMIN — HEPARIN SODIUM 5000 UNIT(S): 5000 INJECTION INTRAVENOUS; SUBCUTANEOUS at 17:22

## 2020-01-01 RX ADMIN — Medication 5 UNIT(S): at 07:55

## 2020-01-01 RX ADMIN — Medication 1: at 17:21

## 2020-01-01 RX ADMIN — Medication 650 MILLIGRAM(S): at 07:12

## 2020-01-01 RX ADMIN — AMLODIPINE BESYLATE 5 MILLIGRAM(S): 2.5 TABLET ORAL at 06:19

## 2020-01-01 RX ADMIN — GABAPENTIN 200 MILLIGRAM(S): 400 CAPSULE ORAL at 15:28

## 2020-01-01 RX ADMIN — Medication 5 UNIT(S): at 17:21

## 2020-01-01 RX ADMIN — INSULIN GLARGINE 20 UNIT(S): 100 INJECTION, SOLUTION SUBCUTANEOUS at 22:05

## 2020-01-01 RX ADMIN — GABAPENTIN 200 MILLIGRAM(S): 400 CAPSULE ORAL at 22:05

## 2020-01-01 RX ADMIN — HEPARIN SODIUM 5000 UNIT(S): 5000 INJECTION INTRAVENOUS; SUBCUTANEOUS at 06:20

## 2020-01-01 RX ADMIN — Medication 650 MILLIGRAM(S): at 06:20

## 2020-01-01 RX ADMIN — GABAPENTIN 200 MILLIGRAM(S): 400 CAPSULE ORAL at 06:20

## 2020-01-01 RX ADMIN — Medication 650 MILLIGRAM(S): at 17:24

## 2020-01-01 NOTE — CONSULT NOTE ADULT - ATTENDING COMMENTS
I have personally interviewed and examined this patient, reviewed all pertinent findings/ data and information and performed the evaluation and management service provided at today's visit

## 2020-01-01 NOTE — CONSULT NOTE ADULT - SUBJECTIVE AND OBJECTIVE BOX
CC: asked to consult on this patient for multiple medical issues after Right BKA including diabetes    HPI: Pt is a 48 y/o M with PMHx of DM who presented to [facility] on 12/12/19 c/o worsening RLE infection. Pt stated he first noticed a right 5th toe wound on 12/2, and saw a physician at [facility] who prescribed Santyl collagenase which he used for one week topically. He stated that his foot infection continued to worsen until day of admission, when he was seen at [facility] ED, told that he had subcutaneous gas seen on Xray and needed a BKA. Pt wanted a second opinion and left AMA after which he presented to [facility] ED. He stated pain in the right foot is worsening, and he was able to walk on day of admission prior to arriving in ED where he was also found to be in  DKA. He was amenable to urgent surgical intervention and SICU consulted for resuscitation - necrotizing soft tissue infection of the RLE, admitted to SICU for resuscitation and management of severe sepsis, s/p emergent guillotine Rt guillotine BKA. His postop course was complicated by ARDS and MANN but both improved. He is s/p BKA completion (12/23). Course was also complicated by labile sugar levels. Pt recommended for acute inpatient rehabilitation and was stable for [facility] 12[facility].    PAST MEDICAL & SURGICAL HISTORY:  DM (diabetes mellitus): DM 1  Stenosis of popliteal artery: Left stent placed 2017    SOCHX: denies tobacco, alcohol or illicit drug use    Allergies    No Known Allergies    MEDICATIONS  (STANDING):  amLODIPine   Tablet 5 milliGRAM(s) Oral daily  dextrose 5%. 1000 milliLiter(s) (50 mL/Hr) IV Continuous <Continuous>  dextrose 50% Injectable 12.5 Gram(s) IV Push once  dextrose 50% Injectable 25 Gram(s) IV Push once  dextrose 50% Injectable 25 Gram(s) IV Push once  gabapentin 200 milliGRAM(s) Oral three times a day  heparin  Injectable 5000 Unit(s) SubCutaneous every 8 hours  insulin glargine Injectable (LANTUS) 20 Unit(s) SubCutaneous at bedtime  insulin lispro (HumaLOG) corrective regimen sliding scale   SubCutaneous three times a day before meals  insulin lispro (HumaLOG) corrective regimen sliding scale   SubCutaneous at bedtime  insulin lispro Injectable (HumaLOG) 5 Unit(s) SubCutaneous three times a day before meals    MEDICATIONS  (PRN):  acetaminophen   Tablet .. 650 milliGRAM(s) Oral every 6 hours PRN Temp greater or equal to 38C (100.4F), Mild Pain (1 - 3)  dextrose 40% Gel 15 Gram(s) Oral once PRN Blood Glucose LESS THAN 70 milliGRAM(s)/deciliter  glucagon  Injectable 1 milliGRAM(s) IntraMuscular once PRN Glucose LESS THAN 70 milligrams/deciliter    REVIEW OF SYSTEMS:  CONSTITUTIONAL: No fever, weight loss, or fatigue  EYES: No eye pain, visual disturbances, or discharge  ENMT:  No difficulty hearing, tinnitus, vertigo; No sinus or throat pain  NECK: No pain or stiffness  BREASTS: No pain, masses, or nipple discharge  RESPIRATORY: No cough, wheezing, chills or hemoptysis; No shortness of breath  CARDIOVASCULAR: No chest pain, palpitations, dizziness, or leg swelling  GASTROINTESTINAL: No abdominal or epigastric pain. No nausea, vomiting, or hematemesis; No diarrhea or constipation. No melena or hematochezia.  GENITOURINARY: No dysuria, frequency, hematuria, or incontinence  NEUROLOGICAL: No headaches, memory loss, loss of strength, numbness, or tremors  SKIN: No itching, burning, rashes, or lesions   LYMPH NODES: No enlarged glands  ENDOCRINE: No heat or cold intolerance; No hair loss  MUSCULOSKELETAL: No joint pain or swelling; No muscle, back pain; right LE pain controlled on present regimen  PSYCHIATRIC: No depression, anxiety, mood swings, or difficulty sleeping  HEME/LYMPH: No easy bruising, or bleeding gums  ALLERY AND IMMUNOLOGIC: No hives or eczema    ALL ROS REVIEWED AND NORMAL EXCEPT AS STATED ABOVE    Vital Signs Last 24 Hrs  T(C): 36.9 (01 Jan 2020 07:36), Max: 37.1 (31 Dec 2019 20:15)  T(F): 98.4 (01 Jan 2020 07:36), Max: 98.8 (31 Dec 2019 20:15)  HR: 90 (01 Jan 2020 07:36) (70 - 104)  BP: 132/75 (01 Jan 2020 07:36) (112/68 - 145/68)  BP(mean): --  RR: 15 (01 Jan 2020 07:36) (14 - 17)  SpO2: 96% (01 Jan 2020 07:36) (96% - 100%)  CAPILLARY BLOOD GLUCOSE      POCT Blood Glucose.: 130 mg/dL (01 Jan 2020 07:29)  POCT Blood Glucose.: 213 mg/dL (31 Dec 2019 22:19)  POCT Blood Glucose.: 188 mg/dL (31 Dec 2019 17:22)  POCT Blood Glucose.: 221 mg/dL (31 Dec 2019 11:30)  POCT Blood Glucose.: 178 mg/dL (31 Dec 2019 09:46)  POCT Blood Glucose.: 134 mg/dL (31 Dec 2019 09:28)  POCT Blood Glucose.: 70 mg/dL (31 Dec 2019 09:06)    PHYSICAL EXAM:   GEN : Alert and oriented x 3  HEENT: NCAT EOMI MMM  Neck: supple no JVd  Lungs clear  Cor RSR  abd soft NT ND  Right LE immobilizer on; unable to evaluate wound at this time    (01-01 @ 05:30)                      8.9  3.54 )-----------( 376                 28.3    Neutrophils = -- (--%)  Lymphocytes = -- (--%)  Eosinophils = -- (--%)  Basophils = -- (--%)  Monocytes = -- (--%)  Bands = --%    01-01    137  |  104  |  32<H>  ----------------------------<  148<H>  3.9   |  30  |  1.43<H>    Ca    8.9      01 Jan 2020 05:30

## 2020-01-01 NOTE — PROGRESS NOTE ADULT - SUBJECTIVE AND OBJECTIVE BOX
Pt. seen and examined at bedside.  No overnight events.  No pain complaints.        REVIEW OF SYSTEMS  Constitutional - No fever,  No fatigue  Neurological - No headaches, No loss of strength  Musculoskeletal - No joint pain, No joint swelling, No muscle pain    VITALS  T(C): 36.9 (01-01-20 @ 07:36), Max: 37.1 (12-31-19 @ 20:15)  HR: 90 (01-01-20 @ 07:36) (70 - 97)  BP: 132/75 (01-01-20 @ 07:36) (132/75 - 145/68)  RR: 15 (01-01-20 @ 07:36) (14 - 16)  SpO2: 96% (01-01-20 @ 07:36) (96% - 99%)  Wt(kg): --       MEDICATIONS   acetaminophen   Tablet .. 650 milliGRAM(s) every 6 hours PRN  amLODIPine   Tablet 5 milliGRAM(s) daily  dextrose 40% Gel 15 Gram(s) once PRN  dextrose 5%. 1000 milliLiter(s) <Continuous>  dextrose 50% Injectable 12.5 Gram(s) once  dextrose 50% Injectable 25 Gram(s) once  dextrose 50% Injectable 25 Gram(s) once  gabapentin 200 milliGRAM(s) three times a day  glucagon  Injectable 1 milliGRAM(s) once PRN  heparin  Injectable 5000 Unit(s) every 8 hours  insulin glargine Injectable (LANTUS) 20 Unit(s) at bedtime  insulin lispro (HumaLOG) corrective regimen sliding scale   three times a day before meals  insulin lispro (HumaLOG) corrective regimen sliding scale   at bedtime  insulin lispro Injectable (HumaLOG) 5 Unit(s) three times a day before meals      RECENT LABS/IMAGING                        8.9    3.54  )-----------( 376      ( 01 Jan 2020 05:30 )             28.3     01-01    137  |  104  |  32<H>  ----------------------------<  148<H>  3.9   |  30  |  1.43<H>    Ca    8.9      01 Jan 2020 05:30                  POCT Blood Glucose.: 130 mg/dL (01-01-20 @ 07:29)  POCT Blood Glucose.: 213 mg/dL (12-31-19 @ 22:19)  POCT Blood Glucose.: 188 mg/dL (12-31-19 @ 17:22)  POCT Blood Glucose.: 221 mg/dL (12-31-19 @ 11:30)    ---------  PHYSICAL EXAM  Constitutional - NAD, Comfortable  Pulm - Breathing comfortably, No wheezing  Abd - Soft, NTND  Extremities - No edema, No calf tenderness  Right residual limb - bandaged C/D/I - knee immobilizer  Neurologic Exam -                    Cognitive - Awake, Alert     Communication - Fluent     Motor - No focal deficits     Sensory - Intact to LT  Psychiatric - Mood WNL, Affect WNL    ASSESSMENT/PLAN  49y Male with functional deficits s/p R BKA  Continue current medical management  Pain - Tylenol PRN; edna  DVT PPX - heparin  Injectable 5000 Unit(s) every 8 hours  Active issues - CRI  Continue 3hrs a day of comprehensive rehab program.

## 2020-01-01 NOTE — CONSULT NOTE ADULT - ASSESSMENT
Pt is a 50 y/o M with PMHx of DM who presented to [facility] on 12/12/19 c/o worsening RLE infection and found to have necrotizing soft tissue infection of the RLE, admitted to SICU for resuscitation and management of severe sepsis, s/p emergent guillotine Rt caroline BKA.    #Right BKA secondary to necrotizing fasciitis  - PT/OT  - Continue knee immobilizer  - Pain control w/ tylenol  - Wound change daily with sterile 4x4 gauze and wrap with kerlix  - monitor incision    >DM1 ; s/p DKA likely secondary to LE infection  - accuchecks reviewed are mostly acceptable but Hgb A1C 12 @ [facility] suggests poor long term control  - Lantus 20units at bedtime  - Humalog 5 units before meals  - ISS  - adjust basal if consecutive accucheck elevations per hospitalist team / follow up     >Diabetic neuropathy  - continue gabapentin    >Essential HTN  - stable on Norvasc 5mg daily    > PPX   - heparin subq to prevent DVTs    >Anemia chronic disease   - may have a blood loss component as Hgb 12/12 9.9 and all otheres in the 8 range  - hemodynamically stable

## 2020-01-02 ENCOUNTER — INBOUND DOCUMENT (OUTPATIENT)
Age: 50
End: 2020-01-02

## 2020-01-02 PROBLEM — Z00.00 ENCOUNTER FOR PREVENTIVE HEALTH EXAMINATION: Status: ACTIVE | Noted: 2020-01-02

## 2020-01-02 LAB
GLUCOSE BLDC GLUCOMTR-MCNC: 151 MG/DL — HIGH (ref 70–99)
GLUCOSE BLDC GLUCOMTR-MCNC: 189 MG/DL — HIGH (ref 70–99)
GLUCOSE BLDC GLUCOMTR-MCNC: 381 MG/DL — HIGH (ref 70–99)
GLUCOSE BLDC GLUCOMTR-MCNC: 55 MG/DL — LOW (ref 70–99)
GLUCOSE BLDC GLUCOMTR-MCNC: 57 MG/DL — LOW (ref 70–99)
GLUCOSE BLDC GLUCOMTR-MCNC: 60 MG/DL — LOW (ref 70–99)
GLUCOSE BLDC GLUCOMTR-MCNC: 78 MG/DL — SIGNIFICANT CHANGE UP (ref 70–99)
GLUCOSE BLDC GLUCOMTR-MCNC: 80 MG/DL — SIGNIFICANT CHANGE UP (ref 70–99)

## 2020-01-02 PROCEDURE — 99232 SBSQ HOSP IP/OBS MODERATE 35: CPT | Mod: GC

## 2020-01-02 PROCEDURE — 99222 1ST HOSP IP/OBS MODERATE 55: CPT

## 2020-01-02 RX ORDER — INSULIN GLARGINE 100 [IU]/ML
18 INJECTION, SOLUTION SUBCUTANEOUS AT BEDTIME
Refills: 0 | Status: DISCONTINUED | OUTPATIENT
Start: 2020-01-02 | End: 2020-01-07

## 2020-01-02 RX ORDER — ACETAMINOPHEN 500 MG
650 TABLET ORAL ONCE
Refills: 0 | Status: COMPLETED | OUTPATIENT
Start: 2020-01-02 | End: 2020-01-02

## 2020-01-02 RX ADMIN — INSULIN GLARGINE 18 UNIT(S): 100 INJECTION, SOLUTION SUBCUTANEOUS at 22:06

## 2020-01-02 RX ADMIN — Medication 650 MILLIGRAM(S): at 13:45

## 2020-01-02 RX ADMIN — GABAPENTIN 200 MILLIGRAM(S): 400 CAPSULE ORAL at 20:14

## 2020-01-02 RX ADMIN — Medication 5 UNIT(S): at 13:05

## 2020-01-02 RX ADMIN — Medication 3: at 22:07

## 2020-01-02 RX ADMIN — Medication 650 MILLIGRAM(S): at 13:08

## 2020-01-02 RX ADMIN — Medication 5 UNIT(S): at 08:13

## 2020-01-02 RX ADMIN — Medication 650 MILLIGRAM(S): at 03:54

## 2020-01-02 RX ADMIN — Medication 1: at 08:13

## 2020-01-02 RX ADMIN — Medication 650 MILLIGRAM(S): at 19:02

## 2020-01-02 RX ADMIN — HEPARIN SODIUM 5000 UNIT(S): 5000 INJECTION INTRAVENOUS; SUBCUTANEOUS at 17:08

## 2020-01-02 RX ADMIN — GABAPENTIN 200 MILLIGRAM(S): 400 CAPSULE ORAL at 06:00

## 2020-01-02 RX ADMIN — AMLODIPINE BESYLATE 5 MILLIGRAM(S): 2.5 TABLET ORAL at 06:00

## 2020-01-02 RX ADMIN — GABAPENTIN 200 MILLIGRAM(S): 400 CAPSULE ORAL at 13:08

## 2020-01-02 RX ADMIN — Medication 650 MILLIGRAM(S): at 19:20

## 2020-01-02 RX ADMIN — Medication 650 MILLIGRAM(S): at 23:42

## 2020-01-02 RX ADMIN — HEPARIN SODIUM 5000 UNIT(S): 5000 INJECTION INTRAVENOUS; SUBCUTANEOUS at 06:00

## 2020-01-02 RX ADMIN — Medication 650 MILLIGRAM(S): at 04:30

## 2020-01-02 NOTE — DIETITIAN INITIAL EVALUATION ADULT. - PERTINENT LABORATORY DATA
1/1   Na-137  K-3.9  BUN-32H  Creat-1.43H  Gluc-148H  12/13 - Hemoglobin A1c - 12.0H  POCT (over Last 3 Days) - Ranging from

## 2020-01-02 NOTE — DIETITIAN INITIAL EVALUATION ADULT. - OTHER INFO
49yr Old Male - Dx: Right AKA - Initial Assessment - Diet - Consistent Carbohydrate Diet w/ Thin Liquids (Recommend Initiate Glucerna 8oz PO TID), Denies Food Allergy/Intolerance, Tolerates Diet Well, No Chewing/Swallowing Complications (Per Patient), Surgical Incision on Right Arm & No Pressure Ulcers (as Per Nursing Flow Sheets), +1 Edema Noted to Right Residual Limb(as Per Nursing Flow Sheets), No Recent Nausea/Vomiting/Diarrhea/Constipation (as Per Patient)

## 2020-01-02 NOTE — DIETITIAN INITIAL EVALUATION ADULT. - PHYSICAL APPEARANCE
Temporalis, Orbital Fat Pads, Buccal Fat Pads & Gastrocnemius(Calf) Wasting Observed  (Per Nutrition Focused Physical Exam)

## 2020-01-02 NOTE — DIETITIAN INITIAL EVALUATION ADULT. - DIET TYPE
On Consistent Carbohydrate Diet w/ Thin Liquids  Education Provided on Blood Glucose Management  Recommend Initiate Glucerna 8oz PO TID (Provides 660kcal-30grams of Protein)    Patient Does Follow Low Sugar Diet @Home, Consumes 3Meals a Day w/ a Snack @Night & Does Take  Protein Supplements @Home on Glucerna 8oz PO TID (Provides 660kcal-30grams of Protein)/supplement (specify)/consistent carbohydrate (evening snack)

## 2020-01-02 NOTE — CHART NOTE - NSCHARTNOTEFT_GEN_A_CORE
Upon Nutritional Assessment by the Registered Dietitian Your Patient was Determined to Meet criteria/has Evidence of the Following Diagnosis:          [X]  Acute Severe Protein-Calorie Malnutrition    Findings as based on:  [X] Comprehensive Nutrition Assessment   [X] Nutrition Focused Physical Exam - Temporalis, Orbital Fat Pads, Buccal Fat Pads & Gastrocnemius(Calf) Wasting/Depletion Observed  [X] Other: Significant Weight Decrease Over Last 6 Months     Nutrition Plan/Recommendations:    1) Glucerna 8oz PO TID (Provides 660kcal-30grams of Protein)    PROVIDER Section:   By Signing This Assessment You Are Acknowledging & Agree with The Diagnosis Assigned by the Registered Dietitian    Sergey Little RDN

## 2020-01-02 NOTE — DIETITIAN INITIAL EVALUATION ADULT. - ADD RECOMMEND
1) Monitor Weights, Intake, Tolerance, Skin, POCT & Labwork   2) Glucerna 8oz PO TID 3) Education Provided on Blood Glucose Management 4) Continue Nutrition Plan of Care

## 2020-01-02 NOTE — CONSULT NOTE ADULT - SUBJECTIVE AND OBJECTIVE BOX
Reason for Diabetes Education Consult:    HPI: 49y Male   Admitted from:  [facility]    Diabetes Type: Type 1   Duration of Diabetes: 34 years  Diabetes Complications: circulatory - right BKA  History of DKA?  denies  Eye doctor within past year? no.  Reports seeing optho approx. 5 years ago. Educated on importance of annual eye exam  Current Therapy:  At home was taking Basaglar 20 units QHS with the following Humalog scale (reports he "found the scale himself" and follows as below):  Glucose    - 0 units  151-200 - 3 units  201-250 - 5 units  251-300 - 8 units  301-350 - 10 units  351-400 - 12 units  >400 - 15 units  He reports checking fingersticks 3-4 times daily with readings generally 120-200 range. Endorses peripheral neuropathy but denies polydipsia, polyuria, polyphagia. Follows w/ endocrinologist in [place] but does not recall name.  Was using vial and syringe.  Pt very lean and muscular (goes to the gym often) - Injects into upper arms, likely into deltoids.      Previous A1C - reports last A1C "around 10".  Does not recall previous level of control    Episodes of Hypoglycemia:  recent hypos while in the hospital.  may be due to recent weight loss of abput 20 lbs (as per pt) and eating less food than he had been at home    Outpatient Endocrinologist?  yes does not recall name    PAST MEDICAL & SURGICAL HISTORY:  DM (diabetes mellitus): DM 1  Stenosis of popliteal artery: Left stent placed 2017    FAMILY HISTORY:      SH:  Smoking  denies  Etoh: denies  Recreational Drugs: denies    Home Medications:  acetaminophen 325 mg oral tablet: 3 tab(s) orally every 6 hours (30 Dec 2019 08:23)  amLODIPine 5 mg oral tablet: 1 tab(s) orally once a day (30 Dec 2019 08:23)  gabapentin 100 mg oral capsule: 2 cap(s) orally every 8 hours (30 Dec 2019 08:23)  HumaLOG 100 units/mL injectable solution: 5 unit(s) subcutaneous 3 times a day (before meals) (31 Dec 2019 14:40)  insulin glargine: 20 unit(s) subcutaneous once a day (at bedtime) (31 Dec 2019 12:36)  insulin lispro: 5 unit(s) subcutaneous 4 times a day (before meals and at bedtime) (30 Dec 2019 10:13)  insulin lispro: 0 Unit(s) if Glucose 0 - 250  1 Unit(s) if Glucose 251 - 300  2 Unit(s) if Glucose 301 - 350  3 Unit(s) if Glucose 351 - 400  4 Unit(s) if Glucose GREATER THAN 400    Give correctional scale insulin REGARDLESS of PO status NOTIFY Provider for blood glucose LESS THAN 70 milliGRAM(s)/deciLiter or GREATER THAN 400 milliGRAM(s)/deciliter. (30 Dec 2019 10:15)  oxyCODONE 10 mg oral tablet: 1 tab(s) orally every 4 hours, As needed, Severe Pain (7 - 10) (30 Dec 2019 08:23)      Current (Non-Endocrine) Meds:  acetaminophen   Tablet .. 650 milliGRAM(s) Oral every 6 hours PRN  amLODIPine   Tablet 5 milliGRAM(s) Oral daily  dextrose 5%. 1000 milliLiter(s) IV Continuous <Continuous>  gabapentin 200 milliGRAM(s) Oral three times a day  heparin  Injectable 5000 Unit(s) SubCutaneous every 12 hours      Current Endocrine Meds:   dextrose 40% Gel 15 Gram(s) Oral once PRN  dextrose 50% Injectable 12.5 Gram(s) IV Push once  dextrose 50% Injectable 25 Gram(s) IV Push once  dextrose 50% Injectable 25 Gram(s) IV Push once  glucagon  Injectable 1 milliGRAM(s) IntraMuscular once PRN  insulin glargine Injectable (LANTUS) 20 Unit(s) SubCutaneous at bedtime  insulin lispro (HumaLOG) corrective regimen sliding scale   SubCutaneous three times a day before meals  insulin lispro (HumaLOG) corrective regimen sliding scale   SubCutaneous at bedtime  insulin lispro Injectable (HumaLOG) 5 Unit(s) SubCutaneous three times a day before meals      History of Medication Compliance:  reports never missing insulin doses     Allergies:  No Known Allergies      Height, weight BMI  Height (cm): 182.9 (12-31 @ 16:25)  Weight (kg): 73.9 (12-31 @ 16:25)  BMI (kg/m2): 22.1 (12-31 @ 16:25)    Vital Signs Last 24 Hrs  T(C): 36.7 (02 Jan 2020 07:32), Max: 36.7 (01 Jan 2020 22:08)  T(F): 98 (02 Jan 2020 07:32), Max: 98 (01 Jan 2020 22:08)  HR: 68 (02 Jan 2020 07:32) (68 - 94)  BP: 122/78 (02 Jan 2020 07:32) (112/71 - 122/78)  BP(mean): --  RR: 14 (02 Jan 2020 07:32) (14 - 14)  SpO2: 99% (02 Jan 2020 07:32) (97% - 99%)    Ext: Right BKA  Feet:  left foot dry and rough, scar from previous surgery.  Reports hyperbaric tx for prior foot wound on this foot  Psychiatric: A&O x 3, normal affect/mood.    POCT Blood Glucose.: 189 mg/dL (01-02-20 @ 08:10)  POCT Blood Glucose.: 78 mg/dL (01-02-20 @ 06:29)  POCT Blood Glucose.: 57 mg/dL (01-02-20 @ 06:10)  POCT Blood Glucose.: 116 mg/dL (01-01-20 @ 22:04)  POCT Blood Glucose.: 172 mg/dL (01-01-20 @ 17:20)  POCT Blood Glucose.: 155 mg/dL (01-01-20 @ 14:41)  POCT Blood Glucose.: 88 mg/dL (01-01-20 @ 12:36)  POCT Blood Glucose.: 75 mg/dL (01-01-20 @ 11:53)  POCT Blood Glucose.: 130 mg/dL (01-01-20 @ 07:29)  POCT Blood Glucose.: 213 mg/dL (12-31-19 @ 22:19)  POCT Blood Glucose.: 188 mg/dL (12-31-19 @ 17:22)  POCT Blood Glucose.: 221 mg/dL (12-31-19 @ 11:30)  POCT Blood Glucose.: 178 mg/dL (12-31-19 @ 09:46)  POCT Blood Glucose.: 134 mg/dL (12-31-19 @ 09:28)  POCT Blood Glucose.: 70 mg/dL (12-31-19 @ 09:06)  POCT Blood Glucose.: 49 mg/dL (12-31-19 @ 08:39)  POCT Blood Glucose.: 55 mg/dL (12-31-19 @ 08:37)  POCT Blood Glucose.: 319 mg/dL (12-30-19 @ 22:07)  POCT Blood Glucose.: 192 mg/dL (12-30-19 @ 17:45)  POCT Blood Glucose.: 79 mg/dL (12-30-19 @ 17:20)  POCT Blood Glucose.: 165 mg/dL (12-30-19 @ 12:11)    LABS:                        8.9    3.54  )-----------( 376      ( 01 Jan 2020 05:30 )             28.3     01-01    137  |  104  |  32<H>  ----------------------------<  148<H>  3.9   |  30  |  1.43<H>    Ca    8.9      01 Jan 2020 05:30        Hemoglobin A1C, Whole Blood: 12.0 (12-13 @ 18:31)                           Met with:  Pt.  Reports eating less food here than at home.  States he needs more substantial snack at night since dinner is served at 5:00 pm.  Requesting full sandwich rather than 1/2 sandwich which is fine.  Lengthy discussion on importance of of getting a1c into safer range to help prevent complications and aid wound healing.  Instructed to be sure to alert staff immediately of any feeling of hypoglycemia.  No medic alert bracelet - educated on importance.      Education Provided:  Educated on impact of current A1C on long and short term complications of Diabetes.    Recommendations:   For now:  Decrease Glargine from 20 to 18 units at bedtime  Continue Lispro-  5  units three times a day before meals w/ low dose correction  Will continue to monitor     At discharge, recommend: TBD depending on BG readings and insulin requirements.  Pt prefers to be sent home on vial/syringe rather than pens since he is comfortable with that - will likely be started on an insulin pump once he follows w/ endo.  Would greatly benefit from continuous glucose monitoring once discharged.      Pt can follow up at discharge with:  Endocrine follow up appointments scheduled with [facility], [address] [place] [address], [address], [place], [phone]  1/21/20 @ 230 PM with Diabetes educator  4/20/20 @ 5 PM with Dr. Pena

## 2020-01-02 NOTE — DIETITIAN INITIAL EVALUATION ADULT. - ENERGY NEEDS
Height: 72Inches   Weight: 172lb   Body Mass Index (BMI): N/A   Ideal Body Weight Range: 167lb (+/-10%)   Percent Ideal Body Weight ~97%

## 2020-01-03 LAB
ANION GAP SERPL CALC-SCNC: 7 MMOL/L — SIGNIFICANT CHANGE UP (ref 5–17)
BUN SERPL-MCNC: 33 MG/DL — HIGH (ref 7–23)
CALCIUM SERPL-MCNC: 9 MG/DL — SIGNIFICANT CHANGE UP (ref 8.4–10.5)
CHLORIDE SERPL-SCNC: 103 MMOL/L — SIGNIFICANT CHANGE UP (ref 96–108)
CO2 SERPL-SCNC: 28 MMOL/L — SIGNIFICANT CHANGE UP (ref 22–31)
CREAT SERPL-MCNC: 1.44 MG/DL — HIGH (ref 0.5–1.3)
GLUCOSE BLDC GLUCOMTR-MCNC: 115 MG/DL — HIGH (ref 70–99)
GLUCOSE BLDC GLUCOMTR-MCNC: 140 MG/DL — HIGH (ref 70–99)
GLUCOSE BLDC GLUCOMTR-MCNC: 314 MG/DL — HIGH (ref 70–99)
GLUCOSE BLDC GLUCOMTR-MCNC: 62 MG/DL — LOW (ref 70–99)
GLUCOSE BLDC GLUCOMTR-MCNC: 62 MG/DL — LOW (ref 70–99)
GLUCOSE BLDC GLUCOMTR-MCNC: 73 MG/DL — SIGNIFICANT CHANGE UP (ref 70–99)
GLUCOSE BLDC GLUCOMTR-MCNC: 82 MG/DL — SIGNIFICANT CHANGE UP (ref 70–99)
GLUCOSE BLDC GLUCOMTR-MCNC: 83 MG/DL — SIGNIFICANT CHANGE UP (ref 70–99)
GLUCOSE SERPL-MCNC: 152 MG/DL — HIGH (ref 70–99)
HCT VFR BLD CALC: 27.9 % — LOW (ref 39–50)
HGB BLD-MCNC: 8.7 G/DL — LOW (ref 13–17)
MCHC RBC-ENTMCNC: 27.3 PG — SIGNIFICANT CHANGE UP (ref 27–34)
MCHC RBC-ENTMCNC: 31.2 GM/DL — LOW (ref 32–36)
MCV RBC AUTO: 87.5 FL — SIGNIFICANT CHANGE UP (ref 80–100)
NRBC # BLD: 0 /100 WBCS — SIGNIFICANT CHANGE UP (ref 0–0)
PLATELET # BLD AUTO: 340 K/UL — SIGNIFICANT CHANGE UP (ref 150–400)
POTASSIUM SERPL-MCNC: 3.9 MMOL/L — SIGNIFICANT CHANGE UP (ref 3.5–5.3)
POTASSIUM SERPL-SCNC: 3.9 MMOL/L — SIGNIFICANT CHANGE UP (ref 3.5–5.3)
RBC # BLD: 3.19 M/UL — LOW (ref 4.2–5.8)
RBC # FLD: 13.5 % — SIGNIFICANT CHANGE UP (ref 10.3–14.5)
SODIUM SERPL-SCNC: 138 MMOL/L — SIGNIFICANT CHANGE UP (ref 135–145)
WBC # BLD: 3.34 K/UL — LOW (ref 3.8–10.5)
WBC # FLD AUTO: 3.34 K/UL — LOW (ref 3.8–10.5)

## 2020-01-03 PROCEDURE — 99232 SBSQ HOSP IP/OBS MODERATE 35: CPT | Mod: GC

## 2020-01-03 RX ORDER — INSULIN LISPRO 100/ML
3 VIAL (ML) SUBCUTANEOUS ONCE
Refills: 0 | Status: COMPLETED | OUTPATIENT
Start: 2020-01-03 | End: 2020-01-03

## 2020-01-03 RX ADMIN — INSULIN GLARGINE 18 UNIT(S): 100 INJECTION, SOLUTION SUBCUTANEOUS at 21:29

## 2020-01-03 RX ADMIN — Medication 3 UNIT(S): at 12:57

## 2020-01-03 RX ADMIN — Medication 4: at 16:59

## 2020-01-03 RX ADMIN — Medication 650 MILLIGRAM(S): at 21:29

## 2020-01-03 RX ADMIN — GABAPENTIN 200 MILLIGRAM(S): 400 CAPSULE ORAL at 06:23

## 2020-01-03 RX ADMIN — Medication 5 UNIT(S): at 07:53

## 2020-01-03 RX ADMIN — Medication 650 MILLIGRAM(S): at 21:36

## 2020-01-03 RX ADMIN — AMLODIPINE BESYLATE 5 MILLIGRAM(S): 2.5 TABLET ORAL at 06:23

## 2020-01-03 RX ADMIN — Medication 650 MILLIGRAM(S): at 06:23

## 2020-01-03 RX ADMIN — GABAPENTIN 200 MILLIGRAM(S): 400 CAPSULE ORAL at 13:02

## 2020-01-03 RX ADMIN — Medication 5 UNIT(S): at 17:00

## 2020-01-03 RX ADMIN — Medication 650 MILLIGRAM(S): at 06:54

## 2020-01-03 RX ADMIN — Medication 650 MILLIGRAM(S): at 00:33

## 2020-01-03 RX ADMIN — GABAPENTIN 200 MILLIGRAM(S): 400 CAPSULE ORAL at 21:30

## 2020-01-03 NOTE — PROGRESS NOTE ADULT - ASSESSMENT
Pt is a 50 y/o M with PMHx of DM who presented to [facility] on 12/12/19 c/o worsening RLE infection and found to have necrotizing soft tissue infection of the RLE, admitted to SICU for resuscitation and management of severe sepsis, s/p emergent guillotine Rt caroline BKA.    #Right BKA secondary to necrotizing fasciitis  - PT/OT  - Continue knee immobilizer at night  - Pain control  - Wound change daily with sterile 4x4 gauze and wrap with kerlix  - off antibiotics for the nec fas. Monitor incision site.   - Neuropsychology consult for coping    #DM1   - Lantus 18units at bedtime  - Humalog 5 units before meals  - ISS  - Diabetes educator    #HTN  - Norvasc 5mg daily    #Pain   - Tylenol PRN  - Gabapentin 200mg tid    #Diet  - Consistent Carb with evening snack and Glucerna shake    #DVT PPX   - heparin subq

## 2020-01-03 NOTE — PROGRESS NOTE ADULT - SUBJECTIVE AND OBJECTIVE BOX
HPI:  Pt is a 48 y/o M with PMHx of DM who presented to [facility] on 12/12/19 c/o worsening RLE infection and found to have necrotizing soft tissue infection of the RLE, admitted to SICU for resuscitation and management of severe sepsis, s/p emergent guillotine Rt caroline GROVES.    SUBJECTIVE / INTERVAL HPI: Patient seen and examined at bedside. Pt is feeling well. He is not complaining of pain and is doing well in therapy. He feels that the knee immobilizer is a little too big and uncomfortably. He is having regular bowel movements and urinating.     REVIEW OF SYSTEMS:    CONSTITUTIONAL: No weakness, fevers or chills  EYES/ENT: No visual changes;  No vertigo or throat pain   NECK: No pain or stiffness  RESPIRATORY: No cough, wheezing, or shortness of breath  CARDIOVASCULAR: No chest pain or palpitations  GASTROINTESTINAL: No abdominal or epigastric pain. No nausea or vomiting. No diarrhea or constipation.   GENITOURINARY: No dysuria, frequency or hematuria  NEUROLOGICAL: No numbness or weakness  SKIN: No itching, rashes  MSK: +right BKA      VITAL SIGNS:  Vital Signs Last 24 Hrs  T(C): 36.4 (03 Jan 2020 08:27), Max: 36.8 (02 Jan 2020 20:16)  T(F): 97.6 (03 Jan 2020 08:27), Max: 98.3 (02 Jan 2020 20:16)  HR: 92 (03 Jan 2020 08:27) (83 - 99)  BP: 117/72 (03 Jan 2020 08:27) (113/67 - 134/68)  BP(mean): --  RR: 15 (03 Jan 2020 08:27) (12 - 15)  SpO2: 95% (03 Jan 2020 08:27) (95% - 96%)    PHYSICAL EXAM:    General: NAD, sitting comfortably in wheelchair with knee immobilizer on right leg  Cardiovascular: warm and well perfused  Respiratory: no respiratory distress, no wheezing  Gastrointestinal: soft, NT/ND;   Extremities: no edema, clubbing or cyanosis, Right BKA wrapped in ACE wrap, good shape  Neurological: AAOx3; no focal deficits    MEDICATIONS:  MEDICATIONS  (STANDING):  amLODIPine   Tablet 5 milliGRAM(s) Oral daily  dextrose 5%. 1000 milliLiter(s) (50 mL/Hr) IV Continuous <Continuous>  dextrose 50% Injectable 12.5 Gram(s) IV Push once  dextrose 50% Injectable 25 Gram(s) IV Push once  dextrose 50% Injectable 25 Gram(s) IV Push once  gabapentin 200 milliGRAM(s) Oral three times a day  heparin  Injectable 5000 Unit(s) SubCutaneous every 12 hours  insulin glargine Injectable (LANTUS) 18 Unit(s) SubCutaneous at bedtime  insulin lispro (HumaLOG) corrective regimen sliding scale   SubCutaneous three times a day before meals  insulin lispro (HumaLOG) corrective regimen sliding scale   SubCutaneous at bedtime  insulin lispro Injectable (HumaLOG) 5 Unit(s) SubCutaneous three times a day before meals    MEDICATIONS  (PRN):  acetaminophen   Tablet .. 650 milliGRAM(s) Oral every 6 hours PRN Temp greater or equal to 38C (100.4F), Mild Pain (1 - 3)  dextrose 40% Gel 15 Gram(s) Oral once PRN Blood Glucose LESS THAN 70 milliGRAM(s)/deciliter  glucagon  Injectable 1 milliGRAM(s) IntraMuscular once PRN Glucose LESS THAN 70 milligrams/deciliter      ALLERGIES:  Allergies    No Known Allergies    Intolerances        LABS:                        8.7    3.34  )-----------( 340      ( 03 Jan 2020 05:52 )             27.9     01-03    138  |  103  |  33<H>  ----------------------------<  152<H>  3.9   |  28  |  1.44<H>    Ca    9.0      03 Jan 2020 05:52          CAPILLARY BLOOD GLUCOSE      POCT Blood Glucose.: 82 mg/dL (03 Jan 2020 11:37)      RADIOLOGY & ADDITIONAL TESTS: Reviewed.

## 2020-01-04 LAB
GLUCOSE BLDC GLUCOMTR-MCNC: 133 MG/DL — HIGH (ref 70–99)
GLUCOSE BLDC GLUCOMTR-MCNC: 269 MG/DL — HIGH (ref 70–99)
GLUCOSE BLDC GLUCOMTR-MCNC: 275 MG/DL — HIGH (ref 70–99)
GLUCOSE BLDC GLUCOMTR-MCNC: 74 MG/DL — SIGNIFICANT CHANGE UP (ref 70–99)

## 2020-01-04 PROCEDURE — 99232 SBSQ HOSP IP/OBS MODERATE 35: CPT | Mod: GC

## 2020-01-04 RX ADMIN — INSULIN GLARGINE 18 UNIT(S): 100 INJECTION, SOLUTION SUBCUTANEOUS at 22:01

## 2020-01-04 RX ADMIN — Medication 1: at 22:01

## 2020-01-04 RX ADMIN — Medication 650 MILLIGRAM(S): at 23:23

## 2020-01-04 RX ADMIN — Medication 5 UNIT(S): at 12:29

## 2020-01-04 RX ADMIN — Medication 5 UNIT(S): at 17:33

## 2020-01-04 RX ADMIN — GABAPENTIN 200 MILLIGRAM(S): 400 CAPSULE ORAL at 14:11

## 2020-01-04 RX ADMIN — Medication 3: at 08:25

## 2020-01-04 RX ADMIN — GABAPENTIN 200 MILLIGRAM(S): 400 CAPSULE ORAL at 06:06

## 2020-01-04 RX ADMIN — Medication 650 MILLIGRAM(S): at 22:01

## 2020-01-04 RX ADMIN — GABAPENTIN 200 MILLIGRAM(S): 400 CAPSULE ORAL at 22:01

## 2020-01-04 RX ADMIN — AMLODIPINE BESYLATE 5 MILLIGRAM(S): 2.5 TABLET ORAL at 06:06

## 2020-01-04 RX ADMIN — Medication 5 UNIT(S): at 08:25

## 2020-01-04 NOTE — PROGRESS NOTE ADULT - SUBJECTIVE AND OBJECTIVE BOX
HPI:  Pt is a 50 y/o M with PMHx of DM who presented to [facility] on 12/12/19 c/o worsening RLE infection and found to have necrotizing soft tissue infection of the RLE, admitted to SICU for resuscitation and management of severe sepsis, s/p emergent caroline Rt caroline GROVES.    SUBJECTIVE / INTERVAL HPI: Patient seen and examined at bedside. Pt is feeling well. He is not complaining of pain and is doing well in therapy. several episodes of hypoglycemia yesterday.      REVIEW OF SYSTEMS:    CONSTITUTIONAL: No weakness, fevers or chills  EYES/ENT: No visual changes;  No vertigo or throat pain   NECK: No pain or stiffness  RESPIRATORY: No cough, wheezing, or shortness of breath  CARDIOVASCULAR: No chest pain or palpitations  GASTROINTESTINAL: No abdominal or epigastric pain. No nausea or vomiting. No diarrhea or constipation.   GENITOURINARY: No dysuria, frequency or hematuria  NEUROLOGICAL: No numbness or weakness  SKIN: No itching, rashes  MSK: +right BKA      MEDICATIONS  (STANDING):  amLODIPine   Tablet 5 milliGRAM(s) Oral daily  dextrose 5%. 1000 milliLiter(s) (50 mL/Hr) IV Continuous <Continuous>  dextrose 50% Injectable 12.5 Gram(s) IV Push once  dextrose 50% Injectable 25 Gram(s) IV Push once  dextrose 50% Injectable 25 Gram(s) IV Push once  gabapentin 200 milliGRAM(s) Oral three times a day  heparin  Injectable 5000 Unit(s) SubCutaneous every 12 hours  insulin glargine Injectable (LANTUS) 18 Unit(s) SubCutaneous at bedtime  insulin lispro (HumaLOG) corrective regimen sliding scale   SubCutaneous three times a day before meals  insulin lispro (HumaLOG) corrective regimen sliding scale   SubCutaneous at bedtime  insulin lispro Injectable (HumaLOG) 5 Unit(s) SubCutaneous three times a day before meals    MEDICATIONS  (PRN):  acetaminophen   Tablet .. 650 milliGRAM(s) Oral every 6 hours PRN Temp greater or equal to 38C (100.4F), Mild Pain (1 - 3)  dextrose 40% Gel 15 Gram(s) Oral once PRN Blood Glucose LESS THAN 70 milliGRAM(s)/deciliter  glucagon  Injectable 1 milliGRAM(s) IntraMuscular once PRN Glucose LESS THAN 70 milligrams/deciliter      Vital Signs Last 24 Hrs  T(C): 36.9 (04 Jan 2020 08:28), Max: 36.9 (04 Jan 2020 08:28)  T(F): 98.5 (04 Jan 2020 08:28), Max: 98.5 (04 Jan 2020 08:28)  HR: 88 (04 Jan 2020 08:28) (80 - 88)  BP: 109/72 (04 Jan 2020 06:08) (109/72 - 116/70)  BP(mean): --  RR: 14 (04 Jan 2020 08:28) (14 - 15)  SpO2: 99% (04 Jan 2020 08:28) (95% - 99%)  PHYSICAL EXAM:    General: NAD, sitting comfortably in wheelchair with knee immobilizer on right leg  Cardiovascular: warm and well perfused  Respiratory: no respiratory distress, no wheezing  Gastrointestinal: soft, NT/ND;   Extremities: no edema, clubbing or cyanosis, Right BKA wrapped in ACE wrap, good shape  Neurological: AAOx3; no focal deficits

## 2020-01-04 NOTE — PROGRESS NOTE ADULT - ASSESSMENT
Pt is a 48 y/o M with PMHx of DM who presented to [facility] on 12/12/19 c/o worsening RLE infection and found to have necrotizing soft tissue infection of the RLE, admitted to SICU for resuscitation and management of severe sepsis, s/p emergent guillotine Rt caroline BKA.    #Right BKA secondary to necrotizing fasciitis  - PT/OT  - Continue knee immobilizer at night  - Pain control  - Wound change daily with sterile 4x4 gauze and wrap with kerlix  - off antibiotics for the nec fas. Monitor incision site.   - Neuropsychology consult for coping    #DM1   - Lantus 18units at bedtime  - Humalog 5 units before meals  - ISS  - Diabetes educator  -endocrine consult for high/low glucose     #HTN  - Norvasc 5mg daily    #Pain   - Tylenol PRN  - Gabapentin 200mg tid    #Diet  - Consistent Carb with evening snack and Glucerna shake    #DVT PPX   - heparin subq

## 2020-01-05 LAB
GLUCOSE BLDC GLUCOMTR-MCNC: 183 MG/DL — HIGH (ref 70–99)
GLUCOSE BLDC GLUCOMTR-MCNC: 203 MG/DL — HIGH (ref 70–99)
GLUCOSE BLDC GLUCOMTR-MCNC: 82 MG/DL — SIGNIFICANT CHANGE UP (ref 70–99)
GLUCOSE BLDC GLUCOMTR-MCNC: 86 MG/DL — SIGNIFICANT CHANGE UP (ref 70–99)

## 2020-01-05 PROCEDURE — 99232 SBSQ HOSP IP/OBS MODERATE 35: CPT | Mod: GC

## 2020-01-05 RX ADMIN — INSULIN GLARGINE 18 UNIT(S): 100 INJECTION, SOLUTION SUBCUTANEOUS at 21:40

## 2020-01-05 RX ADMIN — GABAPENTIN 200 MILLIGRAM(S): 400 CAPSULE ORAL at 13:22

## 2020-01-05 RX ADMIN — GABAPENTIN 200 MILLIGRAM(S): 400 CAPSULE ORAL at 08:36

## 2020-01-05 RX ADMIN — AMLODIPINE BESYLATE 5 MILLIGRAM(S): 2.5 TABLET ORAL at 08:36

## 2020-01-05 RX ADMIN — Medication 5 UNIT(S): at 08:34

## 2020-01-05 RX ADMIN — Medication 5 UNIT(S): at 12:11

## 2020-01-05 RX ADMIN — Medication 2: at 17:22

## 2020-01-05 RX ADMIN — GABAPENTIN 200 MILLIGRAM(S): 400 CAPSULE ORAL at 21:40

## 2020-01-05 RX ADMIN — Medication 5 UNIT(S): at 17:23

## 2020-01-05 RX ADMIN — Medication 650 MILLIGRAM(S): at 13:24

## 2020-01-05 NOTE — PROGRESS NOTE ADULT - ASSESSMENT
Pt is a 50 y/o M with PMHx of DM who presented to [facility] on 12/12/19 c/o worsening RLE infection and found to have necrotizing soft tissue infection of the RLE, admitted to SICU for resuscitation and management of severe sepsis, s/p emergent guillotine Rt caroline BKA.    #Right BKA secondary to necrotizing fasciitis  - PT/OT  - Continue knee immobilizer at night  - Pain control  - Wound change daily with sterile 4x4 gauze and wrap with kerlix  - off antibiotics for the nec fas. Monitor incision site.   - Neuropsychology consult for coping    #DM1   - Lantus 18units at bedtime  - Humalog 5 units before meals  - ISS  - Diabetes educator  -endocrine consult for high/low glucose     #HTN  - Norvasc 5mg daily    #Pain   - Tylenol PRN  - Gabapentin 200mg tid    #Diet  - Consistent Carb with evening snack and Glucerna shake    #DVT PPX   - heparin subq

## 2020-01-05 NOTE — PROGRESS NOTE ADULT - SUBJECTIVE AND OBJECTIVE BOX
HPI:  Pt is a 50 y/o M with PMHx of DM who presented to [facility] on 12/12/19 c/o worsening RLE infection and found to have necrotizing soft tissue infection of the RLE, admitted to SICU for resuscitation and management of severe sepsis, s/p emergent caroline Rt caroline GROVES.    SUBJECTIVE / INTERVAL HPI: Patient seen and examined at bedside. Pt is feeling well. He is not complaining of pain and is doing well in therapy     REVIEW OF SYSTEMS:    CONSTITUTIONAL: No weakness, fevers or chills  EYES/ENT: No visual changes;  No vertigo or throat pain   NECK: No pain or stiffness  RESPIRATORY: No cough, wheezing, or shortness of breath  CARDIOVASCULAR: No chest pain or palpitations  GASTROINTESTINAL: No abdominal or epigastric pain. No nausea or vomiting. No diarrhea or constipation.   GENITOURINARY: No dysuria, frequency or hematuria  NEUROLOGICAL: No numbness or weakness  SKIN: No itching, rashes  MSK: +right BKA      MEDICATIONS  (STANDING):  amLODIPine   Tablet 5 milliGRAM(s) Oral daily  dextrose 5%. 1000 milliLiter(s) (50 mL/Hr) IV Continuous <Continuous>  dextrose 50% Injectable 12.5 Gram(s) IV Push once  dextrose 50% Injectable 25 Gram(s) IV Push once  dextrose 50% Injectable 25 Gram(s) IV Push once  gabapentin 200 milliGRAM(s) Oral three times a day  heparin  Injectable 5000 Unit(s) SubCutaneous every 12 hours  insulin glargine Injectable (LANTUS) 18 Unit(s) SubCutaneous at bedtime  insulin lispro (HumaLOG) corrective regimen sliding scale   SubCutaneous three times a day before meals  insulin lispro (HumaLOG) corrective regimen sliding scale   SubCutaneous at bedtime  insulin lispro Injectable (HumaLOG) 5 Unit(s) SubCutaneous three times a day before meals    MEDICATIONS  (PRN):  acetaminophen   Tablet .. 650 milliGRAM(s) Oral every 6 hours PRN Temp greater or equal to 38C (100.4F), Mild Pain (1 - 3)  dextrose 40% Gel 15 Gram(s) Oral once PRN Blood Glucose LESS THAN 70 milliGRAM(s)/deciliter  glucagon  Injectable 1 milliGRAM(s) IntraMuscular once PRN Glucose LESS THAN 70 milligrams/deciliter      Vital Signs Last 24 Hrs  T(C): 36.9 (05 Jan 2020 08:23), Max: 36.9 (05 Jan 2020 08:23)  T(F): 98.4 (05 Jan 2020 08:23), Max: 98.4 (05 Jan 2020 08:23)  HR: 90 (05 Jan 2020 08:23) (82 - 90)  BP: 103/64 (05 Jan 2020 08:23) (103/64 - 115/74)  BP(mean): --  RR: 15 (05 Jan 2020 08:23) (14 - 15)  SpO2: 99% (04 Jan 2020 22:03) (99% - 99%)    General: NAD, sitting comfortably in wheelchair with knee immobilizer on right leg  Cardiovascular: warm and well perfused  Respiratory: no respiratory distress, no wheezing  Gastrointestinal: soft, NT/ND;   Extremities: no edema, clubbing or cyanosis, Right BKA wrapped in ACE wrap, good shape  Neurological: AAOx3; no focal deficits

## 2020-01-06 LAB
ANION GAP SERPL CALC-SCNC: 8 MMOL/L — SIGNIFICANT CHANGE UP (ref 5–17)
BUN SERPL-MCNC: 32 MG/DL — HIGH (ref 7–23)
CALCIUM SERPL-MCNC: 8.8 MG/DL — SIGNIFICANT CHANGE UP (ref 8.4–10.5)
CHLORIDE SERPL-SCNC: 100 MMOL/L — SIGNIFICANT CHANGE UP (ref 96–108)
CO2 SERPL-SCNC: 27 MMOL/L — SIGNIFICANT CHANGE UP (ref 22–31)
CREAT SERPL-MCNC: 1.59 MG/DL — HIGH (ref 0.5–1.3)
GLUCOSE BLDC GLUCOMTR-MCNC: 139 MG/DL — HIGH (ref 70–99)
GLUCOSE BLDC GLUCOMTR-MCNC: 176 MG/DL — HIGH (ref 70–99)
GLUCOSE BLDC GLUCOMTR-MCNC: 298 MG/DL — HIGH (ref 70–99)
GLUCOSE BLDC GLUCOMTR-MCNC: 71 MG/DL — SIGNIFICANT CHANGE UP (ref 70–99)
GLUCOSE SERPL-MCNC: 333 MG/DL — HIGH (ref 70–99)
HCT VFR BLD CALC: 29.3 % — LOW (ref 39–50)
HGB BLD-MCNC: 9.6 G/DL — LOW (ref 13–17)
MCHC RBC-ENTMCNC: 28.6 PG — SIGNIFICANT CHANGE UP (ref 27–34)
MCHC RBC-ENTMCNC: 32.8 GM/DL — SIGNIFICANT CHANGE UP (ref 32–36)
MCV RBC AUTO: 87.2 FL — SIGNIFICANT CHANGE UP (ref 80–100)
NRBC # BLD: 0 /100 WBCS — SIGNIFICANT CHANGE UP (ref 0–0)
PLATELET # BLD AUTO: 317 K/UL — SIGNIFICANT CHANGE UP (ref 150–400)
POTASSIUM SERPL-MCNC: 4.6 MMOL/L — SIGNIFICANT CHANGE UP (ref 3.5–5.3)
POTASSIUM SERPL-SCNC: 4.6 MMOL/L — SIGNIFICANT CHANGE UP (ref 3.5–5.3)
RBC # BLD: 3.36 M/UL — LOW (ref 4.2–5.8)
RBC # FLD: 13.8 % — SIGNIFICANT CHANGE UP (ref 10.3–14.5)
SODIUM SERPL-SCNC: 135 MMOL/L — SIGNIFICANT CHANGE UP (ref 135–145)
WBC # BLD: 4.33 K/UL — SIGNIFICANT CHANGE UP (ref 3.8–10.5)
WBC # FLD AUTO: 4.33 K/UL — SIGNIFICANT CHANGE UP (ref 3.8–10.5)

## 2020-01-06 PROCEDURE — 99232 SBSQ HOSP IP/OBS MODERATE 35: CPT | Mod: GC

## 2020-01-06 RX ADMIN — AMLODIPINE BESYLATE 5 MILLIGRAM(S): 2.5 TABLET ORAL at 06:49

## 2020-01-06 RX ADMIN — GABAPENTIN 200 MILLIGRAM(S): 400 CAPSULE ORAL at 06:21

## 2020-01-06 RX ADMIN — Medication 3: at 08:22

## 2020-01-06 RX ADMIN — GABAPENTIN 200 MILLIGRAM(S): 400 CAPSULE ORAL at 21:03

## 2020-01-06 RX ADMIN — Medication 5 UNIT(S): at 08:23

## 2020-01-06 RX ADMIN — INSULIN GLARGINE 18 UNIT(S): 100 INJECTION, SOLUTION SUBCUTANEOUS at 21:03

## 2020-01-06 RX ADMIN — Medication 1: at 17:42

## 2020-01-06 RX ADMIN — Medication 5 UNIT(S): at 12:38

## 2020-01-06 RX ADMIN — GABAPENTIN 200 MILLIGRAM(S): 400 CAPSULE ORAL at 15:03

## 2020-01-06 RX ADMIN — Medication 5 UNIT(S): at 17:43

## 2020-01-06 NOTE — PROGRESS NOTE ADULT - SUBJECTIVE AND OBJECTIVE BOX
HPI:  Pt is a 48 y/o M with PMHx of DM who presented to [facility] on 12/12/19 c/o worsening RLE infection and found to have necrotizing soft tissue infection of the RLE, admitted to SICU for resuscitation and management of severe sepsis, s/p emergent guillotine Rt caroline CHERRYDAWNA.    SUBJECTIVE / INTERVAL HPI: Patient seen and examined at bedside. Pt is doing well. He does not have pain in the amputation site and he is tolerating therapy well. His only complaint is the heparin shots, which we will stop as he is mobile in his room.     REVIEW OF SYSTEMS:    CONSTITUTIONAL: No weakness, fevers or chills  RESPIRATORY: No cough, wheezing, or shortness of breath  CARDIOVASCULAR: No chest pain or palpitations  GASTROINTESTINAL: No abdominal or epigastric pain. No nausea or vomiting. No diarrhea or constipation.   GENITOURINARY: No dysuria, frequency or hematuria  NEUROLOGICAL: No numbness or weakness  SKIN: No itching, rashes  MSK: +right BKA      VITAL SIGNS:  Vital Signs Last 24 Hrs  T(C): 36.7 (06 Jan 2020 08:26), Max: 36.9 (05 Jan 2020 20:27)  T(F): 98.1 (06 Jan 2020 08:26), Max: 98.4 (05 Jan 2020 20:27)  HR: 94 (06 Jan 2020 08:26) (93 - 98)  BP: 114/72 (06 Jan 2020 08:26) (114/72 - 144/86)  BP(mean): --  RR: 14 (06 Jan 2020 08:26) (14 - 14)  SpO2: 99% (06 Jan 2020 08:26) (98% - 99%)    PHYSICAL EXAM:    General: NAD, sitting up at edge of bed comfortably  Cardiovascular: +S1/S2, RRR  Respiratory: CTA B/L; breathing comfortably  Gastrointestinal: soft, NT/ND;  Extremities: warm, well perfused; right BKA wrapped in ACE wrap  Neurological: AAOx3; no focal deficits    MEDICATIONS:  MEDICATIONS  (STANDING):  amLODIPine   Tablet 5 milliGRAM(s) Oral daily  dextrose 5%. 1000 milliLiter(s) (50 mL/Hr) IV Continuous <Continuous>  dextrose 50% Injectable 12.5 Gram(s) IV Push once  dextrose 50% Injectable 25 Gram(s) IV Push once  dextrose 50% Injectable 25 Gram(s) IV Push once  gabapentin 200 milliGRAM(s) Oral three times a day  heparin  Injectable 5000 Unit(s) SubCutaneous every 12 hours  insulin glargine Injectable (LANTUS) 18 Unit(s) SubCutaneous at bedtime  insulin lispro (HumaLOG) corrective regimen sliding scale   SubCutaneous three times a day before meals  insulin lispro (HumaLOG) corrective regimen sliding scale   SubCutaneous at bedtime  insulin lispro Injectable (HumaLOG) 5 Unit(s) SubCutaneous three times a day before meals    MEDICATIONS  (PRN):  acetaminophen   Tablet .. 650 milliGRAM(s) Oral every 6 hours PRN Temp greater or equal to 38C (100.4F), Mild Pain (1 - 3)  dextrose 40% Gel 15 Gram(s) Oral once PRN Blood Glucose LESS THAN 70 milliGRAM(s)/deciliter  glucagon  Injectable 1 milliGRAM(s) IntraMuscular once PRN Glucose LESS THAN 70 milligrams/deciliter      ALLERGIES:  Allergies    No Known Allergies    Intolerances        LABS:                        9.6    4.33  )-----------( 317      ( 06 Jan 2020 07:00 )             29.3     01-06    135  |  100  |  32<H>  ----------------------------<  333<H>  4.6   |  27  |  1.59<H>    Ca    8.8      06 Jan 2020 07:00          CAPILLARY BLOOD GLUCOSE      POCT Blood Glucose.: 71 mg/dL (06 Jan 2020 12:12)      RADIOLOGY & ADDITIONAL TESTS: Reviewed.

## 2020-01-06 NOTE — PROGRESS NOTE ADULT - ASSESSMENT
Pt is a 50 y/o M with PMHx of DM who presented to [facility] on 12/12/19 c/o worsening RLE infection and found to have necrotizing soft tissue infection of the RLE, admitted to SICU for resuscitation and management of severe sepsis, s/p emergent guillotine Rt caroline BKA.    #Right BKA secondary to necrotizing fasciitis  - PT/OT  - Continue knee immobilizer at night  - Pain control  - Wound change daily with sterile 4x4 gauze and wrap with kerlix  - off antibiotics for the nec fas. Monitor incision site.   - Neuropsychology consult for coping    #DM1   - Lantus 18units at bedtime  - Humalog 5 units before meals  - ISS  - Diabetes educator  -endocrine consult for high/low glucose     #HTN  - Norvasc 5mg daily    #Pain   - Tylenol PRN  - Gabapentin 200mg tid    #Diet  - Consistent Carb with evening snack and Glucerna shake    #DVT PPX   - heparin subq - will stop as pt has been refusing and he is mobile

## 2020-01-07 LAB
GLUCOSE BLDC GLUCOMTR-MCNC: 116 MG/DL — HIGH (ref 70–99)
GLUCOSE BLDC GLUCOMTR-MCNC: 220 MG/DL — HIGH (ref 70–99)
GLUCOSE BLDC GLUCOMTR-MCNC: 287 MG/DL — HIGH (ref 70–99)
GLUCOSE BLDC GLUCOMTR-MCNC: 351 MG/DL — HIGH (ref 70–99)
GLUCOSE BLDC GLUCOMTR-MCNC: 48 MG/DL — LOW (ref 70–99)
GLUCOSE BLDC GLUCOMTR-MCNC: 48 MG/DL — LOW (ref 70–99)
GLUCOSE BLDC GLUCOMTR-MCNC: 69 MG/DL — LOW (ref 70–99)

## 2020-01-07 PROCEDURE — 99232 SBSQ HOSP IP/OBS MODERATE 35: CPT | Mod: GC

## 2020-01-07 PROCEDURE — 99233 SBSQ HOSP IP/OBS HIGH 50: CPT

## 2020-01-07 RX ORDER — INSULIN LISPRO 100/ML
5 VIAL (ML) SUBCUTANEOUS
Refills: 0 | Status: DISCONTINUED | OUTPATIENT
Start: 2020-01-07 | End: 2020-01-07

## 2020-01-07 RX ORDER — INSULIN GLARGINE 100 [IU]/ML
15 INJECTION, SOLUTION SUBCUTANEOUS AT BEDTIME
Refills: 0 | Status: DISCONTINUED | OUTPATIENT
Start: 2020-01-07 | End: 2020-01-07

## 2020-01-07 RX ORDER — INSULIN LISPRO 100/ML
3 VIAL (ML) SUBCUTANEOUS
Refills: 0 | Status: DISCONTINUED | OUTPATIENT
Start: 2020-01-07 | End: 2020-01-11

## 2020-01-07 RX ORDER — INSULIN LISPRO 100/ML
1 VIAL (ML) SUBCUTANEOUS
Refills: 0 | Status: DISCONTINUED | OUTPATIENT
Start: 2020-01-07 | End: 2020-01-11

## 2020-01-07 RX ORDER — INSULIN GLARGINE 100 [IU]/ML
18 INJECTION, SOLUTION SUBCUTANEOUS AT BEDTIME
Refills: 0 | Status: DISCONTINUED | OUTPATIENT
Start: 2020-01-07 | End: 2020-01-11

## 2020-01-07 RX ADMIN — Medication 3: at 21:29

## 2020-01-07 RX ADMIN — Medication 3 UNIT(S): at 17:09

## 2020-01-07 RX ADMIN — Medication 3: at 17:08

## 2020-01-07 RX ADMIN — AMLODIPINE BESYLATE 5 MILLIGRAM(S): 2.5 TABLET ORAL at 06:22

## 2020-01-07 RX ADMIN — GABAPENTIN 200 MILLIGRAM(S): 400 CAPSULE ORAL at 21:29

## 2020-01-07 RX ADMIN — Medication 5 UNIT(S): at 07:56

## 2020-01-07 RX ADMIN — Medication 2: at 07:56

## 2020-01-07 RX ADMIN — GABAPENTIN 200 MILLIGRAM(S): 400 CAPSULE ORAL at 06:22

## 2020-01-07 RX ADMIN — GABAPENTIN 200 MILLIGRAM(S): 400 CAPSULE ORAL at 13:37

## 2020-01-07 RX ADMIN — INSULIN GLARGINE 18 UNIT(S): 100 INJECTION, SOLUTION SUBCUTANEOUS at 21:29

## 2020-01-07 NOTE — PROGRESS NOTE ADULT - ASSESSMENT
Pt is a 50 y/o M with PMHx of DM who presented to [facility] on 12/12/19 c/o worsening RLE infection and found to have necrotizing soft tissue infection of the RLE, admitted to SICU for resuscitation and management of severe sepsis, s/p emergent guillotine Rt caroline CHERRYA.    #Right BKA secondary to necrotizing fasciitis  - PT/OT  - Continue knee immobilizer at night  - Pain control  - Wound change daily with sterile 4x4 gauze and wrap with kerlix  - off antibiotics for the nec fas. Monitor incision site.   - Neuropsychology consult for coping    #DM1   - Lantus 18units at bedtime  - Humalog 5 units before meals  - ISS  - Diabetes educator  -endocrine consult for high/low glucose     #HTN  - Norvasc 5mg daily    #Pain   - Tylenol PRN  - Gabapentin 200mg tid    #Diet  - Consistent Carb with evening snack and Glucerna shake    #DVT PPX   - heparin subq - will stop as pt has been refusing and he is mobile    Discharge planning  - Pt discussed in team meeting on 1/7/20. Barriers to discharge include stairs, though pt states that family will be able to help him into the house. Discharge date set for 1/11/20

## 2020-01-07 NOTE — PROGRESS NOTE ADULT - SUBJECTIVE AND OBJECTIVE BOX
HPI:  Pt is a 50 y/o M with PMHx of DM who presented to [facility] on 12/12/19 c/o worsening RLE infection and found to have necrotizing soft tissue infection of the RLE, admitted to SICU for resuscitation and management of severe sepsis, s/p emergent caroline Rt caroline GROVES.    SUBJECTIVE / INTERVAL HPI: Patient seen and examined in PT gym. Pt doing well, he has no complaints. He is working well with walker in PT. He is eager for discharge back home.     REVIEW OF SYSTEMS:    CONSTITUTIONAL: No fevers or chills  EYES/ENT: No visual changes;  No vertigo or throat pain   NECK: No pain or stiffness  RESPIRATORY: No cough, wheezing, or shortness of breath  CARDIOVASCULAR: No chest pain or palpitations  GASTROINTESTINAL: No abdominal or epigastric pain. No nausea or vomiting. No diarrhea or constipation.   GENITOURINARY: No dysuria, frequency or hematuria  NEUROLOGICAL: No numbness or weakness  SKIN: No itching, rashes  MSK: right BKA      VITAL SIGNS:  Vital Signs Last 24 Hrs  T(C): 36.9 (07 Jan 2020 08:03), Max: 36.9 (07 Jan 2020 08:03)  T(F): 98.4 (07 Jan 2020 08:03), Max: 98.4 (07 Jan 2020 08:03)  HR: 100 (07 Jan 2020 08:03) (90 - 100)  BP: 100/67 (07 Jan 2020 08:03) (100/67 - 130/78)  BP(mean): --  RR: 15 (07 Jan 2020 08:03) (14 - 15)  SpO2: 98% (07 Jan 2020 08:03) (96% - 98%)    PHYSICAL EXAM:    General: NAD, walking in PT gym with walker  Cardiovascular: warm and well perfused  Respiratory: no respiratory distress, no wheezing  Gastrointestinal: soft, NT/ND;  Extremities: right BKA  Neurological: AAOx3; no focal deficits    MEDICATIONS:  MEDICATIONS  (STANDING):  amLODIPine   Tablet 5 milliGRAM(s) Oral daily  dextrose 5%. 1000 milliLiter(s) (50 mL/Hr) IV Continuous <Continuous>  dextrose 50% Injectable 12.5 Gram(s) IV Push once  dextrose 50% Injectable 25 Gram(s) IV Push once  dextrose 50% Injectable 25 Gram(s) IV Push once  gabapentin 200 milliGRAM(s) Oral three times a day  insulin glargine Injectable (LANTUS) 18 Unit(s) SubCutaneous at bedtime  insulin lispro (HumaLOG) corrective regimen sliding scale   SubCutaneous three times a day before meals  insulin lispro (HumaLOG) corrective regimen sliding scale   SubCutaneous at bedtime  insulin lispro Injectable (HumaLOG) 5 Unit(s) SubCutaneous three times a day before meals    MEDICATIONS  (PRN):  acetaminophen   Tablet .. 650 milliGRAM(s) Oral every 6 hours PRN Temp greater or equal to 38C (100.4F), Mild Pain (1 - 3)  dextrose 40% Gel 15 Gram(s) Oral once PRN Blood Glucose LESS THAN 70 milliGRAM(s)/deciliter  glucagon  Injectable 1 milliGRAM(s) IntraMuscular once PRN Glucose LESS THAN 70 milligrams/deciliter      ALLERGIES:  Allergies    No Known Allergies    Intolerances        LABS:                        9.6    4.33  )-----------( 317      ( 06 Jan 2020 07:00 )             29.3     01-06    135  |  100  |  32<H>  ----------------------------<  333<H>  4.6   |  27  |  1.59<H>    Ca    8.8      06 Jan 2020 07:00          CAPILLARY BLOOD GLUCOSE      POCT Blood Glucose.: 220 mg/dL (07 Jan 2020 07:49)      RADIOLOGY & ADDITIONAL TESTS: Reviewed.

## 2020-01-07 NOTE — CHART NOTE - NSCHARTNOTEFT_GEN_A_CORE
Nutrition Follow Up Note  Hospital Course   (Per Electronic Medical Record):     Source:  Patient [X]  Medical Record [X]      Diet:   Consistent Carbohydrate Diet w/ Thin Liquids  Tolerates Diet Well  No Chewing/Swallowing Difficulties  No Recent Nausea, Vomiting, Diarrhea or Constipation  Consumes % of Meals (as Per Documentation) - States Good PO Intake/Appetite   Education Provided on Proper Nutrition  on Glucerna 8oz PO TID (Provides 660kcal-30grams of Protein) - Patient Takes Nutrition Supplement     Enteral/Parenteral Nutrition: Not Applicable    Current Weight: 162.9lb on 12/31  Obtain New Weight   Obtain Weights Weekly     Pertinent Medications: MEDICATIONS  (STANDING):  amLODIPine   Tablet 5 milliGRAM(s) Oral daily  dextrose 5%. 1000 milliLiter(s) (50 mL/Hr) IV Continuous <Continuous>  dextrose 50% Injectable 12.5 Gram(s) IV Push once  dextrose 50% Injectable 25 Gram(s) IV Push once  dextrose 50% Injectable 25 Gram(s) IV Push once  gabapentin 200 milliGRAM(s) Oral three times a day  insulin glargine Injectable (LANTUS) 15 Unit(s) SubCutaneous at bedtime  insulin lispro (HumaLOG) corrective regimen sliding scale   SubCutaneous three times a day before meals  insulin lispro (HumaLOG) corrective regimen sliding scale   SubCutaneous at bedtime  insulin lispro Injectable (HumaLOG) 5 Unit(s) SubCutaneous three times a day before meals    MEDICATIONS  (PRN):  acetaminophen   Tablet .. 650 milliGRAM(s) Oral every 6 hours PRN Temp greater or equal to 38C (100.4F), Mild Pain (1 - 3)  dextrose 40% Gel 15 Gram(s) Oral once PRN Blood Glucose LESS THAN 70 milliGRAM(s)/deciliter  glucagon  Injectable 1 milliGRAM(s) IntraMuscular once PRN Glucose LESS THAN 70 milligrams/deciliter    Pertinent Labs:  01-06 Na135 mmol/L Glu 333 mg/dL<H> K+ 4.6 mmol/L Cr  1.59 mg/dL<H> BUN 32 mg/dL<H> 12-13 [id number] 12.0 %<H> 12-28 Chol 157 mg/dL  mg/dL HDL 36 mg/dL Trig 90 mg/dL    POCT (over Last 2 Days) - Ranging from     Skin: No Pressure Ulcers   Surgical Incision on Right BKA  (as Per Nursing Flow Sheet)     Edema: None Noted     Last Bowel Movement: on 1/6    Estimated Needs:   [X] No Change Since Previous Assessment    Previous Nutrition Diagnosis:   Severe Malnutrition    Nutrition Diagnosis is [X] Ongoing - Continues on Nutrition Supplement & Patient Takes Nutrition Supplement     New Nutrition Diagnosis: [X] Not Applicable    Interventions:   1. Education Provided on Proper Nutrition    2. Recommend Continue Nutrition Plan of Care     Monitoring & Evaluation:   [X] Weights   [X] PO Intake   [X] Skin Integrity   [X] Follow Up (Per Protocol)  [X] Tolerance to Diet Prescription   [X] Other: Labs & POCT    Registered Dietitian/Nutritionist Remains Available.  Sergey Little RDN    Pager # [phone]  Phone# [phone]

## 2020-01-07 NOTE — PROGRESS NOTE ADULT - SUBJECTIVE AND OBJECTIVE BOX
Patient is a 49y old  Male who presents with a chief complaint of s/p right BKA completion (12/23). (07 Jan 2020 11:47)      INTERVAL History of Present Illness/OVERNIGHT EVENTS: intermittent hypoglycemia    MEDICATIONS  (STANDING):  amLODIPine   Tablet 5 milliGRAM(s) Oral daily  dextrose 5%. 1000 milliLiter(s) (50 mL/Hr) IV Continuous <Continuous>  dextrose 50% Injectable 12.5 Gram(s) IV Push once  dextrose 50% Injectable 25 Gram(s) IV Push once  dextrose 50% Injectable 25 Gram(s) IV Push once  gabapentin 200 milliGRAM(s) Oral three times a day  insulin glargine Injectable (LANTUS) 15 Unit(s) SubCutaneous at bedtime  insulin lispro (HumaLOG) corrective regimen sliding scale   SubCutaneous three times a day before meals  insulin lispro (HumaLOG) corrective regimen sliding scale   SubCutaneous at bedtime  insulin lispro Injectable (HumaLOG) 5 Unit(s) SubCutaneous three times a day before meals    MEDICATIONS  (PRN):  acetaminophen   Tablet .. 650 milliGRAM(s) Oral every 6 hours PRN Temp greater or equal to 38C (100.4F), Mild Pain (1 - 3)  dextrose 40% Gel 15 Gram(s) Oral once PRN Blood Glucose LESS THAN 70 milliGRAM(s)/deciliter  glucagon  Injectable 1 milliGRAM(s) IntraMuscular once PRN Glucose LESS THAN 70 milligrams/deciliter      Allergies    No Known Allergies    Intolerances        REVIEW OF SYSTEMS:  Negative unless otherwise specified above.    Vital Signs Last 24 Hrs  T(C): 36.9 (07 Jan 2020 08:03), Max: 36.9 (07 Jan 2020 08:03)  T(F): 98.4 (07 Jan 2020 08:03), Max: 98.4 (07 Jan 2020 08:03)  HR: 100 (07 Jan 2020 08:03) (90 - 100)  BP: 100/67 (07 Jan 2020 08:03) (100/67 - 130/78)  BP(mean): --  RR: 15 (07 Jan 2020 08:03) (14 - 15)  SpO2: 98% (07 Jan 2020 08:03) (96% - 98%)        PHYSICAL EXAM:  GENERAL: No apparent distress, appears stated age  HEAD:  Atraumatic, Normocephalic  EYES: Conjunctiva and sclera clear, no discharge  ENMT: Moist mucous membranes, no nasal discharge  NECK: Supple, no JVD  CHEST/LUNG: Clear to auscultation bilaterally, no wheeze or rales  HEART: Regular rate and rhythm, no murmurs, rubs or gallops  ABDOMEN: Soft, Nontender, Nondistended; Bowel sounds present  EXTREMITIES:  No clubbing, cyanosis or edema, right BKA  SKIN: No rash or new discoloration  NERVOUS SYSTEM:  Alert & Oriented; Bilateral Lower extremity mobile, sensation to light touch intact      LABS:      Ca    8.8        06 Jan 2020 07:00          CAPILLARY BLOOD GLUCOSE      POCT Blood Glucose.: 69 mg/dL (07 Jan 2020 12:01)  POCT Blood Glucose.: 48 mg/dL (07 Jan 2020 11:43)  POCT Blood Glucose.: 48 mg/dL (07 Jan 2020 11:42)  POCT Blood Glucose.: 220 mg/dL (07 Jan 2020 07:49)  POCT Blood Glucose.: 139 mg/dL (06 Jan 2020 20:59)  POCT Blood Glucose.: 176 mg/dL (06 Jan 2020 17:40)      RADIOLOGY & ADDITIONAL TESTS:    Images reviewed personally    Consultant Notes Reviewed and Care Discussed with relevant Consultants/Other Providers.

## 2020-01-07 NOTE — PROGRESS NOTE ADULT - ASSESSMENT
Pt is a 50 y/o M with PMHx of DM who presented to [facility] on 12/12/19 c/o worsening RLE infection and found to have necrotizing soft tissue infection of the RLE, admitted to SICU for resuscitation and management of severe sepsis, s/p emergent guillotine Rt guillotine BKA.    #Right BKA secondary to necrotizing fasciitis  - PT/OT  - Continue knee immobilizer  - Pain control w/ tylenol    >IDDM1 with neuropathy ; s/p DKA likely secondary to LE infection  - Given intermittent hypoglycemia, suggest lower Lantus dose to 15units at bedtime  - Humalog 5 units before meals  - ISS  - poor dietary compliance    >Diabetic neuropathy  - continue gabapentin    >Essential HTN  - stable on Norvasc 5mg daily    > PPX   - heparin subq to prevent DVTs    d/w rehab team  Carson Spicer MD, MHA, FACP, [facility]  Pager: [phone]  If no response or off-hours, page [phone]

## 2020-01-08 LAB
GLUCOSE BLDC GLUCOMTR-MCNC: 263 MG/DL — HIGH (ref 70–99)
GLUCOSE BLDC GLUCOMTR-MCNC: 338 MG/DL — HIGH (ref 70–99)
GLUCOSE BLDC GLUCOMTR-MCNC: 398 MG/DL — HIGH (ref 70–99)
GLUCOSE BLDC GLUCOMTR-MCNC: 409 MG/DL — HIGH (ref 70–99)
GLUCOSE BLDC GLUCOMTR-MCNC: 88 MG/DL — SIGNIFICANT CHANGE UP (ref 70–99)

## 2020-01-08 PROCEDURE — 99232 SBSQ HOSP IP/OBS MODERATE 35: CPT | Mod: GC

## 2020-01-08 PROCEDURE — 99232 SBSQ HOSP IP/OBS MODERATE 35: CPT

## 2020-01-08 RX ADMIN — Medication 3 UNIT(S): at 12:20

## 2020-01-08 RX ADMIN — Medication 3: at 22:06

## 2020-01-08 RX ADMIN — Medication 3 UNIT(S): at 17:35

## 2020-01-08 RX ADMIN — GABAPENTIN 200 MILLIGRAM(S): 400 CAPSULE ORAL at 16:07

## 2020-01-08 RX ADMIN — Medication 3: at 08:01

## 2020-01-08 RX ADMIN — GABAPENTIN 200 MILLIGRAM(S): 400 CAPSULE ORAL at 06:31

## 2020-01-08 RX ADMIN — Medication 1 UNIT(S): at 08:01

## 2020-01-08 RX ADMIN — GABAPENTIN 200 MILLIGRAM(S): 400 CAPSULE ORAL at 21:59

## 2020-01-08 RX ADMIN — Medication 4: at 12:21

## 2020-01-08 RX ADMIN — AMLODIPINE BESYLATE 5 MILLIGRAM(S): 2.5 TABLET ORAL at 06:31

## 2020-01-08 RX ADMIN — INSULIN GLARGINE 18 UNIT(S): 100 INJECTION, SOLUTION SUBCUTANEOUS at 21:59

## 2020-01-08 NOTE — PROGRESS NOTE ADULT - ASSESSMENT
Pt is a 50 y/o M with PMHx of DM who presented to [facility] on 12/12/19 c/o worsening RLE infection and found to have necrotizing soft tissue infection of the RLE, admitted to SICU for resuscitation and management of severe sepsis, s/p emergent guillotine Rt guillotine BKA.    #Right BKA secondary to necrotizing fasciitis  - PT/OT  - Continue knee immobilizer  - Pain control w/ tylenol    >IDDM1 with neuropathy ; s/p DKA likely secondary to LE infection  - Labile glucose and poor dietary compliance  adjust insulin dose as appropriate based on glucose levels.   - ISS    >Diabetic neuropathy  - continue gabapentin    >Essential HTN  - stable on Norvasc 5mg daily    > PPX   - heparin subq to prevent DVTs    will d/w rehab team  Carson Spicer MD, MHA, FACP, [facility]  Pager: [phone]

## 2020-01-08 NOTE — PROGRESS NOTE ADULT - SUBJECTIVE AND OBJECTIVE BOX
Patient is a 49y old  Male who presents with a chief complaint of s/p right BKA completion (12/23). (07 Jan 2020 12:23)      INTERVAL History of Present Illness/OVERNIGHT EVENTS: hypoglycemia yesterday - had OJ with sugar - then hyperglycemic  dietary compliance chronically poor     MEDICATIONS  (STANDING):  amLODIPine   Tablet 5 milliGRAM(s) Oral daily  dextrose 5%. 1000 milliLiter(s) (50 mL/Hr) IV Continuous <Continuous>  dextrose 50% Injectable 12.5 Gram(s) IV Push once  dextrose 50% Injectable 25 Gram(s) IV Push once  dextrose 50% Injectable 25 Gram(s) IV Push once  gabapentin 200 milliGRAM(s) Oral three times a day  insulin glargine Injectable (LANTUS) 18 Unit(s) SubCutaneous at bedtime  insulin lispro (HumaLOG) corrective regimen sliding scale   SubCutaneous three times a day before meals  insulin lispro (HumaLOG) corrective regimen sliding scale   SubCutaneous at bedtime  insulin lispro Injectable (HumaLOG) 1 Unit(s) SubCutaneous before breakfast  insulin lispro Injectable (HumaLOG) 3 Unit(s) SubCutaneous <User Schedule>    MEDICATIONS  (PRN):  acetaminophen   Tablet .. 650 milliGRAM(s) Oral every 6 hours PRN Temp greater or equal to 38C (100.4F), Mild Pain (1 - 3)  dextrose 40% Gel 15 Gram(s) Oral once PRN Blood Glucose LESS THAN 70 milliGRAM(s)/deciliter  glucagon  Injectable 1 milliGRAM(s) IntraMuscular once PRN Glucose LESS THAN 70 milligrams/deciliter      Allergies    No Known Allergies    Intolerances        REVIEW OF SYSTEMS:  Negative unless otherwise specified above.    Vital Signs Last 24 Hrs  T(C): 37 (08 Jan 2020 08:39), Max: 37 (08 Jan 2020 08:39)  T(F): 98.6 (08 Jan 2020 08:39), Max: 98.6 (08 Jan 2020 08:39)  HR: 94 (08 Jan 2020 08:39) (91 - 95)  BP: 132/72 (08 Jan 2020 08:39) (125/78 - 132/72)  BP(mean): --  RR: 14 (08 Jan 2020 08:39) (4 - 14)  SpO2: 96% (08 Jan 2020 08:39) (96% - 97%)        PHYSICAL EXAM:  GENERAL: No apparent distress, appears stated age  HEAD:  Atraumatic, Normocephalic  EYES: Conjunctiva and sclera clear, no discharge  ENMT: Moist mucous membranes, no nasal discharge  NECK: Supple, no JVD  CHEST/LUNG: Clear to auscultation bilaterally, no wheeze or rales  HEART: Regular rate and rhythm, no murmurs, rubs or gallops  ABDOMEN: Soft, Nontender, Nondistended; Bowel sounds present  EXTREMITIES:  No clubbing, cyanosis or edema, right BKA  SKIN: No rash or new discoloration  NERVOUS SYSTEM:  Alert & Oriented; sensation to light touch intact      LABS:              CAPILLARY BLOOD GLUCOSE      POCT Blood Glucose.: 263 mg/dL (08 Jan 2020 07:49)  POCT Blood Glucose.: 351 mg/dL (07 Jan 2020 20:53)  POCT Blood Glucose.: 287 mg/dL (07 Jan 2020 17:05)  POCT Blood Glucose.: 116 mg/dL (07 Jan 2020 12:26)  POCT Blood Glucose.: 69 mg/dL (07 Jan 2020 12:01)  POCT Blood Glucose.: 48 mg/dL (07 Jan 2020 11:43)  POCT Blood Glucose.: 48 mg/dL (07 Jan 2020 11:42)      RADIOLOGY & ADDITIONAL TESTS:    Images reviewed personally    Consultant Notes Reviewed and Care Discussed with relevant Consultants/Other Providers.

## 2020-01-08 NOTE — PROGRESS NOTE ADULT - ASSESSMENT
Pt is a 48 y/o M with PMHx of DM who presented to [facility] on 12/12/19 c/o worsening RLE infection and found to have necrotizing soft tissue infection of the RLE, admitted to SICU for resuscitation and management of severe sepsis, s/p emergent guillotine Rt caroline BKA.    #Right BKA secondary to necrotizing fasciitis  - c/w comprehensive  PT/OT  - c/w- knee immobilizer at night  - Pain control  - Wound change daily with sterile 4x4 gauze and wrap with kerlix  -. Monitor incision site.       #DM1   - Lantus 18units at bedtime  - Humalog 5 units before meals      #HTN  - c/w-Norvasc 5mg daily    #Pain   - Tylenol PRN  - Gabapentin 200mg tid    #Diet  - Consistent Carb with evening snack and Glucerna shake    #DVT PPX   - heparin subq - will stop as pt has been refusing and he is mobile      Barriers for d/c are stairs - patient states that family will be able to help him into the house.   Target d/c date-1/11/20 Pt is a 48 y/o M with PMHx of DM who presented to [facility] on 12/12/19 c/o worsening RLE infection and found to have necrotizing soft tissue infection of the RLE, admitted to SICU for resuscitation and management of severe sepsis, s/p emergent guillotine Rt caroline BKA.    #Right BKA secondary to necrotizing fasciitis  - c/w comprehensive  PT/OT  - c/w- knee immobilizer at night  - Pain control  -. Monitor incision site.       #DM1   - Lantus 18units at bedtime  - Humalog 1 unit before breakfast, 3 units before lunch, dinner.       #HTN  - c/w-Norvasc 5mg daily    #Pain   - Tylenol PRN  - Gabapentin 200mg tid    #Diet  - Consistent Carb with evening snack and Glucerna shake  -diabetes educator     #DVT PPX   - heparin subq - will stop as pt has been refusing and he is mobile      Barriers for d/c are stairs - patient states that family will be able to help him into the house.   Target d/c date-1/11/20

## 2020-01-08 NOTE — PROGRESS NOTE ADULT - SUBJECTIVE AND OBJECTIVE BOX
HPI:  Pt is a 50 y/o M with PMHx of DM who presented to [facility] on 12/12/19 c/o worsening RLE infection and found to have necrotizing soft tissue infection of the RLE, admitted to SICU for resuscitation and management of severe sepsis, s/p emergent caroline Rt caroline GROVES.    SUBJECTIVE:   Patient seen and examined. Doing well in rehab-100ft with RW. Pain controlled with medication. No complaints. Eager for discharge home 1/11/20     REVIEW OF SYSTEMS:  Denies chest pain, shortness of breath, palpitations, chills, fever, no nausea/vomiting  MSK: right BKA  All else negative.     MEDICATIONS  (STANDING):  amLODIPine   Tablet 5 milliGRAM(s) Oral daily  dextrose 5%. 1000 milliLiter(s) (50 mL/Hr) IV Continuous <Continuous>  dextrose 50% Injectable 12.5 Gram(s) IV Push once  dextrose 50% Injectable 25 Gram(s) IV Push once  dextrose 50% Injectable 25 Gram(s) IV Push once  gabapentin 200 milliGRAM(s) Oral three times a day  insulin glargine Injectable (LANTUS) 18 Unit(s) SubCutaneous at bedtime  insulin lispro (HumaLOG) corrective regimen sliding scale   SubCutaneous three times a day before meals  insulin lispro (HumaLOG) corrective regimen sliding scale   SubCutaneous at bedtime  insulin lispro Injectable (HumaLOG) 1 Unit(s) SubCutaneous before breakfast  insulin lispro Injectable (HumaLOG) 3 Unit(s) SubCutaneous <User Schedule>    MEDICATIONS  (PRN):  acetaminophen   Tablet .. 650 milliGRAM(s) Oral every 6 hours PRN Temp greater or equal to 38C (100.4F), Mild Pain (1 - 3)  dextrose 40% Gel 15 Gram(s) Oral once PRN Blood Glucose LESS THAN 70 milliGRAM(s)/deciliter  glucagon  Injectable 1 milliGRAM(s) IntraMuscular once PRN Glucose LESS THAN 70 milligrams/deciliter    Vital Signs Last 24 Hrs  T(C): 37 (08 Jan 2020 08:39), Max: 37 (08 Jan 2020 08:39)  T(F): 98.6 (08 Jan 2020 08:39), Max: 98.6 (08 Jan 2020 08:39)  HR: 94 (08 Jan 2020 08:39) (91 - 95)  BP: 132/72 (08 Jan 2020 08:39) (125/78 - 132/72)  BP(mean): --  RR: 14 (08 Jan 2020 08:39) (4 - 14)  SpO2: 96% (08 Jan 2020 08:39) (96% - 97%)    PHYSICAL EXAM:    General: NAD, walking in PT gym with walker  Cardiovascular: warm and well perfused  Respiratory: no respiratory distress, no wheezing  Gastrointestinal: soft, NT/ND;  Extremities: right BKA HPI:  Pt is a 48 y/o M with PMHx of DM who presented to [facility] on 12/12/19 c/o worsening RLE infection and found to have necrotizing soft tissue infection of the RLE, admitted to SICU for resuscitation and management of severe sepsis, s/p emergent caroline Rt caroline GROVES.    SUBJECTIVE:   Patient seen and examined. Doing well in rehab-100ft with RW. Pain controlled with medication. No complaints. Eager for discharge home 1/11/20     REVIEW OF SYSTEMS:  Denies chest pain, shortness of breath, palpitations, chills, fever, no nausea/vomiting  MSK: right BKA  All else negative.     MEDICATIONS  (STANDING):  amLODIPine   Tablet 5 milliGRAM(s) Oral daily  dextrose 5%. 1000 milliLiter(s) (50 mL/Hr) IV Continuous <Continuous>  dextrose 50% Injectable 12.5 Gram(s) IV Push once  dextrose 50% Injectable 25 Gram(s) IV Push once  dextrose 50% Injectable 25 Gram(s) IV Push once  gabapentin 200 milliGRAM(s) Oral three times a day  insulin glargine Injectable (LANTUS) 18 Unit(s) SubCutaneous at bedtime  insulin lispro (HumaLOG) corrective regimen sliding scale   SubCutaneous three times a day before meals  insulin lispro (HumaLOG) corrective regimen sliding scale   SubCutaneous at bedtime  insulin lispro Injectable (HumaLOG) 1 Unit(s) SubCutaneous before breakfast  insulin lispro Injectable (HumaLOG) 3 Unit(s) SubCutaneous <User Schedule>    MEDICATIONS  (PRN):  acetaminophen   Tablet .. 650 milliGRAM(s) Oral every 6 hours PRN Temp greater or equal to 38C (100.4F), Mild Pain (1 - 3)  dextrose 40% Gel 15 Gram(s) Oral once PRN Blood Glucose LESS THAN 70 milliGRAM(s)/deciliter  glucagon  Injectable 1 milliGRAM(s) IntraMuscular once PRN Glucose LESS THAN 70 milligrams/deciliter    Vital Signs Last 24 Hrs  T(C): 37 (08 Jan 2020 08:39), Max: 37 (08 Jan 2020 08:39)  T(F): 98.6 (08 Jan 2020 08:39), Max: 98.6 (08 Jan 2020 08:39)  HR: 94 (08 Jan 2020 08:39) (91 - 95)  BP: 132/72 (08 Jan 2020 08:39) (125/78 - 132/72)  BP(mean): --  RR: 14 (08 Jan 2020 08:39) (4 - 14)  SpO2: 96% (08 Jan 2020 08:39) (96% - 97%)    PHYSICAL EXAM:    General: NAD, walking in PT gym with walker  Cardiovascular: warm and well perfused  Respiratory: no respiratory distress, no wheezing  Gastrointestinal: soft, NT/ND;  Extremities: right BKA, incision c/d/i   Neuro: MS 5/5 throughout

## 2020-01-09 ENCOUNTER — TRANSCRIPTION ENCOUNTER (OUTPATIENT)
Age: 50
End: 2020-01-09

## 2020-01-09 PROBLEM — E11.9 TYPE 2 DIABETES MELLITUS WITHOUT COMPLICATIONS: Chronic | Status: ACTIVE | Noted: 2019-12-12

## 2020-01-09 LAB
ANION GAP SERPL CALC-SCNC: 7 MMOL/L — SIGNIFICANT CHANGE UP (ref 5–17)
BUN SERPL-MCNC: 32 MG/DL — HIGH (ref 7–23)
CALCIUM SERPL-MCNC: 9.1 MG/DL — SIGNIFICANT CHANGE UP (ref 8.4–10.5)
CHLORIDE SERPL-SCNC: 101 MMOL/L — SIGNIFICANT CHANGE UP (ref 96–108)
CO2 SERPL-SCNC: 30 MMOL/L — SIGNIFICANT CHANGE UP (ref 22–31)
CREAT SERPL-MCNC: 1.45 MG/DL — HIGH (ref 0.5–1.3)
GLUCOSE BLDC GLUCOMTR-MCNC: 113 MG/DL — HIGH (ref 70–99)
GLUCOSE BLDC GLUCOMTR-MCNC: 183 MG/DL — HIGH (ref 70–99)
GLUCOSE BLDC GLUCOMTR-MCNC: 211 MG/DL — HIGH (ref 70–99)
GLUCOSE BLDC GLUCOMTR-MCNC: 288 MG/DL — HIGH (ref 70–99)
GLUCOSE BLDC GLUCOMTR-MCNC: 50 MG/DL — LOW (ref 70–99)
GLUCOSE BLDC GLUCOMTR-MCNC: 51 MG/DL — LOW (ref 70–99)
GLUCOSE SERPL-MCNC: 61 MG/DL — LOW (ref 70–99)
HCT VFR BLD CALC: 32.7 % — LOW (ref 39–50)
HGB BLD-MCNC: 10.9 G/DL — LOW (ref 13–17)
MCHC RBC-ENTMCNC: 28.6 PG — SIGNIFICANT CHANGE UP (ref 27–34)
MCHC RBC-ENTMCNC: 33.3 GM/DL — SIGNIFICANT CHANGE UP (ref 32–36)
MCV RBC AUTO: 85.8 FL — SIGNIFICANT CHANGE UP (ref 80–100)
NRBC # BLD: 0 /100 WBCS — SIGNIFICANT CHANGE UP (ref 0–0)
PLATELET # BLD AUTO: 354 K/UL — SIGNIFICANT CHANGE UP (ref 150–400)
POTASSIUM SERPL-MCNC: 3.9 MMOL/L — SIGNIFICANT CHANGE UP (ref 3.5–5.3)
POTASSIUM SERPL-SCNC: 3.9 MMOL/L — SIGNIFICANT CHANGE UP (ref 3.5–5.3)
RBC # BLD: 3.81 M/UL — LOW (ref 4.2–5.8)
RBC # FLD: 13.8 % — SIGNIFICANT CHANGE UP (ref 10.3–14.5)
SODIUM SERPL-SCNC: 138 MMOL/L — SIGNIFICANT CHANGE UP (ref 135–145)
WBC # BLD: 4.42 K/UL — SIGNIFICANT CHANGE UP (ref 3.8–10.5)
WBC # FLD AUTO: 4.42 K/UL — SIGNIFICANT CHANGE UP (ref 3.8–10.5)

## 2020-01-09 PROCEDURE — 99233 SBSQ HOSP IP/OBS HIGH 50: CPT

## 2020-01-09 PROCEDURE — ZZZZZ: CPT

## 2020-01-09 PROCEDURE — 99232 SBSQ HOSP IP/OBS MODERATE 35: CPT | Mod: GC

## 2020-01-09 RX ADMIN — GABAPENTIN 200 MILLIGRAM(S): 400 CAPSULE ORAL at 21:48

## 2020-01-09 RX ADMIN — GABAPENTIN 200 MILLIGRAM(S): 400 CAPSULE ORAL at 13:11

## 2020-01-09 RX ADMIN — AMLODIPINE BESYLATE 5 MILLIGRAM(S): 2.5 TABLET ORAL at 06:11

## 2020-01-09 RX ADMIN — Medication 1: at 17:19

## 2020-01-09 RX ADMIN — Medication 3 UNIT(S): at 17:22

## 2020-01-09 RX ADMIN — Medication 1 UNIT(S): at 08:51

## 2020-01-09 RX ADMIN — Medication 2: at 12:26

## 2020-01-09 RX ADMIN — Medication 3 UNIT(S): at 12:26

## 2020-01-09 RX ADMIN — GABAPENTIN 200 MILLIGRAM(S): 400 CAPSULE ORAL at 06:11

## 2020-01-09 RX ADMIN — INSULIN GLARGINE 18 UNIT(S): 100 INJECTION, SOLUTION SUBCUTANEOUS at 21:49

## 2020-01-09 NOTE — DISCHARGE NOTE PROVIDER - NSDCFUSCHEDAPPT_GEN_ALL_CORE_FT
TARA BLAKE ; 01/21/2020 ; NPP Endocrin [address] [facility]  TARA BLAKE ; 01/29/2020 ; NPP Surg Vasc [address] TARA BLAKE ; 01/21/2020 ; NPP Endocrin [address] [facility]  TARA BLAKE ; 01/29/2020 ; NPP Surg Vasc [address] TARA BLAKE ; 01/21/2020 ; NPP Endocrin [address] [facility]  TARA BLAKE ; 01/29/2020 ; NPP Surg Vasc [address] TARA BLAKE ; 01/21/2020 ; NPP Endocrin [facility]  TARA BLAKE ; 01/29/2020 ; NPP Surg Vasc [address]

## 2020-01-09 NOTE — PROGRESS NOTE ADULT - SUBJECTIVE AND OBJECTIVE BOX
Diabetes Note:    Diabetes Type: 1    Current Endocrine Meds:   dextrose 40% Gel 15 Gram(s) Oral once PRN  dextrose 50% Injectable 12.5 Gram(s) IV Push once  dextrose 50% Injectable 25 Gram(s) IV Push once  dextrose 50% Injectable 25 Gram(s) IV Push once  glucagon  Injectable 1 milliGRAM(s) IntraMuscular once PRN  insulin glargine Injectable (LANTUS) 18 Unit(s) SubCutaneous at bedtime  insulin lispro (HumaLOG) corrective regimen sliding scale   SubCutaneous three times a day before meals  insulin lispro (HumaLOG) corrective regimen sliding scale   SubCutaneous at bedtime  insulin lispro Injectable (HumaLOG) 1 Unit(s) SubCutaneous before breakfast  insulin lispro Injectable (HumaLOG) 3 Unit(s) SubCutaneous <User Schedule>      Vital Signs Last 24 Hrs  T(C): 36.7 (08 Jan 2020 21:11), Max: 36.7 (08 Jan 2020 21:11)  T(F): 98.1 (08 Jan 2020 21:11), Max: 98.1 (08 Jan 2020 21:11)  HR: 89 (09 Jan 2020 06:12) (70 - 89)  BP: 133/84 (09 Jan 2020 06:12) (115/66 - 133/84)  BP(mean): --  RR: 14 (09 Jan 2020 06:12) (14 - 14)  SpO2: 98% (09 Jan 2020 06:12) (98% - 98%)    Psychiatric: A&O x 3, normal affect/mood.    Hypoglycemic Episodes:  multiple, unpredictable    BG at HS last night was 398.  Pt was given lispro low dose correction of 3 units. BG this morning 50 (pt symptomatic).  Pt states he never takes correction insulin at bedtime, no matter how high BG is.  Lengthy discussion on importance of avoiding BG excursions.  Pt states he uses a "sliding scale" at home based ONLY on BG and does not take carbs to be eaten into account.  States this is how he keeps BG controlled - current a1c 12%.  Denies lows at home.  Has been meeting regularly with dietitian to discuss meals.    Met with:    Diabetes Plan  For now:  Glargine-    units at bedtime  Lispro-    units three times a day before meals   Will continue to monitor     At discharge, recommend:      Pt can follow up at discharge with: Diabetes Note:    Diabetes Type: 1    Current Endocrine Meds:   dextrose 40% Gel 15 Gram(s) Oral once PRN  dextrose 50% Injectable 12.5 Gram(s) IV Push once  dextrose 50% Injectable 25 Gram(s) IV Push once  dextrose 50% Injectable 25 Gram(s) IV Push once  glucagon  Injectable 1 milliGRAM(s) IntraMuscular once PRN  insulin glargine Injectable (LANTUS) 18 Unit(s) SubCutaneous at bedtime  insulin lispro (HumaLOG) corrective regimen sliding scale   SubCutaneous three times a day before meals  insulin lispro (HumaLOG) corrective regimen sliding scale   SubCutaneous at bedtime  insulin lispro Injectable (HumaLOG) 1 Unit(s) SubCutaneous before breakfast  insulin lispro Injectable (HumaLOG) 3 Unit(s) SubCutaneous <User Schedule>      Vital Signs Last 24 Hrs  T(C): 36.7 (08 Jan 2020 21:11), Max: 36.7 (08 Jan 2020 21:11)  T(F): 98.1 (08 Jan 2020 21:11), Max: 98.1 (08 Jan 2020 21:11)  HR: 89 (09 Jan 2020 06:12) (70 - 89)  BP: 133/84 (09 Jan 2020 06:12) (115/66 - 133/84)  BP(mean): --  RR: 14 (09 Jan 2020 06:12) (14 - 14)  SpO2: 98% (09 Jan 2020 06:12) (98% - 98%)    Psychiatric: A&O x 3, normal affect/mood.    Hypoglycemic Episodes:  multiple, unpredictable    BG at HS last night was 398.  Pt was given lispro low dose correction of 3 units. BG this morning 50 (pt symptomatic).  Pt states he never takes correction insulin at bedtime, no matter how high BG is - states long acting insulin always brings down by morning..  Lengthy discussion on importance of avoiding BG excursions.  Pt states he uses a "sliding scale" at home based ONLY on BG and does not take carbs to be eaten into account.  States this is how he keeps BG controlled - current a1c 12%.  Denies lows at home.  Has been meeting regularly with dietitian to discuss meals.  Does not seem to be eating consistent amount of carbs w/ every meal.  Breakfast nutritional insulin was decreased to only 1 unit 2 days ago since pt was returning from therapy with hypoglycemia.    Endocrinology consult called.  Case discussed w/ Dr. Cullen.  MD will be in to see patient today.    Diabetes Plan  For now:  Glargine- 18 units at bedtime  Lispro- 1 unit with breakfast, 3 units with lunch and dinner  pending Endo consult   Will continue to monitor     At discharge, recommend:  TBD      Pt can follow up at discharge with:  PCP and Endo

## 2020-01-09 NOTE — DISCHARGE NOTE PROVIDER - NSDCMRMEDTOKEN_GEN_ALL_CORE_FT
amLODIPine 5 mg oral tablet: 1 tab(s) orally once a day  gabapentin 100 mg oral capsule: 2 cap(s) orally every 8 hours  HumaLOG 100 units/mL injectable solution: 5 unit(s) subcutaneous 3 times a day (before meals)  insulin glargine: 20 unit(s) subcutaneous once a day (at bedtime)  insulin lispro: 5 unit(s) subcutaneous 4 times a day (before meals and at bedtime)  insulin lispro: 0 Unit(s) if Glucose 0 - 250  1 Unit(s) if Glucose 251 - 300  2 Unit(s) if Glucose 301 - 350  3 Unit(s) if Glucose 351 - 400  4 Unit(s) if Glucose GREATER THAN 400    Give correctional scale insulin REGARDLESS of PO status NOTIFY Provider for blood glucose LESS THAN 70 milliGRAM(s)/deciLiter or GREATER THAN 400 milliGRAM(s)/deciliter. amLODIPine 5 mg oral tablet: 1 tab(s) orally once a day  Basaglar KwikPen 100 units/mL subcutaneous solution: 18 unit(s) subcutaneous once a day (at bedtime)   gabapentin 100 mg oral capsule: 2 cap(s) orally every 8 hours  insulin lispro (concentrated) 200 units/mL subcutaneous solution: 1 unit before breakfast  3 unit(s) subcutaneous 2 times a day (before luch, dinner)

## 2020-01-09 NOTE — CONSULT NOTE ADULT - REASON FOR ADMISSION
s/p right BKA completion (12/23).

## 2020-01-09 NOTE — DISCHARGE NOTE PROVIDER - NSDCCPCAREPLAN_GEN_ALL_CORE_FT
PRINCIPAL DISCHARGE DIAGNOSIS  Diagnosis: S/P BKA (below knee amputation), right  Assessment and Plan of Treatment: You originally presented to the hospital with a right foot infection and had an amputation of the right leg below the knee. You tolerated the procedure well and came to [place] for rehab. You did very well with physical and occupational therapy. You regained strength and endurance and are ready to return home. You will follow up with your vascular surgeon for staple removal and for eventual prosthetic fitting.      SECONDARY DISCHARGE DIAGNOSES  Diagnosis: Type 1 diabetes  Assessment and Plan of Treatment: You have type 1 diabetes and require insulin. You will follow up with the [facility] Endocrinology team upon discharge for continued care of your diabetes.

## 2020-01-09 NOTE — DISCHARGE NOTE PROVIDER - CARE PROVIDERS DIRECT ADDRESSES
[email or web address]

## 2020-01-09 NOTE — PROGRESS NOTE ADULT - ASSESSMENT
Pt is a 50 y/o M with PMHx of DM who presented to [facility] on 12/12/19 c/o worsening RLE infection and found to have necrotizing soft tissue infection of the RLE, admitted to SICU for resuscitation and management of severe sepsis, s/p emergent guillotine Rt guillotjoy BKA.    #Right BKA secondary to necrotizing fasciitis  - c/w comprehensive  PT/OT  - c/w- knee immobilizer at night  - Pain control  -. Monitor incision site.       #DM1   - Lantus 18units at bedtime  - Humalog 1 unit before breakfast, 3 units before lunch, dinner.       #HTN  - c/w-Norvasc 5mg daily    #Pain   - Tylenol PRN  - Gabapentin 200mg tid    #Diet  - Consistent Carb with evening snack and Glucerna shake  -diabetes educator -to see patient today 1/9/20 / patient f/u with Endo consult Dr. Cullen        Barriers for d/c are stairs - patient states that family will be able to help him into the house  Target d/c date-1/11/20 Pt is a 48 y/o M with PMHx of DM who presented to [facility] on 12/12/19 c/o worsening RLE infection and found to have necrotizing soft tissue infection of the RLE, admitted to SICU for resuscitation and management of severe sepsis, s/p emergent guillotine Rt guillani BKA.    #Right BKA secondary to necrotizing fasciitis  - c/w comprehensive  PT/OT  - c/w- knee immobilizer at night  - Pain control  - Monitor incision site.       #DM1   - Lantus 18units at bedtime  - Humalog 1 unit before breakfast, 3 units before lunch, dinner.       #HTN  - c/w-Norvasc 5mg daily    #Pain   - Tylenol PRN  - Gabapentin 200mg tid    #Diet  - Consistent Carb with evening snack and Glucerna shake  -diabetes educator -to see patient today 1/9/20 / patient f/u with Endo consult Dr. Cullen        Barriers for d/c are stairs - patient states that family will be able to help him into the house  Target d/c date-1/11/20  F/U with Endocrinologist on discharge from rehab Pt is a 48 y/o M with PMHx of DM who presented to [facility] on 12/12/19 c/o worsening RLE infection and found to have necrotizing soft tissue infection of the RLE, admitted to SICU for resuscitation and management of severe sepsis, s/p emergent guillotine Rt caroline BKA.    #Right BKA secondary to necrotizing fasciitis  - c/w comprehensive  PT/OT  - Pain control  - Monitor incision site.       #DM1   - Lantus 18units at bedtime  - Humalog 1 unit before breakfast, 3 units before lunch, dinner.   -endocrine consult appreciated- needs outpt follow up       #HTN  - c/w-Norvasc 5mg daily    #Pain   - Tylenol PRN  - Gabapentin 200mg tid    #Diet  - Consistent Carb with evening snack and Glucerna shake  -diabetes educator -to see patient today 1/9/20 / patient f/u with Endo consult Dr. Cullen        Barriers for d/c are stairs - patient states that family will be able to help him into the house  Target d/c date-1/11/20  F/U with Endocrinologist on discharge from rehab

## 2020-01-09 NOTE — DISCHARGE NOTE PROVIDER - PROVIDER TOKENS
PROVIDER:[TOKEN:[[id number]]] PROVIDER:[TOKEN:[[id number]]],PROVIDER:[TOKEN:[[id number]]]

## 2020-01-09 NOTE — DISCHARGE NOTE PROVIDER - CARE PROVIDER_API CALL
Lexie Ramirez)  Surgery; Vascular Surgery  [address] [facility], [address]  [place] [address]  Phone: [phone]  Fax: [phone]  Follow Up Time: Lexie Ramirez)  Surgery; Vascular Surgery  [address] [facility], [address]  [place] [address]  Phone: [phone]  Fax: [phone]  Follow Up Time:     Dai Lennon (DO)  PhysicalRehab Medicine  [address] [place] [address], [address] [place] [address]  Phone: [phone]  Fax: [phone]  Follow Up Time:

## 2020-01-09 NOTE — DISCHARGE NOTE PROVIDER - NSFOLLOWUPCLINICS_GEN_ALL_ED_FT
[facility] Endocrinology  Endocrinology  [address] [place] [address]  Phone: [phone]  Fax:   Follow Up Time:

## 2020-01-09 NOTE — CONSULT NOTE ADULT - ASSESSMENT
49 yrs. old uncontrolled Type ! DM with PVD, Periperal neuropathy, now S/P R BKA.  High A1c 12.2 showing poor control.  Discussed with Pt. about his DM, insulin therapy, CGMS, insulin pump like [employer] auto pump with glucose sensor.  advised to follow with his Endo. after discharge for better control of his Type ! DM, and prevention of complications.  continue Basal/Bolus insulin now and reduce bolus.

## 2020-01-09 NOTE — DISCHARGE NOTE PROVIDER - HOSPITAL COURSE
Pt is a 50 y/o M with PMHx of DM who presented to [facility] on 12/12/19 c/o worsening RLE infection and found to have necrotizing soft tissue infection of the RLE, admitted to SICU for resuscitation and management of severe sepsis, s/p emergent caroline Rt caroline GROVES.        Pt came to [facility] for acute inpatient rehabilitation. He did very well with PT and OT and learned how to use the walker and regained strength. His hospital stay was significant for very labile blood sugars. He fluctuated greatly between highs and lows and was seen by Diabetes education and endocrinology. Pt is planning on following up with [facility] Endocrinology as an outpatient for more improved care of his diabetes. He will also follow up with his vascular surgeon for staple removal and eventual prosthetic shaping and creation.

## 2020-01-09 NOTE — PROGRESS NOTE ADULT - SUBJECTIVE AND OBJECTIVE BOX
HPI:  Pt is a 48 y/o M with PMHx of DM who presented to [facility] on 12/12/19 c/o worsening RLE infection and found to have necrotizing soft tissue infection of the RLE, admitted to SICU for resuscitation and management of severe sepsis, s/p emergent caroline Rt caroline GROVES.    SUBJECTIVE:   Patient seen and examined. Progressing well in rehab. Seen by Diabetes Educator today-patient needs- Endocrinology consult - Dr. Cullen. No new complaints       REVIEW OF SYSTEMS:  Denies chest pain, shortness of breath, palpitations, chills, fever, no nausea/vomiting  MSK: right BKA  All else negative.     MEDICATIONS  (STANDING):  amLODIPine   Tablet 5 milliGRAM(s) Oral daily  dextrose 5%. 1000 milliLiter(s) (50 mL/Hr) IV Continuous <Continuous>  dextrose 50% Injectable 12.5 Gram(s) IV Push once  dextrose 50% Injectable 25 Gram(s) IV Push once  dextrose 50% Injectable 25 Gram(s) IV Push once  gabapentin 200 milliGRAM(s) Oral three times a day  insulin glargine Injectable (LANTUS) 18 Unit(s) SubCutaneous at bedtime  insulin lispro (HumaLOG) corrective regimen sliding scale   SubCutaneous three times a day before meals  insulin lispro (HumaLOG) corrective regimen sliding scale   SubCutaneous at bedtime  insulin lispro Injectable (HumaLOG) 1 Unit(s) SubCutaneous before breakfast  insulin lispro Injectable (HumaLOG) 3 Unit(s) SubCutaneous <User Schedule>    MEDICATIONS  (PRN):  acetaminophen   Tablet .. 650 milliGRAM(s) Oral every 6 hours PRN Temp greater or equal to 38C (100.4F), Mild Pain (1 - 3)  dextrose 40% Gel 15 Gram(s) Oral once PRN Blood Glucose LESS THAN 70 milliGRAM(s)/deciliter  glucagon  Injectable 1 milliGRAM(s) IntraMuscular once PRN Glucose LESS THAN 70 milligrams/deciliter        Vital Signs Last 24 Hrs  T(C): 36.8 (09 Jan 2020 09:01), Max: 36.8 (09 Jan 2020 09:01)  T(F): 98.2 (09 Jan 2020 09:01), Max: 98.2 (09 Jan 2020 09:01)  HR: 98 (09 Jan 2020 09:01) (70 - 98)  BP: 125/74 (09 Jan 2020 09:01) (115/66 - 133/84)  BP(mean): --  RR: 14 (09 Jan 2020 09:01) (14 - 14)  SpO2: 98% (09 Jan 2020 09:01) (98% - 98%)      PHYSICAL EXAM:    General: NAD, walking in PT gym with walker  Cardiovascular: warm and well perfused  Respiratory: no respiratory distress, no wheezing  Gastrointestinal: soft, NT/ND;  Extremities: right BKA, incision c/d/i   Neuro: MS 5/5 throughout HPI:  Pt is a 48 y/o M with PMHx of DM who presented to [facility] on 12/12/19 c/o worsening RLE infection and found to have necrotizing soft tissue infection of the RLE, admitted to SICU for resuscitation and management of severe sepsis, s/p emergent caroline Rt caroline GROVES.    SUBJECTIVE:   Patient seen and examined. Progressing well in rehab. Seen by Diabetes Educator today.  Endocrinology consult - Dr. Cullen. No new complaints.  Patient needs to f/u with Endocrinologist when discharged from rehab.       REVIEW OF SYSTEMS:  Denies chest pain, shortness of breath, palpitations, chills, fever, no nausea/vomiting  MSK: right BKA  All else negative.     MEDICATIONS  (STANDING):  amLODIPine   Tablet 5 milliGRAM(s) Oral daily  dextrose 5%. 1000 milliLiter(s) (50 mL/Hr) IV Continuous <Continuous>  dextrose 50% Injectable 12.5 Gram(s) IV Push once  dextrose 50% Injectable 25 Gram(s) IV Push once  dextrose 50% Injectable 25 Gram(s) IV Push once  gabapentin 200 milliGRAM(s) Oral three times a day  insulin glargine Injectable (LANTUS) 18 Unit(s) SubCutaneous at bedtime  insulin lispro (HumaLOG) corrective regimen sliding scale   SubCutaneous three times a day before meals  insulin lispro (HumaLOG) corrective regimen sliding scale   SubCutaneous at bedtime  insulin lispro Injectable (HumaLOG) 1 Unit(s) SubCutaneous before breakfast  insulin lispro Injectable (HumaLOG) 3 Unit(s) SubCutaneous <User Schedule>    MEDICATIONS  (PRN):  acetaminophen   Tablet .. 650 milliGRAM(s) Oral every 6 hours PRN Temp greater or equal to 38C (100.4F), Mild Pain (1 - 3)  dextrose 40% Gel 15 Gram(s) Oral once PRN Blood Glucose LESS THAN 70 milliGRAM(s)/deciliter  glucagon  Injectable 1 milliGRAM(s) IntraMuscular once PRN Glucose LESS THAN 70 milligrams/deciliter        Vital Signs Last 24 Hrs  T(C): 36.8 (09 Jan 2020 09:01), Max: 36.8 (09 Jan 2020 09:01)  T(F): 98.2 (09 Jan 2020 09:01), Max: 98.2 (09 Jan 2020 09:01)  HR: 98 (09 Jan 2020 09:01) (70 - 98)  BP: 125/74 (09 Jan 2020 09:01) (115/66 - 133/84)  BP(mean): --  RR: 14 (09 Jan 2020 09:01) (14 - 14)  SpO2: 98% (09 Jan 2020 09:01) (98% - 98%)      PHYSICAL EXAM:    General: NAD-OOB/Chair in room  Cardiovascular: warm and well perfused  Respiratory: B/L CTA, no r/r/w  Gastrointestinal: soft, NT/ND;  Extremities: right BKA, incision C/D/I  Neurological: AAOx3; no focal deficits HPI:  Pt is a 50 y/o M with PMHx of DM who presented to [facility] on 12/12/19 c/o worsening RLE infection and found to have necrotizing soft tissue infection of the RLE, admitted to SICU for resuscitation and management of severe sepsis, s/p emergent caroline Rt caroline GROVSE.    SUBJECTIVE:   Patient seen and examined. Progressing well in rehab. Seen by Diabetes Educator today.  Endocrinology consult - Dr. Cullen. No new complaints.  Patient needs to f/u with Endocrinologist when discharged from rehab.       REVIEW OF SYSTEMS:  Denies chest pain, shortness of breath, palpitations, chills, fever, no nausea/vomiting  MSK: right BKA  All else negative.     MEDICATIONS  (STANDING):  amLODIPine   Tablet 5 milliGRAM(s) Oral daily  dextrose 5%. 1000 milliLiter(s) (50 mL/Hr) IV Continuous <Continuous>  dextrose 50% Injectable 12.5 Gram(s) IV Push once  dextrose 50% Injectable 25 Gram(s) IV Push once  dextrose 50% Injectable 25 Gram(s) IV Push once  gabapentin 200 milliGRAM(s) Oral three times a day  insulin glargine Injectable (LANTUS) 18 Unit(s) SubCutaneous at bedtime  insulin lispro (HumaLOG) corrective regimen sliding scale   SubCutaneous three times a day before meals  insulin lispro Injectable (HumaLOG) 1 Unit(s) SubCutaneous before breakfast  insulin lispro Injectable (HumaLOG) 3 Unit(s) SubCutaneous <User Schedule>    MEDICATIONS  (PRN):  acetaminophen   Tablet .. 650 milliGRAM(s) Oral every 6 hours PRN Temp greater or equal to 38C (100.4F), Mild Pain (1 - 3)  dextrose 40% Gel 15 Gram(s) Oral once PRN Blood Glucose LESS THAN 70 milliGRAM(s)/deciliter  glucagon  Injectable 1 milliGRAM(s) IntraMuscular once PRN Glucose LESS THAN 70 milligrams/deciliter        Vital Signs Last 24 Hrs  T(C): 36.8 (09 Jan 2020 09:01), Max: 36.8 (09 Jan 2020 09:01)  T(F): 98.2 (09 Jan 2020 09:01), Max: 98.2 (09 Jan 2020 09:01)  HR: 98 (09 Jan 2020 09:01) (70 - 98)  BP: 125/74 (09 Jan 2020 09:01) (115/66 - 133/84)  BP(mean): --  RR: 14 (09 Jan 2020 09:01) (14 - 14)  SpO2: 98% (09 Jan 2020 09:01) (98% - 98%)      PHYSICAL EXAM:    General: NAD-OOB/Chair in room  Cardiovascular: warm and well perfused  Respiratory: B/L CTA, no r/r/w  Gastrointestinal: soft, NT/ND;  Extremities: right BKA, incision C/D/I  Neurological: AAOx3; no focal deficits

## 2020-01-10 ENCOUNTER — TRANSCRIPTION ENCOUNTER (OUTPATIENT)
Age: 50
End: 2020-01-10

## 2020-01-10 LAB
GLUCOSE BLDC GLUCOMTR-MCNC: 188 MG/DL — HIGH (ref 70–99)
GLUCOSE BLDC GLUCOMTR-MCNC: 220 MG/DL — HIGH (ref 70–99)
GLUCOSE BLDC GLUCOMTR-MCNC: 222 MG/DL — HIGH (ref 70–99)
GLUCOSE BLDC GLUCOMTR-MCNC: 231 MG/DL — HIGH (ref 70–99)

## 2020-01-10 PROCEDURE — 99232 SBSQ HOSP IP/OBS MODERATE 35: CPT | Mod: GC

## 2020-01-10 RX ORDER — INSULIN GLARGINE 100 [IU]/ML
18 INJECTION, SOLUTION SUBCUTANEOUS
Qty: 1 | Refills: 0
Start: 2020-01-10 | End: 2020-02-08

## 2020-01-10 RX ORDER — AMLODIPINE BESYLATE 2.5 MG/1
1 TABLET ORAL
Qty: 30 | Refills: 0
Start: 2020-01-10 | End: 2020-02-08

## 2020-01-10 RX ORDER — GABAPENTIN 400 MG/1
2 CAPSULE ORAL
Qty: 30 | Refills: 0
Start: 2020-01-10

## 2020-01-10 RX ORDER — INSULIN LISPRO 100/ML
3 VIAL (ML) SUBCUTANEOUS
Qty: 1 | Refills: 0
Start: 2020-01-10

## 2020-01-10 RX ORDER — INSULIN LISPRO 100/ML
5 VIAL (ML) SUBCUTANEOUS
Qty: 0 | Refills: 0 | DISCHARGE

## 2020-01-10 RX ADMIN — Medication 3 UNIT(S): at 12:16

## 2020-01-10 RX ADMIN — Medication 2: at 17:24

## 2020-01-10 RX ADMIN — Medication 1 UNIT(S): at 08:14

## 2020-01-10 RX ADMIN — AMLODIPINE BESYLATE 5 MILLIGRAM(S): 2.5 TABLET ORAL at 05:40

## 2020-01-10 RX ADMIN — Medication 2: at 08:13

## 2020-01-10 RX ADMIN — GABAPENTIN 200 MILLIGRAM(S): 400 CAPSULE ORAL at 05:40

## 2020-01-10 RX ADMIN — GABAPENTIN 200 MILLIGRAM(S): 400 CAPSULE ORAL at 22:11

## 2020-01-10 RX ADMIN — Medication 3 UNIT(S): at 17:24

## 2020-01-10 RX ADMIN — Medication 1: at 12:17

## 2020-01-10 RX ADMIN — INSULIN GLARGINE 18 UNIT(S): 100 INJECTION, SOLUTION SUBCUTANEOUS at 22:11

## 2020-01-10 RX ADMIN — GABAPENTIN 200 MILLIGRAM(S): 400 CAPSULE ORAL at 14:26

## 2020-01-10 NOTE — PROGRESS NOTE ADULT - ASSESSMENT
Pt is a 50 y/o M with PMHx of DM who presented to [facility] on 12/12/19 c/o worsening RLE infection and found to have necrotizing soft tissue infection of the RLE, admitted to SICU for resuscitation and management of severe sepsis, s/p emergent guillotine Rt guillotjoy BKA.    #Right BKA secondary to necrotizing fasciitis  - c/w comprehensive  PT/OT  - c/w- knee immobilizer at night  - Pain control  -. Monitor incision site.       #DM1   - Lantus 18units at bedtime  - Humalog 1 unit before breakfast, 3 units before lunch, dinner.       #HTN  - c/w-Norvasc 5mg daily    #Pain   - Tylenol PRN  - Gabapentin 200mg tid    #Diet  - Consistent Carb with evening snack and Glucerna shake          stable for dc in am

## 2020-01-10 NOTE — DISCHARGE NOTE NURSING/CASE MANAGEMENT/SOCIAL WORK - PATIENT PORTAL LINK FT
You can access the FollowMyHealth Patient Portal offered by [facility] by registering at the following website: [email or web address]. By joining [facility]’s FollowMyHealth portal, you will also be able to view your health information using other applications (apps) compatible with our system.

## 2020-01-10 NOTE — PROGRESS NOTE ADULT - SUBJECTIVE AND OBJECTIVE BOX
HPI:  Pt is a 50 y/o M with PMHx of DM who presented to [facility] on 12/12/19 c/o worsening RLE infection and found to have necrotizing soft tissue infection of the RLE, admitted to SICU for resuscitation and management of severe sepsis, s/p emergent caroline Rt caroline GROVES.    SUBJECTIVE:   Patient seen and examined.  Pain controlled with medication. No complaints. Eager for discharge home 1/11/20     REVIEW OF SYSTEMS:  Denies chest pain, shortness of breath, palpitations, chills, fever, no nausea/vomiting  MSK: right BKA  All else negative.     MEDICATIONS  (STANDING):  amLODIPine   Tablet 5 milliGRAM(s) Oral daily  dextrose 5%. 1000 milliLiter(s) (50 mL/Hr) IV Continuous <Continuous>  dextrose 50% Injectable 12.5 Gram(s) IV Push once  dextrose 50% Injectable 25 Gram(s) IV Push once  dextrose 50% Injectable 25 Gram(s) IV Push once  gabapentin 200 milliGRAM(s) Oral three times a day  insulin glargine Injectable (LANTUS) 18 Unit(s) SubCutaneous at bedtime  insulin lispro (HumaLOG) corrective regimen sliding scale   SubCutaneous three times a day before meals  insulin lispro Injectable (HumaLOG) 1 Unit(s) SubCutaneous before breakfast  insulin lispro Injectable (HumaLOG) 3 Unit(s) SubCutaneous <User Schedule>    MEDICATIONS  (PRN):  acetaminophen   Tablet .. 650 milliGRAM(s) Oral every 6 hours PRN Temp greater or equal to 38C (100.4F), Mild Pain (1 - 3)  dextrose 40% Gel 15 Gram(s) Oral once PRN Blood Glucose LESS THAN 70 milliGRAM(s)/deciliter  glucagon  Injectable 1 milliGRAM(s) IntraMuscular once PRN Glucose LESS THAN 70 milligrams/deciliter      Vital Signs Last 24 Hrs  T(C): 36.9 (10 Clarence 2020 08:06), Max: 36.9 (10 Clarence 2020 08:06)  T(F): 98.5 (10 Clarence 2020 08:06), Max: 98.5 (10 Clarence 2020 08:06)  HR: 86 (10 Clarence 2020 08:06) (80 - 86)  BP: 128/74 (10 Clarence 2020 08:06) (122/74 - 137/72)  BP(mean): --  RR: 14 (10 Clarence 2020 08:06) (14 - 14)  SpO2: 99% (10 Clarence 2020 08:06) (97% - 99%)      PHYSICAL EXAM:    General: NAD, walking in PT gym with walker  Cardiovascular: warm and well perfused  Respiratory: no respiratory distress, no wheezing  Gastrointestinal: soft, NT/ND;  Extremities: right BKA, incision c/d/i   Neuro: MS 5/5 throughout

## 2020-01-11 VITALS
OXYGEN SATURATION: 99 % | SYSTOLIC BLOOD PRESSURE: 115 MMHG | RESPIRATION RATE: 14 BRPM | HEART RATE: 98 BPM | TEMPERATURE: 98 F | DIASTOLIC BLOOD PRESSURE: 71 MMHG

## 2020-01-11 LAB
GLUCOSE BLDC GLUCOMTR-MCNC: 154 MG/DL — HIGH (ref 70–99)
GLUCOSE BLDC GLUCOMTR-MCNC: 49 MG/DL — LOW (ref 70–99)
GLUCOSE BLDC GLUCOMTR-MCNC: 51 MG/DL — LOW (ref 70–99)
GLUCOSE BLDC GLUCOMTR-MCNC: 54 MG/DL — LOW (ref 70–99)
GLUCOSE BLDC GLUCOMTR-MCNC: 90 MG/DL — SIGNIFICANT CHANGE UP (ref 70–99)

## 2020-01-11 PROCEDURE — 97530 THERAPEUTIC ACTIVITIES: CPT

## 2020-01-11 PROCEDURE — 97542 WHEELCHAIR MNGMENT TRAINING: CPT

## 2020-01-11 PROCEDURE — 97167 OT EVAL HIGH COMPLEX 60 MIN: CPT

## 2020-01-11 PROCEDURE — 99232 SBSQ HOSP IP/OBS MODERATE 35: CPT

## 2020-01-11 PROCEDURE — 97533 SENSORY INTEGRATION: CPT

## 2020-01-11 PROCEDURE — 97116 GAIT TRAINING THERAPY: CPT

## 2020-01-11 PROCEDURE — 97535 SELF CARE MNGMENT TRAINING: CPT

## 2020-01-11 PROCEDURE — 80048 BASIC METABOLIC PNL TOTAL CA: CPT

## 2020-01-11 PROCEDURE — 97110 THERAPEUTIC EXERCISES: CPT

## 2020-01-11 PROCEDURE — 85027 COMPLETE CBC AUTOMATED: CPT

## 2020-01-11 PROCEDURE — 82962 GLUCOSE BLOOD TEST: CPT

## 2020-01-11 PROCEDURE — 97163 PT EVAL HIGH COMPLEX 45 MIN: CPT

## 2020-01-11 RX ORDER — INSULIN GLARGINE 100 [IU]/ML
18 INJECTION, SOLUTION SUBCUTANEOUS
Qty: 1 | Refills: 0
Start: 2020-01-11 | End: 2020-02-09

## 2020-01-11 RX ORDER — INSULIN LISPRO 100/ML
3 VIAL (ML) SUBCUTANEOUS
Qty: 1 | Refills: 0
Start: 2020-01-11

## 2020-01-11 RX ORDER — AMLODIPINE BESYLATE 2.5 MG/1
1 TABLET ORAL
Qty: 0 | Refills: 0 | DISCHARGE
Start: 2020-01-11

## 2020-01-11 RX ORDER — GABAPENTIN 400 MG/1
2 CAPSULE ORAL
Qty: 0 | Refills: 0 | DISCHARGE
Start: 2020-01-11

## 2020-01-11 RX ORDER — GABAPENTIN 400 MG/1
2 CAPSULE ORAL
Qty: 30 | Refills: 0
Start: 2020-01-11

## 2020-01-11 RX ADMIN — GABAPENTIN 200 MILLIGRAM(S): 400 CAPSULE ORAL at 05:51

## 2020-01-11 RX ADMIN — AMLODIPINE BESYLATE 5 MILLIGRAM(S): 2.5 TABLET ORAL at 05:51

## 2020-01-11 NOTE — PROVIDER CONTACT NOTE (OTHER) - ASSESSMENT
AT 13.02 151 andgave 5unit of humolog
AOX4, pt states that he is asymptomatic. No complaint of dizziness, shaking. Pt does not appear confused or diaphoretic. hypoglycemic protocol started
Patient verbalized he feel ok , would like to get nutrition assessment and a better bedtime snack.

## 2020-01-11 NOTE — PROVIDER CONTACT NOTE (OTHER) - RECOMMENDATIONS
cont pt teaching
Continue with due dose, and will ask Endocrinology follow up
Protocol followed as ordered. Apple juice 4 oz administered followed by breakfast tray.

## 2020-01-11 NOTE — PROVIDER CONTACT NOTE (OTHER) - ACTION/TREATMENT ORDERED:
protocol followed, condition stable, plan of care continued
hsd apple juice once, blood sugar came upto 73, request ensure, had ensure,blood sugar 82.refuse to drink anymore juice, Dr. Lennon aware.patient waiting for lunch.
monitor pt
Snacks given will continue to monitor .
Hypoglycemic protocol stopped at 0904 with POC . Will continue to monitor.

## 2020-01-11 NOTE — PROGRESS NOTE ADULT - REASON FOR ADMISSION
s/p right BKA completion (12/23).

## 2020-01-11 NOTE — PROGRESS NOTE ADULT - SUBJECTIVE AND OBJECTIVE BOX
HPI:  Pt is a 48 y/o M with PMHx of DM who presented to [facility] on 12/12/19 c/o worsening RLE infection and found to have necrotizing soft tissue infection of the RLE, admitted to SICU for resuscitation and management of severe sepsis, s/p emergent caroline Rt caroline GROVES.    SUBJECTIVE:   Patient seen and examined.  No acute events overnight.  Ready for discharge home.     REVIEW OF SYSTEMS:  Denies chest pain, shortness of breath, palpitations, chills, fever, no nausea/vomiting  MSK: right BKA  All else negative.     MEDICATIONS  (STANDING):  amLODIPine   Tablet 5 milliGRAM(s) Oral daily  dextrose 5%. 1000 milliLiter(s) (50 mL/Hr) IV Continuous <Continuous>  dextrose 50% Injectable 12.5 Gram(s) IV Push once  dextrose 50% Injectable 25 Gram(s) IV Push once  dextrose 50% Injectable 25 Gram(s) IV Push once  gabapentin 200 milliGRAM(s) Oral three times a day  insulin glargine Injectable (LANTUS) 18 Unit(s) SubCutaneous at bedtime  insulin lispro (HumaLOG) corrective regimen sliding scale   SubCutaneous three times a day before meals  insulin lispro Injectable (HumaLOG) 1 Unit(s) SubCutaneous before breakfast  insulin lispro Injectable (HumaLOG) 3 Unit(s) SubCutaneous <User Schedule>    MEDICATIONS  (PRN):  acetaminophen   Tablet .. 650 milliGRAM(s) Oral every 6 hours PRN Temp greater or equal to 38C (100.4F), Mild Pain (1 - 3)  dextrose 40% Gel 15 Gram(s) Oral once PRN Blood Glucose LESS THAN 70 milliGRAM(s)/deciliter  glucagon  Injectable 1 milliGRAM(s) IntraMuscular once PRN Glucose LESS THAN 70 milligrams/deciliter      Vital Signs Last 24 Hrs  T(C): 36.9 (10 Clarence 2020 08:06), Max: 36.9 (10 Clarence 2020 08:06)  T(F): 98.5 (10 Clarence 2020 08:06), Max: 98.5 (10 Clarence 2020 08:06)  HR: 86 (10 Clarence 2020 08:06) (80 - 86)  BP: 128/74 (10 Clarence 2020 08:06) (122/74 - 137/72)  BP(mean): --  RR: 14 (10 Clarence 2020 08:06) (14 - 14)  SpO2: 99% (10 Clarence 2020 08:06) (97% - 99%)      PHYSICAL EXAM:    General: NAD, walking in PT gym with walker  Cardiovascular: warm and well perfused  Respiratory: no respiratory distress, no wheezing  Gastrointestinal: soft, NT/ND, unchanged from previous exam  Extremities: right BKA, incision c/d/i   Neuro: MS 5/5 throughout

## 2020-01-11 NOTE — PROVIDER CONTACT NOTE (OTHER) - BACKGROUND
type I DM
Blood sugar 398 mg/ dl . Due for Lantus and 3 units Novolog as sliding scale .
Patient is Type 1 DM . Currently , on Lantus insulin 20 units at hs , and also on pre meal insulin , and sliding scale
type 1 DM
type 1 diabetic

## 2020-01-11 NOTE — PROVIDER CONTACT NOTE (OTHER) - DATE AND TIME:
02-Jan-2020 13:11
02-Jan-2020 06:38
03-Jan-2020 11:00
07-Jan-2020 13:00
08-Jan-2020 22:20
11-Jan-2020 08:00

## 2020-01-11 NOTE — PROGRESS NOTE ADULT - ASSESSMENT
Pt is a 50 y/o M with PMHx of DM who presented to [facility] on 12/12/19 c/o worsening RLE infection and found to have necrotizing soft tissue infection of the RLE, admitted to SICU for resuscitation and management of severe sepsis, s/p emergent guillotine Rt caroline BKA.    #Right BKA secondary to necrotizing fasciitis  - c/w comprehensive  PT/OT  - c/w- knee immobilizer at night  - Pain control  -. Monitor incision site.       #DM1   - Lantus 18units at bedtime  - Humalog 1 unit before breakfast, 3 units before lunch, dinner.       #HTN  - c/w-Norvasc 5mg daily    #Pain   - Tylenol PRN  - Gabapentin 200mg tid    #Diet  - Consistent Carb with evening snack and Glucerna shake          stable for dc this morning

## 2020-01-11 NOTE — PROVIDER CONTACT NOTE (OTHER) - SITUATION
pt b.glucose at 12.15 55,pt refused repeat b.glucose check,refuse 15 gm of glucose,at 12.32 b.glucose 60,pt agreed to have  4 oz of a.juice and lunch,pt refused b.glucose checking after 15 mt
Blood sugar 408 mg/ dl at bedtime , repeated and was 398 mg/ dl . Patient is Type 1 DM
Patient complained that he feel his sugar is low , blood sugar checked and was 57 mg/ dl refused repeat check . 15 gm sugar given and repeated in 15 minutes and is 78 mg / dl
hypoglycemic protocol. POC BG 49/repeat 51
low blood sugar during lunch time.
patient felt tired,blood sugar 62

## 2020-01-21 ENCOUNTER — APPOINTMENT (OUTPATIENT)
Dept: ENDOCRINOLOGY | Facility: CLINIC | Age: 50
End: 2020-01-21
Payer: COMMERCIAL

## 2020-01-21 VITALS — WEIGHT: 170 LBS | HEIGHT: 72 IN | BODY MASS INDEX: 23.03 KG/M2

## 2020-01-21 PROCEDURE — G0108 DIAB MANAGE TRN  PER INDIV: CPT

## 2020-01-21 RX ORDER — URINE ACETONE TEST STRIPS
STRIP MISCELLANEOUS
Qty: 1 | Refills: 2 | Status: ACTIVE | COMMUNITY
Start: 2020-01-21 | End: 1900-01-01

## 2020-01-29 ENCOUNTER — APPOINTMENT (OUTPATIENT)
Dept: VASCULAR SURGERY | Facility: CLINIC | Age: 50
End: 2020-01-29
Payer: COMMERCIAL

## 2020-01-29 VITALS
TEMPERATURE: 98.3 F | HEIGHT: 72 IN | WEIGHT: 170 LBS | HEART RATE: 99 BPM | DIASTOLIC BLOOD PRESSURE: 82 MMHG | SYSTOLIC BLOOD PRESSURE: 125 MMHG | BODY MASS INDEX: 23.03 KG/M2

## 2020-01-29 DIAGNOSIS — Z98.890 OTHER SPECIFIED POSTPROCEDURAL STATES: ICD-10-CM

## 2020-01-29 PROCEDURE — 99024 POSTOP FOLLOW-UP VISIT: CPT

## 2020-01-29 NOTE — REASON FOR VISIT
[de-identified] : Revision YANELI [de-identified] : 12/23/19 [de-identified] : Mr. Purvis is recovering well. He was discharge from rehab facility and is currently being fit for a prosthesis. He has no complaints. Denies any pain, signs of infection. He is requesting to have paperwork for return of work filled out.

## 2020-01-29 NOTE — DISCUSSION/SUMMARY
[FreeTextEntry1] : 48 yo male 1 months s/p right BKA, recovering well\par - Staples removed\par - patient will be obtaining prosthesis\par - Will fill out paperwork and return to patient\par - Return to clinic PRN

## 2020-01-29 NOTE — REVIEW OF SYSTEMS
[Skin Wound] : skin wound [Lower Ext Edema] : no extremity edema [FreeTextEntry5] : Denies pain at stump

## 2020-01-29 NOTE — PHYSICAL EXAM
[Normal Rate and Rhythm] : normal rate and rhythm [2+] : right 2+ [de-identified] : NAD, AAO x3 [de-identified] : Breathing even and unlabored on room air [FreeTextEntry1] : Right BKA stump healing well. No erythema, induration of dehiscence.

## 2020-03-10 RX ORDER — SYRING-NEEDL,DISP,INSUL,0.3 ML 31 GX5/16"
31G X 5/16" SYRINGE, EMPTY DISPOSABLE MISCELLANEOUS
Qty: 400 | Refills: 0 | Status: ACTIVE | COMMUNITY
Start: 2020-03-10 | End: 1900-01-01

## 2020-03-30 RX ORDER — SOFT LENS RINSE,STORE SOLUTION
0.9 SOLUTION, NON-ORAL MISCELLANEOUS
Qty: 2 | Refills: 0 | Status: ACTIVE | COMMUNITY
Start: 2020-03-27 | End: 1900-01-01

## 2020-04-20 ENCOUNTER — APPOINTMENT (OUTPATIENT)
Dept: ENDOCRINOLOGY | Facility: CLINIC | Age: 50
End: 2020-04-20
Payer: COMMERCIAL

## 2020-04-20 ENCOUNTER — NON-APPOINTMENT (OUTPATIENT)
Age: 50
End: 2020-04-20

## 2020-04-20 ENCOUNTER — APPOINTMENT (OUTPATIENT)
Dept: ENDOCRINOLOGY | Facility: CLINIC | Age: 50
End: 2020-04-20

## 2020-04-20 DIAGNOSIS — Z60.2 PROBLEMS RELATED TO LIVING ALONE: ICD-10-CM

## 2020-04-20 DIAGNOSIS — Z78.9 OTHER SPECIFIED HEALTH STATUS: ICD-10-CM

## 2020-04-20 PROCEDURE — 99214 OFFICE O/P EST MOD 30 MIN: CPT

## 2020-04-20 PROCEDURE — 99204 OFFICE O/P NEW MOD 45 MIN: CPT | Mod: 95

## 2020-04-20 SDOH — SOCIAL STABILITY - SOCIAL INSECURITY: PROBLEMS RELATED TO LIVING ALONE: Z60.2

## 2020-04-20 NOTE — HISTORY OF PRESENT ILLNESS
[FreeTextEntry1] : 51 y/o male w/ T1DM\par Unable to maintain office visit due to COVID\par Telephone visit completed with the patient after attempting telehealth visit that only briefly connected and then he could not connect\par \par T1DM was diagnosed at age 15\par Complications include: R-BKA (initially had a blister and went to [place] and was applying a debridement agent) and wears a prosthesis. No neuropathy in Left foot. Last ophtho visit was 2 years ago may be, no retinopathy, no laser surgery. No MI or CVA.\par Hospitalizations: 2020 for DKA \par Current DM Medications/Insulin: Tandem T Slim Pump is at home. Currently on Humalog 4 units TID AC and 1 unit for 50 >150. Taking Basaglar 16 units QHS\par Past - was on Medtronic in the past\par \par Current Fingerstick Glucose Logs: Using Dexcom G6\par Fastin-130s. This morning FS normal\par Pre-Lunch: 150-160s\par Pre-Dinner: 160-190s\par Bedtime: 300s (took Humalog 10 units)\par States alarms are on. Otherwise can recognize s/s in 60s - drinks orange juice -- 1 cup \par \par Current Diet Includes: My FItness Pal lilly for carb counting\par Breakfast: crossaint, turkey, egg, sausage\par Lunch: chicken oziel with french fries and salad\par Dinner: Sphegetti and meatball. 2 hamburgers\par Snacks: sugar free cookies. Chips and dips\par Drinks: water mostly \par Mon-Fri - exercises 2 hours after dinner, strength training. Exercises for 45 min - 1 hour. Post exercise FS are normal. 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

## 2020-04-20 NOTE — PHYSICAL EXAM
[Alert] : alert [No Acute Distress] : no acute distress [Normal Voice/Communication] : normal voice communication [Normal Hearing] : hearing was normal [Oriented x3] : oriented to person, place, and time [Normal Affect] : the affect was normal [Normal Insight/Judgement] : insight and judgment were intact [Normal Mood] : the mood was normal

## 2020-04-20 NOTE — REVIEW OF SYSTEMS
[All other systems negative] : All other systems negative [Negative] : Heme/Lymph [Fatigue] : no fatigue [Recent Weight Gain (___ Lbs)] : no recent weight gain [Recent Weight Loss (___ Lbs)] : no recent weight loss

## 2020-04-20 NOTE — ASSESSMENT
[Diabetes Foot Care] : diabetes foot care [Carbohydrate Consistent Diet] : carbohydrate consistent diet [Long Term Vascular Complications] : long term vascular complications of diabetes [Exercise/Effect on Glucose] : exercise/effect on glucose [Importance of Diet and Exercise] : importance of diet and exercise to improve glycemic control, achieve weight loss and improve cardiovascular health [Hypoglycemia Management] : hypoglycemia management [Action and use of Insulin] : action and use of short and long-acting insulin [Retinopathy Screening] : Patient was referred to ophthalmology for retinopathy screening [Insulin Self-Administration] : insulin self-administration [FreeTextEntry1] : 51 y/o male with uncontrolled T1DM complicated with R-BKA, will be transitioning to Tandem T Slim pump. Currently using Dexcom G6 and insulin injections\par \par T1DM\par - I had a lengthy discussion regarding healthy diet (consistent carbohydrates and low calorie content) with the patient. I also emphasized to maintain routine exercise activity (30 minutes daily or 150 minutes in the week).\par - I discussed the importance of avoiding mild hypoglycemia (FS<70 mg/dl) and severe hypoglycemia (FS<55 mg/dl) with the patient. I also discussed hypoglycemia correction with 4 oz of juice or 4 glucose tablets and rechecking FS in 15 minutes. If FS is still low, repeat 4oz juice or 4 glucose tablets and consume 12 grams of carbohydrates.\par - Talked about importance of medical alert\par - Recommend continue current insulin doses and transition to 0.6 u/hr on insulin pump after training\par - After training continue Humalog 4 units per meal with 1 unit for every 50>150, until Jessica reviews carb counting and his routine diet to understand required carb ratio\par - Patient admits that he has a virtual visit with the Tandem T Slim rep for tomorrow to understand how to use pump\par - Continue using Dexcom, he does not have a computer and not sure how to send the Clarity code, will have Jessica reach out to him\par - Recommend continue care for prosthesis, f/u podiatry and ophthalmologist routinely, due for ophtho visit\par - WIll check labs next visit\par \par F/u in 2 months\par \par Kiki Pena, \par \par

## 2020-04-30 NOTE — PATIENT PROFILE ADULT - BRADEN SCORE
[Well Nourished] : well nourished [No Acute Distress] : no acute distress [Well Developed] : well developed [Well-Appearing] : well-appearing [No Respiratory Distress] : no respiratory distress  [No Accessory Muscle Use] : no accessory muscle use [Speech Grossly Normal] : speech grossly normal [Memory Grossly Normal] : memory grossly normal [Normal Affect] : the affect was normal [Alert and Oriented x3] : oriented to person, place, and time [Normal Mood] : the mood was normal [Normal Insight/Judgement] : insight and judgment were intact 20

## 2020-07-09 RX ORDER — INSULIN LISPRO 100 [IU]/ML
100 INJECTION, SOLUTION INTRAVENOUS; SUBCUTANEOUS
Qty: 9 | Refills: 0 | Status: ACTIVE | COMMUNITY
Start: 2020-07-08 | End: 1900-01-01

## 2020-12-08 NOTE — PROGRESS NOTE ADULT - PROVIDER SPECIALTY LIST ADULT
"Chief Complaint   Patient presents with     Cough     Dizziness       Initial /84 (BP Location: Right arm, Patient Position: Chair, Cuff Size: Adult Regular)   Pulse 64   Temp 97  F (36.1  C)   Ht 1.549 m (5' 1\")   Wt 98.4 kg (217 lb)   SpO2 98%   BMI 41.00 kg/m   Estimated body mass index is 41 kg/m  as calculated from the following:    Height as of this encounter: 1.549 m (5' 1\").    Weight as of this encounter: 98.4 kg (217 lb).  Medication Reconciliation: complete  Shonna Oliver LPN    " Diabetes

## 2020-12-11 ENCOUNTER — APPOINTMENT (OUTPATIENT)
Dept: ENDOCRINOLOGY | Facility: CLINIC | Age: 50
End: 2020-12-11

## 2021-02-16 ENCOUNTER — RX RENEWAL (OUTPATIENT)
Age: 51
End: 2021-02-16

## 2021-04-15 RX ORDER — INSULIN LISPRO 100 U/ML
100 INJECTION, SOLUTION INTRAVENOUS; SUBCUTANEOUS
Qty: 3 | Refills: 0 | Status: ACTIVE | COMMUNITY
Start: 2020-03-10 | End: 1900-01-01

## 2021-04-21 ENCOUNTER — RX RENEWAL (OUTPATIENT)
Age: 51
End: 2021-04-21

## 2021-05-13 ENCOUNTER — RX RENEWAL (OUTPATIENT)
Age: 51
End: 2021-05-13

## 2021-06-14 ENCOUNTER — APPOINTMENT (OUTPATIENT)
Dept: ENDOCRINOLOGY | Facility: CLINIC | Age: 51
End: 2021-06-14

## 2021-07-23 ENCOUNTER — RX RENEWAL (OUTPATIENT)
Age: 51
End: 2021-07-23

## 2021-08-30 ENCOUNTER — APPOINTMENT (OUTPATIENT)
Dept: ENDOCRINOLOGY | Facility: CLINIC | Age: 51
End: 2021-08-30

## 2021-09-02 ENCOUNTER — RX RENEWAL (OUTPATIENT)
Age: 51
End: 2021-09-02

## 2021-10-13 NOTE — PROGRESS NOTE ADULT - PROBLEM SELECTOR PROBLEM 1
Procedure Note      Pre/Post Operative Diagnosis:   Left 2nd and 4th finger warts     Procedure:   Removal of  Left 2nd and 4th finger warts        Surgeon: Leo Figueredo MD    Local Anesthesia: 1% lidocaine with 0.25% Marcaine with epinephrine    Indication for the procedure:  This is a 15 year old male  patient referred by Rochelle Mondragon with a  Left 2nd and 4th finger warts.       After explaining the risks to include bleeding, infection, recurrence or need for reexcision, and scarring the patient wished to proceed.    Procedure:   The area was prepped and draped in usual sterile fashion with ChloraPrep.   After, adequate local anesthesia, an 1 cm X 0.5 cm elliptical skin incision was made to encompass the  0.5 cm lesion.  The skin was closed with 4-0 Monocryl and Dermabond.      The area was prepped and draped in usual sterile fashion with ChloraPrep.   After, adequate local anesthesia, an 1 cm X 0.4 cm elliptical skin incision was made to encompass the  0.4 cm lesion.  The skin was closed with 4-0 Monocryl and Dermabond.        Plan:  The patient will be called to review the pathology. Patient will follow up if there any problems with the wound including redness or drainage.      Leo Figueredo MD....................  10/13/2021   9:36 AM         Type 1 diabetes mellitus with other circulatory complication

## 2021-10-16 NOTE — PROGRESS NOTE ADULT - SUBJECTIVE AND OBJECTIVE BOX
Vascular Surgery Progress Note     SUBJECTIVE / 24H EVENTS  Patient seen and examined on morning rounds. No acute events overnight. Saturating in is 90% on high flow nc. Lantus dose was decreased to 20 units. Dressing changed and wrapped with kurlex and ace bandage.      OBJECTIVE:      PHYSICAL EXAM:  Gen: NAD, well-developed  Neuro: AAOX3,   CV: S1S2, with regular rhythm (-)m/r/g  Pulm: CTAB, no wheezes or rhonchi  GI: abd s/nt/nd  Ext: R BKA Dressing c/d/i, WWP, no pitting edema, calf without tenderness or tension            Vital Signs:  Vital Signs Last 24 Hrs  T(C): 36.8 (16 Dec 2019 08:00), Max: 37.7 (15 Dec 2019 20:00)  T(F): 98.3 (16 Dec 2019 08:00), Max: 99.8 (15 Dec 2019 20:00)  HR: 94 (16 Dec 2019 08:00) (91 - 111)  BP: --  BP(mean): --  RR: 17 (16 Dec 2019 08:00) (16 - 27)  SpO2: 95% (16 Dec 2019 08:00) (94% - 99%)    CAPILLARY BLOOD GLUCOSE      POCT Blood Glucose.: 201 mg/dL (16 Dec 2019 07:26)  POCT Blood Glucose.: 218 mg/dL (16 Dec 2019 05:51)  POCT Blood Glucose.: 208 mg/dL (15 Dec 2019 21:16)  POCT Blood Glucose.: 128 mg/dL (15 Dec 2019 16:03)  POCT Blood Glucose.: 103 mg/dL (15 Dec 2019 11:08)      I&O's Detail    15 Dec 2019 07:01  -  16 Dec 2019 07:00  --------------------------------------------------------  IN:    IV PiggyBack: 600 mL    lactated ringers.: 250 mL    Oral Fluid: 250 mL  Total IN: 1100 mL    OUT:    Voided: 1150 mL  Total OUT: 1150 mL    Total NET: -50 mL          MEDICATIONS  (STANDING):  amLODIPine   Tablet 5 milliGRAM(s) Oral daily  chlorhexidine 4% Liquid 1 Application(s) Topical <User Schedule>  dextrose 50% Injectable 12.5 Gram(s) IV Push once  dextrose 50% Injectable 25 Gram(s) IV Push once  dextrose 50% Injectable 25 Gram(s) IV Push once  heparin  Injectable 5000 Unit(s) SubCutaneous every 8 hours  influenza   Vaccine 0.5 milliLiter(s) IntraMuscular once  insulin glargine Injectable (LANTUS) 20 Unit(s) SubCutaneous at bedtime  insulin lispro (HumaLOG) corrective regimen sliding scale   SubCutaneous three times a day before meals  insulin lispro (HumaLOG) corrective regimen sliding scale   SubCutaneous at bedtime  insulin lispro Injectable (HumaLOG) 5 Unit(s) SubCutaneous three times a day before meals  piperacillin/tazobactam IVPB.. 3.375 Gram(s) IV Intermittent every 8 hours    MEDICATIONS  (PRN):  acetaminophen   Tablet .. 650 milliGRAM(s) Oral every 6 hours PRN Mild Pain (1 - 3)  dextrose 40% Gel 15 Gram(s) Oral once PRN Blood Glucose LESS THAN 70 milliGRAM(s)/deciliter  glucagon  Injectable 1 milliGRAM(s) IntraMuscular once PRN Glucose LESS THAN 70 milligrams/deciliter  ondansetron Injectable 4 milliGRAM(s) IV Push every 8 hours PRN Nausea and/or Vomiting  oxyCODONE    IR 10 milliGRAM(s) Oral every 4 hours PRN Severe Pain (7 - 10)  oxyCODONE    IR 5 milliGRAM(s) Oral every 4 hours PRN Moderate Pain (4 - 6)          Labs:    12-16    130<L>  |  96<L>  |  16  ----------------------------<  226<H>  4.2   |  22  |  1.65<H>    Ca    7.6<L>      16 Dec 2019 00:10  Phos  2.3     12-16  Mg     1.8     12-16                              8.2    10.17 )-----------( 305      ( 16 Dec 2019 00:10 )             24.8         Imaging: No

## 2021-10-25 ENCOUNTER — RX RENEWAL (OUTPATIENT)
Age: 51
End: 2021-10-25

## 2021-11-08 NOTE — CHART NOTE - NSCHARTNOTEFT_GEN_A_CORE
Pre-operative Note    Pre-operative Diagnosis: right guillotine BKA amputation  Procedure: BKA completion  Surgeon: PLACIDO    Labs:                        8.0    8.45  )-----------( 335      ( 17 Dec 2019 23:45 )             25.1     12-17    134<L>  |  98  |  15  ----------------------------<  218<H>  4.1   |  24  |  1.52<H>    Ca    8.1<L>      17 Dec 2019 23:45  Phos  3.1     12-17  Mg     2.0     12-17    TPro  7.3  /  Alb  1.8<L>  /  TBili  < 0.2<L>  /  DBili  < 0.2  /  AST  62<H>  /  ALT  47<H>  /  AlkPhos  226<H>  12-17      Type & Screen #1: 12/12/19- O positive  Type & Screen #2: Pending AM labs      Imaging  - CXR: 12/17--Bilateral hazy lower lung fields has the appearance of effusions but air   bronchograms indicate airspace pathology consistent with clinical   suspicion of ARDS.  - EKG:    ECHO: 12/14   1. Normal mitral valve. Minimal mitral regurgitation.  2. Normal trileaflet aortic valve. No aortic valve  regurgitation seen.  3. Normal left ventricular systolic function. No segmental  wall motion abnormalities.  4. Normal right ventricular size and function.  5. Bilateral pleural effusions.      Plan:  - NPO at midnight  - IVF while NPO   - Insulin adjusted for NPO status  - Follow-up AM CXR  - continue VTE ppx  - Consent obtained Asc Procedure Text (A): After obtaining clear surgical margins the patient was sent to an ASC for surgical repair.  The patient understands they will receive post-surgical care and follow-up from the ASC physician.

## 2021-11-08 NOTE — PROVIDER CONTACT NOTE (MEDICATION) - RECOMMENDATIONS
Stat dose tylenol. High Dose Vitamin A Pregnancy And Lactation Text: High dose vitamin A therapy is contraindicated during pregnancy and breast feeding.

## 2021-12-13 ENCOUNTER — RX RENEWAL (OUTPATIENT)
Age: 51
End: 2021-12-13

## 2022-02-22 RX ORDER — INSULIN ASPART 100 [IU]/ML
100 INJECTION, SOLUTION INTRAVENOUS; SUBCUTANEOUS
Qty: 30 | Refills: 0 | Status: ACTIVE | COMMUNITY
Start: 2021-02-16 | End: 1900-01-01

## 2022-02-28 ENCOUNTER — APPOINTMENT (OUTPATIENT)
Dept: ENDOCRINOLOGY | Facility: CLINIC | Age: 52
End: 2022-02-28
Payer: COMMERCIAL

## 2022-02-28 VITALS
HEIGHT: 72 IN | BODY MASS INDEX: 23.03 KG/M2 | WEIGHT: 170 LBS | TEMPERATURE: 97.5 F | OXYGEN SATURATION: 98 % | DIASTOLIC BLOOD PRESSURE: 80 MMHG | HEART RATE: 100 BPM | SYSTOLIC BLOOD PRESSURE: 136 MMHG

## 2022-02-28 DIAGNOSIS — E10.9 TYPE 1 DIABETES MELLITUS W/OUT COMPLICATIONS: ICD-10-CM

## 2022-02-28 LAB
GLUCOSE BLDC GLUCOMTR-MCNC: 138
HBA1C MFR BLD HPLC: 11

## 2022-02-28 PROCEDURE — 82962 GLUCOSE BLOOD TEST: CPT

## 2022-02-28 PROCEDURE — 99214 OFFICE O/P EST MOD 30 MIN: CPT | Mod: 25

## 2022-02-28 PROCEDURE — 83036 HEMOGLOBIN GLYCOSYLATED A1C: CPT | Mod: QW

## 2022-02-28 RX ORDER — INSULIN LISPRO 100 U/ML
100 INJECTION, SOLUTION SUBCUTANEOUS
Qty: 2 | Refills: 0 | Status: ACTIVE | COMMUNITY
Start: 2020-01-21 | End: 1900-01-01

## 2022-02-28 RX ORDER — INSULIN GLARGINE 100 [IU]/ML
100 INJECTION, SOLUTION SUBCUTANEOUS
Qty: 2 | Refills: 0 | Status: ACTIVE | COMMUNITY
Start: 2020-01-21 | End: 1900-01-01

## 2022-02-28 RX ORDER — GLUCAGON 3 MG/1
3 POWDER NASAL
Qty: 1 | Refills: 0 | Status: ACTIVE | COMMUNITY
Start: 2020-01-21 | End: 1900-01-01

## 2022-02-28 RX ORDER — PEN NEEDLE, DIABETIC 29 G X1/2"
32G X 4 MM NEEDLE, DISPOSABLE MISCELLANEOUS
Qty: 1 | Refills: 0 | Status: ACTIVE | COMMUNITY
Start: 2020-01-21 | End: 1900-01-01

## 2022-04-07 ENCOUNTER — RX RENEWAL (OUTPATIENT)
Age: 52
End: 2022-04-07

## 2022-08-29 ENCOUNTER — RX RENEWAL (OUTPATIENT)
Age: 52
End: 2022-08-29

## 2022-08-30 ENCOUNTER — APPOINTMENT (OUTPATIENT)
Dept: ENDOCRINOLOGY | Facility: CLINIC | Age: 52
End: 2022-08-30

## 2022-10-03 ENCOUNTER — RX RENEWAL (OUTPATIENT)
Age: 52
End: 2022-10-03

## 2022-10-03 RX ORDER — INSULIN ASPART 100 [IU]/ML
100 INJECTION, SOLUTION INTRAVENOUS; SUBCUTANEOUS
Qty: 30 | Refills: 0 | Status: ACTIVE | COMMUNITY
Start: 2022-04-07 | End: 1900-01-01

## 2022-12-01 NOTE — DIETITIAN INITIAL EVALUATION ADULT. - ETIOLOGY
related to endocrine [id number] Infliximab Counseling:  I discussed with the patient the risks of infliximab including but not limited to myelosuppression, immunosuppression, autoimmune hepatitis, demyelinating diseases, lymphoma, and serious infections.  The patient understands that monitoring is required including a PPD at baseline and must alert us or the primary physician if symptoms of infection or other concerning signs are noted.

## 2022-12-07 NOTE — PROVIDER CONTACT NOTE (MEDICATION) - SITUATION
patient breakfast fs was 54 , refused confirmatory one until appro 10 minutes later showing result of 99. Consent 2/Introductory Paragraph: Mohs surgery was explained to the patient and consent was obtained. The risks, benefits and alternatives to therapy were discussed in detail. Specifically, the risks of infection, scarring, bleeding, prolonged wound healing, incomplete removal, allergy to anesthesia, nerve injury and recurrence were addressed. Prior to the procedure, the treatment site was clearly identified and confirmed by the patient. All components of Universal Protocol/PAUSE Rule completed.

## 2023-02-01 NOTE — PROVIDER CONTACT NOTE (MEDICATION) - RECOMMENDATIONS
PA notified,
please change insulin coverage orders for sliding scale for 6am
will repeat fingerstick until fs are 100 or above x2  FS . endocrine consult ordered .
General

## 2023-03-16 ENCOUNTER — RX RENEWAL (OUTPATIENT)
Age: 53
End: 2023-03-16

## 2024-03-22 NOTE — PATIENT PROFILE ADULT - NSPROGENANESREACTION_GEN_A_NUR
One month follow up call to patient. She reports she continues to have bad abdominal pain and pain down her leg and into her labia. She reports she can not sleep, the pain wakes her up. He stated she has a call to OB/GYN  to inquire due to bloodwork is back up and she would be able to go through with surgery. She stated Insurance stated she had to have surgery done through [facility]. She stated states she is contemplating going to [facility] as they are a [facility] hospital and assist everyone. She stated when she called [facility] and went to ER a few days ago they could not take her there because it is too far. She states she can find a ride during the daytime. Encouraged her to go to ER due to the pain being so bad. She states she is going to try to find a ride as she has been calling 's office's and leaving messages and no one is getting back to her. She feels as though no one believes her with the pain she is in. This note routed to    no previous reaction

## 2024-10-22 ENCOUNTER — RESULT REVIEW (OUTPATIENT)
Age: 54
End: 2024-10-22

## 2024-10-22 ENCOUNTER — INPATIENT (INPATIENT)
Facility: HOSPITAL | Age: 54
LOS: 9 days | Discharge: HOME | End: 2024-11-01
Attending: SURGERY | Admitting: SURGERY
Payer: COMMERCIAL

## 2024-10-22 VITALS
HEART RATE: 107 BPM | DIASTOLIC BLOOD PRESSURE: 79 MMHG | OXYGEN SATURATION: 99 % | HEIGHT: 76 IN | SYSTOLIC BLOOD PRESSURE: 146 MMHG | TEMPERATURE: 98 F | WEIGHT: 175.93 LBS | RESPIRATION RATE: 18 BRPM

## 2024-10-22 DIAGNOSIS — I96 GANGRENE, NOT ELSEWHERE CLASSIFIED: ICD-10-CM

## 2024-10-22 DIAGNOSIS — I70.209 UNSPECIFIED ATHEROSCLEROSIS OF NATIVE ARTERIES OF EXTREMITIES, UNSPECIFIED EXTREMITY: Chronic | ICD-10-CM

## 2024-10-22 LAB
ALBUMIN SERPL ELPH-MCNC: 3 G/DL — LOW (ref 3.3–5)
ALP SERPL-CCNC: 136 U/L — HIGH (ref 40–120)
ALT FLD-CCNC: SIGNIFICANT CHANGE UP U/L (ref 4–41)
ANION GAP SERPL CALC-SCNC: 15 MMOL/L — HIGH (ref 7–14)
AST SERPL-CCNC: SIGNIFICANT CHANGE UP U/L (ref 4–40)
BASOPHILS # BLD AUTO: 0.03 K/UL — SIGNIFICANT CHANGE UP (ref 0–0.2)
BASOPHILS NFR BLD AUTO: 0.3 % — SIGNIFICANT CHANGE UP (ref 0–2)
BILIRUB SERPL-MCNC: 0.2 MG/DL — SIGNIFICANT CHANGE UP (ref 0.2–1.2)
BLOOD GAS VENOUS COMPREHENSIVE RESULT: SIGNIFICANT CHANGE UP
BUN SERPL-MCNC: 18 MG/DL — SIGNIFICANT CHANGE UP (ref 7–23)
CALCIUM SERPL-MCNC: 9.2 MG/DL — SIGNIFICANT CHANGE UP (ref 8.4–10.5)
CHLORIDE SERPL-SCNC: 99 MMOL/L — SIGNIFICANT CHANGE UP (ref 98–107)
CO2 SERPL-SCNC: 24 MMOL/L — SIGNIFICANT CHANGE UP (ref 22–31)
CREAT SERPL-MCNC: 1.36 MG/DL — HIGH (ref 0.5–1.3)
CRP SERPL-MCNC: 147.8 MG/L — HIGH
EGFR: 63 ML/MIN/1.73M2 — SIGNIFICANT CHANGE UP
EOSINOPHIL # BLD AUTO: 0.29 K/UL — SIGNIFICANT CHANGE UP (ref 0–0.5)
EOSINOPHIL NFR BLD AUTO: 2.7 % — SIGNIFICANT CHANGE UP (ref 0–6)
ERYTHROCYTE [SEDIMENTATION RATE] IN BLOOD: 85 MM/HR — HIGH (ref 1–15)
GLUCOSE BLDC GLUCOMTR-MCNC: 122 MG/DL — HIGH (ref 70–99)
GLUCOSE BLDC GLUCOMTR-MCNC: 191 MG/DL — HIGH (ref 70–99)
GLUCOSE SERPL-MCNC: 55 MG/DL — LOW (ref 70–99)
GRAM STN FLD: ABNORMAL
HCT VFR BLD CALC: 36 % — LOW (ref 39–50)
HGB BLD-MCNC: 12 G/DL — LOW (ref 13–17)
IANC: 8.3 K/UL — HIGH (ref 1.8–7.4)
IMM GRANULOCYTES NFR BLD AUTO: 0.4 % — SIGNIFICANT CHANGE UP (ref 0–0.9)
LYMPHOCYTES # BLD AUTO: 1.6 K/UL — SIGNIFICANT CHANGE UP (ref 1–3.3)
LYMPHOCYTES # BLD AUTO: 14.8 % — SIGNIFICANT CHANGE UP (ref 13–44)
MCHC RBC-ENTMCNC: 28.6 PG — SIGNIFICANT CHANGE UP (ref 27–34)
MCHC RBC-ENTMCNC: 33.3 GM/DL — SIGNIFICANT CHANGE UP (ref 32–36)
MCV RBC AUTO: 85.7 FL — SIGNIFICANT CHANGE UP (ref 80–100)
MONOCYTES # BLD AUTO: 0.54 K/UL — SIGNIFICANT CHANGE UP (ref 0–0.9)
MONOCYTES NFR BLD AUTO: 5 % — SIGNIFICANT CHANGE UP (ref 2–14)
NEUTROPHILS # BLD AUTO: 8.3 K/UL — HIGH (ref 1.8–7.4)
NEUTROPHILS NFR BLD AUTO: 76.8 % — SIGNIFICANT CHANGE UP (ref 43–77)
NRBC # BLD: 0 /100 WBCS — SIGNIFICANT CHANGE UP (ref 0–0)
NRBC # FLD: 0 K/UL — SIGNIFICANT CHANGE UP (ref 0–0)
PLATELET # BLD AUTO: 619 K/UL — HIGH (ref 150–400)
POTASSIUM SERPL-MCNC: SIGNIFICANT CHANGE UP MMOL/L (ref 3.5–5.3)
POTASSIUM SERPL-SCNC: SIGNIFICANT CHANGE UP MMOL/L (ref 3.5–5.3)
PROT SERPL-MCNC: SIGNIFICANT CHANGE UP G/DL (ref 6–8.3)
RBC # BLD: 4.2 M/UL — SIGNIFICANT CHANGE UP (ref 4.2–5.8)
RBC # FLD: 13.1 % — SIGNIFICANT CHANGE UP (ref 10.3–14.5)
SODIUM SERPL-SCNC: 138 MMOL/L — SIGNIFICANT CHANGE UP (ref 135–145)
SPECIMEN SOURCE: SIGNIFICANT CHANGE UP
WBC # BLD: 10.8 K/UL — HIGH (ref 3.8–10.5)
WBC # FLD AUTO: 10.8 K/UL — HIGH (ref 3.8–10.5)

## 2024-10-22 PROCEDURE — 99285 EMERGENCY DEPT VISIT HI MDM: CPT

## 2024-10-22 PROCEDURE — 99223 1ST HOSP IP/OBS HIGH 75: CPT

## 2024-10-22 PROCEDURE — 73620 X-RAY EXAM OF FOOT: CPT | Mod: 26,LT

## 2024-10-22 PROCEDURE — 73600 X-RAY EXAM OF ANKLE: CPT | Mod: 26,LT

## 2024-10-22 PROCEDURE — 88311 DECALCIFY TISSUE: CPT | Mod: 26

## 2024-10-22 PROCEDURE — 88305 TISSUE EXAM BY PATHOLOGIST: CPT | Mod: 26

## 2024-10-22 RX ORDER — INFLUENZ VIR VAC TV P-SURF2003 15MCG/.5ML
0.5 SYRINGE (ML) INTRAMUSCULAR ONCE
Refills: 0 | Status: DISCONTINUED | OUTPATIENT
Start: 2024-10-22 | End: 2024-11-01

## 2024-10-22 RX ORDER — GABAPENTIN 300 MG/1
1 CAPSULE ORAL
Refills: 0 | DISCHARGE

## 2024-10-22 RX ORDER — PIPERACILLIN AND TAZOBACTAM .5; 4 G/20ML; G/20ML
3.38 INJECTION, POWDER, LYOPHILIZED, FOR SOLUTION INTRAVENOUS EVERY 8 HOURS
Refills: 0 | Status: DISCONTINUED | OUTPATIENT
Start: 2024-10-22 | End: 2024-10-25

## 2024-10-22 RX ORDER — INSULIN LISPRO 100/ML
VIAL (ML) SUBCUTANEOUS
Refills: 0 | Status: DISCONTINUED | OUTPATIENT
Start: 2024-10-22 | End: 2024-10-23

## 2024-10-22 RX ORDER — GABAPENTIN 300 MG/1
800 CAPSULE ORAL THREE TIMES A DAY
Refills: 0 | Status: DISCONTINUED | OUTPATIENT
Start: 2024-10-22 | End: 2024-10-29

## 2024-10-22 RX ORDER — ASPIRIN/MAG CARB/ALUMINUM AMIN 325 MG
81 TABLET ORAL DAILY
Refills: 0 | Status: DISCONTINUED | OUTPATIENT
Start: 2024-10-22 | End: 2024-10-26

## 2024-10-22 RX ORDER — INSULIN GLARGINE,HUM.REC.ANLOG 100/ML
20 VIAL (ML) SUBCUTANEOUS EVERY MORNING
Refills: 0 | Status: DISCONTINUED | OUTPATIENT
Start: 2024-10-23 | End: 2024-10-23

## 2024-10-22 RX ORDER — PIPERACILLIN AND TAZOBACTAM .5; 4 G/20ML; G/20ML
3.38 INJECTION, POWDER, LYOPHILIZED, FOR SOLUTION INTRAVENOUS ONCE
Refills: 0 | Status: COMPLETED | OUTPATIENT
Start: 2024-10-22 | End: 2024-10-22

## 2024-10-22 RX ORDER — VANCOMYCIN HYDROCHLORIDE 50 MG/ML
1000 KIT ORAL ONCE
Refills: 0 | Status: COMPLETED | OUTPATIENT
Start: 2024-10-22 | End: 2024-10-22

## 2024-10-22 RX ORDER — GLUCAGON INJECTION, SOLUTION 1 MG/.2ML
1 INJECTION, SOLUTION SUBCUTANEOUS ONCE
Refills: 0 | Status: DISCONTINUED | OUTPATIENT
Start: 2024-10-22 | End: 2024-11-01

## 2024-10-22 RX ORDER — HEPARIN SODIUM 10000 [USP'U]/ML
5000 INJECTION INTRAVENOUS; SUBCUTANEOUS EVERY 8 HOURS
Refills: 0 | Status: DISCONTINUED | OUTPATIENT
Start: 2024-10-22 | End: 2024-10-24

## 2024-10-22 RX ADMIN — GABAPENTIN 800 MILLIGRAM(S): 300 CAPSULE ORAL at 20:18

## 2024-10-22 RX ADMIN — PIPERACILLIN AND TAZOBACTAM 200 GRAM(S): .5; 4 INJECTION, POWDER, LYOPHILIZED, FOR SOLUTION INTRAVENOUS at 19:22

## 2024-10-22 RX ADMIN — Medication 40 MILLIGRAM(S): at 21:54

## 2024-10-22 RX ADMIN — VANCOMYCIN HYDROCHLORIDE 250 MILLIGRAM(S): KIT at 17:35

## 2024-10-22 NOTE — CONSULT NOTE ADULT - ASSESSMENT
55 yo M Presented with R 3,4 digits wet gangrene with wound to bone  - Pt seen and evaluated.  - Afebrile, WBC 10.80, ESR pending, CRP 14.78  - L foot full thickness gangrene of 3,4 th digits to level of MPJ with wet changes, with partial detachment fo 4th digit, and wound to bone, mild malodor, scant purulence, ischemic changes to 2nd digits, plantar submet 3 eschar  - After obtaining verbal consent, using a suture removal kit and 15 blade, R foot 4th digit was disarticulated at PIPJ level, 1cc purlence noted, dorsal I&D was perfomed to level of subQ, not beyond, scant purulence noted, wound was then flushed, packed with 1" packing, followed by DSD  - Left foot X-ray: 4th metatarsal head OM  - Left foot wound culture obtained and sent  - L foot 4th digit sent to pathology  - Recommended admission to medicine  - Recommended IV Vanco & Zosyn  - Recommended vascular consult  - Pod plan: Local wound care VS L foot TMA vs L foot Partial Fourth Ray Resection and 3rd digit amputation pending [facility] recs  - Please document medical clearance for possible podiatry surgery under anesthesia   - Discussed with Attending

## 2024-10-22 NOTE — PATIENT PROFILE ADULT - FALL HARM RISK - HARM RISK INTERVENTIONS

## 2024-10-22 NOTE — ED ADULT NURSE NOTE - OBJECTIVE STATEMENT
pt. is alert and oriented x 4, presenting to the ER with complaints of left 3rd and 4th toe with discoloration. Pt. stated " I noticed my toes getting black for the past 2 weeks and it got worse". no c/o pain toes with black eschar and necrotic.

## 2024-10-22 NOTE — H&P ADULT - ATTENDING COMMENTS
55yo M s/p R BKA now with L foot gangrene and infection   s/p podiatry debridement  IV Abx  f/up wound cx  wound care as per podiatry  CHRISTIAN/PVRs  may need angio pending PVRs

## 2024-10-22 NOTE — H&P ADULT - ASSESSMENT
A 54 year old male with PMHx of DM and stroke not on ASA PSHx of Rt. BKA in 2019 presents with left foot gangrene and web space infection. Vascular surgery is consulted.    Plan:  - Admit to vascular surgery  - CC diet  - Pain management  - CHRISTIAN/PVR  - Podiatry consult  - X-Ray of the foot  - Medical and cardiac clearance  - Optimize blood glucose levels  - Med Rec done  - Diabetes orders  - AM labs  - DVT PPx    C-Team  [id number] A 54 year old male with PMHx of DM and stroke not on ASA PSHx of Rt. BKA in 2019 presents with left foot gangrene and web space infection. Vascular surgery is consulted.    Plan:  - Admit to vascular surgery  - CC diet  - Pain management  - ASA  - Statin  - CHRISTIAN/PVR  - Podiatry consult  - X-Ray of the foot  - Medical and cardiac clearance  - Optimize blood glucose levels  - Med Rec done  - Diabetes orders  - AM labs  - DVT PPx    C-Team  [id number]

## 2024-10-22 NOTE — CONSULT NOTE ADULT - SUBJECTIVE AND OBJECTIVE BOX
VASCULAR SURGERY CONSULT NOTE  --------------------------------------------------------------------------------------------    Patient is a 54y old  Male who presents with a chief complaint of     HPI: HPI:  54-year-old male history of right below the knee amputation in 2019, presents to the ER with complaints to the toes of the left foot.  He has a gangrenous third toe and infection in the spaces between toes 3 and 4 as well as 4 and 5.  He has a nonhealing ulcer to the bottom of his foot.  He denies fevers chills nausea vomiting or pain.  He is a diabetic he states his A1c is between 7 and 8.  This is an improvement compared to prior A1c's.  He does not follow with a podiatrist.    In the ED, the patient is afebrile, tachy, normotensive, RA    ROS: 10-system review is otherwise negative except HPI above.      PAST MEDICAL & SURGICAL HISTORY:  DM (diabetes mellitus)  DM 1      Stenosis of popliteal artery  Left stent placed 2017        FAMILY HISTORY:      SOCIAL HISTORY:      ALLERGIES: No Known Allergies      HOME MEDICATIONS:     CURRENT MEDICATIONS  MEDICATIONS (STANDING): piperacillin/tazobactam IVPB... 3.375 Gram(s) IV Intermittent once  vancomycin  IVPB. 1000 milliGRAM(s) IV Intermittent once    MEDICATIONS (PRN):  --------------------------------------------------------------------------------------------    Vitals:   T(C): 36.7 (10-22-24 @ 12:27), Max: 36.7 (10-22-24 @ 12:27)  HR: 107 (10-22-24 @ 12:27) (107 - 107)  BP: 146/79 (10-22-24 @ 12:27) (146/79 - 146/79)  RR: 18 (10-22-24 @ 12:27) (18 - 18)  SpO2: 99% (10-22-24 @ 12:27) (99% - 99%)  CAPILLARY BLOOD GLUCOSE      POCT Blood Glucose.: 71 mg/dL (22 Oct 2024 15:35)  POCT Blood Glucose.: 98 mg/dL (22 Oct 2024 12:25)    CAPILLARY BLOOD GLUCOSE      POCT Blood Glucose.: 71 mg/dL (22 Oct 2024 15:35)  POCT Blood Glucose.: 98 mg/dL (22 Oct 2024 12:25)      Height (cm): 193 (10-22 @ 12:27)  Weight (kg): 79.8 (10-22 @ 12:27)  BMI (kg/m2): 21.4 (10-22 @ 12:27)  BSA (m2): 2.1 (10-22 @ 12:27)    PHYSICAL EXAM:   General: NAD, Lying in bed comfortably  Neuro: A+Ox3  HEENT: NC/AT, EOMI  Neck: Soft, supple  Cardio: RRR, nml S1/S2  Resp: Good effort, CTA b/l  GI/Abd: Soft, NT/ND, no rebound/guarding, no masses palpated  Vascular:   Skin: Intact, no breakdown  Lymphatic/Nodes: No palpable lymphadenopathy  Musculoskeletal: All 4 extremities moving spontaneously, no limitations.  --------------------------------------------------------------------------------------------    LABS  CBC (10-22 @ 15:00)                              12.0[L]                         10.80[H]  )----------------(  619[H]     76.8  % Neutrophils, 14.8  % Lymphocytes, ANC: 8.30[H]                              36.0[L]              VBG (10-22 @ 15:00)     7.37 / 55 / 33 / 32[H] / 5.2[H] / 55.4[L]%     Lactate: 1.7    --------------------------------------------------------------------------------------------    MICROBIOLOGY      --------------------------------------------------------------------------------------------    IMAGING     VASCULAR SURGERY CONSULT NOTE  --------------------------------------------------------------------------------------------    Patient is a 54y old  Male who presents with a chief complaint of     HPI: HPI:  54-year-old male history of right below the knee amputation in 2019, presents to the ER with complaints to the toes of the left foot.  He has a gangrenous third toe and infection in the spaces between toes 3 and 4 as well as 4 and 5.  He has a nonhealing ulcer to the bottom of his foot.  He denies fevers chills nausea vomiting or pain.  He is a diabetic he states his A1c is between 7 and 8.  This is an improvement compared to prior A1c's.  He does not follow with a podiatrist.    In the ED, the patient is afebrile, tachy, normotensive, RA    ROS: 10-system review is otherwise negative except HPI above.      PAST MEDICAL & SURGICAL HISTORY:  DM (diabetes mellitus)  DM 1      Stenosis of popliteal artery  Left stent placed 2017        FAMILY HISTORY:      SOCIAL HISTORY:      ALLERGIES: No Known Allergies      HOME MEDICATIONS:     CURRENT MEDICATIONS  MEDICATIONS (STANDING): piperacillin/tazobactam IVPB... 3.375 Gram(s) IV Intermittent once  vancomycin  IVPB. 1000 milliGRAM(s) IV Intermittent once    MEDICATIONS (PRN):  --------------------------------------------------------------------------------------------    Vitals:   T(C): 36.7 (10-22-24 @ 12:27), Max: 36.7 (10-22-24 @ 12:27)  HR: 107 (10-22-24 @ 12:27) (107 - 107)  BP: 146/79 (10-22-24 @ 12:27) (146/79 - 146/79)  RR: 18 (10-22-24 @ 12:27) (18 - 18)  SpO2: 99% (10-22-24 @ 12:27) (99% - 99%)  CAPILLARY BLOOD GLUCOSE      POCT Blood Glucose.: 71 mg/dL (22 Oct 2024 15:35)  POCT Blood Glucose.: 98 mg/dL (22 Oct 2024 12:25)    CAPILLARY BLOOD GLUCOSE      POCT Blood Glucose.: 71 mg/dL (22 Oct 2024 15:35)  POCT Blood Glucose.: 98 mg/dL (22 Oct 2024 12:25)      Height (cm): 193 (10-22 @ 12:27)  Weight (kg): 79.8 (10-22 @ 12:27)  BMI (kg/m2): 21.4 (10-22 @ 12:27)  BSA (m2): 2.1 (10-22 @ 12:27)    PHYSICAL EXAM:   General: NAD, Lying in bed comfortably  Neuro: A+Ox3  HEENT: NC/AT, EOMI  Neck: Soft, supple  Cardio: RRR, nml S1/S2  Resp: Good effort, CTA b/l  Vascular: s/p Rt. BKA. Left foot dopplerable signals PT. Intact motor and sensory. Foot covered in kerlix (patient asked not to take it down)  Musculoskeletal: All  extremities moving spontaneously, no limitations.  --------------------------------------------------------------------------------------------    LABS  CBC (10-22 @ 15:00)                              12.0[L]                         10.80[H]  )----------------(  619[H]     76.8  % Neutrophils, 14.8  % Lymphocytes, ANC: 8.30[H]                              36.0[L]              VBG (10-22 @ 15:00)     7.37 / 55 / 33 / 32[H] / 5.2[H] / 55.4[L]%     Lactate: 1.7    --------------------------------------------------------------------------------------------    MICROBIOLOGY      --------------------------------------------------------------------------------------------    IMAGING     VASCULAR SURGERY CONSULT NOTE  --------------------------------------------------------------------------------------------    Patient is a 54y old  Male who presents with a chief complaint of     HPI: HPI:  54-year-old male history of right below the knee amputation in 2019, presents to the ER with complaints to the toes of the left foot.  He has a gangrenous third toe and infection in the spaces between toes 3 and 4 as well as 4 and 5.  He has a nonhealing ulcer to the bottom of his foot.  He denies fevers chills nausea vomiting or pain.  He is a diabetic he states his A1c is between 7 and 8.  This is an improvement compared to prior A1c's.  He does not follow with a podiatrist.    In the ED, the patient is afebrile, tachy, normotensive, RA    ROS: 10-system review is otherwise negative except HPI above.      PAST MEDICAL & SURGICAL HISTORY:  DM (diabetes mellitus)  DM 1      Stenosis of popliteal artery  Left stent placed 2017        FAMILY HISTORY:      SOCIAL HISTORY:      ALLERGIES: No Known Allergies      HOME MEDICATIONS:     CURRENT MEDICATIONS  MEDICATIONS (STANDING): piperacillin/tazobactam IVPB... 3.375 Gram(s) IV Intermittent once  vancomycin  IVPB. 1000 milliGRAM(s) IV Intermittent once    MEDICATIONS (PRN):  --------------------------------------------------------------------------------------------    Vitals:   T(C): 36.7 (10-22-24 @ 12:27), Max: 36.7 (10-22-24 @ 12:27)  HR: 107 (10-22-24 @ 12:27) (107 - 107)  BP: 146/79 (10-22-24 @ 12:27) (146/79 - 146/79)  RR: 18 (10-22-24 @ 12:27) (18 - 18)  SpO2: 99% (10-22-24 @ 12:27) (99% - 99%)  CAPILLARY BLOOD GLUCOSE      POCT Blood Glucose.: 71 mg/dL (22 Oct 2024 15:35)  POCT Blood Glucose.: 98 mg/dL (22 Oct 2024 12:25)    CAPILLARY BLOOD GLUCOSE      POCT Blood Glucose.: 71 mg/dL (22 Oct 2024 15:35)  POCT Blood Glucose.: 98 mg/dL (22 Oct 2024 12:25)      Height (cm): 193 (10-22 @ 12:27)  Weight (kg): 79.8 (10-22 @ 12:27)  BMI (kg/m2): 21.4 (10-22 @ 12:27)  BSA (m2): 2.1 (10-22 @ 12:27)    PHYSICAL EXAM:   General: NAD, Lying in bed comfortably  Neuro: A+Ox3  HEENT: NC/AT, EOMI  Neck: Soft, supple  Cardio: RRR, nml S1/S2  Resp: Good effort, CTA b/l  Vascular: s/p Rt. BKA. Left foot dopplerable signals PT. Intact motor and sensory. Foot covered in kerlix (patient asked not to take it down)  Musculoskeletal: All  extremities moving spontaneously, no limitations.  --------------------------------------------------------------------------------------------    LABS  CBC (10-22 @ 15:00)                              12.0[L]                         10.80[H]  )----------------(  619[H]     76.8  % Neutrophils, 14.8  % Lymphocytes, ANC: 8.30[H]                              36.0[L]              VBG (10-22 @ 15:00)     7.37 / 55 / 33 / 32[H] / 5.2[H] / 55.4[L]%     Lactate: 1.7    --------------------------------------------------------------------------------------------    MICROBIOLOGY      --------------------------------------------------------------------------------------------    MED REC    Gabapentin 800 mg BID  Lantus 20 units before breakfast

## 2024-10-22 NOTE — ED PROVIDER NOTE - MUSCULOSKELETAL, MLM
Spine appears normal, range of motion is not limited, no muscle or joint tenderness.   Right-sided below-knee amputation.  Left foot there is a gangrenous

## 2024-10-22 NOTE — ED PROVIDER NOTE - PROGRESS NOTE DETAILS
BRUNO Vazquez: Spoke with podiatry will see pt in the ED BRUNO Vazquez: Podiatry at bedside, removed 4th digit, awaiting xray, spoke with vascular will see pt in ED BRUNO Vazquez: Vascular surgery accepting pt, pt aware of admission

## 2024-10-22 NOTE — PATIENT PROFILE ADULT - NSPROHMDIABETMGMTSTRAT_GEN_A_NUR
continous blood glucose monitoring- on right arm  Lantus in AM/activity/adequate rest/diet modification/exercise/insulin therapy/weight management

## 2024-10-22 NOTE — ED PROVIDER NOTE - OBJECTIVE STATEMENT
54-year-old male history of right below the knee amputation, presents to the ER with complaints to the toes of the left foot.  He has a gangrenous third toe and infection in the spaces between toes 3 and 4 as well as 4 and 5.  He has a nonhealing ulcer to the bottom of his foot.  He denies fevers chills nausea vomiting or pain.  He is a diabetic he states his A1c is between 7 and 8.  This is an improvement compared to prior A1c's.  He does not follow with a podiatrist. 54-year-old male history of right below the knee amputation, presents to the ER with complaints to the toes of the left foot.  He has a gangrenous third toe and infection in the spaces between toes 3 and 4 as well as 4 and 5.  He has a nonhealing ulcer to the bottom of his foot.  He denies fevers chills nausea vomiting or pain.  He is a diabetic he states his A1c is between 7 and 8.  This is an improvement compared to prior A1c's.  He does not follow with a podiatrist.    BRUNO Vazquez: 54yM w/pmhx right BKA, DM2 presenting to the ED with concern for left foot infection. pt reports over the past few weeks he noticed discoloration to left foot 3rd toe, now with discoloration to 4th toe as well. Pt does not follow with a podiatrist. Pt denies fever/chills, foul smelling odor from foot, change to sensation of the foot, cp, sob, abd pain, n/v/d, recent travel or illness or any other concerns.

## 2024-10-22 NOTE — H&P ADULT - HISTORY OF PRESENT ILLNESS
54-year-old male history of right below the knee amputation in 2019, presents to the ER with complaints to the toes of the left foot.  He has a gangrenous third toe and infection in the spaces between toes 3 and 4 as well as 4 and 5.  He has a nonhealing ulcer to the bottom of his foot.  He denies fevers chills nausea vomiting or pain.  He is a diabetic he states his A1c is between 7 and 8.  This is an improvement compared to prior A1c's.  He does not follow with a podiatrist.    In the ED, the patient is afebrile, tachy, normotensive, RA

## 2024-10-22 NOTE — H&P ADULT - NSHPPHYSICALEXAM_GEN_ALL_CORE
PHYSICAL EXAM:   General: NAD, Lying in bed comfortably  Neuro: A+Ox3  HEENT: NC/AT, EOMI  Neck: Soft, supple  Cardio: RRR, nml S1/S2  Resp: Good effort, CTA b/l  Vascular: s/p Rt. BKA. Left foot dopplerable signals PT. Intact motor and sensory. Foot covered in kerlix (patient asked not to take it down)  Musculoskeletal: All  extremities moving spontaneously, no limitations.

## 2024-10-22 NOTE — PATIENT PROFILE ADULT - NSPROGENBLOODRESTRICT_GEN_A_NUR
Chief Complaint   Patient presents with   • Worker's Compensation     [employer]      Date of injury:  12/22/2017    Employer:  [employer]   Job title:  Wax room /     HISTORY OF PRESENT ILLNESS:  (This includes review of systems)  The patient reports that on December 22 the year 2017 while at work she noticed a gradual development of pain over her right middle back as she was moving, pushing and pulling,  heavy molds/metal castings.  She explains that many of these molds are simply too heavy to be lifted manually and that a \"Veronika\" (a lift truck of sorts) is used to move the molds from shelf to machine and  from machine to shelf.  She explains that with one of the machines there is difficulty matching the level of the lift truck platform with the machine edge and oftentimes this requires some manual pushing, pulling and budging of the molds to slide them in place.  Patient explains that many of these molds are too heavy for 1 person to lift .  As an example patient reports that at a certain point she found herself sitting and pushing out with her legs to move a mold as her back and arms were getting too sore from all the heavy activity.    The patient found herself trying to make use of cold/ice compresses first for the back pain and found this just made it worse.  After this she made use of heating pads and warm applications and found it provided some temporary relief; however her back pain otherwise seemed to only worsen.  She ended up presenting to emergency department on December 23 of the year 2017 to address the back pain.  In reviewing notes from that visit it can be seen that she was assessed as having \"strain of lumbar region\" and was given prescriptions for naproxen and Flexeril.  She was advised to avoid heavy lifting and also advised that she could not work while taking the medication (presumably the Flexeril).    Currently  the patient characterizes the pain as moderate; however she emphasizes that it can be severe especially with more twisting of the upper body and even more so with pushing or pulling while twisting.    Asked where the pain is the worst the patient points out area beneath her right shoulder blade, describing area that involves much of the top half of right latissimus dorsi distribution.  The patient has noticed with coughing, sneezing or deeper breathing she can feel the pain more over the area.  She has not noticed however development of shortness of breath or chest pain.  Patient reports that the naproxen does provide some fair relief of the back pain and she has not noticed any side effects from the naproxen, denying any problems with stomach upset or drowsiness.  Patient reports that she has noticed the Flexeril seems to lead her sleep a little better; although during the day she hasn't noticed that it makes that much difference in the back pain.  Patient has not noticed any pain radiating into buttocks or lower extremities.  She has not noticed any development of numbness or tingling anywhere.  She has not noticed any change in bowel or bladder function.  She does not recall ever having back pain like this before.    MEDICAL HISTORY:  (This includes review of current medications and  list of any known medication allergies)  The patient has a history of at least one chronic illness/condition.  Please refer to the problem list section and medical history section of the Epic chart for more information on this.  Currently the patient is taking one other prescription medication (i.e. in addition to the naproxen and Flexeril that was prescribed in the emergency department for her back pain).  Please refer to medication section of the Epic chart for more information on this.  The patient has no known drug allergies.      Gastrointestinal:  The patient denies any problems with stomach ulcers or bad heartburn.       Surgeries:  Patient has a history of at least 3 major surgeries.  Please refer to the surgical history section of the Epic chart for more information on this.  Patient does not have any history of back or spinal surgery.       Social History and Miscellaneous:  The patient does not and never has smoked tobacco.  The patient works for [employer] in the \"wax room\" and as a .  She has been on this job about 2 years.        PHYSICAL EXAMINATION:    This is a 54 year old female who is alert and oriented times 4.  Her back posture is noticeably guarded.  She is observed to sit laterally bending some to the left and avoiding applying pressure over right middle back.   Answers all questions promptly and appropriately.  Her affect is appropriate.    Vital Signs:    Visit Vitals  BP (!) 144/98   Pulse 80   Resp 16   Ht 5' 2\" (1.575 m)   Wt 88.5 kg   BMI 35.67 kg/m²     Back, thoracic spine and lumbar spine:  There is some moderate tenderness with palpation over the right  mid back, largely along thoracic levels and overlying superior half of the latissimus dorsi distribution on the right.  Musculature here is palpably increased in tautness as compared with the left latissimus dorsi.  There are no point areas of more prominent tenderness.  There is no palpable deformity of underlying rib structure.  There is no tenderness with palpation over midline thoracic or lumbar spine.  There is no tenderness with palpation over sacroiliac joints on right or left.    Deep tendon reflexes: Bilateral patellar are 2+.  Bilateral Achilles 2+.  Toe flexion and extension strength is full and comparable bilaterally.  Straight leg raises are negative bilaterally.    The patient's gait is observed to be smooth and well coordinated with no apparent antalgia and with no foot drop.    Respiratory: Breathing is regular and nonlabored. Lung fields are clear to auscultation. There is good aeration throughout. There  are no adventitious sounds.    Cardiovascular: Rate and rhythm is regular. S1-S2 sounds are within normal limits. There are no S3 or S4 sounds. There are no rubs, gallops, clicks or murmurs.    Neurological: Strength and sensation is intact. Speech is fluent and articulate.    General: Patient is without diaphoresis or pallor and is nontoxic in appearance.    DIAGNOSIS:  Mid back strain--strain of right latissimus dorsi--subsequent encounter    PLAN:  (This includes description of work restrictions)   The patient may return to work with restrictions of limiting any lift, carry, push or pull to not more than 15 pounds.  Patient needs to keep to one half reach.  Patient should avoid deep twisting and bending at the waist (not more than 45°).  Patient needs to change positions frequently (especially avoiding prolonged sitting--not more than 15 minutes at a time).    Patient is advised now to take naproxen only as needed for the back pain--1 as often as every 12 hours--pointing out also that her blood pressure is elevated today and that naproxen can worsen this.  Patient is not given refill for naproxen.  The patient is given refill of Flexeril, 5 mg strength tablets a quantity of 14, with instructions that she can take 1-2 before bed as needed for more intense back pain and tightness.  She is cautioned to not drive within 8 hours after taking this medicine.  The patient is also given some biofreeze (topical medication) with instructions that she can apply this 3-4 times a day over sore area on her back as it helps with the pain and tightness.  She is cautioned to not apply warm applications over the biofreeze.    Patient  is instructed to begin daily routine of some gentle stretches for her back.  Stretches tailored to her current symptoms are carefully demonstrated for her.  She's advised also to make a point of periodically taking in a deep breath.  The patient is scheduled return for follow-up next week and is given  instructions to call or return sooner if she has inadequate pain relief with these current measures or does note development of clearly worsening symptoms--as discussed, before the scheduled follow-up appointment time.      This does appear to be a work-related injury.     Expected MMI (Maximum Medical Improvement):  2-3 weeks       Addendum: Later review of pharmacy records reveals that the patient was provided with refill of 10 mg strength tablets of Flexeril instead of 5 mg, the intended.  The patient is contacted and carefully instructed to a certain to only take 1 tablet per dose.  Patient acknowledged understanding of this.       none

## 2024-10-22 NOTE — ED PROVIDER NOTE - PHYSICAL EXAMINATION
Skin: left foot 3rd digit dry gangrene, 4th digit appears necrotic, web spacing with purulent drainage, ulceration to base of 3-4 digits  foot is warm, DP pulse 2+

## 2024-10-22 NOTE — ED PROVIDER NOTE - ATTENDING APP SHARED VISIT CONTRIBUTION OF CARE
I, Sachin Daigle, DO personally saw the patient with FELECIA.  I have personally performed a face to face diagnostic evaluation on this patient.  I have reviewed the FELECIA note and agree with the history, exam, and plan of care, except as noted.  I personally saw the patient and performed a substantive portion of the visit including all aspects of the medical decision making.

## 2024-10-22 NOTE — CONSULT NOTE ADULT - ASSESSMENT
A 54 year old male with PMHx of DM PSHx of Rt. BKA in 2019 presents with left foot gangrene and web space infection. Vascular surgery is consulted.    Recommendations:  - CHRISTIAN/PVR  - Podiatry consult  - X-Ray of the foot  - Medical and cardiac clearance  - Optimize blood glucose levels    C-Team  [id number]

## 2024-10-22 NOTE — CONSULT NOTE ADULT - SUBJECTIVE AND OBJECTIVE BOX
· HPI Objective Statement: 54-year-old male history of right below the knee amputation, presents to the ER with complaints to the toes of the left foot.  He has a gangrenous third toe and infection in the spaces between toes 3 and 4 as well as 4 and 5.  He has a nonhealing ulcer to the bottom of his foot.  He denies fevers chills nausea vomiting or pain.  He is a diabetic he states his A1c is between 7 and 8.  This is an improvement compared to prior A1c's.  He does not follow with a podiatrist.    Pt stated he noticed digits discoloration for a week, Pt reported history of LLE cellulitis 6 weeks ago that was managed as outpatient with IV zosyn for 2 weeks.   · Chief Complaint: The patient is a 54y Male complaining of    PAST MEDICAL & SURGICAL HISTORY:  DM (diabetes mellitus)  DM 1      Stenosis of popliteal artery  Left stent placed 2017          MEDICATIONS  (STANDING):  piperacillin/tazobactam IVPB... 3.375 Gram(s) IV Intermittent once  vancomycin  IVPB. 1000 milliGRAM(s) IV Intermittent once    MEDICATIONS  (PRN):      Allergies    No Known Allergies    Intolerances        VITALS:    Vital Signs Last 24 Hrs  T(C): 36.7 (22 Oct 2024 12:27), Max: 36.7 (22 Oct 2024 12:27)  T(F): 98 (22 Oct 2024 12:27), Max: 98 (22 Oct 2024 12:27)  HR: 107 (22 Oct 2024 12:27) (107 - 107)  BP: 146/79 (22 Oct 2024 12:27) (146/79 - 146/79)  BP(mean): --  RR: 18 (22 Oct 2024 12:27) (18 - 18)  SpO2: 99% (22 Oct 2024 12:27) (99% - 99%)    Parameters below as of 22 Oct 2024 12:27  Patient On (Oxygen Delivery Method): room air        LABS:                          12.0   10.80 )-----------( 619      ( 22 Oct 2024 15:00 )             36.0       10-22    138  |  99  |  18  ----------------------------<  55[L]  TNP   |  24  |  1.36[H]    Ca    9.2      22 Oct 2024 15:00    TPro  TNP  /  Alb  3.0[L]  /  TBili  0.2  /  DBili  x   /  AST  TNP  /  ALT  TNP  /  AlkPhos  136[H]  10-22      CAPILLARY BLOOD GLUCOSE      POCT Blood Glucose.: 71 mg/dL (22 Oct 2024 15:35)  POCT Blood Glucose.: 98 mg/dL (22 Oct 2024 12:25)          LOWER EXTREMITY PHYSICAL EXAM:    Vascular: DP1/4, PT 0/4, B/L, CFT could not be assessed 2/2 gangrene,, Temperature gradient warm to cool , B/L.   Neuro: Epicritic sensation absent to the level of digits, B/L.  Musculoskeletal/Ortho: S/P R BKA  Skin: L foot full thickness gangrene of 3,4 th digits to level of MPJ with wet changes, with partial detachement fo 4th digit, and wound to bone, mild malodor, scant purulence, ischemic changes to 2nd digits, plantar submet 3 eschar      RADIOLOGY & ADDITIONAL STUDIES:       · HPI Objective Statement: 54-year-old male history of right below the knee amputation, presents to the ER with complaints to the toes of the left foot.  He has a gangrenous third toe and infection in the spaces between toes 3 and 4 as well as 4 and 5.  He has a nonhealing ulcer to the bottom of his foot.  He denies fevers chills nausea vomiting or pain.  He is a diabetic he states his A1c is between 7 and 8.  This is an improvement compared to prior A1c's.  He does not follow with a podiatrist.    Pt stated he noticed digits discoloration for a week, Pt reported history of LLE cellulitis 6 weeks ago that was managed as outpatient with IV zosyn for 2 weeks.   · Chief Complaint: The patient is a 54y Male complaining of    PAST MEDICAL & SURGICAL HISTORY:  DM (diabetes mellitus)  DM 1      Stenosis of popliteal artery  Left stent placed 2017          MEDICATIONS  (STANDING):  piperacillin/tazobactam IVPB... 3.375 Gram(s) IV Intermittent once  vancomycin  IVPB. 1000 milliGRAM(s) IV Intermittent once    MEDICATIONS  (PRN):      Allergies    No Known Allergies    Intolerances        VITALS:    Vital Signs Last 24 Hrs  T(C): 36.7 (22 Oct 2024 12:27), Max: 36.7 (22 Oct 2024 12:27)  T(F): 98 (22 Oct 2024 12:27), Max: 98 (22 Oct 2024 12:27)  HR: 107 (22 Oct 2024 12:27) (107 - 107)  BP: 146/79 (22 Oct 2024 12:27) (146/79 - 146/79)  BP(mean): --  RR: 18 (22 Oct 2024 12:27) (18 - 18)  SpO2: 99% (22 Oct 2024 12:27) (99% - 99%)    Parameters below as of 22 Oct 2024 12:27  Patient On (Oxygen Delivery Method): room air        LABS:                          12.0   10.80 )-----------( 619      ( 22 Oct 2024 15:00 )             36.0       10-22    138  |  99  |  18  ----------------------------<  55[L]  TNP   |  24  |  1.36[H]    Ca    9.2      22 Oct 2024 15:00    TPro  TNP  /  Alb  3.0[L]  /  TBili  0.2  /  DBili  x   /  AST  TNP  /  ALT  TNP  /  AlkPhos  136[H]  10-22      CAPILLARY BLOOD GLUCOSE      POCT Blood Glucose.: 71 mg/dL (22 Oct 2024 15:35)  POCT Blood Glucose.: 98 mg/dL (22 Oct 2024 12:25)          LOWER EXTREMITY PHYSICAL EXAM:    Vascular: DP1/4, PT 0/4, B/L, CFT could not be assessed 2/2 gangrene,, Temperature gradient warm to cool , B/L.   Neuro: Epicritic sensation absent to the level of digits, B/L.  Musculoskeletal/Ortho: S/P R BKA  Skin: L foot full thickness gangrene of 3,4 th digits to level of MPJ with wet changes, with partial detachment fo 4th digit, and wound to bone, mild malodor, scant purulence, ischemic changes to 2nd digits, plantar submet 3 eschar      RADIOLOGY & ADDITIONAL STUDIES:

## 2024-10-22 NOTE — ED PROVIDER NOTE - CLINICAL SUMMARY MEDICAL DECISION MAKING FREE TEXT BOX
54yM w/pmhx right BKA, DM2 presenting to the ED with concern for left foot infection. pt reports over the past few weeks he noticed discoloration to left foot 3rd toe, now with discoloration to 4th toe as well. Pt does not follow with a podiatrist. Pt denies fever/chills, foul smelling odor from foot, change to sensation of the foot, On exam pt is well appearing, afebrile, left foot 3rd digit dry gangrene, 4th digit appears necrotic, web spacing with purulent drainage, ulceration to base of 3-4 digits  foot is warm, DP pulse 2+. Concern for dry gangrene to 3rd digit, wet gangrene to 4th digit, cellulitis, OM. Plan: cbc/cmp, esr/crp, blood cultures, xray foot, IV abx, podiatry consult

## 2024-10-22 NOTE — H&P ADULT - NSHPLABSRESULTS_GEN_ALL_CORE
.  LABS:                         12.0   10.80 )-----------( 619      ( 22 Oct 2024 15:00 )             36.0     10-22    138  |  99  |  18  ----------------------------<  55[L]  TNP   |  24  |  1.36[H]    Ca    9.2      22 Oct 2024 15:00    TPro  TNP  /  Alb  3.0[L]  /  TBili  0.2  /  DBili  x   /  AST  TNP  /  ALT  TNP  /  AlkPhos  136[H]  10-22      Urinalysis Basic - ( 22 Oct 2024 15:00 )    Color: x / Appearance: x / SG: x / pH: x  Gluc: 55 mg/dL / Ketone: x  / Bili: x / Urobili: x   Blood: x / Protein: x / Nitrite: x   Leuk Esterase: x / RBC: x / WBC x   Sq Epi: x / Non Sq Epi: x / Bacteria: x            RADIOLOGY, EKG & ADDITIONAL TESTS: Reviewed.

## 2024-10-23 LAB
A1C WITH ESTIMATED AVERAGE GLUCOSE RESULT: 8 % — HIGH (ref 4–5.6)
ADD ON TEST-SPECIMEN IN LAB: SIGNIFICANT CHANGE UP
ANION GAP SERPL CALC-SCNC: 12 MMOL/L — SIGNIFICANT CHANGE UP (ref 7–14)
BUN SERPL-MCNC: 19 MG/DL — SIGNIFICANT CHANGE UP (ref 7–23)
CALCIUM SERPL-MCNC: 8.7 MG/DL — SIGNIFICANT CHANGE UP (ref 8.4–10.5)
CHLORIDE SERPL-SCNC: 100 MMOL/L — SIGNIFICANT CHANGE UP (ref 98–107)
CO2 SERPL-SCNC: 27 MMOL/L — SIGNIFICANT CHANGE UP (ref 22–31)
CREAT SERPL-MCNC: 1.44 MG/DL — HIGH (ref 0.5–1.3)
EGFR: 58 ML/MIN/1.73M2 — LOW
ESTIMATED AVERAGE GLUCOSE: 183 — SIGNIFICANT CHANGE UP
GLUCOSE BLDC GLUCOMTR-MCNC: 136 MG/DL — HIGH (ref 70–99)
GLUCOSE BLDC GLUCOMTR-MCNC: 146 MG/DL — HIGH (ref 70–99)
GLUCOSE BLDC GLUCOMTR-MCNC: 199 MG/DL — HIGH (ref 70–99)
GLUCOSE BLDC GLUCOMTR-MCNC: 251 MG/DL — HIGH (ref 70–99)
GLUCOSE SERPL-MCNC: 189 MG/DL — HIGH (ref 70–99)
HCT VFR BLD CALC: 31.5 % — LOW (ref 39–50)
HGB BLD-MCNC: 10.1 G/DL — LOW (ref 13–17)
MAGNESIUM SERPL-MCNC: 1.7 MG/DL — SIGNIFICANT CHANGE UP (ref 1.6–2.6)
MCHC RBC-ENTMCNC: 27.2 PG — SIGNIFICANT CHANGE UP (ref 27–34)
MCHC RBC-ENTMCNC: 32.1 GM/DL — SIGNIFICANT CHANGE UP (ref 32–36)
MCV RBC AUTO: 84.7 FL — SIGNIFICANT CHANGE UP (ref 80–100)
NRBC # BLD: 0 /100 WBCS — SIGNIFICANT CHANGE UP (ref 0–0)
NRBC # FLD: 0 K/UL — SIGNIFICANT CHANGE UP (ref 0–0)
PHOSPHATE SERPL-MCNC: 3.4 MG/DL — SIGNIFICANT CHANGE UP (ref 2.5–4.5)
PLATELET # BLD AUTO: 437 K/UL — HIGH (ref 150–400)
POTASSIUM SERPL-MCNC: 3.6 MMOL/L — SIGNIFICANT CHANGE UP (ref 3.5–5.3)
POTASSIUM SERPL-SCNC: 3.6 MMOL/L — SIGNIFICANT CHANGE UP (ref 3.5–5.3)
RBC # BLD: 3.72 M/UL — LOW (ref 4.2–5.8)
RBC # FLD: 12.9 % — SIGNIFICANT CHANGE UP (ref 10.3–14.5)
SODIUM SERPL-SCNC: 139 MMOL/L — SIGNIFICANT CHANGE UP (ref 135–145)
WBC # BLD: 7.75 K/UL — SIGNIFICANT CHANGE UP (ref 3.8–10.5)
WBC # FLD AUTO: 7.75 K/UL — SIGNIFICANT CHANGE UP (ref 3.8–10.5)

## 2024-10-23 PROCEDURE — 99231 SBSQ HOSP IP/OBS SF/LOW 25: CPT

## 2024-10-23 PROCEDURE — 99222 1ST HOSP IP/OBS MODERATE 55: CPT

## 2024-10-23 RX ORDER — VANCOMYCIN HYDROCHLORIDE 50 MG/ML
1250 KIT ORAL EVERY 12 HOURS
Refills: 0 | Status: DISCONTINUED | OUTPATIENT
Start: 2024-10-23 | End: 2024-10-23

## 2024-10-23 RX ORDER — MAGNESIUM SULFATE IN 0.9% NACL 2 G/50 ML
2 INTRAVENOUS SOLUTION, PIGGYBACK (ML) INTRAVENOUS ONCE
Refills: 0 | Status: COMPLETED | OUTPATIENT
Start: 2024-10-23 | End: 2024-10-23

## 2024-10-23 RX ORDER — INSULIN GLARGINE,HUM.REC.ANLOG 100/ML
10 VIAL (ML) SUBCUTANEOUS EVERY MORNING
Refills: 0 | Status: DISCONTINUED | OUTPATIENT
Start: 2024-10-23 | End: 2024-10-24

## 2024-10-23 RX ORDER — POTASSIUM CHLORIDE 10 MEQ
40 TABLET, EXTENDED RELEASE ORAL ONCE
Refills: 0 | Status: COMPLETED | OUTPATIENT
Start: 2024-10-23 | End: 2024-10-23

## 2024-10-23 RX ORDER — CHLORHEXIDINE GLUCONATE 40 MG/ML
1 SOLUTION TOPICAL DAILY
Refills: 0 | Status: DISCONTINUED | OUTPATIENT
Start: 2024-10-23 | End: 2024-11-01

## 2024-10-23 RX ORDER — INSULIN LISPRO 100/ML
VIAL (ML) SUBCUTANEOUS EVERY 6 HOURS
Refills: 0 | Status: DISCONTINUED | OUTPATIENT
Start: 2024-10-23 | End: 2024-10-24

## 2024-10-23 RX ORDER — ACETAMINOPHEN 500 MG
975 TABLET ORAL EVERY 6 HOURS
Refills: 0 | Status: DISCONTINUED | OUTPATIENT
Start: 2024-10-23 | End: 2024-10-31

## 2024-10-23 RX ORDER — VANCOMYCIN HYDROCHLORIDE 50 MG/ML
1250 KIT ORAL EVERY 12 HOURS
Refills: 0 | Status: DISCONTINUED | OUTPATIENT
Start: 2024-10-23 | End: 2024-10-25

## 2024-10-23 RX ADMIN — Medication 1: at 18:30

## 2024-10-23 RX ADMIN — Medication 20 UNIT(S): at 09:12

## 2024-10-23 RX ADMIN — PIPERACILLIN AND TAZOBACTAM 25 GRAM(S): .5; 4 INJECTION, POWDER, LYOPHILIZED, FOR SOLUTION INTRAVENOUS at 02:00

## 2024-10-23 RX ADMIN — Medication 975 MILLIGRAM(S): at 12:05

## 2024-10-23 RX ADMIN — Medication 81 MILLIGRAM(S): at 12:05

## 2024-10-23 RX ADMIN — Medication 40 MILLIEQUIVALENT(S): at 09:15

## 2024-10-23 RX ADMIN — PIPERACILLIN AND TAZOBACTAM 25 GRAM(S): .5; 4 INJECTION, POWDER, LYOPHILIZED, FOR SOLUTION INTRAVENOUS at 19:40

## 2024-10-23 RX ADMIN — Medication 25 GRAM(S): at 09:12

## 2024-10-23 RX ADMIN — PIPERACILLIN AND TAZOBACTAM 25 GRAM(S): .5; 4 INJECTION, POWDER, LYOPHILIZED, FOR SOLUTION INTRAVENOUS at 11:19

## 2024-10-23 RX ADMIN — Medication 975 MILLIGRAM(S): at 05:42

## 2024-10-23 RX ADMIN — GABAPENTIN 800 MILLIGRAM(S): 300 CAPSULE ORAL at 13:39

## 2024-10-23 RX ADMIN — Medication 975 MILLIGRAM(S): at 06:42

## 2024-10-23 RX ADMIN — GABAPENTIN 800 MILLIGRAM(S): 300 CAPSULE ORAL at 05:42

## 2024-10-23 RX ADMIN — Medication 975 MILLIGRAM(S): at 13:02

## 2024-10-23 RX ADMIN — GABAPENTIN 800 MILLIGRAM(S): 300 CAPSULE ORAL at 21:30

## 2024-10-23 RX ADMIN — CHLORHEXIDINE GLUCONATE 1 APPLICATION(S): 40 SOLUTION TOPICAL at 12:06

## 2024-10-23 RX ADMIN — VANCOMYCIN HYDROCHLORIDE 166.67 MILLIGRAM(S): KIT at 16:04

## 2024-10-23 NOTE — PROGRESS NOTE ADULT - SUBJECTIVE AND OBJECTIVE BOX
INTERVAL EVENTS: No acute events overnight.  SUBJECTIVE: Patient seen and examined at bedside with surgical team, patient without complaints. Denies fever, chills, CP, SOB nausea, vomiting, abdominal pain.    OBJECTIVE:    Vital Signs Last 24 Hrs  T(C): 36.8 (23 Oct 2024 05:50), Max: 37.5 (22 Oct 2024 17:26)  T(F): 98.2 (23 Oct 2024 05:50), Max: 99.5 (22 Oct 2024 17:26)  HR: 88 (23 Oct 2024 05:50) (86 - 107)  BP: 153/75 (23 Oct 2024 05:50) (126/70 - 153/75)  BP(mean): --  RR: 16 (23 Oct 2024 05:50) (16 - 18)  SpO2: 100% (23 Oct 2024 05:50) (98% - 100%)    Parameters below as of 23 Oct 2024 05:50  Patient On (Oxygen Delivery Method): room air    I&O's Detail  MEDICATIONS  (STANDING):  acetaminophen     Tablet .. 975 milliGRAM(s) Oral every 6 hours  aspirin  chewable 81 milliGRAM(s) Oral daily  atorvastatin 40 milliGRAM(s) Oral at bedtime  chlorhexidine 2% Cloths 1 Application(s) Topical daily  dextrose 5%. 1000 milliLiter(s) (100 mL/Hr) IV Continuous <Continuous>  dextrose 5%. 1000 milliLiter(s) (50 mL/Hr) IV Continuous <Continuous>  dextrose 50% Injectable 25 Gram(s) IV Push once  dextrose 50% Injectable 12.5 Gram(s) IV Push once  dextrose 50% Injectable 25 Gram(s) IV Push once  gabapentin 800 milliGRAM(s) Oral three times a day  glucagon  Injectable 1 milliGRAM(s) IntraMuscular once  heparin   Injectable 5000 Unit(s) SubCutaneous every 8 hours  influenza   Vaccine 0.5 milliLiter(s) IntraMuscular once  insulin glargine Injectable (LANTUS) 20 Unit(s) SubCutaneous every morning  insulin lispro (ADMELOG) corrective regimen sliding scale   SubCutaneous three times a day before meals  piperacillin/tazobactam IVPB.. 3.375 Gram(s) IV Intermittent every 8 hours    MEDICATIONS  (PRN):  dextrose Oral Gel 15 Gram(s) Oral once PRN Blood Glucose LESS THAN 70 milliGRAM(s)/deciliter      PHYSICAL EXAM:  General: NAD, Lying in bed comfortably  Neuro: A+Ox3  Cardio: RRR, nml S1/S2  Resp: Good effort, CTA b/l  Vascular: s/p Rt. BKA. Left foot dopplerable signals PT. Intact motor and sensory. Foot covered in kerlix (patient asked not to take it down)  Musculoskeletal: All  extremities moving spontaneously, no limitations.    LABS:                        10.1   7.75  )-----------( 437      ( 23 Oct 2024 06:46 )             31.5     10-23    139  |  100  |  19  ----------------------------<  189[H]  3.6   |  27  |  1.44[H]    Ca    8.7      23 Oct 2024 06:46  Phos  3.4     10-23  Mg     1.70     10-23    TPro  TNP  /  Alb  3.0[L]  /  TBili  0.2  /  DBili  x   /  AST  TNP  /  ALT  TNP  /  AlkPhos  136[H]  10-22      LIVER FUNCTIONS - ( 22 Oct 2024 15:00 )  Alb: 3.0 g/dL / Pro: TNP g/dL / ALK PHOS: 136 U/L / ALT: TNP U/L / AST: TNP U/L / GGT: x           Urinalysis Basic - ( 23 Oct 2024 06:46 )    Color: x / Appearance: x / SG: x / pH: x  Gluc: 189 mg/dL / Ketone: x  / Bili: x / Urobili: x   Blood: x / Protein: x / Nitrite: x   Leuk Esterase: x / RBC: x / WBC x   Sq Epi: x / Non Sq Epi: x / Bacteria: x

## 2024-10-23 NOTE — CONSULT NOTE ADULT - ASSESSMENT
PT TO BE SEEN 54 YOM with T1DM s/p R BKA who presents with acute on chronic L foot wound with imaging concerning for acute OM of L 4th metatarsal head.    #OM left 4th metatarsal head  - Pt afebrile, leukocytosis resolved. ESR 85, .8  - Exam with L foot full thickness gangrene of 3,4 th digits to level of MPJ with wet changes, with partial detachment fo 4th digit, and wound to bone. Mild malodor, scant purulence, ischemic changes to 2nd digits, plantar submet 3 eschar  - Xray foot 10/22 with cortical erosions and destruction of the left fourth metatarsal head and proximal phalanx concerning for acute osteomyelitis.  - F/u blood cultures 10/22  - F/u wound culture 10/22. Abscess culture with gram + cocci  - Check MRSA swab  - Continue empiric vanc/vosyn for now  - Dose medications per GFR    PT TO BE SEEN. CASE NOT YET DISCUSSED 54 YOM with T1DM s/p R BKA who presents with acute on chronic L foot wound with imaging concerning for acute OM of L 4th metatarsal head.    #OM left 4th metatarsal head  - Pt afebrile, leukocytosis resolved. ESR 85, .8  - Exam with L foot full thickness gangrene of 3,4 th digits to level of MPJ with wet changes, with partial detachment fo 4th digit, and wound to bone. Mild malodor, scant purulence, ischemic changes to 2nd digits, plantar submet 3 eschar  - Xray foot 10/22 with cortical erosions and destruction of the left fourth metatarsal head and proximal phalanx concerning for acute osteomyelitis.  - F/u blood cultures 10/22  - F/u wound culture 10/22. Abscess culture with gram + cocci  - Check MRSA swab  - Continue empiric vanc/vosyn for now  - Repeat A1C  - Dose medications per GFR    PT TO BE SEEN. CASE NOT YET DISCUSSED 54 YOM with T1DM s/p R BKA who presents with acute on chronic L foot wound with imaging concerning for acute OM of L 4th metatarsal head.    #OM left 4th metatarsal head  - Pt afebrile, leukocytosis resolved. ESR 85, .8  - Exam with L foot full thickness gangrene of 3,4 th digits to level of MPJ with wet changes, with partial detachment of 4th digit (removed), and wound to bone. Mild malodor, scant purulence, ischemic changes to 2nd digits, plantar submet 3 eschar  - Xray foot 10/22 with cortical erosions and destruction of the left fourth metatarsal head and proximal phalanx concerning for acute osteomyelitis.  - F/u blood cultures 10/22  - F/u wound culture 10/22. Abscess culture with gram + cocci  - Check MRSA swab  - Continue empiric vanc/vosyn for now  - Pending vascular interventions  - Repeat A1C  - Dose medications per GFR    CASE NOT YET DISCUSSED 54 YOM with T1DM s/p R BKA who presents with acute on chronic L foot wound with imaging concerning for acute OM of L 4th metatarsal head.    #OM left 4th metatarsal head  - Pt afebrile, leukocytosis resolved. ESR 85, .8  - Exam with L foot full thickness gangrene of 3,4 th digits to level of MPJ with wet changes, with partial detachment of 4th digit (removed), and wound to bone. Mild malodor, scant purulence, ischemic changes to 2nd digits, plantar submet 3 eschar  - Xray foot 10/22 with cortical erosions and destruction of the left fourth metatarsal head and proximal phalanx concerning for acute osteomyelitis.  - F/u blood cultures 10/22  - F/u wound culture 10/22. Abscess culture with gram + cocci  - F/u L foot 4th digit sent to pathology  - Check MRSA swab  - Continue empiric vanc/vosyn for now  - Pending vascular interventions  - Repeat A1C  - Dose medications per GFR    CASE NOT YET DISCUSSED 54 YOM with T1DM s/p R BKA who presents with acute on chronic L foot wound with imaging concerning for acute OM of L 4th metatarsal head.    #OM left 4th metatarsal head  - Pt afebrile, leukocytosis resolved. ESR 85, .8  - Exam with L foot full thickness gangrene of 3,4 th digits to level of MPJ with wet changes, with partial detachment of 4th digit (removed), and wound to bone. Mild malodor, scant purulence, ischemic changes to 2nd digits, plantar submet 3 eschar  - Xray foot 10/22 with cortical erosions and destruction of the left fourth metatarsal head and proximal phalanx concerning for acute osteomyelitis.  - F/u blood cultures 10/22  - Wound culture 10/22 with gram + cocci so far  - F/u L foot 4th digit sent to pathology  - Check MRSA swab  - Continue empiric vanc/vosyn for now  - Pending vascular interventions  - Repeat A1C  - Dose medications per GFR    CASE DW ATTENDING

## 2024-10-23 NOTE — CONSULT NOTE ADULT - SUBJECTIVE AND OBJECTIVE BOX
CHIEF COMPLAINT:Patient is a 54y old  Male who presents with a chief complaint of Foot Wound (23 Oct 2024 11:21)      HISTORY OF PRESENT ILLNESS:    A 54 year old male with PMHx of DM and stroke not on ASA PSHx of Rt. BKA in 2019 presents with left foot gangrene and web space infection, osteomyelitis planned for lower ext angio  No chest pain, dyspnea, palpitation, or dizziness.         PAST MEDICAL & SURGICAL HISTORY:  DM (diabetes mellitus)  DM 1      Stenosis of popliteal artery  Left stent placed 2017              MEDICATIONS:  aspirin  chewable 81 milliGRAM(s) Oral daily  heparin   Injectable 5000 Unit(s) SubCutaneous every 8 hours    piperacillin/tazobactam IVPB.. 3.375 Gram(s) IV Intermittent every 8 hours  vancomycin  IVPB 1250 milliGRAM(s) IV Intermittent every 12 hours      acetaminophen     Tablet .. 975 milliGRAM(s) Oral every 6 hours  gabapentin 800 milliGRAM(s) Oral three times a day      atorvastatin 40 milliGRAM(s) Oral at bedtime  dextrose 50% Injectable 25 Gram(s) IV Push once  dextrose 50% Injectable 12.5 Gram(s) IV Push once  dextrose 50% Injectable 25 Gram(s) IV Push once  dextrose Oral Gel 15 Gram(s) Oral once PRN  glucagon  Injectable 1 milliGRAM(s) IntraMuscular once  insulin glargine Injectable (LANTUS) 20 Unit(s) SubCutaneous every morning  insulin lispro (ADMELOG) corrective regimen sliding scale   SubCutaneous three times a day before meals    chlorhexidine 2% Cloths 1 Application(s) Topical daily  dextrose 5%. 1000 milliLiter(s) IV Continuous <Continuous>  dextrose 5%. 1000 milliLiter(s) IV Continuous <Continuous>  influenza   Vaccine 0.5 milliLiter(s) IntraMuscular once      FAMILY HISTORY:      Non-contributory    SOCIAL HISTORY:    No tobacco, drugs or etoh    Allergies    No Known Allergies    Intolerances    	    REVIEW OF SYSTEMS:  as above  The rest of the 14 points ROS reviewed and except above they are unremarkable.        PHYSICAL EXAM:  T(C): 36.7 (10-23-24 @ 10:09), Max: 37.5 (10-22-24 @ 17:26)  HR: 85 (10-23-24 @ 10:09) (85 - 105)  BP: 135/73 (10-23-24 @ 10:09) (126/70 - 153/75)  RR: 17 (10-23-24 @ 10:09) (16 - 18)  SpO2: 95% (10-23-24 @ 10:09) (95% - 100%)  Wt(kg): --  I&O's Summary      JVP: Normal  Neck: supple  Lung: clear   CV: S1 S2 , Murmur:  Abd: soft  Ext: right BKA  neuro: Awake / alert  Psych: flat affect      LABS/DATA:    TELEMETRY: 	    ECG:  	   	  CARDIAC MARKERS:                                      10.1   7.75  )-----------( 437      ( 23 Oct 2024 06:46 )             31.5     10-23    139  |  100  |  19  ----------------------------<  189[H]  3.6   |  27  |  1.44[H]    Ca    8.7      23 Oct 2024 06:46  Phos  3.4     10-23  Mg     1.70     10-23    TPro  TNP  /  Alb  3.0[L]  /  TBili  0.2  /  DBili  x   /  AST  TNP  /  ALT  TNP  /  AlkPhos  136[H]  10-22    proBNP:   Lipid Profile:   HgA1c:   TSH:

## 2024-10-23 NOTE — PROGRESS NOTE ADULT - ASSESSMENT
55 yo M Presented with R 3,4 digits wet gangrene with wound to bone  - Pt seen and evaluated.  - Afebrile, WBC 10.80, ESR pending, CRP 14.78  - L foot full thickness gangrene of 3,4 th digits to level of MPJ with wet changes, with partial detachment fo 4th digit, and wound to bone, mild malodor, scant purulence, ischemic changes to 2nd digits, plantar submet 3 eschar  - After obtaining verbal consent, using a suture removal kit and 15 blade, R foot 4th digit was disarticulated at PIPJ level, 1cc purlence noted, dorsal I&D was perfomed to level of subQ, not beyond, scant purulence noted, wound was then flushed, packed with 1" packing, followed by DSD  - Left foot X-ray: 4th metatarsal head OM  - Left foot wound culture obtained and sent  - L foot 4th digit sent to pathology  - Recommended admission to medicine  - Recommended IV Vanco & Zosyn  - Recommended vascular consult  - Pod plan: Local wound care VS L foot TMA vs L foot Partial Fourth Ray Resection and 3rd digit amputation pending [facility] recs  - Please document medical clearance for possible podiatry surgery under anesthesia   - Discussed with Attending    54M presents 10/22 s/p bedside right foot 3rd digit disarticulation at the proximal phalangeal joint  -  Pt seen and evaluated.  -  Afebrile, WBC 7.75  -  10/22 s/p bedside right foot 3rd digit disarticulation at the proximal phalangeal joint, firbogranular wound bed, no purulent drainage, no malodor, no fluctuance, no bogginess, no tracking/tunneling, full thickness dry gangrene of the 4th digit, ischemic changes to the 2nd digit. Right BKA.   - Left foot X-ray: 4th metatarsal head OM  - Left foot wound culture obtained and sent  - L foot 4th digit sent to pathology  - Vascular recs, appreciated  - ID recs, appreciated  - Pod plan: Local wound care VS L foot TMA vs L foot Partial Fourth Ray Resection and 3rd digit amputation pending [facility] recs  - Please document medical clearance for possible podiatry surgery under anesthesia   - Discussed with Attending

## 2024-10-23 NOTE — PROGRESS NOTE ADULT - SUBJECTIVE AND OBJECTIVE BOX
Patient is a 54y old  Male who presents with a chief complaint of Foot Wound (23 Oct 2024 10:04)       INTERVAL HPI/OVERNIGHT EVENTS:  Patient seen and evaluated at bedside.  Pt is resting comfortable in NAD. Denies N/V/F/C.  Pain rated at X/10    Allergies    No Known Allergies    Intolerances        Vital Signs Last 24 Hrs  T(C): 36.7 (23 Oct 2024 10:09), Max: 37.5 (22 Oct 2024 17:26)  T(F): 98.1 (23 Oct 2024 10:09), Max: 99.5 (22 Oct 2024 17:26)  HR: 85 (23 Oct 2024 10:09) (85 - 107)  BP: 135/73 (23 Oct 2024 10:09) (126/70 - 153/75)  BP(mean): 100 (23 Oct 2024 10:09) (100 - 100)  RR: 17 (23 Oct 2024 10:09) (16 - 18)  SpO2: 95% (23 Oct 2024 10:09) (95% - 100%)    Parameters below as of 23 Oct 2024 10:09  Patient On (Oxygen Delivery Method): room air        LABS:                        10.1   7.75  )-----------( 437      ( 23 Oct 2024 06:46 )             31.5     10-23    139  |  100  |  19  ----------------------------<  189[H]  3.6   |  27  |  1.44[H]    Ca    8.7      23 Oct 2024 06:46  Phos  3.4     10-23  Mg     1.70     10-23    TPro  TNP  /  Alb  3.0[L]  /  TBili  0.2  /  DBili  x   /  AST  TNP  /  ALT  TNP  /  AlkPhos  136[H]  10-22      Urinalysis Basic - ( 23 Oct 2024 06:46 )    Color: x / Appearance: x / SG: x / pH: x  Gluc: 189 mg/dL / Ketone: x  / Bili: x / Urobili: x   Blood: x / Protein: x / Nitrite: x   Leuk Esterase: x / RBC: x / WBC x   Sq Epi: x / Non Sq Epi: x / Bacteria: x      CAPILLARY BLOOD GLUCOSE      POCT Blood Glucose.: 146 mg/dL (23 Oct 2024 08:42)  POCT Blood Glucose.: 122 mg/dL (22 Oct 2024 21:42)  POCT Blood Glucose.: 191 mg/dL (22 Oct 2024 19:27)  POCT Blood Glucose.: 71 mg/dL (22 Oct 2024 15:35)  POCT Blood Glucose.: 98 mg/dL (22 Oct 2024 12:25)      Lower Extremity Physical Exam:    Vascular: DP1/4, PT 0/4, B/L, CFT could not be assessed 2/2 gangrene,, Temperature gradient warm to cool , B/L.   Neuro: Epicritic sensation absent to the level of digits, B/L.  Musculoskeletal/Ortho: S/P R BKA  Skin: L foot full thickness gangrene of 3,4 th digits to level of MPJ with wet changes, with partial detachment fo 4th digit, and wound to bone, mild malodor, scant purulence, ischemic changes to 2nd digits, plantar submet 3 eschar    RADIOLOGY & ADDITIONAL TESTS:   Patient is a 54y old  Male who presents with a chief complaint of Foot Wound (23 Oct 2024 10:04)       INTERVAL HPI/OVERNIGHT EVENTS:  Patient seen and evaluated at bedside.  Pt is resting comfortable in NAD. Denies N/V/F/C.  Pain rated at X/10    Allergies    No Known Allergies    Intolerances        Vital Signs Last 24 Hrs  T(C): 36.7 (23 Oct 2024 10:09), Max: 37.5 (22 Oct 2024 17:26)  T(F): 98.1 (23 Oct 2024 10:09), Max: 99.5 (22 Oct 2024 17:26)  HR: 85 (23 Oct 2024 10:09) (85 - 107)  BP: 135/73 (23 Oct 2024 10:09) (126/70 - 153/75)  BP(mean): 100 (23 Oct 2024 10:09) (100 - 100)  RR: 17 (23 Oct 2024 10:09) (16 - 18)  SpO2: 95% (23 Oct 2024 10:09) (95% - 100%)    Parameters below as of 23 Oct 2024 10:09  Patient On (Oxygen Delivery Method): room air        LABS:                        10.1   7.75  )-----------( 437      ( 23 Oct 2024 06:46 )             31.5     10-23    139  |  100  |  19  ----------------------------<  189[H]  3.6   |  27  |  1.44[H]    Ca    8.7      23 Oct 2024 06:46  Phos  3.4     10-23  Mg     1.70     10-23    TPro  TNP  /  Alb  3.0[L]  /  TBili  0.2  /  DBili  x   /  AST  TNP  /  ALT  TNP  /  AlkPhos  136[H]  10-22      Urinalysis Basic - ( 23 Oct 2024 06:46 )    Color: x / Appearance: x / SG: x / pH: x  Gluc: 189 mg/dL / Ketone: x  / Bili: x / Urobili: x   Blood: x / Protein: x / Nitrite: x   Leuk Esterase: x / RBC: x / WBC x   Sq Epi: x / Non Sq Epi: x / Bacteria: x      CAPILLARY BLOOD GLUCOSE      POCT Blood Glucose.: 146 mg/dL (23 Oct 2024 08:42)  POCT Blood Glucose.: 122 mg/dL (22 Oct 2024 21:42)  POCT Blood Glucose.: 191 mg/dL (22 Oct 2024 19:27)  POCT Blood Glucose.: 71 mg/dL (22 Oct 2024 15:35)  POCT Blood Glucose.: 98 mg/dL (22 Oct 2024 12:25)      Lower Extremity Physical Exam:    Vascular: DP1/4, PT 0/4, B/L, CFT could not be assessed 2/2 gangrene,, Temperature gradient warm to cool , B/L.   Neuro: Epicritic sensation absent to the level of digits, B/L.  Musculoskeletal/Ortho: S/P R BKA  Skin: 10/22 s/p bedside right foot 3rd digit disarticulation at the proximal phalangeal joint, firbogranular wound bed, no purulent drainage, no malodor, no fluctuance, no bogginess, no tracking/tunneling, full thickness dry gangrene of the 4th digit, ischemic changes to the 2nd digit. Right BKA.       RADIOLOGY & ADDITIONAL TESTS:

## 2024-10-23 NOTE — CONSULT NOTE ADULT - ATTENDING COMMENTS
54 YOM with T1DM s/p R BKA who presents with acute on chronic L foot wound with imaging concerning for acute OM of L 4th metatarsal head.  - Pt afebrile, leukocytosis resolved. ESR 85, .8  - Exam with L foot full thickness gangrene of 3,4 th digits to level of MPJ with wet changes, with partial detachment of 4th digit (removed), and wound to bone. Mild malodor, scant purulence, ischemic changes to 2nd digits, plantar submet 3 eschar  - Xray foot 10/22 with cortical erosions and destruction of the left fourth metatarsal head and proximal phalanx concerning for acute osteomyelitis.    Infectious diseases consultation requested today for further management.     # OM left 4th metatarsal head  # Left foot 2nd digit ischemic changes   # Right BKA.   # Diabetes mellitis   # Elevated inflammatory markers     MICRO:   10/22 Wound CX: GPC in pairs   10/22 BCX: IP     Recommendations:   - F/u blood cultures 10/22  - Wound culture 10/22 with gram + cocci so far- follow   - F/u L foot 4th digit sent to pathology  - Check MRSA swab  - Continue empiric Zosyn   - Continue Vancomycin 1250 mg Q12H. Please obtain V. trough 30 mins prior to the 4th dose. -600   - Pending final surgical interventions by podiatry. Antibiotic choice and duration based on that.   - Repeat A1C  - Trend CRP weekly.       Discussed with the primary team    Calli Kang MD  Attending Physician, Division of Infectious Diseases  Department of Medicine   [facility]    Contact on TEAMS messaging from 9am - 5pm  Office: [phone] (after 5 PM or weekend)

## 2024-10-23 NOTE — CONSULT NOTE ADULT - ASSESSMENT
PAD  osteomyelitis of foot  abx  asa  statin   fu with podiatry and vascular     DM I  Monitor finger stick. Insulin coverage. Diabetic education and Diabetic diet. Consider nutrition consultation.    Pre-Operative Cardiac Risk Stratification and Optimization   Based on patient history and physical exam, the patient is considered to have elevated risk   obtain EKG  obtain Echo   if pt needs bypass then will need stress test      Advanced care planning was discussed with patient and family.  Risks, benefits and alternatives of the cardiac treatments and medical therapy including procedures were discussed in detail and all questions were answered. Importance of compliance with medical therapy and lifestyle modification to improve cardiovascular health were addressed. Appropriate forms and patient educational materials were reviewed. 30 minutes face to face spent.

## 2024-10-23 NOTE — PROGRESS NOTE ADULT - ASSESSMENT
A 54 year old male with PMHx of DM and stroke not on ASA PSHx of Rt. BKA in 2019 presents with left foot gangrene and web space infection.     Plan:  - Diet: Regular (consistent carbs)  - Pain management  - ASA/SQH  - Statin  - CHRISTIAN/PVR pending   - Podiatry consult  - X-Ray of the foot - cortical erosions and destruction of the left fourth metatarsal head and proximal phalanx concerning for acute osteomyelitis.  - Medical and cardiac clearance  - ID consult: MRSA swab, Continue empiric vanc/vosyn for now  - Abscess culture with gram + cocci      C-Team  [id number]

## 2024-10-23 NOTE — CONSULT NOTE ADULT - SUBJECTIVE AND OBJECTIVE BOX
Consultation Requested by:    Patient is a 54y old  Male who presents with a chief complaint of Foot Wound (22 Oct 2024 17:45)    HPI:  54-year-old male history of right below the knee amputation in 2019, presents to the ER with complaints to the toes of the left foot. Pt stated he noticed digits discoloration for a week, Pt reported history of LLE cellulitis 6 weeks ago that was managed as outpatient with IV zosyn for 2 weeks.     Pt seen by podiatry, found to have L foot full thickness gangrene of 3,4 th digits to level of MPJ with wet changes, with partial detachment fo 4th digit, and wound to bone, mild malodor, scant purulence, ischemic changes to 2nd digits, plantar submet 3 eschar. I&D was performed and purulence was noted.       PT TO BE SEEN      REVIEW OF SYSTEMS  All review of systems negative, except for those marked:  General:		[] Abnormal:  	[] Night Sweats		[] Fever		[] Weight Loss  Pulmonary/Cough:	[] Abnormal:  Cardiac/Chest Pain:	[] Abnormal:  Gastrointestinal:	[] Abnormal:  Eyes:			[] Abnormal:  ENT:			[] Abnormal:  Dysuria:		[] Abnormal:  Musculoskeletal	:	[] Abnormal:  Endocrine:		[] Abnormal:  Lymph Nodes:		[] Abnormal:  Headache:		[] Abnormal:  Skin:			[] Abnormal:  Allergy/Immune:	[] Abnormal:  Psychiatric:		[] Abnormal:  [] All other review of systems negative  [] Unable to obtain (explain):    Recent Ill Contacts:	[] No	[] Yes:  Recent Travel History:	[] No	[] Yes:  Recent Animal/Insect Exposure/Tick Bites:	[] No	[] Yes:    Allergies    No Known Allergies    Intolerances      Antimicrobials:  piperacillin/tazobactam IVPB.. 3.375 Gram(s) IV Intermittent every 8 hours      Other Medications:  acetaminophen     Tablet .. 975 milliGRAM(s) Oral every 6 hours  aspirin  chewable 81 milliGRAM(s) Oral daily  atorvastatin 40 milliGRAM(s) Oral at bedtime  chlorhexidine 2% Cloths 1 Application(s) Topical daily  dextrose 5%. 1000 milliLiter(s) IV Continuous <Continuous>  dextrose 5%. 1000 milliLiter(s) IV Continuous <Continuous>  dextrose 50% Injectable 25 Gram(s) IV Push once  dextrose 50% Injectable 12.5 Gram(s) IV Push once  dextrose 50% Injectable 25 Gram(s) IV Push once  dextrose Oral Gel 15 Gram(s) Oral once PRN  gabapentin 800 milliGRAM(s) Oral three times a day  glucagon  Injectable 1 milliGRAM(s) IntraMuscular once  heparin   Injectable 5000 Unit(s) SubCutaneous every 8 hours  influenza   Vaccine 0.5 milliLiter(s) IntraMuscular once  insulin glargine Injectable (LANTUS) 20 Unit(s) SubCutaneous every morning  insulin lispro (ADMELOG) corrective regimen sliding scale   SubCutaneous three times a day before meals      FAMILY HISTORY:    PAST MEDICAL & SURGICAL HISTORY:  DM (diabetes mellitus)  DM 1      Stenosis of popliteal artery  Left stent placed 2017        SOCIAL HISTORY:    IMMUNIZATIONS  [] Up to Date		[] Not Up to Date:  Recent Immunizations:	[] No	[] Yes:    Daily Height in cm: 193.04 (22 Oct 2024 12:27)    Daily   Head Circumference:  Vital Signs Last 24 Hrs  T(C): 36.8 (23 Oct 2024 05:50), Max: 37.5 (22 Oct 2024 17:26)  T(F): 98.2 (23 Oct 2024 05:50), Max: 99.5 (22 Oct 2024 17:26)  HR: 88 (23 Oct 2024 05:50) (86 - 107)  BP: 153/75 (23 Oct 2024 05:50) (126/70 - 153/75)  BP(mean): --  RR: 16 (23 Oct 2024 05:50) (16 - 18)  SpO2: 100% (23 Oct 2024 05:50) (98% - 100%)    Parameters below as of 23 Oct 2024 05:50  Patient On (Oxygen Delivery Method): room air        PHYSICAL EXAM  All physical exam findings normal, except for those marked:  General:	Normal: alert, neither acutely nor chronically ill-appearing, well developed/well   .		nourished, no respiratory distress  .		[] Abnormal:  Eyes		Normal: no conjunctival injection, no discharge, no photophobia, intact   .		extraocular movements, sclera not icteric  .		[] Abnormal:  ENT:		Normal: normal tympanic membranes; external ear normal, nares normal without   .		discharge, no pharyngeal erythema or exudates, no oral mucosal lesions, normal   .		tongue and lips  .		[] Abnormal:  Neck		Normal: supple, full range of motion, no nuchal rigidity  .		[] Abnormal:  Lymph Nodes	Normal: normal size and consistency, non-tender  .		[] Abnormal:  Cardiovascular	Normal: regular rate and variability; Normal S1, S2; No murmur  .		[] Abnormal:  Respiratory	Normal: no wheezing or crackles, bilateral audible breath sounds, no retractions  .		[] Abnormal:  Abdominal	Normal: soft; non-distended; non-tender; no hepatosplenomegaly or masses  .		[] Abnormal:  		Normal: normal external genitalia, no rash  .		[] Abnormal:  Extremities	Normal: FROM x4, no cyanosis or edema, symmetric pulses  .		[] Abnormal:  Skin		Normal: skin intact and not indurated; no rash, no desquamation  .		[] Abnormal:  Neurologic	Normal: alert, oriented as age-appropriate, affect appropriate; no weakness, no   .		facial asymmetry, moves all extremities, normal gait-child older than 18 months  .		[] Abnormal:  Musculoskeletal		Normal: no joint swelling, erythema, or tenderness; full range of motion   .			with no contractures; no muscle tenderness; no clubbing; no cyanosis;   .			no edema  .			[] Abnormal    Respiratory Support:		[] No	[] Yes:  Vasoactive medication infusion:	[] No	[] Yes:  Venous catheters:		[] No	[] Yes:  Bladder catheter:		[] No	[] Yes:  Other catheters or tubes:	[] No	[] Yes:    Lab Results:                        10.1   7.75  )-----------( 437      ( 23 Oct 2024 06:46 )             31.5     10-23    139  |  100  |  19  ----------------------------<  189[H]  3.6   |  27  |  1.44[H]    Ca    8.7      23 Oct 2024 06:46  Phos  3.4     10-23  Mg     1.70     10-23    TPro  TNP  /  Alb  3.0[L]  /  TBili  0.2  /  DBili  x   /  AST  TNP  /  ALT  TNP  /  AlkPhos  136[H]  10-22    LIVER FUNCTIONS - ( 22 Oct 2024 15:00 )  Alb: 3.0 g/dL / Pro: TNP g/dL / ALK PHOS: 136 U/L / ALT: TNP U/L / AST: TNP U/L / GGT: x             Urinalysis Basic - ( 23 Oct 2024 06:46 )    Color: x / Appearance: x / SG: x / pH: x  Gluc: 189 mg/dL / Ketone: x  / Bili: x / Urobili: x   Blood: x / Protein: x / Nitrite: x   Leuk Esterase: x / RBC: x / WBC x   Sq Epi: x / Non Sq Epi: x / Bacteria: x        IMAGING:  < from: Xray Ankle 2 Views, Left (10.22.24 @ 17:12) >  IMPRESSION:    Cortical erosions and destruction of the left fourthmetatarsal head and   proximal phalanx concerning for acute osteomyelitis.    < end of copied text >   Consultation Requested by:    Patient is a 54y old  Male who presents with a chief complaint of Foot Wound (22 Oct 2024 17:45)    HPI:  54-year-old male history of right below the knee amputation in 2019, presents to the ER with complaints to the toes of the left foot. Pt stated he noticed digits discoloration for a week, Pt reported history of LLE cellulitis 6 weeks ago that was managed as outpatient with IV zosyn for 2 weeks.     Pt seen by podiatry, found to have L foot full thickness gangrene of 3,4 th digits to level of MPJ with wet changes, with partial detachment fo 4th digit, and wound to bone, mild malodor, scant purulence, ischemic changes to 2nd digits, plantar submet 3 eschar. I&D was performed and purulence was noted.       PT TO BE SEEN      REVIEW OF SYSTEMS  All review of systems negative, except for those marked:  General:		[] Abnormal:  	[] Night Sweats		[] Fever		[] Weight Loss  Pulmonary/Cough:	[] Abnormal:  Cardiac/Chest Pain:	[] Abnormal:  Gastrointestinal:	[] Abnormal:  Eyes:			[] Abnormal:  ENT:			[] Abnormal:  Dysuria:		[] Abnormal:  Musculoskeletal	:	[] Abnormal:  Endocrine:		[] Abnormal:  Lymph Nodes:		[] Abnormal:  Headache:		[] Abnormal:  Skin:			[] Abnormal:  Allergy/Immune:	[] Abnormal:  Psychiatric:		[] Abnormal:  [] All other review of systems negative  [] Unable to obtain (explain):    Recent Ill Contacts:	[] No	[] Yes:  Recent Travel History:	[] No	[] Yes:  Recent Animal/Insect Exposure/Tick Bites:	[] No	[] Yes:    Allergies    No Known Allergies    Intolerances      Antimicrobials:  piperacillin/tazobactam IVPB.. 3.375 Gram(s) IV Intermittent every 8 hours      Other Medications:  acetaminophen     Tablet .. 975 milliGRAM(s) Oral every 6 hours  aspirin  chewable 81 milliGRAM(s) Oral daily  atorvastatin 40 milliGRAM(s) Oral at bedtime  chlorhexidine 2% Cloths 1 Application(s) Topical daily  dextrose 5%. 1000 milliLiter(s) IV Continuous <Continuous>  dextrose 5%. 1000 milliLiter(s) IV Continuous <Continuous>  dextrose 50% Injectable 25 Gram(s) IV Push once  dextrose 50% Injectable 12.5 Gram(s) IV Push once  dextrose 50% Injectable 25 Gram(s) IV Push once  dextrose Oral Gel 15 Gram(s) Oral once PRN  gabapentin 800 milliGRAM(s) Oral three times a day  glucagon  Injectable 1 milliGRAM(s) IntraMuscular once  heparin   Injectable 5000 Unit(s) SubCutaneous every 8 hours  influenza   Vaccine 0.5 milliLiter(s) IntraMuscular once  insulin glargine Injectable (LANTUS) 20 Unit(s) SubCutaneous every morning  insulin lispro (ADMELOG) corrective regimen sliding scale   SubCutaneous three times a day before meals      FAMILY HISTORY:    PAST MEDICAL & SURGICAL HISTORY:  DM (diabetes mellitus)  DM 1      Stenosis of popliteal artery  Left stent placed 2017        SOCIAL HISTORY:    IMMUNIZATIONS  [] Up to Date		[] Not Up to Date:  Recent Immunizations:	[] No	[] Yes:    Daily Height in cm: 193.04 (22 Oct 2024 12:27)    Daily   Head Circumference:  Vital Signs Last 24 Hrs  T(C): 36.8 (23 Oct 2024 05:50), Max: 37.5 (22 Oct 2024 17:26)  T(F): 98.2 (23 Oct 2024 05:50), Max: 99.5 (22 Oct 2024 17:26)  HR: 88 (23 Oct 2024 05:50) (86 - 107)  BP: 153/75 (23 Oct 2024 05:50) (126/70 - 153/75)  BP(mean): --  RR: 16 (23 Oct 2024 05:50) (16 - 18)  SpO2: 100% (23 Oct 2024 05:50) (98% - 100%)    Parameters below as of 23 Oct 2024 05:50  Patient On (Oxygen Delivery Method): room air        PHYSICAL EXAM  All physical exam findings normal, except for those marked:  General:	Normal: alert, neither acutely nor chronically ill-appearing, well developed/well   .		nourished, no respiratory distress  .		[] Abnormal:  Eyes		Normal: no conjunctival injection, no discharge, no photophobia, intact   .		extraocular movements, sclera not icteric  .		[] Abnormal:  ENT:		Normal: normal tympanic membranes; external ear normal, nares normal without   .		discharge, no pharyngeal erythema or exudates, no oral mucosal lesions, normal   .		tongue and lips  .		[] Abnormal:  Neck		Normal: supple, full range of motion, no nuchal rigidity  .		[] Abnormal:  Lymph Nodes	Normal: normal size and consistency, non-tender  .		[] Abnormal:  Cardiovascular	Normal: regular rate and variability; Normal S1, S2; No murmur  .		[] Abnormal:  Respiratory	Normal: no wheezing or crackles, bilateral audible breath sounds, no retractions  .		[] Abnormal:  Abdominal	Normal: soft; non-distended; non-tender; no hepatosplenomegaly or masses  .		[] Abnormal:  		Normal: normal external genitalia, no rash  .		[] Abnormal:  Extremities	Normal: FROM x4, no cyanosis or edema, symmetric pulses  .		[] Abnormal:  Skin		Normal: skin intact and not indurated; no rash, no desquamation  .		[] Abnormal:  Neurologic	Normal: alert, oriented as age-appropriate, affect appropriate; no weakness, no   .		facial asymmetry, moves all extremities, normal gait-child older than 18 months  .		[] Abnormal:  Musculoskeletal		Normal: no joint swelling, erythema, or tenderness; full range of motion   .			with no contractures; no muscle tenderness; no clubbing; no cyanosis;   .			no edema  .			[] Abnormal    Respiratory Support:		[] No	[] Yes:  Vasoactive medication infusion:	[] No	[] Yes:  Venous catheters:		[] No	[] Yes:  Bladder catheter:		[] No	[] Yes:  Other catheters or tubes:	[] No	[] Yes:    Lab Results:                        10.1   7.75  )-----------( 437      ( 23 Oct 2024 06:46 )             31.5     10-23    139  |  100  |  19  ----------------------------<  189[H]  3.6   |  27  |  1.44[H]    Ca    8.7      23 Oct 2024 06:46  Phos  3.4     10-23  Mg     1.70     10-23    TPro  TNP  /  Alb  3.0[L]  /  TBili  0.2  /  DBili  x   /  AST  TNP  /  ALT  TNP  /  AlkPhos  136[H]  10-22    LIVER FUNCTIONS - ( 22 Oct 2024 15:00 )  Alb: 3.0 g/dL / Pro: TNP g/dL / ALK PHOS: 136 U/L / ALT: TNP U/L / AST: TNP U/L / GGT: x             Urinalysis Basic - ( 23 Oct 2024 06:46 )    Color: x / Appearance: x / SG: x / pH: x  Gluc: 189 mg/dL / Ketone: x  / Bili: x / Urobili: x   Blood: x / Protein: x / Nitrite: x   Leuk Esterase: x / RBC: x / WBC x   Sq Epi: x / Non Sq Epi: x / Bacteria: x        IMAGING:  < from: Xray Ankle 2 Views, Left (10.22.24 @ 17:12) >  IMPRESSION:    Cortical erosions and destruction of the left fourth metatarsal head and   proximal phalanx concerning for acute osteomyelitis.    < end of copied text >   Consultation Requested by:    Patient is a 54y old  Male who presents with a chief complaint of Foot Wound (22 Oct 2024 17:45)    HPI:  54-year-old male history of T1DM, CVA 4/2024 no residual deficits, right below the knee amputation in 2019, presents to the ER with complaints to the toes of the left foot. Pt stated he noticed digits discoloration for a week, Pt reported history of LLE cellulitis 6 weeks ago that was managed as outpatient in [place] with family doc with IV zosyn for 2 weeks, and the course finished about 2 months ago. Wife ([employer] ICU RN) was cleaning toes with iodine. Denies other systemic symptoms.     Travel: [facility].     Pt seen by podiatry, found to have L foot full thickness gangrene of 3,4 th digits to level of MPJ with wet changes, with partial detachment fo 4th digit, and wound to bone, mild malodor, scant purulence, ischemic changes to 2nd digits, plantar submet 3 eschar. I&D was performed and purulence was noted.         REVIEW OF SYSTEMS  All review of systems negative, except for those marked:  General:		[] Abnormal:  	[] Night Sweats		[] Fever		[] Weight Loss  Pulmonary/Cough:	[] Abnormal:  Cardiac/Chest Pain:	[] Abnormal:  Gastrointestinal:	[] Abnormal:  Eyes:			[] Abnormal:  ENT:			[] Abnormal:  Dysuria:		[] Abnormal:  Musculoskeletal	:	[] Abnormal:  Endocrine:		[] Abnormal:  Lymph Nodes:		[] Abnormal:  Headache:		[] Abnormal:  Skin:			[] Abnormal:  Allergy/Immune:	[] Abnormal:  Psychiatric:		[] Abnormal:  [] All other review of systems negative  [] Unable to obtain (explain):    Recent Ill Contacts:	[X] No	[] Yes:  Recent Travel History:	[] No	[X] Yes:  Recent Animal/Insect Exposure/Tick Bites:	[] No	[X] Yes: 2 dogs at home.    Allergies    No Known Allergies    Intolerances      Antimicrobials:  piperacillin/tazobactam IVPB.. 3.375 Gram(s) IV Intermittent every 8 hours      Other Medications:  acetaminophen     Tablet .. 975 milliGRAM(s) Oral every 6 hours  aspirin  chewable 81 milliGRAM(s) Oral daily  atorvastatin 40 milliGRAM(s) Oral at bedtime  chlorhexidine 2% Cloths 1 Application(s) Topical daily  dextrose 5%. 1000 milliLiter(s) IV Continuous <Continuous>  dextrose 5%. 1000 milliLiter(s) IV Continuous <Continuous>  dextrose 50% Injectable 25 Gram(s) IV Push once  dextrose 50% Injectable 12.5 Gram(s) IV Push once  dextrose 50% Injectable 25 Gram(s) IV Push once  dextrose Oral Gel 15 Gram(s) Oral once PRN  gabapentin 800 milliGRAM(s) Oral three times a day  glucagon  Injectable 1 milliGRAM(s) IntraMuscular once  heparin   Injectable 5000 Unit(s) SubCutaneous every 8 hours  influenza   Vaccine 0.5 milliLiter(s) IntraMuscular once  insulin glargine Injectable (LANTUS) 20 Unit(s) SubCutaneous every morning  insulin lispro (ADMELOG) corrective regimen sliding scale   SubCutaneous three times a day before meals      FAMILY HISTORY:    PAST MEDICAL & SURGICAL HISTORY:  DM (diabetes mellitus)  DM 1      Stenosis of popliteal artery  Left stent placed 2017        SOCIAL HISTORY:    IMMUNIZATIONS  [] Up to Date		[] Not Up to Date:  Recent Immunizations:	[] No	[] Yes:    Daily Height in cm: 193.04 (22 Oct 2024 12:27)    Daily   Head Circumference:  Vital Signs Last 24 Hrs  T(C): 36.8 (23 Oct 2024 05:50), Max: 37.5 (22 Oct 2024 17:26)  T(F): 98.2 (23 Oct 2024 05:50), Max: 99.5 (22 Oct 2024 17:26)  HR: 88 (23 Oct 2024 05:50) (86 - 107)  BP: 153/75 (23 Oct 2024 05:50) (126/70 - 153/75)  BP(mean): --  RR: 16 (23 Oct 2024 05:50) (16 - 18)  SpO2: 100% (23 Oct 2024 05:50) (98% - 100%)    Parameters below as of 23 Oct 2024 05:50  Patient On (Oxygen Delivery Method): room air        PHYSICAL EXAM  All physical exam findings normal, except for those marked:  General:	Normal: alert, neither acutely nor chronically ill-appearing, well developed/well   .		nourished, no respiratory distress  .		[] Abnormal:  Eyes		Normal: no conjunctival injection, no discharge, no photophobia, intact   .		extraocular movements, sclera not icteric  .		[] Abnormal:  ENT:		Normal: normal tympanic membranes; external ear normal, nares normal without   .		discharge, no pharyngeal erythema or exudates, no oral mucosal lesions, normal   .		tongue and lips  .		[] Abnormal:  Neck		Normal: supple, full range of motion, no nuchal rigidity  .		[] Abnormal:  Lymph Nodes	Normal: normal size and consistency, non-tender  .		[] Abnormal:  Cardiovascular	Normal: regular rate and variability; Normal S1, S2; No murmur  .		[] Abnormal:  Respiratory	Normal: no wheezing or crackles, bilateral audible breath sounds, no retractions  .		[] Abnormal:  Abdominal	Normal: soft; non-distended; non-tender; no hepatosplenomegaly or masses  .		[] Abnormal:  		Normal: normal external genitalia, no rash  .		[] Abnormal:  Extremities	Normal: FROM x4, no cyanosis or edema, symmetric pulses  .		[] Abnormal:  Skin		Normal: skin intact and not indurated; no rash, no desquamation  .		[] Abnormal:  Neurologic	Normal: alert, oriented as age-appropriate, affect appropriate; no weakness, no   .		facial asymmetry, moves all extremities, normal gait-child older than 18 months  .		[] Abnormal:  Musculoskeletal		Normal: no joint swelling, erythema, or tenderness; full range of motion   .			with no contractures; no muscle tenderness; no clubbing; no cyanosis;   .			no edema  .			[] Abnormal    Respiratory Support:		[] No	[] Yes:  Vasoactive medication infusion:	[] No	[] Yes:  Venous catheters:		[] No	[] Yes:  Bladder catheter:		[] No	[] Yes:  Other catheters or tubes:	[] No	[] Yes:    Lab Results:                        10.1   7.75  )-----------( 437      ( 23 Oct 2024 06:46 )             31.5     10-23    139  |  100  |  19  ----------------------------<  189[H]  3.6   |  27  |  1.44[H]    Ca    8.7      23 Oct 2024 06:46  Phos  3.4     10-23  Mg     1.70     10-23    TPro  TNP  /  Alb  3.0[L]  /  TBili  0.2  /  DBili  x   /  AST  TNP  /  ALT  TNP  /  AlkPhos  136[H]  10-22    LIVER FUNCTIONS - ( 22 Oct 2024 15:00 )  Alb: 3.0 g/dL / Pro: TNP g/dL / ALK PHOS: 136 U/L / ALT: TNP U/L / AST: TNP U/L / GGT: x             Urinalysis Basic - ( 23 Oct 2024 06:46 )    Color: x / Appearance: x / SG: x / pH: x  Gluc: 189 mg/dL / Ketone: x  / Bili: x / Urobili: x   Blood: x / Protein: x / Nitrite: x   Leuk Esterase: x / RBC: x / WBC x   Sq Epi: x / Non Sq Epi: x / Bacteria: x        IMAGING:  < from: Xray Ankle 2 Views, Left (10.22.24 @ 17:12) >  IMPRESSION:    Cortical erosions and destruction of the left fourth metatarsal head and   proximal phalanx concerning for acute osteomyelitis.    < end of copied text >   Consultation Requested by:    Patient is a 54y old  Male who presents with a chief complaint of Foot Wound (22 Oct 2024 17:45)    HPI:  54-year-old male history of T1DM, CVA 4/2024 no residual deficits, right below the knee amputation in 2019, presents to the ER with complaints to the toes of the left foot. Pt stated he noticed digits discoloration for a week, Pt reported history of LLE cellulitis in august that was managed as outpatient in [place] with family doc with IV zosyn for 2 weeks, and the course finished about 2 months ago. Wife ([facility] RN) was cleaning toes with iodine since then, but the toe wounds progressed. Denies other systemic symptoms.     Travel: [facility].     Pt seen by podiatry, found to have L foot full thickness gangrene of 3,4 th digits to level of MPJ with wet changes, with partial detachment fo 4th digit, and wound to bone. 4 digit removed in ED. Also with mild malodor, scant purulence, ischemic changes to 2nd digits, plantar submet 3 eschar. I&D was performed and purulence was noted.         REVIEW OF SYSTEMS  All review of systems negative, except for those marked:  General:		[] Abnormal:  	[] Night Sweats		[] Fever		[] Weight Loss  Pulmonary/Cough:	[] Abnormal:  Cardiac/Chest Pain:	[] Abnormal:  Gastrointestinal:	[] Abnormal:  Eyes:			[] Abnormal:  ENT:			[] Abnormal:  Dysuria:		[] Abnormal:  Musculoskeletal	:	[] Abnormal:  Endocrine:		[] Abnormal:  Lymph Nodes:		[] Abnormal:  Headache:		[] Abnormal:  Skin:			[X] Abnormal: Per HPI  Allergy/Immune:	[] Abnormal:  Psychiatric:		[] Abnormal:  [] All other review of systems negative  [] Unable to obtain (explain):    Recent Ill Contacts:	[X] No	[] Yes:  Recent Travel History:	[] No	[X] Yes:  Recent Animal/Insect Exposure/Tick Bites:	[] No	[X] Yes: 2 dogs at home.    Allergies    No Known Allergies    Intolerances      Antimicrobials:  piperacillin/tazobactam IVPB.. 3.375 Gram(s) IV Intermittent every 8 hours      Other Medications:  acetaminophen     Tablet .. 975 milliGRAM(s) Oral every 6 hours  aspirin  chewable 81 milliGRAM(s) Oral daily  atorvastatin 40 milliGRAM(s) Oral at bedtime  chlorhexidine 2% Cloths 1 Application(s) Topical daily  dextrose 5%. 1000 milliLiter(s) IV Continuous <Continuous>  dextrose 5%. 1000 milliLiter(s) IV Continuous <Continuous>  dextrose 50% Injectable 25 Gram(s) IV Push once  dextrose 50% Injectable 12.5 Gram(s) IV Push once  dextrose 50% Injectable 25 Gram(s) IV Push once  dextrose Oral Gel 15 Gram(s) Oral once PRN  gabapentin 800 milliGRAM(s) Oral three times a day  glucagon  Injectable 1 milliGRAM(s) IntraMuscular once  heparin   Injectable 5000 Unit(s) SubCutaneous every 8 hours  influenza   Vaccine 0.5 milliLiter(s) IntraMuscular once  insulin glargine Injectable (LANTUS) 20 Unit(s) SubCutaneous every morning  insulin lispro (ADMELOG) corrective regimen sliding scale   SubCutaneous three times a day before meals      FAMILY HISTORY:    PAST MEDICAL & SURGICAL HISTORY:  DM (diabetes mellitus)  DM 1      Stenosis of popliteal artery  Left stent placed 2017      SOCIAL HISTORY:  Works as a [employer], . 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    IMMUNIZATIONS  [] Up to Date		[] Not Up to Date:  Recent Immunizations:	[] No	[] Yes:    Daily Height in cm: 193.04 (22 Oct 2024 12:27)    Daily   Head Circumference:  Vital Signs Last 24 Hrs  T(C): 36.8 (23 Oct 2024 05:50), Max: 37.5 (22 Oct 2024 17:26)  T(F): 98.2 (23 Oct 2024 05:50), Max: 99.5 (22 Oct 2024 17:26)  HR: 88 (23 Oct 2024 05:50) (86 - 107)  BP: 153/75 (23 Oct 2024 05:50) (126/70 - 153/75)  BP(mean): --  RR: 16 (23 Oct 2024 05:50) (16 - 18)  SpO2: 100% (23 Oct 2024 05:50) (98% - 100%)    Parameters below as of 23 Oct 2024 05:50  Patient On (Oxygen Delivery Method): room air        PHYSICAL EXAM  All physical exam findings normal, except for those marked:  General:	Normal: alert, neither acutely nor chronically ill-appearing, well developed/well   .		nourished, no respiratory distress  .		[] Abnormal:  Eyes		Normal: no conjunctival injection, no discharge, no photophobia, intact   .		extraocular movements, sclera not icteric  .		[] Abnormal:  ENT:		Normal: normal tympanic membranes; external ear normal, nares normal without   .		discharge, no pharyngeal erythema or exudates, no oral mucosal lesions, normal   .		tongue and lips  .		[] Abnormal: poor dentition.  Neck		Normal: supple, full range of motion, no nuchal rigidity  .		[] Abnormal:  Lymph Nodes	Normal: normal size and consistency, non-tender  .		[] Abnormal:  Cardiovascular	Normal: regular rate and rhythm; Normal S1, S2; No murmur  .		[] Abnormal:  Respiratory	Normal: no wheezing or crackles, bilateral audible breath sounds, no retractions  .		[] Abnormal:  Abdominal	Normal: soft; non-distended; non-tender; no hepatosplenomegaly or masses  .		[] Abnormal:  		Normal: normal external genitalia, no rash  .		[] Abnormal:  Extremities  .		[] Abnormal: R BKA. L foot wrapped, skin changes as above.  Skin		Normal: skin intact and not indurated; no rash, no desquamation  .		[] Abnormal:  Neurologic	Normal: alert, oriented as age-appropriate; no weakness, no   .		facial asymmetry  .		[] Abnormal: Flat affect      Respiratory Support:		[X] No	[] Yes:  Vasoactive medication infusion:	[X] No	[] Yes:  Venous catheters:		[X] No	[] Yes:  Bladder catheter:		[X] No	[] Yes:  Other catheters or tubes:	[X] No	[] Yes:    Lab Results:                        10.1   7.75  )-----------( 437      ( 23 Oct 2024 06:46 )             31.5     10-23    139  |  100  |  19  ----------------------------<  189[H]  3.6   |  27  |  1.44[H]    Ca    8.7      23 Oct 2024 06:46  Phos  3.4     10-23  Mg     1.70     10-23    TPro  TNP  /  Alb  3.0[L]  /  TBili  0.2  /  DBili  x   /  AST  TNP  /  ALT  TNP  /  AlkPhos  136[H]  10-22    LIVER FUNCTIONS - ( 22 Oct 2024 15:00 )  Alb: 3.0 g/dL / Pro: TNP g/dL / ALK PHOS: 136 U/L / ALT: TNP U/L / AST: TNP U/L / GGT: x             Urinalysis Basic - ( 23 Oct 2024 06:46 )    Color: x / Appearance: x / SG: x / pH: x  Gluc: 189 mg/dL / Ketone: x  / Bili: x / Urobili: x   Blood: x / Protein: x / Nitrite: x   Leuk Esterase: x / RBC: x / WBC x   Sq Epi: x / Non Sq Epi: x / Bacteria: x        IMAGING:  < from: Xray Ankle 2 Views, Left (10.22.24 @ 17:12) >  IMPRESSION:    Cortical erosions and destruction of the left fourth metatarsal head and   proximal phalanx concerning for acute osteomyelitis.    < end of copied text >

## 2024-10-23 NOTE — CONSULT NOTE ADULT - ASSESSMENT
Patient is a 54 year old male with PMHx of DM and stroke not on ASA PSHx of Rt. BKA in 2019 presents with left foot gangrene and web space infection.     # LE osteo   LE  osteo L foot   ID eval appreciated   ABx as per ID   follow up Cx   Vascular care appreciated   ASA and statin   check echo and EKG   plan for TMA   stress test if need bypass     # DM  Sliding scale   diab diet   A1C 8  adjust insulin regime     # MANN   monitor renal function   if worsening renal function, check renal US   avoid nephrotoxic medications   IV and oral hydration   monitor urine output       # chronic CVA   ASA and statin   BP control   fall precautions     DVT and GI PPX

## 2024-10-23 NOTE — CONSULT NOTE ADULT - SUBJECTIVE AND OBJECTIVE BOX
Name of Patient : TARA BLAKE  MRN: [id number]  Date of visit: 10-23-24 @ 10:03      Subjective: Patient seen and examined. No new events except as noted.     REVIEW OF SYSTEMS:    CONSTITUTIONAL: No weakness, fevers or chills  EYES/ENT: No visual changes;  No vertigo or throat pain   NECK: No pain or stiffness  RESPIRATORY: No cough, wheezing, hemoptysis; No shortness of breath  CARDIOVASCULAR: No chest pain or palpitations  GASTROINTESTINAL: No abdominal or epigastric pain. No nausea, vomiting, or hematemesis; No diarrhea or constipation. No melena or hematochezia.  GENITOURINARY: No dysuria, frequency or hematuria  NEUROLOGICAL: No numbness or weakness  SKIN: No itching, burning, rashes, or lesions   All other review of systems is negative unless indicated above.    MEDICATIONS:  MEDICATIONS  (STANDING):  acetaminophen     Tablet .. 975 milliGRAM(s) Oral every 6 hours  aspirin  chewable 81 milliGRAM(s) Oral daily  atorvastatin 40 milliGRAM(s) Oral at bedtime  chlorhexidine 2% Cloths 1 Application(s) Topical daily  dextrose 5%. 1000 milliLiter(s) (100 mL/Hr) IV Continuous <Continuous>  dextrose 5%. 1000 milliLiter(s) (50 mL/Hr) IV Continuous <Continuous>  dextrose 50% Injectable 25 Gram(s) IV Push once  dextrose 50% Injectable 12.5 Gram(s) IV Push once  dextrose 50% Injectable 25 Gram(s) IV Push once  gabapentin 800 milliGRAM(s) Oral three times a day  glucagon  Injectable 1 milliGRAM(s) IntraMuscular once  heparin   Injectable 5000 Unit(s) SubCutaneous every 8 hours  influenza   Vaccine 0.5 milliLiter(s) IntraMuscular once  insulin glargine Injectable (LANTUS) 20 Unit(s) SubCutaneous every morning  insulin lispro (ADMELOG) corrective regimen sliding scale   SubCutaneous three times a day before meals  piperacillin/tazobactam IVPB.. 3.375 Gram(s) IV Intermittent every 8 hours      PHYSICAL EXAM:  T(C): 36.8 (10-23-24 @ 05:50), Max: 37.5 (10-22-24 @ 17:26)  HR: 88 (10-23-24 @ 05:50) (86 - 107)  BP: 153/75 (10-23-24 @ 05:50) (126/70 - 153/75)  RR: 16 (10-23-24 @ 05:50) (16 - 18)  SpO2: 100% (10-23-24 @ 05:50) (98% - 100%)  Wt(kg): --  I&O's Summary    Height (cm): 193 (10-22 @ 12:27)  Weight (kg): 79.8 (10-22 @ 12:27)  BMI (kg/m2): 21.4 (10-22 @ 12:27)  BSA (m2): 2.1 (10-22 @ 12:27)    Appearance: Normal	  HEENT:  PERRLA   Lymphatic: No lymphadenopathy   Cardiovascular: Normal S1 S2, no JVD  Respiratory: normal effort , clear  Gastrointestinal:  Soft, Non-tender  Skin: No rashes,  warm to touch  Psychiatry:  Mood & affect appropriate  Musculuskeletal: No edema    recent labs, Imaging and EKGs personally reviewed           Culture - Abscess with Gram Stain (collected 10-22-24 @ 16:59)  Source: .Abscess  Gram Stain (10-22-24 @ 21:38):    Rare polymorphonuclear leukocytes seen per low power field    Rare Gram positive cocci in pairs seen per oil power field               Name of Patient : TARA BLAKE  MRN: [id number]  Date of visit: 10-23-24 @ 10:03    Patient is a 54 year old male with PMHx of DM and stroke not on ASA PSHx of Rt. BKA in 2019 presents with left foot gangrene and web space infection.       Subjective: Patient seen and examined. No new events except as noted.   doing okay     REVIEW OF SYSTEMS:    CONSTITUTIONAL: No weakness, fevers or chills  EYES/ENT: No visual changes;  No vertigo or throat pain   NECK: No pain or stiffness  RESPIRATORY: No cough, wheezing, hemoptysis; No shortness of breath  CARDIOVASCULAR: No chest pain or palpitations  GASTROINTESTINAL: No abdominal or epigastric pain. No nausea, vomiting, or hematemesis; No diarrhea or constipation. No melena or hematochezia.  GENITOURINARY: No dysuria, frequency or hematuria  NEUROLOGICAL: No numbness or weakness  SKIN: No itching, burning, rashes, or lesions   All other review of systems is negative unless indicated above.    MEDICATIONS:  MEDICATIONS  (STANDING):  acetaminophen     Tablet .. 975 milliGRAM(s) Oral every 6 hours  aspirin  chewable 81 milliGRAM(s) Oral daily  atorvastatin 40 milliGRAM(s) Oral at bedtime  chlorhexidine 2% Cloths 1 Application(s) Topical daily  dextrose 5%. 1000 milliLiter(s) (100 mL/Hr) IV Continuous <Continuous>  dextrose 5%. 1000 milliLiter(s) (50 mL/Hr) IV Continuous <Continuous>  dextrose 50% Injectable 25 Gram(s) IV Push once  dextrose 50% Injectable 12.5 Gram(s) IV Push once  dextrose 50% Injectable 25 Gram(s) IV Push once  gabapentin 800 milliGRAM(s) Oral three times a day  glucagon  Injectable 1 milliGRAM(s) IntraMuscular once  heparin   Injectable 5000 Unit(s) SubCutaneous every 8 hours  influenza   Vaccine 0.5 milliLiter(s) IntraMuscular once  insulin glargine Injectable (LANTUS) 20 Unit(s) SubCutaneous every morning  insulin lispro (ADMELOG) corrective regimen sliding scale   SubCutaneous three times a day before meals  piperacillin/tazobactam IVPB.. 3.375 Gram(s) IV Intermittent every 8 hours    Home Medications:  gabapentin 800 mg oral tablet: 1 tab(s) orally 2 times a day (22 Oct 2024 17:53)      PAST MEDICAL & SURGICAL HISTORY:  DM (diabetes mellitus)  Stenosis of popliteal artery  Left stent placed 2017    PHYSICAL EXAM:  T(C): 36.8 (10-23-24 @ 05:50), Max: 37.5 (10-22-24 @ 17:26)  HR: 88 (10-23-24 @ 05:50) (86 - 107)  BP: 153/75 (10-23-24 @ 05:50) (126/70 - 153/75)  RR: 16 (10-23-24 @ 05:50) (16 - 18)  SpO2: 100% (10-23-24 @ 05:50) (98% - 100%)  Wt(kg): --  I&O's Summary    Height (cm): 193 (10-22 @ 12:27)  Weight (kg): 79.8 (10-22 @ 12:27)  BMI (kg/m2): 21.4 (10-22 @ 12:27)  BSA (m2): 2.1 (10-22 @ 12:27)    Appearance: Normal	  HEENT:  PERRLA   Lymphatic: No lymphadenopathy   Cardiovascular: Normal S1 S2, no JVD  Respiratory: normal effort , clear  Gastrointestinal:  Soft, Non-tender  Skin: No rashes,  warm to touch  Psychiatry:  Mood & affect appropriate  Musculuskeletal: No edema    recent labs, Imaging and EKGs personally reviewed     Culture - Abscess with Gram Stain (collected 10-22-24 @ 16:59)  Source: .Abscess  Gram Stain (10-22-24 @ 21:38):    Rare polymorphonuclear leukocytes seen per low power field    Rare Gram positive cocci in pairs seen per oil power field                          10.1   7.75  )-----------( 437      ( 23 Oct 2024 06:46 )             31.5               10-23    139  |  100  |  19  ----------------------------<  189[H]  3.6   |  27  |  1.44[H]    Ca    8.7      23 Oct 2024 06:46  Phos  3.4     10-23  Mg     1.70     10-23    TPro  TNP  /  Alb  3.0[L]  /  TBili  0.2  /  DBili  x   /  AST  TNP  /  ALT  TNP  /  AlkPhos  136[H]  10-22                       Urinalysis Basic - ( 23 Oct 2024 06:46 )    Color: x / Appearance: x / SG: x / pH: x  Gluc: 189 mg/dL / Ketone: x  / Bili: x / Urobili: x   Blood: x / Protein: x / Nitrite: x   Leuk Esterase: x / RBC: x / WBC x   Sq Epi: x / Non Sq Epi: x / Bacteria: x

## 2024-10-24 ENCOUNTER — RESULT REVIEW (OUTPATIENT)
Age: 54
End: 2024-10-24

## 2024-10-24 ENCOUNTER — APPOINTMENT (OUTPATIENT)
Dept: VASCULAR SURGERY | Facility: HOSPITAL | Age: 54
End: 2024-10-24

## 2024-10-24 LAB
-  AZTREONAM: SIGNIFICANT CHANGE UP
-  CEFEPIME: SIGNIFICANT CHANGE UP
-  CEFTAZIDIME: SIGNIFICANT CHANGE UP
-  CIPROFLOXACIN: SIGNIFICANT CHANGE UP
-  CLINDAMYCIN: SIGNIFICANT CHANGE UP
-  ERYTHROMYCIN: SIGNIFICANT CHANGE UP
-  GENTAMICIN: SIGNIFICANT CHANGE UP
-  IMIPENEM: SIGNIFICANT CHANGE UP
-  LEVOFLOXACIN: SIGNIFICANT CHANGE UP
-  MEROPENEM: SIGNIFICANT CHANGE UP
-  OXACILLIN: SIGNIFICANT CHANGE UP
-  PIPERACILLIN/TAZOBACTAM: SIGNIFICANT CHANGE UP
-  RIFAMPIN: SIGNIFICANT CHANGE UP
-  TETRACYCLINE: SIGNIFICANT CHANGE UP
-  TRIMETHOPRIM/SULFAMETHOXAZOLE: SIGNIFICANT CHANGE UP
-  VANCOMYCIN: SIGNIFICANT CHANGE UP
ANION GAP SERPL CALC-SCNC: 10 MMOL/L — SIGNIFICANT CHANGE UP (ref 7–14)
APTT BLD: 37 SEC — HIGH (ref 24.5–35.6)
BLD GP AB SCN SERPL QL: NEGATIVE — SIGNIFICANT CHANGE UP
BUN SERPL-MCNC: 20 MG/DL — SIGNIFICANT CHANGE UP (ref 7–23)
CALCIUM SERPL-MCNC: 8.8 MG/DL — SIGNIFICANT CHANGE UP (ref 8.4–10.5)
CHLORIDE SERPL-SCNC: 102 MMOL/L — SIGNIFICANT CHANGE UP (ref 98–107)
CO2 SERPL-SCNC: 26 MMOL/L — SIGNIFICANT CHANGE UP (ref 22–31)
CREAT SERPL-MCNC: 1.42 MG/DL — HIGH (ref 0.5–1.3)
EGFR: 59 ML/MIN/1.73M2 — LOW
GLUCOSE BLDC GLUCOMTR-MCNC: 134 MG/DL — HIGH (ref 70–99)
GLUCOSE BLDC GLUCOMTR-MCNC: 137 MG/DL — HIGH (ref 70–99)
GLUCOSE BLDC GLUCOMTR-MCNC: 178 MG/DL — HIGH (ref 70–99)
GLUCOSE BLDC GLUCOMTR-MCNC: 186 MG/DL — HIGH (ref 70–99)
GLUCOSE BLDC GLUCOMTR-MCNC: 191 MG/DL — HIGH (ref 70–99)
GLUCOSE SERPL-MCNC: 168 MG/DL — HIGH (ref 70–99)
HCT VFR BLD CALC: 28.5 % — LOW (ref 39–50)
HGB BLD-MCNC: 9.3 G/DL — LOW (ref 13–17)
INR BLD: 1.08 RATIO — SIGNIFICANT CHANGE UP (ref 0.85–1.16)
MAGNESIUM SERPL-MCNC: 1.9 MG/DL — SIGNIFICANT CHANGE UP (ref 1.6–2.6)
MCHC RBC-ENTMCNC: 27.5 PG — SIGNIFICANT CHANGE UP (ref 27–34)
MCHC RBC-ENTMCNC: 32.6 GM/DL — SIGNIFICANT CHANGE UP (ref 32–36)
MCV RBC AUTO: 84.3 FL — SIGNIFICANT CHANGE UP (ref 80–100)
METHOD TYPE: SIGNIFICANT CHANGE UP
METHOD TYPE: SIGNIFICANT CHANGE UP
NRBC # BLD: 0 /100 WBCS — SIGNIFICANT CHANGE UP (ref 0–0)
NRBC # FLD: 0 K/UL — SIGNIFICANT CHANGE UP (ref 0–0)
PHOSPHATE SERPL-MCNC: 3 MG/DL — SIGNIFICANT CHANGE UP (ref 2.5–4.5)
PLATELET # BLD AUTO: 420 K/UL — HIGH (ref 150–400)
POTASSIUM SERPL-MCNC: 4.1 MMOL/L — SIGNIFICANT CHANGE UP (ref 3.5–5.3)
POTASSIUM SERPL-SCNC: 4.1 MMOL/L — SIGNIFICANT CHANGE UP (ref 3.5–5.3)
PROTHROM AB SERPL-ACNC: 12.5 SEC — SIGNIFICANT CHANGE UP (ref 9.9–13.4)
RBC # BLD: 3.38 M/UL — LOW (ref 4.2–5.8)
RBC # FLD: 12.5 % — SIGNIFICANT CHANGE UP (ref 10.3–14.5)
RH IG SCN BLD-IMP: POSITIVE — SIGNIFICANT CHANGE UP
RH IG SCN BLD-IMP: POSITIVE — SIGNIFICANT CHANGE UP
SODIUM SERPL-SCNC: 138 MMOL/L — SIGNIFICANT CHANGE UP (ref 135–145)
WBC # BLD: 7.83 K/UL — SIGNIFICANT CHANGE UP (ref 3.8–10.5)
WBC # FLD AUTO: 7.83 K/UL — SIGNIFICANT CHANGE UP (ref 3.8–10.5)

## 2024-10-24 PROCEDURE — 37230: CPT | Mod: RT

## 2024-10-24 PROCEDURE — 37226: CPT | Mod: LT

## 2024-10-24 PROCEDURE — 37252 INTRVASC US NONCORONARY 1ST: CPT

## 2024-10-24 PROCEDURE — 75716 ARTERY X-RAYS ARMS/LEGS: CPT | Mod: 26,59

## 2024-10-24 PROCEDURE — 75710 ARTERY X-RAYS ARM/LEG: CPT | Mod: 26,59,LT

## 2024-10-24 PROCEDURE — 75625 CONTRAST EXAM ABDOMINL AORTA: CPT | Mod: 26,59

## 2024-10-24 PROCEDURE — 99152 MOD SED SAME PHYS/QHP 5/>YRS: CPT

## 2024-10-24 PROCEDURE — 93923 UPR/LXTR ART STDY 3+ LVLS: CPT | Mod: 26

## 2024-10-24 PROCEDURE — 93306 TTE W/DOPPLER COMPLETE: CPT | Mod: 26

## 2024-10-24 RX ORDER — INSULIN LISPRO 100/ML
VIAL (ML) SUBCUTANEOUS
Refills: 0 | Status: DISCONTINUED | OUTPATIENT
Start: 2024-10-24 | End: 2024-10-29

## 2024-10-24 RX ORDER — INSULIN GLARGINE,HUM.REC.ANLOG 100/ML
20 VIAL (ML) SUBCUTANEOUS EVERY MORNING
Refills: 0 | Status: DISCONTINUED | OUTPATIENT
Start: 2024-10-24 | End: 2024-10-29

## 2024-10-24 RX ORDER — RIVAROXABAN 20 MG/1
2.5 TABLET, FILM COATED ORAL
Refills: 0 | Status: DISCONTINUED | OUTPATIENT
Start: 2024-10-24 | End: 2024-10-26

## 2024-10-24 RX ADMIN — Medication 975 MILLIGRAM(S): at 05:37

## 2024-10-24 RX ADMIN — GABAPENTIN 800 MILLIGRAM(S): 300 CAPSULE ORAL at 22:27

## 2024-10-24 RX ADMIN — PIPERACILLIN AND TAZOBACTAM 25 GRAM(S): .5; 4 INJECTION, POWDER, LYOPHILIZED, FOR SOLUTION INTRAVENOUS at 03:00

## 2024-10-24 RX ADMIN — Medication 10 UNIT(S): at 09:15

## 2024-10-24 RX ADMIN — Medication 100 MILLILITER(S): at 00:07

## 2024-10-24 RX ADMIN — PIPERACILLIN AND TAZOBACTAM 25 GRAM(S): .5; 4 INJECTION, POWDER, LYOPHILIZED, FOR SOLUTION INTRAVENOUS at 11:31

## 2024-10-24 RX ADMIN — Medication 975 MILLIGRAM(S): at 06:35

## 2024-10-24 RX ADMIN — Medication 975 MILLIGRAM(S): at 11:41

## 2024-10-24 RX ADMIN — CHLORHEXIDINE GLUCONATE 1 APPLICATION(S): 40 SOLUTION TOPICAL at 11:34

## 2024-10-24 RX ADMIN — Medication 975 MILLIGRAM(S): at 01:00

## 2024-10-24 RX ADMIN — Medication 3: at 00:04

## 2024-10-24 RX ADMIN — VANCOMYCIN HYDROCHLORIDE 166.67 MILLIGRAM(S): KIT at 04:04

## 2024-10-24 RX ADMIN — PIPERACILLIN AND TAZOBACTAM 25 GRAM(S): .5; 4 INJECTION, POWDER, LYOPHILIZED, FOR SOLUTION INTRAVENOUS at 22:27

## 2024-10-24 RX ADMIN — Medication 975 MILLIGRAM(S): at 00:05

## 2024-10-24 RX ADMIN — Medication 81 MILLIGRAM(S): at 11:32

## 2024-10-24 RX ADMIN — Medication 975 MILLIGRAM(S): at 14:02

## 2024-10-24 RX ADMIN — Medication 1: at 22:26

## 2024-10-24 RX ADMIN — VANCOMYCIN HYDROCHLORIDE 166.67 MILLIGRAM(S): KIT at 17:56

## 2024-10-24 RX ADMIN — Medication 1: at 06:44

## 2024-10-24 RX ADMIN — GABAPENTIN 800 MILLIGRAM(S): 300 CAPSULE ORAL at 05:37

## 2024-10-24 NOTE — PROGRESS NOTE ADULT - SUBJECTIVE AND OBJECTIVE BOX
Name of Patient : TARA BLAKE  MRN: [id number]  Date of visit: 10-24-24       Subjective: Patient seen and examined. No new events except as noted.     REVIEW OF SYSTEMS:    CONSTITUTIONAL: No weakness, fevers or chills  EYES/ENT: No visual changes;  No vertigo or throat pain   NECK: No pain or stiffness  RESPIRATORY: No cough, wheezing, hemoptysis; No shortness of breath  CARDIOVASCULAR: No chest pain or palpitations  GASTROINTESTINAL: No abdominal or epigastric pain. No nausea, vomiting, or hematemesis; No diarrhea or constipation. No melena or hematochezia.  GENITOURINARY: No dysuria, frequency or hematuria  NEUROLOGICAL: No numbness or weakness  SKIN: No itching, burning, rashes, or lesions   All other review of systems is negative unless indicated above.    MEDICATIONS:  MEDICATIONS  (STANDING):  acetaminophen     Tablet .. 975 milliGRAM(s) Oral every 6 hours  aspirin  chewable 81 milliGRAM(s) Oral daily  atorvastatin 40 milliGRAM(s) Oral at bedtime  chlorhexidine 2% Cloths 1 Application(s) Topical daily  dextrose 5%. 1000 milliLiter(s) (50 mL/Hr) IV Continuous <Continuous>  dextrose 5%. 1000 milliLiter(s) (100 mL/Hr) IV Continuous <Continuous>  dextrose 50% Injectable 25 Gram(s) IV Push once  dextrose 50% Injectable 12.5 Gram(s) IV Push once  dextrose 50% Injectable 25 Gram(s) IV Push once  gabapentin 800 milliGRAM(s) Oral three times a day  glucagon  Injectable 1 milliGRAM(s) IntraMuscular once  influenza   Vaccine 0.5 milliLiter(s) IntraMuscular once  insulin glargine Injectable (LANTUS) 20 Unit(s) SubCutaneous every morning  insulin lispro (ADMELOG) corrective regimen sliding scale   SubCutaneous Before meals and at bedtime  lactated ringers. 1000 milliLiter(s) (100 mL/Hr) IV Continuous <Continuous>  piperacillin/tazobactam IVPB.. 3.375 Gram(s) IV Intermittent every 8 hours  rivaroxaban 2.5 milliGRAM(s) Oral <User Schedule>  vancomycin  IVPB 1250 milliGRAM(s) IV Intermittent every 12 hours      PHYSICAL EXAM:  T(C): 37.1 (10-24-24 @ 21:44), Max: 37.1 (10-24-24 @ 12:35)  HR: 96 (10-24-24 @ 21:44) (78 - 98)  BP: 140/70 (10-24-24 @ 21:44) (137/78 - 167/91)  RR: 18 (10-24-24 @ 21:44) (13 - 18)  SpO2: 100% (10-24-24 @ 21:44) (96% - 100%)  Wt(kg): --  I&O's Summary    Height (cm): 193 (10-24 @ 12:35)  Weight (kg): 79.8 (10-24 @ 12:35)  BMI (kg/m2): 21.4 (10-24 @ 12:35)  BSA (m2): 2.1 (10-24 @ 12:35)    Appearance: Normal	  HEENT:  PERRLA   Lymphatic: No lymphadenopathy   Cardiovascular: Normal S1 S2, no JVD  Respiratory: normal effort , clear  Gastrointestinal:  Soft, Non-tender  Skin: No rashes,  warm to touch  Psychiatry:  Mood & affect appropriate  Musculuskeletal: No edema    recent labs, Imaging and EKGs personally reviewed                           9.3    7.83  )-----------( 420      ( 24 Oct 2024 04:00 )             28.5               10-24    138  |  102  |  20  ----------------------------<  168[H]  4.1   |  26  |  1.42[H]    Ca    8.8      24 Oct 2024 04:00  Phos  3.0     10-24  Mg     1.90     10-24      PT/INR - ( 24 Oct 2024 04:00 )   PT: 12.5 sec;   INR: 1.08 ratio         PTT - ( 24 Oct 2024 04:00 )  PTT:37.0 sec                   Urinalysis Basic - ( 24 Oct 2024 04:00 )    Color: x / Appearance: x / SG: x / pH: x  Gluc: 168 mg/dL / Ketone: x  / Bili: x / Urobili: x   Blood: x / Protein: x / Nitrite: x   Leuk Esterase: x / RBC: x / WBC x   Sq Epi: x / Non Sq Epi: x / Bacteria: x

## 2024-10-24 NOTE — PROGRESS NOTE ADULT - ASSESSMENT
54M 10/22 s/p bedside right foot 3rd digit disarticulation at the proximal phalangeal joint  -  Pt seen and evaluated.  -  Afebrile, No Leukocytosis   - 10/22 s/p bedside right foot 3rd digit disarticulation at the proximal phalangeal joint, fibrotic wound bed, no purulent drainage, no malodor, no fluctuance, no bogginess, no tracking/tunneling, full thickness dry gangrene of the 4th digit, ischemic changes to the 2nd digit. Right BKA.   - Left foot X-ray: 4th metatarsal head OM  - Left foot wound culture obtained and sent  - Vascular recs, appreciated, angio today 10/24  - ID recs, appreciated  - Pod plan:  L foot TMA VS L foot [facility] 3rd and [facility]   - Cards clearance 10/24 documented, appreciated   - Please document medical clearance   - Discussed with Attending   54M 10/22 s/p bedside L foot 3rd digit disarticulation at the proximal phalangeal joint  -  Pt seen and evaluated.  -  Afebrile, No Leukocytosis   - 10/22 s/p bedside L foot 3rd digit disarticulation at the proximal phalangeal joint, fibrotic wound bed, no purulent drainage, no malodor, no fluctuance, no bogginess, no tracking/tunneling, full thickness dry gangrene of the 4th digit, ischemic changes to the 2nd digit. Right BKA.   - Left foot X-ray: 4th metatarsal head OM  - Left foot wound culture obtained and sent  - Vascular recs, appreciated, angio today 10/24  - ID recs, appreciated  - Pod plan:  L foot TMA VS L foot [facility] 3[facility] and [facility]   - Cards clearance 10/24 documented, appreciated   - Please document medical clearance   - Discussed with Attending

## 2024-10-24 NOTE — PRE PROCEDURE NOTE - PRE PROCEDURE EVALUATION
Pre Procedural Sedation Evaluation    Proceduralist: Dr. Lexie Ramirez    History and physical reviewed  Medications reviewed  Labs reviewed  EKG reviewed    Anticoagulation: N/A  Urine pregnancy: N/A  Dentures: None  Last PO intake: 10/23/2024    Pre-Procedure Physical Assessment  Mental status: Alert and oriented x 3  Level of consciousness: 1  Cardiac assessment: Stable  Pulmonary assessment: Stable  Obstructive sleep apnea: No  Aspiration risk: No  Mallampati score: 2  ASA Classification: 3  Prior Sedative or Anesthesia Experience: No complications  Informed consent by responsible adult: Yes  Based on today's assessment, anesthesia consult requested: No

## 2024-10-24 NOTE — PROGRESS NOTE ADULT - SUBJECTIVE AND OBJECTIVE BOX
Urologic Surgery Operative Report     Pre-op Diagnosis: R ureteral stone   Post-op Diagnosis: Same as above   Procedure:  Right ureteroscopy, renal stone basketing, right ureteral stent exchange   Surgeon: Surgeon(s) and Role:     * Jonathan Turcios M.D. - Primary   Assistant: Fifiulator: Rahul Quiroz R.N.  Laser Staff: Eugenio Mathew  Scrub Person: Cuong Watson   Anesthesia: * No anesthesia type entered *,   Anesthesiologist: Fozia Barnett M.D.   Estimated Blood Loss: * No blood loss amount entered *   IV fluids <1L Crystalloid    Specimens: 1. Stone for chemical analysis    Complications: None   Condition: Stable, procedure well tolerated    Drains: 1. [id number], JJ stent((on strings) )  2. No milton   Disposition:  1. PACU, discharge after voiding  2. f/u 5 to 7 days for stent removal in office and in 6 to 8 weeks with renal ultrasound prior   Findings 1.  0.5 cm stone at right renal pelvis      Indication:   70-year-old female with obstructing right ureteral stone status post urgent right ureteral stent placement here for definitive therapy..  After a full discussion of alternatives, risks, and benefits the patient consented to proceeding with Ureteroscopy, laser lithotripsy and possible JJ stent placement on the respective side.  She understands the risk of inability to access ureter, the need for second procedures, the possibility of negative ureteroscopy, that she may have stent discomfort until this is removed, bleeding, infection, ureteral injury or stricture, bladder injury, post op urinary retention requiring milton catheter, and the general pulmonary and cardiovascular risks associated with anesthesia.     Procedure Details:   The patient was taken to operating room and placed on table in supine position.  Ancef was administered prior to the start of the procedure based on previous urine cultures. Sequential compression devices were placed for deep venous thrombosis prophylaxis. After induction of  general anesthesia, both legs were placed in Avinash stirrups in the standard lithotomy position.  A timeout was held confirming the correct patient, procedure and laterality.   The perineal area was prepped and draped in a sterile fashion. A rigid cystoscope was inserted into the urethra and the bladder was emptied and then distended with sterile saline. Urethral sounds were not needed to dilate the urethral meatus. Cystoscopy revealed normal bladder mucosa, orthotopic ureteral orifices, and no other abnormalities..    Right ureteral stent grasped and removed in its entirety.  0.35 Glidewire advanced to the patient's right ureteral orifice over which a dual-lumen ureteral catheter was advanced a second wire placed.  Rigid ureteroscope advanced over the second wire up to level of the mid ureter, no stones were noted.  Flexible ureteroscope was then advanced over the wire up to level the renal pelvis and pan pyeloscopy revealed the previously noted 5 mm stone in a separate 3 mm stone in the midpole.  Both stones grasped with a 0 tip nitinol basket and withdrawn in their entirety without difficulty.  No additional stones were noted upon withdrawal.    Returnign to rigid cystoscope, we then placed a right-sided JJ stent, [id number], JJ stent (on strings)  under fluoroscopy. We then saw that the JJ stent was in appropriate position, with a good curl visualized in the renal pelvis fluoroscopically and a good curl in the bladder endoscopically.  Stone sent for analysis.  The cystoscope was removed after emptying the bladder.  The patient was awoken from anesthesia and brought to recovery in satisfactory condition.     Plan for follow-up in 5 to 7 days for stent removal and in 6 to 8 weeks with renal ultrasound prior.       Jonathan Turcios M.D.   [id number]   [phone]           Vascular Surgery Daily Progress Note  =====================================================    SUBJECTIVE: Patient seen and examined at bedside on AM rounds. Patient reports that they're feeling well. NPO for cath lab. OOB/Amublating as tolerated. Denies fever, chills.    --------------------------------------------------------------------------------------    MEDICATIONS:    Neurologic Medications  acetaminophen     Tablet .. 975 milliGRAM(s) Oral every 6 hours  gabapentin 800 milliGRAM(s) Oral three times a day    Respiratory Medications    Cardiovascular Medications    Gastrointestinal Medications  dextrose 5%. 1000 milliLiter(s) IV Continuous <Continuous>  dextrose 5%. 1000 milliLiter(s) IV Continuous <Continuous>  lactated ringers. 1000 milliLiter(s) IV Continuous <Continuous>    Genitourinary Medications    Hematologic/Oncologic Medications  aspirin  chewable 81 milliGRAM(s) Oral daily  heparin   Injectable 5000 Unit(s) SubCutaneous every 8 hours  influenza   Vaccine 0.5 milliLiter(s) IntraMuscular once    Antimicrobial/Immunologic Medications  piperacillin/tazobactam IVPB.. 3.375 Gram(s) IV Intermittent every 8 hours  vancomycin  IVPB 1250 milliGRAM(s) IV Intermittent every 12 hours    Endocrine/Metabolic Medications  atorvastatin 40 milliGRAM(s) Oral at bedtime  dextrose 50% Injectable 25 Gram(s) IV Push once  dextrose 50% Injectable 12.5 Gram(s) IV Push once  dextrose 50% Injectable 25 Gram(s) IV Push once  dextrose Oral Gel 15 Gram(s) Oral once PRN Blood Glucose LESS THAN 70 milliGRAM(s)/deciliter  glucagon  Injectable 1 milliGRAM(s) IntraMuscular once  insulin glargine Injectable (LANTUS) 10 Unit(s) SubCutaneous every morning  insulin lispro (ADMELOG) corrective regimen sliding scale   SubCutaneous every 6 hours    Topical/Other Medications  chlorhexidine 2% Cloths 1 Application(s) Topical daily    --------------------------------------------------------------------------------------    VITAL SIGNS:  T(C): 36.5 (10-24-24 @ 05:43), Max: 36.9 (10-23-24 @ 19:22)  HR: 97 (10-24-24 @ 05:43) (85 - 97)  BP: 156/73 (10-24-24 @ 05:43) (131/83 - 164/88)  RR: 18 (10-24-24 @ 05:43) (17 - 18)  SpO2: 98% (10-24-24 @ 05:43) (95% - 100%)  --------------------------------------------------------------------------------------    EXAM    General: NAD, Lying in bed comfortably  Neuro: A+Ox3  Cardio: RRR, nml S1/S2  Resp: Good effort, CTA b/l  Vascular: s/p Rt. BKA. Left foot dopplerable signals PT. Intact motor and sensory. Foot covered in kerlix (patient asked not to take it down)  Musculoskeletal: All  extremities moving spontaneously, no limitations.    --------------------------------------------------------------------------------------

## 2024-10-24 NOTE — PROGRESS NOTE ADULT - ASSESSMENT
A 54 year old male with PMHx of DM and stroke not on ASA PSHx of Rt. BKA in 2019 presents with left foot gangrene and web space infection.     Plan:  - Diet: NPO for angio today   - Pain management  - ASA/SQH  - Statin  - CHRISTIAN/PVR pending   - Podiatry consult  - X-Ray of the foot - cortical erosions and destruction of the left fourth metatarsal head and proximal phalanx concerning for acute osteomyelitis.  - Medical and cardiac clearance  - ID consult: MRSA swab, Continue empiric vanc/vosyn for now  - Abscess culture with gram + cocci      Vascular Surgery   [id number]

## 2024-10-24 NOTE — PROGRESS NOTE ADULT - SUBJECTIVE AND OBJECTIVE BOX
Subjective: Patient seen and examined. No new events except as noted.     SUBJECTIVE/ROS:  nad      MEDICATIONS:  MEDICATIONS  (STANDING):  acetaminophen     Tablet .. 975 milliGRAM(s) Oral every 6 hours  aspirin  chewable 81 milliGRAM(s) Oral daily  atorvastatin 40 milliGRAM(s) Oral at bedtime  chlorhexidine 2% Cloths 1 Application(s) Topical daily  dextrose 5%. 1000 milliLiter(s) (100 mL/Hr) IV Continuous <Continuous>  dextrose 5%. 1000 milliLiter(s) (50 mL/Hr) IV Continuous <Continuous>  dextrose 50% Injectable 25 Gram(s) IV Push once  dextrose 50% Injectable 12.5 Gram(s) IV Push once  dextrose 50% Injectable 25 Gram(s) IV Push once  gabapentin 800 milliGRAM(s) Oral three times a day  glucagon  Injectable 1 milliGRAM(s) IntraMuscular once  heparin   Injectable 5000 Unit(s) SubCutaneous every 8 hours  influenza   Vaccine 0.5 milliLiter(s) IntraMuscular once  insulin glargine Injectable (LANTUS) 10 Unit(s) SubCutaneous every morning  insulin lispro (ADMELOG) corrective regimen sliding scale   SubCutaneous every 6 hours  lactated ringers. 1000 milliLiter(s) (100 mL/Hr) IV Continuous <Continuous>  piperacillin/tazobactam IVPB.. 3.375 Gram(s) IV Intermittent every 8 hours  vancomycin  IVPB 1250 milliGRAM(s) IV Intermittent every 12 hours      PHYSICAL EXAM:  T(C): 36.5 (10-24-24 @ 05:43), Max: 36.9 (10-23-24 @ 19:22)  HR: 97 (10-24-24 @ 05:43) (85 - 97)  BP: 156/73 (10-24-24 @ 05:43) (131/83 - 164/88)  RR: 18 (10-24-24 @ 05:43) (17 - 18)  SpO2: 98% (10-24-24 @ 05:43) (95% - 100%)  Wt(kg): --  I&O's Summary          JVP: Normal  Neck: supple  Lung: clear   CV: S1 S2 , Murmur:  Abd: soft  Ext: No edema  neuro: Awake / alert  Psych: flat affect  Skin: normal``    LABS/DATA:    CARDIAC MARKERS:                                9.3    7.83  )-----------( 420      ( 24 Oct 2024 04:00 )             28.5     10-24    138  |  102  |  20  ----------------------------<  168[H]  4.1   |  26  |  1.42[H]    Ca    8.8      24 Oct 2024 04:00  Phos  3.0     10-24  Mg     1.90     10-24    TPro  TNP  /  Alb  3.0[L]  /  TBili  0.2  /  DBili  x   /  AST  TNP  /  ALT  TNP  /  AlkPhos  136[H]  10-22    proBNP:   Lipid Profile:   HgA1c:   TSH:     TELE:  EKG:

## 2024-10-24 NOTE — PROGRESS NOTE ADULT - ASSESSMENT
Patient is a 54 year old male with PMHx of DM and stroke not on ASA PSHx of Rt. BKA in 2019 presents with left foot gangrene and web space infection.     # LE osteo   LE  osteo L foot   ID eval appreciated   ABx as per ID   follow up Cx   Vascular care appreciated   ASA and statin   check echo and EKG   plan for TMA   stress test if need bypass     # DM  Sliding scale   diab diet   A1C 8  adjust insulin regime     # MANN   monitor renal function   if worsening renal function, check renal US   avoid nephrotoxic medications   IV and oral hydration   monitor urine output       # chronic CVA   ASA and statin   BP control   fall precautions     DVT and GI PPX     Patient seen and examined by me. patient care and plan discussed and reviewed with house staff. Plan as outlined above edited by me to reflect our discussion. Advanced care planning/advanced directives discussed with patient/family. DNR status including forceful chest compressions to attempt to restart the heart, ventilator support/artificial breathing, electric shock, artificial nutrition, health care proxy, Molst form all discussed with pt. Fifty seven minutes of total time dedicated to this patient visit today including preparing to see the patient (eg. review of tests), obtaining and/or reviewing separately obtained history, obtaining/reviewing vitals, performing a medically appropriate examination and/or evaluation, counseling and educating the patient/family/caregiver, reviewing previous notes and test results, and procedures, communicating with other health professionals (when not separately reported), and documenting clinical information in the electronic health record.

## 2024-10-24 NOTE — PROGRESS NOTE ADULT - SUBJECTIVE AND OBJECTIVE BOX
Patient is a 54y old  Male who presents with a chief complaint of Foot Wound (24 Oct 2024 09:13)       INTERVAL HPI/OVERNIGHT EVENTS:  Patient seen and evaluated at bedside.  Pt is resting comfortable in NAD. Denies N/V/F/C.      Allergies    No Known Allergies    Intolerances        Vital Signs Last 24 Hrs  T(C): 36.7 (24 Oct 2024 09:34), Max: 36.9 (23 Oct 2024 19:22)  T(F): 98 (24 Oct 2024 09:34), Max: 98.5 (23 Oct 2024 19:22)  HR: 98 (24 Oct 2024 09:34) (88 - 98)  BP: 137/78 (24 Oct 2024 09:34) (131/83 - 164/88)  BP(mean): --  RR: 18 (24 Oct 2024 09:34) (18 - 18)  SpO2: 100% (24 Oct 2024 09:34) (98% - 100%)    Parameters below as of 24 Oct 2024 09:34  Patient On (Oxygen Delivery Method): room air        LABS:                        9.3    7.83  )-----------( 420      ( 24 Oct 2024 04:00 )             28.5     10-24    138  |  102  |  20  ----------------------------<  168[H]  4.1   |  26  |  1.42[H]    Ca    8.8      24 Oct 2024 04:00  Phos  3.0     10-24  Mg     1.90     10-24    TPro  TNP  /  Alb  3.0[L]  /  TBili  0.2  /  DBili  x   /  AST  TNP  /  ALT  TNP  /  AlkPhos  136[H]  10-22    PT/INR - ( 24 Oct 2024 04:00 )   PT: 12.5 sec;   INR: 1.08 ratio         PTT - ( 24 Oct 2024 04:00 )  PTT:37.0 sec  Urinalysis Basic - ( 24 Oct 2024 04:00 )    Color: x / Appearance: x / SG: x / pH: x  Gluc: 168 mg/dL / Ketone: x  / Bili: x / Urobili: x   Blood: x / Protein: x / Nitrite: x   Leuk Esterase: x / RBC: x / WBC x   Sq Epi: x / Non Sq Epi: x / Bacteria: x      CAPILLARY BLOOD GLUCOSE      POCT Blood Glucose.: 137 mg/dL (24 Oct 2024 09:13)  POCT Blood Glucose.: 186 mg/dL (24 Oct 2024 06:40)  POCT Blood Glucose.: 251 mg/dL (23 Oct 2024 23:13)  POCT Blood Glucose.: 199 mg/dL (23 Oct 2024 17:54)  POCT Blood Glucose.: 136 mg/dL (23 Oct 2024 12:28)      Lower Extremity Physical Exam:  Vascular: DP 1/4, PT 0/4, B/L, CFT could not be assessed 2/2 gangrene,, Temperature gradient warm to cool , B/L.   Neuro: Epicritic sensation absent to the level of digits, B/L.  Musculoskeletal/Ortho: S/P R BKA  Skin: 10/22 s/p bedside right foot 3rd digit disarticulation at the proximal phalangeal joint, firbogranular wound bed, no purulent drainage, no malodor, no fluctuance, no bogginess, no tracking/tunneling, full thickness dry gangrene of the 4th digit, ischemic changes to the 2nd digit. Right BKA.     RADIOLOGY & ADDITIONAL TESTS:

## 2024-10-25 LAB
-  AMOXICILLIN/CLAVULANIC ACID: SIGNIFICANT CHANGE UP
-  AMPICILLIN/SULBACTAM: SIGNIFICANT CHANGE UP
-  AMPICILLIN: SIGNIFICANT CHANGE UP
-  AZTREONAM: SIGNIFICANT CHANGE UP
-  CEFAZOLIN: SIGNIFICANT CHANGE UP
-  CEFEPIME: SIGNIFICANT CHANGE UP
-  CEFOXITIN: SIGNIFICANT CHANGE UP
-  CEFTRIAXONE: SIGNIFICANT CHANGE UP
-  CIPROFLOXACIN: SIGNIFICANT CHANGE UP
-  ERTAPENEM: SIGNIFICANT CHANGE UP
-  GENTAMICIN: SIGNIFICANT CHANGE UP
-  IMIPENEM: SIGNIFICANT CHANGE UP
-  LEVOFLOXACIN: SIGNIFICANT CHANGE UP
-  MEROPENEM: SIGNIFICANT CHANGE UP
-  PIPERACILLIN/TAZOBACTAM: SIGNIFICANT CHANGE UP
-  TOBRAMYCIN: SIGNIFICANT CHANGE UP
-  TRIMETHOPRIM/SULFAMETHOXAZOLE: SIGNIFICANT CHANGE UP
ANION GAP SERPL CALC-SCNC: 12 MMOL/L — SIGNIFICANT CHANGE UP (ref 7–14)
BUN SERPL-MCNC: 15 MG/DL — SIGNIFICANT CHANGE UP (ref 7–23)
CALCIUM SERPL-MCNC: 8.3 MG/DL — LOW (ref 8.4–10.5)
CHLORIDE SERPL-SCNC: 100 MMOL/L — SIGNIFICANT CHANGE UP (ref 98–107)
CO2 SERPL-SCNC: 25 MMOL/L — SIGNIFICANT CHANGE UP (ref 22–31)
CREAT SERPL-MCNC: 1.45 MG/DL — HIGH (ref 0.5–1.3)
EGFR: 58 ML/MIN/1.73M2 — LOW
GLUCOSE BLDC GLUCOMTR-MCNC: 101 MG/DL — HIGH (ref 70–99)
GLUCOSE BLDC GLUCOMTR-MCNC: 207 MG/DL — HIGH (ref 70–99)
GLUCOSE BLDC GLUCOMTR-MCNC: 242 MG/DL — HIGH (ref 70–99)
GLUCOSE BLDC GLUCOMTR-MCNC: 242 MG/DL — HIGH (ref 70–99)
GLUCOSE SERPL-MCNC: 266 MG/DL — HIGH (ref 70–99)
HCT VFR BLD CALC: 25.7 % — LOW (ref 39–50)
HGB BLD-MCNC: 8.2 G/DL — LOW (ref 13–17)
MAGNESIUM SERPL-MCNC: 1.8 MG/DL — SIGNIFICANT CHANGE UP (ref 1.6–2.6)
MCHC RBC-ENTMCNC: 27.3 PG — SIGNIFICANT CHANGE UP (ref 27–34)
MCHC RBC-ENTMCNC: 31.9 GM/DL — LOW (ref 32–36)
MCV RBC AUTO: 85.7 FL — SIGNIFICANT CHANGE UP (ref 80–100)
METHOD TYPE: SIGNIFICANT CHANGE UP
NRBC # BLD: 0 /100 WBCS — SIGNIFICANT CHANGE UP (ref 0–0)
NRBC # FLD: 0 K/UL — SIGNIFICANT CHANGE UP (ref 0–0)
PHOSPHATE SERPL-MCNC: 3.8 MG/DL — SIGNIFICANT CHANGE UP (ref 2.5–4.5)
PLATELET # BLD AUTO: 357 K/UL — SIGNIFICANT CHANGE UP (ref 150–400)
POTASSIUM SERPL-MCNC: 3.9 MMOL/L — SIGNIFICANT CHANGE UP (ref 3.5–5.3)
POTASSIUM SERPL-SCNC: 3.9 MMOL/L — SIGNIFICANT CHANGE UP (ref 3.5–5.3)
RBC # BLD: 3 M/UL — LOW (ref 4.2–5.8)
RBC # FLD: 12.6 % — SIGNIFICANT CHANGE UP (ref 10.3–14.5)
SODIUM SERPL-SCNC: 137 MMOL/L — SIGNIFICANT CHANGE UP (ref 135–145)
VANCOMYCIN TROUGH SERPL-MCNC: 16.8 UG/ML — SIGNIFICANT CHANGE UP (ref 10–20)
WBC # BLD: 7.74 K/UL — SIGNIFICANT CHANGE UP (ref 3.8–10.5)
WBC # FLD AUTO: 7.74 K/UL — SIGNIFICANT CHANGE UP (ref 3.8–10.5)

## 2024-10-25 PROCEDURE — 99232 SBSQ HOSP IP/OBS MODERATE 35: CPT

## 2024-10-25 PROCEDURE — 99231 SBSQ HOSP IP/OBS SF/LOW 25: CPT

## 2024-10-25 RX ORDER — MAGNESIUM SULFATE IN 0.9% NACL 2 G/50 ML
2 INTRAVENOUS SOLUTION, PIGGYBACK (ML) INTRAVENOUS ONCE
Refills: 0 | Status: COMPLETED | OUTPATIENT
Start: 2024-10-25 | End: 2024-10-25

## 2024-10-25 RX ORDER — IMIPENEM AND CILASTATIN 500; 500 MG/100ML; MG/100ML
500 INJECTION, POWDER, FOR SOLUTION INTRAVENOUS EVERY 6 HOURS
Refills: 0 | Status: DISCONTINUED | OUTPATIENT
Start: 2024-10-25 | End: 2024-11-01

## 2024-10-25 RX ADMIN — Medication 25 GRAM(S): at 07:34

## 2024-10-25 RX ADMIN — GABAPENTIN 800 MILLIGRAM(S): 300 CAPSULE ORAL at 13:31

## 2024-10-25 RX ADMIN — Medication 975 MILLIGRAM(S): at 23:56

## 2024-10-25 RX ADMIN — RIVAROXABAN 2.5 MILLIGRAM(S): 20 TABLET, FILM COATED ORAL at 09:08

## 2024-10-25 RX ADMIN — Medication 975 MILLIGRAM(S): at 00:55

## 2024-10-25 RX ADMIN — Medication 975 MILLIGRAM(S): at 01:25

## 2024-10-25 RX ADMIN — Medication 2: at 22:14

## 2024-10-25 RX ADMIN — Medication 20 UNIT(S): at 09:32

## 2024-10-25 RX ADMIN — Medication 40 MILLIGRAM(S): at 22:15

## 2024-10-25 RX ADMIN — PIPERACILLIN AND TAZOBACTAM 25 GRAM(S): .5; 4 INJECTION, POWDER, LYOPHILIZED, FOR SOLUTION INTRAVENOUS at 05:54

## 2024-10-25 RX ADMIN — IMIPENEM AND CILASTATIN 100 MILLIGRAM(S): 500; 500 INJECTION, POWDER, FOR SOLUTION INTRAVENOUS at 12:05

## 2024-10-25 RX ADMIN — RIVAROXABAN 2.5 MILLIGRAM(S): 20 TABLET, FILM COATED ORAL at 19:14

## 2024-10-25 RX ADMIN — Medication 2: at 09:07

## 2024-10-25 RX ADMIN — GABAPENTIN 800 MILLIGRAM(S): 300 CAPSULE ORAL at 22:13

## 2024-10-25 RX ADMIN — IMIPENEM AND CILASTATIN 100 MILLIGRAM(S): 500; 500 INJECTION, POWDER, FOR SOLUTION INTRAVENOUS at 17:51

## 2024-10-25 RX ADMIN — Medication 975 MILLIGRAM(S): at 12:04

## 2024-10-25 RX ADMIN — VANCOMYCIN HYDROCHLORIDE 166.67 MILLIGRAM(S): KIT at 05:52

## 2024-10-25 RX ADMIN — Medication 975 MILLIGRAM(S): at 05:53

## 2024-10-25 RX ADMIN — Medication 2: at 18:15

## 2024-10-25 RX ADMIN — IMIPENEM AND CILASTATIN 100 MILLIGRAM(S): 500; 500 INJECTION, POWDER, FOR SOLUTION INTRAVENOUS at 23:27

## 2024-10-25 RX ADMIN — Medication 975 MILLIGRAM(S): at 23:26

## 2024-10-25 RX ADMIN — Medication 975 MILLIGRAM(S): at 06:23

## 2024-10-25 RX ADMIN — Medication 81 MILLIGRAM(S): at 12:04

## 2024-10-25 RX ADMIN — Medication 975 MILLIGRAM(S): at 17:51

## 2024-10-25 RX ADMIN — GABAPENTIN 800 MILLIGRAM(S): 300 CAPSULE ORAL at 05:53

## 2024-10-25 RX ADMIN — CHLORHEXIDINE GLUCONATE 1 APPLICATION(S): 40 SOLUTION TOPICAL at 12:05

## 2024-10-25 NOTE — PROGRESS NOTE ADULT - SUBJECTIVE AND OBJECTIVE BOX
Subjective: Patient seen and examined. No new events except as noted.     SUBJECTIVE/ROS:  No chest pain, dyspnea, palpitation, or dizziness.       MEDICATIONS:  MEDICATIONS  (STANDING):  acetaminophen     Tablet .. 975 milliGRAM(s) Oral every 6 hours  aspirin  chewable 81 milliGRAM(s) Oral daily  atorvastatin 40 milliGRAM(s) Oral at bedtime  chlorhexidine 2% Cloths 1 Application(s) Topical daily  dextrose 5%. 1000 milliLiter(s) (100 mL/Hr) IV Continuous <Continuous>  dextrose 5%. 1000 milliLiter(s) (50 mL/Hr) IV Continuous <Continuous>  dextrose 50% Injectable 25 Gram(s) IV Push once  dextrose 50% Injectable 12.5 Gram(s) IV Push once  dextrose 50% Injectable 25 Gram(s) IV Push once  gabapentin 800 milliGRAM(s) Oral three times a day  glucagon  Injectable 1 milliGRAM(s) IntraMuscular once  influenza   Vaccine 0.5 milliLiter(s) IntraMuscular once  insulin glargine Injectable (LANTUS) 20 Unit(s) SubCutaneous every morning  insulin lispro (ADMELOG) corrective regimen sliding scale   SubCutaneous Before meals and at bedtime  piperacillin/tazobactam IVPB.. 3.375 Gram(s) IV Intermittent every 8 hours  rivaroxaban 2.5 milliGRAM(s) Oral <User Schedule>  vancomycin  IVPB 1250 milliGRAM(s) IV Intermittent every 12 hours      PHYSICAL EXAM:  T(C): 36.8 (10-25-24 @ 05:50), Max: 37.2 (10-25-24 @ 01:52)  HR: 99 (10-25-24 @ 05:50) (78 - 99)  BP: 138/74 (10-25-24 @ 05:50) (131/69 - 167/91)  RR: 18 (10-25-24 @ 05:50) (13 - 18)  SpO2: 98% (10-25-24 @ 05:50) (96% - 100%)  Wt(kg): --  I&O's Summary    24 Oct 2024 07:01  -  25 Oct 2024 07:00  --------------------------------------------------------  IN: 1100 mL / OUT: 150 mL / NET: 950 mL      Height (cm): 193 (10-24 @ 12:35)  Weight (kg): 79.8 (10-24 @ 12:35)  BMI (kg/m2): 21.4 (10-24 @ 12:35)  BSA (m2): 2.1 (10-24 @ 12:35)      JVP: Normal  Neck: supple  Lung: clear   CV: S1 S2 , Murmur:  Abd: soft  Ext: No edema  neuro: Awake / alert  Psych: flat affect  Skin: normal``    LABS/DATA:    CARDIAC MARKERS:    < from: TTE W or WO Ultrasound Enhancing Agent (10.24.24 @ 08:13) >  CONCLUSIONS:      1. Left ventricular cavity is normal in size. Left ventricular wall thickness is normal. Left ventricular systolic function is normal with an ejection fraction of 58 % by Weaver's method of disks.   2. Normal left ventricular diastolic function.   3. Normal right ventricular cavity size and normal right ventricular systolic function.   4. Left atrium is normal in size.   5. Structurally normal mitral valve with normal leaflet excursion.   6. Tricuspid aortic valve with normal leaflet excursion with normal systolic excursion.   7. No pericardial effusion seen.   8. The inferior vena cava is normal in size measuring 1.42 cm in diameter, (normal <2.1cm) with normal inspiratory collapse (normal >50%) consistent with normal right atrial pressure (~3    < end of copied text >                              8.2    7.74  )-----------( 357      ( 25 Oct 2024 04:56 )             25.7     10-25    137  |  100  |  15  ----------------------------<  266[H]  3.9   |  25  |  1.45[H]    Ca    8.3[L]      25 Oct 2024 04:56  Phos  3.8     10-25  Mg     1.80     10-25      proBNP:   Lipid Profile:   HgA1c:   TSH:     TELE:  EKG:

## 2024-10-25 NOTE — PROGRESS NOTE ADULT - SUBJECTIVE AND OBJECTIVE BOX
Vascular Surgery Daily Progress Note  =====================================================    SUBJECTIVE: Patient seen and examined at bedside on AM rounds. Patient reports that they're feeling well with mild pain in RLQ. No hematoma, no strikethrough through access site. Denies fever, chills.    --------------------------------------------------------------------------------------    MEDICATIONS:    Neurologic Medications  acetaminophen     Tablet .. 975 milliGRAM(s) Oral every 6 hours  gabapentin 800 milliGRAM(s) Oral three times a day    Respiratory Medications    Cardiovascular Medications    Gastrointestinal Medications  dextrose 5%. 1000 milliLiter(s) IV Continuous <Continuous>  dextrose 5%. 1000 milliLiter(s) IV Continuous <Continuous>    Genitourinary Medications    Hematologic/Oncologic Medications  aspirin  chewable 81 milliGRAM(s) Oral daily  influenza   Vaccine 0.5 milliLiter(s) IntraMuscular once  rivaroxaban 2.5 milliGRAM(s) Oral <User Schedule>    Antimicrobial/Immunologic Medications  piperacillin/tazobactam IVPB.. 3.375 Gram(s) IV Intermittent every 8 hours  vancomycin  IVPB 1250 milliGRAM(s) IV Intermittent every 12 hours    Endocrine/Metabolic Medications  atorvastatin 40 milliGRAM(s) Oral at bedtime  dextrose 50% Injectable 25 Gram(s) IV Push once  dextrose 50% Injectable 12.5 Gram(s) IV Push once  dextrose 50% Injectable 25 Gram(s) IV Push once  dextrose Oral Gel 15 Gram(s) Oral once PRN Blood Glucose LESS THAN 70 milliGRAM(s)/deciliter  glucagon  Injectable 1 milliGRAM(s) IntraMuscular once  insulin glargine Injectable (LANTUS) 20 Unit(s) SubCutaneous every morning  insulin lispro (ADMELOG) corrective regimen sliding scale   SubCutaneous Before meals and at bedtime    Topical/Other Medications  chlorhexidine 2% Cloths 1 Application(s) Topical daily    --------------------------------------------------------------------------------------    VITAL SIGNS:  T(C): 36.8 (10-25-24 @ 05:50), Max: 37.2 (10-25-24 @ 01:52)  HR: 99 (10-25-24 @ 05:50) (78 - 99)  BP: 138/74 (10-25-24 @ 05:50) (131/69 - 167/91)  RR: 18 (10-25-24 @ 05:50) (13 - 18)  SpO2: 98% (10-25-24 @ 05:50) (96% - 100%)  --------------------------------------------------------------------------------------    EXAM    General: NAD, Lying in bed comfortably  Neuro: A+Ox3  Cardio: RRR, nml S1/S2  Resp: Good effort, CTA b/l  Vascular: s/p Rt. BKA. Left foot dopplerable signals PT. Intact motor and sensory. Foot covered in kerlix (patient asked not to take it down). Right groin access site c/d/i, no hematoma  Musculoskeletal: All  extremities moving spontaneously, no limitations.    --------------------------------------------------------------------------------------

## 2024-10-25 NOTE — PROGRESS NOTE ADULT - ASSESSMENT
54M 10/22 s/p bedside right foot 3rd digit disarticulation at the proximal phalangeal joint  -  Pt seen and evaluated.  -  Afebrile, No Leukocytosis   - 10/22 s/p bedside right foot 3rd digit disarticulation at the proximal phalangeal joint, fibrotic wound bed, no purulent drainage, no malodor, no fluctuance, no bogginess, no tracking/tunneling, full thickness dry gangrene of the 4th digit, ischemic changes to the 2nd digit. Right BKA.   - Left foot X-ray: 4th metatarsal head OM  - Left foot wound culture pending  - Vascular recs, appreciated, angio 10/24  - ID recs, appreciated  - Pod plan:  L foot TMA VS L foot Partial 3rd and 4th ray amputation pending vasc recs/ Pt decision   - Cards clearance 10/24 documented, appreciated   - Please document medical clearance   - Discussed with Attending     54M 10/22 s/p bedside right foot 4th digit disarticulation at the proximal phalangeal joint  -  Pt seen and evaluated.  -  Afebrile, No Leukocytosis   - 10/22 s/p bedside right foot 4th digit disarticulation at the proximal phalangeal joint, fibrotic wound bed, no purulent drainage, no malodor, no fluctuance, no bogginess, no tracking/tunneling, full thickness dry gangrene of the 3rd digit, ischemic changes to the 2nd digit. Right BKA.   - Left foot X-ray: 4th metatarsal head OM  - Left foot wound culture pending  - Vascular recs, appreciated, angio 10/24  - ID recs, appreciated  - Pod plan:  L foot TMA VS L foot Partial 3rd and 4th ray amputation pending vasc recs/ Pt decision   - Cards clearance 10/24 documented, appreciated   - Please document medical clearance   - Discussed with Attending

## 2024-10-25 NOTE — PROGRESS NOTE ADULT - ASSESSMENT
A 54 year old male with PMHx of DM and stroke not on ASA PSHx of Rt. BKA in 2019 presents with left foot gangrene and web space infection.     Plan:  - Diet: Regular (cc)  - Pain management  - ASA/Xarelto  - Statin  - CHRISTIAN/PVR pending   - Podiatry consult  - X-Ray of the foot - cortical erosions and destruction of the left fourth metatarsal head and proximal phalanx concerning for acute osteomyelitis.  - Medical and cardiac clearance  - ID consult: MRSA swab, Continue empiric vanc/vosyn for now  - Abscess culture with gram + cocci    Vascular Surgery   [id number]   A 54 year old male with PMHx of DM and stroke not on ASA PSHx of Rt. BKA in 2019 presents with left foot gangrene and web space infection. Now s/p LLE angiogram 10/24.    Plan:  - Diet: Regular (cc)  - Pain management  - ASA/Xarelto  - Statin  - CHRISTIAN/PVR pending   - Podiatry consult  - X-Ray of the foot - cortical erosions and destruction of the left fourth metatarsal head and proximal phalanx concerning for acute osteomyelitis.  - Medical and cardiac clearance  - ID consult: MRSA swab, Continue empiric vanc/vosyn for now  - Abscess culture with gram + cocci    Vascular Surgery   [id number]

## 2024-10-25 NOTE — PROGRESS NOTE ADULT - ASSESSMENT
54 YOM with T1DM s/p R BKA who presents with acute on chronic L foot wound with imaging concerning for acute OM of L 4th metatarsal head.  - Pt afebrile, leukocytosis resolved. ESR 85, .8  - Exam with L foot full thickness gangrene of 3,4 th digits to level of MPJ with wet changes, with partial detachment of 4th digit (removed), and wound to bone. Mild malodor, scant purulence, ischemic changes to 2nd digits, plantar submet 3 eschar  - Xray foot 10/22 with cortical erosions and destruction of the left fourth metatarsal head and proximal phalanx concerning for acute osteomyelitis.    Infectious diseases consultation requested for further management.     # OM left 4th metatarsal head s/p bedside right foot 4th digit disarticulation at the proximal phalangeal joint on 10/22  # Left foot 2nd digit ischemic changes    # Right BKA.   # Diabetes mellitis   # Elevated inflammatory markers     MICRO:   10/22 Wound CX: MSSA, Pseudomonas Few Citrobacter freundii   10/22 BCX: NGTD    RECOMMENDATIONS:   - Would stop Zosyn and Vancomycin   - Start Imipenem 500 mg Q6H ( will cover Pseudomonas MSSA and Citrobacter + anaerobes )  - F/u blood cultures 10/22 until completion   - Wound culture 10/22 to completion   - Pending final surgical interventions by podiatry. Antibiotic choice and duration based on that.  L foot TMA VS L foot Partial 3rd and 4th [facility]   - Repeat A1C  - Trend CRP weekly.       Discussed with the primary team    Calli Kang MD  Attending Physician, Division of Infectious Diseases  Department of Medicine   [facility]    Contact on TEAMS messaging from 9am - 5pm  Office: [phone] (after 5 PM or weekend) . 54 YOM with T1DM s/p R BKA who presents with acute on chronic L foot wound with imaging concerning for acute OM of L 4th metatarsal head.  - Pt afebrile, leukocytosis resolved. ESR 85, .8  - Exam with L foot full thickness gangrene of 3,4 th digits to level of MPJ with wet changes, with partial detachment of 4th digit (removed), and wound to bone. Mild malodor, scant purulence, ischemic changes to 2nd digits, plantar submet 3 eschar  - Xray foot 10/22 with cortical erosions and destruction of the left fourth metatarsal head and proximal phalanx concerning for acute osteomyelitis.    Infectious diseases consultation requested for further management.     # OM left 4th metatarsal head s/p bedside right foot 4th digit disarticulation at the proximal phalangeal joint on 10/22  # Left foot 2nd digit ischemic changes    # Right BKA.   # Diabetes mellitis   # Elevated inflammatory markers     MICRO:   10/22 Wound CX: MSSA, Pseudomonas Few Citrobacter freundii   10/22 BCX: NGTD    RECOMMENDATIONS:   - Would stop Zosyn and Vancomycin   - Start Imipenem 500 mg Q6H ( will cover Pseudomonas MSSA and Citrobacter + anaerobes )  - F/u blood cultures 10/22 until completion   - Wound culture 10/22 to completion   - Pending final surgical interventions by podiatry. Antibiotic choice and duration based on that.  L foot TMA VS L foot Partial 3rd and 4th ray amputation pending [facility] recs/ Pt decision   - Trend CRP weekly.       Discussed with the primary team    Calli Kang MD  Attending Physician, Division of Infectious Diseases  Department of Medicine   [facility]    Contact on TEAMS messaging from 9am - 5pm  Office: [phone] (after 5 PM or weekend) .

## 2024-10-25 NOTE — PROGRESS NOTE ADULT - SUBJECTIVE AND OBJECTIVE BOX
Name of Patient : TARA BLAKE  MRN: [id number]  Date of visit: 10-25-24     Subjective: Patient seen and examined. No new events except as noted.     REVIEW OF SYSTEMS:    CONSTITUTIONAL: No weakness, fevers or chills  EYES/ENT: No visual changes;  No vertigo or throat pain   NECK: No pain or stiffness  RESPIRATORY: No cough, wheezing, hemoptysis; No shortness of breath  CARDIOVASCULAR: No chest pain or palpitations  GASTROINTESTINAL: No abdominal or epigastric pain. No nausea, vomiting, or hematemesis; No diarrhea or constipation. No melena or hematochezia.  GENITOURINARY: No dysuria, frequency or hematuria  NEUROLOGICAL: No numbness or weakness  SKIN: No itching, burning, rashes, or lesions   All other review of systems is negative unless indicated above.    MEDICATIONS:  MEDICATIONS  (STANDING):  acetaminophen     Tablet .. 975 milliGRAM(s) Oral every 6 hours  aspirin  chewable 81 milliGRAM(s) Oral daily  atorvastatin 40 milliGRAM(s) Oral at bedtime  chlorhexidine 2% Cloths 1 Application(s) Topical daily  dextrose 5%. 1000 milliLiter(s) (100 mL/Hr) IV Continuous <Continuous>  dextrose 5%. 1000 milliLiter(s) (50 mL/Hr) IV Continuous <Continuous>  dextrose 50% Injectable 25 Gram(s) IV Push once  dextrose 50% Injectable 12.5 Gram(s) IV Push once  dextrose 50% Injectable 25 Gram(s) IV Push once  gabapentin 800 milliGRAM(s) Oral three times a day  glucagon  Injectable 1 milliGRAM(s) IntraMuscular once  imipenem/cilastatin  IVPB 500 milliGRAM(s) IV Intermittent every 6 hours  influenza   Vaccine 0.5 milliLiter(s) IntraMuscular once  insulin glargine Injectable (LANTUS) 20 Unit(s) SubCutaneous every morning  insulin lispro (ADMELOG) corrective regimen sliding scale   SubCutaneous Before meals and at bedtime  rivaroxaban 2.5 milliGRAM(s) Oral <User Schedule>      PHYSICAL EXAM:  T(C): 36.8 (10-25-24 @ 18:19), Max: 37.2 (10-25-24 @ 01:52)  HR: 95 (10-25-24 @ 18:19) (84 - 99)  BP: 141/72 (10-25-24 @ 18:19) (126/65 - 153/70)  RR: 18 (10-25-24 @ 18:19) (16 - 18)  SpO2: 99% (10-25-24 @ 18:19) (98% - 100%)  Wt(kg): --  I&O's Summary    24 Oct 2024 07:01  -  25 Oct 2024 07:00  --------------------------------------------------------  IN: 1100 mL / OUT: 150 mL / NET: 950 mL    25 Oct 2024 07:01  -  25 Oct 2024 21:41  --------------------------------------------------------  IN: 965 mL / OUT: 375 mL / NET: 590 mL          Appearance: Normal	  HEENT:  PERRLA   Lymphatic: No lymphadenopathy   Cardiovascular: Normal S1 S2, no JVD  Respiratory: normal effort , clear  Gastrointestinal:  Soft, Non-tender  Skin: No rashes,  warm to touch  Psychiatry:  Mood & affect appropriate  Musculuskeletal: No edema    recent labs, Imaging and EKGs personally reviewed     10-24-24 @ 07:01  -  10-25-24 @ 07:00  --------------------------------------------------------  IN: 1100 mL / OUT: 150 mL / NET: 950 mL    10-25-24 @ 07:01  -  10-25-24 @ 21:41  --------------------------------------------------------  IN: 965 mL / OUT: 375 mL / NET: 590 mL                            8.2    7.74  )-----------( 357      ( 25 Oct 2024 04:56 )             25.7               10-25    137  |  100  |  15  ----------------------------<  266[H]  3.9   |  25  |  1.45[H]    Ca    8.3[L]      25 Oct 2024 04:56  Phos  3.8     10-25  Mg     1.80     10-25      PT/INR - ( 24 Oct 2024 04:00 )   PT: 12.5 sec;   INR: 1.08 ratio         PTT - ( 24 Oct 2024 04:00 )  PTT:37.0 sec                   Urinalysis Basic - ( 25 Oct 2024 04:56 )    Color: x / Appearance: x / SG: x / pH: x  Gluc: 266 mg/dL / Ketone: x  / Bili: x / Urobili: x   Blood: x / Protein: x / Nitrite: x   Leuk Esterase: x / RBC: x / WBC x   Sq Epi: x / Non Sq Epi: x / Bacteria: x

## 2024-10-25 NOTE — PROGRESS NOTE ADULT - SUBJECTIVE AND OBJECTIVE BOX
Patient is a 54y old  Male who presents with a chief complaint of Foot Wound (25 Oct 2024 08:13)       INTERVAL HPI/OVERNIGHT EVENTS:  Patient seen and evaluated at bedside.  Pt is resting comfortable in NAD. Denies N/V/F/C.      Allergies    No Known Allergies    Intolerances        Vital Signs Last 24 Hrs  T(C): 36.8 (25 Oct 2024 05:50), Max: 37.2 (25 Oct 2024 01:52)  T(F): 98.3 (25 Oct 2024 05:50), Max: 98.9 (25 Oct 2024 01:52)  HR: 99 (25 Oct 2024 05:50) (78 - 99)  BP: 138/74 (25 Oct 2024 05:50) (131/69 - 167/91)  BP(mean): --  RR: 18 (25 Oct 2024 05:50) (13 - 18)  SpO2: 98% (25 Oct 2024 05:50) (96% - 100%)    Parameters below as of 25 Oct 2024 05:50  Patient On (Oxygen Delivery Method): room air        LABS:                        8.2    7.74  )-----------( 357      ( 25 Oct 2024 04:56 )             25.7     10-25    137  |  100  |  15  ----------------------------<  266[H]  3.9   |  25  |  1.45[H]    Ca    8.3[L]      25 Oct 2024 04:56  Phos  3.8     10-25  Mg     1.80     10-25      PT/INR - ( 24 Oct 2024 04:00 )   PT: 12.5 sec;   INR: 1.08 ratio         PTT - ( 24 Oct 2024 04:00 )  PTT:37.0 sec  Urinalysis Basic - ( 25 Oct 2024 04:56 )    Color: x / Appearance: x / SG: x / pH: x  Gluc: 266 mg/dL / Ketone: x  / Bili: x / Urobili: x   Blood: x / Protein: x / Nitrite: x   Leuk Esterase: x / RBC: x / WBC x   Sq Epi: x / Non Sq Epi: x / Bacteria: x      CAPILLARY BLOOD GLUCOSE      POCT Blood Glucose.: 191 mg/dL (24 Oct 2024 21:56)  POCT Blood Glucose.: 178 mg/dL (24 Oct 2024 18:08)  POCT Blood Glucose.: 134 mg/dL (24 Oct 2024 12:01)  POCT Blood Glucose.: 137 mg/dL (24 Oct 2024 09:13)      Lower Extremity Physical Exam:  Vascular: DP 1/4, PT 0/4, B/L, CFT could not be assessed 2/2 gangrene,, Temperature gradient warm to cool , B/L.   Neuro: Epicritic sensation absent to the level of digits, B/L.  Musculoskeletal/Ortho: S/P R BKA  Skin: 10/22 s/p bedside right foot 3rd digit disarticulation at the proximal phalangeal joint, firbogranular wound bed, no purulent drainage, no malodor, no fluctuance, no bogginess, no tracking/tunneling, full thickness dry gangrene of the 4th digit, ischemic changes to the 2nd digit. Right BKA.       RADIOLOGY & ADDITIONAL TESTS:

## 2024-10-25 NOTE — PROGRESS NOTE ADULT - SUBJECTIVE AND OBJECTIVE BOX
Patient is a 54y old  Male who presents with a chief complaint of Foot Wound (25 Oct 2024 08:48)    Being followed by ID for    Interval history:  No other acute events    ROS:  No cough,SOB,CP  No N/V/D  No abd pain  No urinary complaints  No HA  No joint or limb pain  No other complaints    Antimicrobials:  piperacillin/tazobactam IVPB.. 3.375 Gram(s) IV Intermittent every 8 hours      Vital Signs Last 24 Hrs  T(C): 36.8 (10-25-24 @ 05:50), Max: 37.2 (10-25-24 @ 01:52)  T(F): 98.3 (10-25-24 @ 05:50), Max: 98.9 (10-25-24 @ 01:52)  HR: 99 (10-25-24 @ 05:50) (78 - 99)  BP: 138/74 (10-25-24 @ 05:50) (131/69 - 167/91)  BP(mean): --  RR: 18 (10-25-24 @ 05:50) (13 - 18)  SpO2: 98% (10-25-24 @ 05:50) (96% - 100%)    Physical Exam:  Constitutional:  well preserved, comfortable  Head/Eyes: no icterus, PERRL, EOMI  ENT:  supple; no thrush  LUNGS:  CTA  CVS:  normal S1, S2, no murmur  Abd:  soft, non-tender; non-distended  Ext:  no edema  Vascular:  IV site no erythema tenderness or discharge  MSK:  joints without swelling  Neuro: AAO X 3, non- focal    Lab Data:                        8.2    7.74  )-----------( 357      ( 25 Oct 2024 04:56 )             25.7     10-25    137  |  100  |  15  ----------------------------<  266[H]  3.9   |  25  |  1.45[H]    Ca    8.3[L]      25 Oct 2024 04:56  Phos  3.8     10-25  Mg     1.80     10-25        .Blood BLOOD  10-22-24   No growth at 48 Hours  --  --      .Abscess  10-22-24   Few Pseudomonas aeruginosa  Moderate Staphylococcus aureus  Rare Citrobacter freundii complex  --  Pseudomonas aeruginosa  Staphylococcus aureus      .Blood BLOOD  10-22-24   No growth at 48 Hours  --  --              Vancomycin Level, Trough: 16.8 ug/mL (10-25-24 @ 04:56)      RADIOLOGY:  below reviewed       Patient is a 54y old  Male who presents with a chief complaint of Foot Wound (25 Oct 2024 08:48)    Being followed by ID for OM    Interval history:  Wound cultures with MSSA, Pseudomonas and Citrobacter     ROS:  No cough,SOB,CP  No N/V/D  No abd pain  No urinary complaints  No HA  No other complaints    Antimicrobials:  piperacillin/tazobactam IVPB.. 3.375 Gram(s) IV Intermittent every 8 hours      Vital Signs Last 24 Hrs  T(C): 36.8 (10-25-24 @ 05:50), Max: 37.2 (10-25-24 @ 01:52)  T(F): 98.3 (10-25-24 @ 05:50), Max: 98.9 (10-25-24 @ 01:52)  HR: 99 (10-25-24 @ 05:50) (78 - 99)  BP: 138/74 (10-25-24 @ 05:50) (131/69 - 167/91)  BP(mean): --  RR: 18 (10-25-24 @ 05:50) (13 - 18)  SpO2: 98% (10-25-24 @ 05:50) (96% - 100%)    Physical Exam:  Constitutional:  well preserved, comfortable  Head/Eyes: no icterus, PERRL, EOMI  ENT:  supple; no thrush  LUNGS:  CTA  CVS:  normal S1, S2, no murmur  Abd:  soft, non-tender; non-distended  Ext:  no edema  Vascular:  IV site no erythema tenderness or discharge  MSK:  joints without swelling  Neuro: AAO X 3, non- focal    Lab Data:                        8.2    7.74  )-----------( 357      ( 25 Oct 2024 04:56 )             25.7     10-25    137  |  100  |  15  ----------------------------<  266[H]  3.9   |  25  |  1.45[H]    Ca    8.3[L]      25 Oct 2024 04:56  Phos  3.8     10-25  Mg     1.80     10-25        .Blood BLOOD  10-22-24   No growth at 48 Hours  --  --      .Abscess  10-22-24   Few Pseudomonas aeruginosa  Moderate Staphylococcus aureus  Rare Citrobacter freundii complex  --  Pseudomonas aeruginosa  Staphylococcus aureus      .Blood BLOOD  10-22-24   No growth at 48 Hours  --  --              Vancomycin Level, Trough: 16.8 ug/mL (10-25-24 @ 04:56)      RADIOLOGY:  below reviewed       Patient is a 54y old  Male who presents with a chief complaint of Foot Wound (25 Oct 2024 08:48)    Being followed by ID for OM    Interval history:  Wound cultures with MSSA, Pseudomonas and Citrobacter   Denies any rash no new diarrhea  Planned for further surgical intervention     ROS:  No cough,SOB,CP  No N/V/D  No abd pain  No urinary complaints  No HA  No other complaints    Antimicrobials:  piperacillin/tazobactam IVPB.. 3.375 Gram(s) IV Intermittent every 8 hours      Vital Signs Last 24 Hrs  T(C): 36.8 (10-25-24 @ 05:50), Max: 37.2 (10-25-24 @ 01:52)  T(F): 98.3 (10-25-24 @ 05:50), Max: 98.9 (10-25-24 @ 01:52)  HR: 99 (10-25-24 @ 05:50) (78 - 99)  BP: 138/74 (10-25-24 @ 05:50) (131/69 - 167/91)  BP(mean): --  RR: 18 (10-25-24 @ 05:50) (13 - 18)  SpO2: 98% (10-25-24 @ 05:50) (96% - 100%)    Physical Exam:  Constitutional:  well preserved, comfortable  Head/Eyes: no icterus, PERRL, EOMI  ENT:  supple; no thrush  LUNGS:  CTA  CVS:  normal S1, S2, no murmur  Abd:  soft, non-tender; non-distended  Ext: Right BKA and Left foot dressing in place +   Vascular:  IV site no erythema tenderness or discharge  MSK:  joints without swelling  Neuro: AAO X 3, non- focal    Lab Data:                        8.2    7.74  )-----------( 357      ( 25 Oct 2024 04:56 )             25.7     10-25    137  |  100  |  15  ----------------------------<  266[H]  3.9   |  25  |  1.45[H]    Ca    8.3[L]      25 Oct 2024 04:56  Phos  3.8     10-25  Mg     1.80     10-25        .Blood BLOOD  10-22-24   No growth at 48 Hours  --  --      .Abscess  10-22-24   Few Pseudomonas aeruginosa  Moderate Staphylococcus aureus  Rare Citrobacter freundii complex  --  Pseudomonas aeruginosa  Staphylococcus aureus      .Blood BLOOD  10-22-24   No growth at 48 Hours  --  --              Vancomycin Level, Trough: 16.8 ug/mL (10-25-24 @ 04:56)      RADIOLOGY:  below reviewed    IMPRESSION:    Cortical erosions and destruction of the left fourth metatarsal head and   proximal phalanx concerning for acute osteomyelitis.    --- End of Report ---

## 2024-10-25 NOTE — PROGRESS NOTE ADULT - ASSESSMENT
Patient is a 54 year old male with PMHx of DM and stroke not on ASA PSHx of Rt. BKA in 2019 presents with left foot gangrene and web space infection.     # LE osteo   LE  osteo L foot   ID eval appreciated   ABx as per ID   follow up Cx   Vascular care appreciated   ASA and statin   check echo and EKG   S/p Angio  plan for TMA , podiatry efollow up   stress test if need bypass     # DM  Sliding scale   diab diet   A1C 8  adjust insulin regime     # MANN   monitor renal function   if worsening renal function, check renal US   avoid nephrotoxic medications   IV and oral hydration   monitor urine output       # chronic CVA   ASA and statin   BP control   fall precautions     DVT and GI PPX     Patient seen and examined by me. patient care and plan discussed and reviewed with house staff. Plan as outlined above edited by me to reflect our discussion. Advanced care planning/advanced directives discussed with patient/family. DNR status including forceful chest compressions to attempt to restart the heart, ventilator support/artificial breathing, electric shock, artificial nutrition, health care proxy, Molst form all discussed with pt. Fifty seven minutes of total time dedicated to this patient visit today including preparing to see the patient (eg. review of tests), obtaining and/or reviewing separately obtained history, obtaining/reviewing vitals, performing a medically appropriate examination and/or evaluation, counseling and educating the patient/family/caregiver, reviewing previous notes and test results, and procedures, communicating with other health professionals (when not separately reported), and documenting clinical information in the electronic health record.

## 2024-10-25 NOTE — PROGRESS NOTE ADULT - ASSESSMENT
PAD  osteomyelitis of foot  abx  asa  statin   fu with podiatry and vascular     DM I  Monitor finger stick. Insulin coverage. Diabetic education and Diabetic diet. Consider nutrition consultation.    Pre-Operative Cardiac Risk Stratification and Optimization   Based on patient history and physical exam, the patient is considered to have elevated risk   echo shows normal LV function   if pt needs bypass then will need stress test

## 2024-10-26 ENCOUNTER — RESULT REVIEW (OUTPATIENT)
Age: 54
End: 2024-10-26

## 2024-10-26 LAB
ANION GAP SERPL CALC-SCNC: 13 MMOL/L — SIGNIFICANT CHANGE UP (ref 7–14)
BUN SERPL-MCNC: 15 MG/DL — SIGNIFICANT CHANGE UP (ref 7–23)
CALCIUM SERPL-MCNC: 8.5 MG/DL — SIGNIFICANT CHANGE UP (ref 8.4–10.5)
CHLORIDE SERPL-SCNC: 102 MMOL/L — SIGNIFICANT CHANGE UP (ref 98–107)
CO2 SERPL-SCNC: 25 MMOL/L — SIGNIFICANT CHANGE UP (ref 22–31)
CREAT SERPL-MCNC: 1.34 MG/DL — HIGH (ref 0.5–1.3)
EGFR: 64 ML/MIN/1.73M2 — SIGNIFICANT CHANGE UP
GLUCOSE BLDC GLUCOMTR-MCNC: 141 MG/DL — HIGH (ref 70–99)
GLUCOSE BLDC GLUCOMTR-MCNC: 141 MG/DL — HIGH (ref 70–99)
GLUCOSE BLDC GLUCOMTR-MCNC: 160 MG/DL — HIGH (ref 70–99)
GLUCOSE BLDC GLUCOMTR-MCNC: 95 MG/DL — SIGNIFICANT CHANGE UP (ref 70–99)
GLUCOSE SERPL-MCNC: 79 MG/DL — SIGNIFICANT CHANGE UP (ref 70–99)
HCT VFR BLD CALC: 22.3 % — LOW (ref 39–50)
HCT VFR BLD CALC: 24.4 % — LOW (ref 39–50)
HGB BLD-MCNC: 7.6 G/DL — LOW (ref 13–17)
HGB BLD-MCNC: 8 G/DL — LOW (ref 13–17)
MAGNESIUM SERPL-MCNC: 1.8 MG/DL — SIGNIFICANT CHANGE UP (ref 1.6–2.6)
MCHC RBC-ENTMCNC: 27.7 PG — SIGNIFICANT CHANGE UP (ref 27–34)
MCHC RBC-ENTMCNC: 28.5 PG — SIGNIFICANT CHANGE UP (ref 27–34)
MCHC RBC-ENTMCNC: 32.8 GM/DL — SIGNIFICANT CHANGE UP (ref 32–36)
MCHC RBC-ENTMCNC: 34.1 GM/DL — SIGNIFICANT CHANGE UP (ref 32–36)
MCV RBC AUTO: 83.5 FL — SIGNIFICANT CHANGE UP (ref 80–100)
MCV RBC AUTO: 84.4 FL — SIGNIFICANT CHANGE UP (ref 80–100)
NRBC # BLD: 0 /100 WBCS — SIGNIFICANT CHANGE UP (ref 0–0)
NRBC # BLD: 0 /100 WBCS — SIGNIFICANT CHANGE UP (ref 0–0)
NRBC # FLD: 0 K/UL — SIGNIFICANT CHANGE UP (ref 0–0)
NRBC # FLD: 0 K/UL — SIGNIFICANT CHANGE UP (ref 0–0)
PHOSPHATE SERPL-MCNC: 2.9 MG/DL — SIGNIFICANT CHANGE UP (ref 2.5–4.5)
PLATELET # BLD AUTO: 324 K/UL — SIGNIFICANT CHANGE UP (ref 150–400)
PLATELET # BLD AUTO: 357 K/UL — SIGNIFICANT CHANGE UP (ref 150–400)
POTASSIUM SERPL-MCNC: 4 MMOL/L — SIGNIFICANT CHANGE UP (ref 3.5–5.3)
POTASSIUM SERPL-SCNC: 4 MMOL/L — SIGNIFICANT CHANGE UP (ref 3.5–5.3)
RBC # BLD: 2.67 M/UL — LOW (ref 4.2–5.8)
RBC # BLD: 2.89 M/UL — LOW (ref 4.2–5.8)
RBC # FLD: 12.4 % — SIGNIFICANT CHANGE UP (ref 10.3–14.5)
RBC # FLD: 12.6 % — SIGNIFICANT CHANGE UP (ref 10.3–14.5)
SODIUM SERPL-SCNC: 140 MMOL/L — SIGNIFICANT CHANGE UP (ref 135–145)
WBC # BLD: 7.52 K/UL — SIGNIFICANT CHANGE UP (ref 3.8–10.5)
WBC # BLD: 7.73 K/UL — SIGNIFICANT CHANGE UP (ref 3.8–10.5)
WBC # FLD AUTO: 7.52 K/UL — SIGNIFICANT CHANGE UP (ref 3.8–10.5)
WBC # FLD AUTO: 7.73 K/UL — SIGNIFICANT CHANGE UP (ref 3.8–10.5)

## 2024-10-26 PROCEDURE — 74176 CT ABD & PELVIS W/O CONTRAST: CPT | Mod: 26

## 2024-10-26 PROCEDURE — 99231 SBSQ HOSP IP/OBS SF/LOW 25: CPT

## 2024-10-26 PROCEDURE — 93926 LOWER EXTREMITY STUDY: CPT | Mod: 26,50,LT

## 2024-10-26 RX ORDER — DIBASIC SODIUM PHOSPHATE, MONOBASIC POTASSIUM PHOSPHATE AND MONOBASIC SODIUM PHOSPHATE 852; 155; 130 MG/1; MG/1; MG/1
1 TABLET ORAL ONCE
Refills: 0 | Status: COMPLETED | OUTPATIENT
Start: 2024-10-26 | End: 2024-10-26

## 2024-10-26 RX ORDER — MAGNESIUM SULFATE IN 0.9% NACL 2 G/50 ML
2 INTRAVENOUS SOLUTION, PIGGYBACK (ML) INTRAVENOUS ONCE
Refills: 0 | Status: COMPLETED | OUTPATIENT
Start: 2024-10-26 | End: 2024-10-26

## 2024-10-26 RX ADMIN — Medication 81 MILLIGRAM(S): at 10:33

## 2024-10-26 RX ADMIN — RIVAROXABAN 2.5 MILLIGRAM(S): 20 TABLET, FILM COATED ORAL at 19:09

## 2024-10-26 RX ADMIN — GABAPENTIN 800 MILLIGRAM(S): 300 CAPSULE ORAL at 22:35

## 2024-10-26 RX ADMIN — Medication 1: at 18:01

## 2024-10-26 RX ADMIN — IMIPENEM AND CILASTATIN 100 MILLIGRAM(S): 500; 500 INJECTION, POWDER, FOR SOLUTION INTRAVENOUS at 05:59

## 2024-10-26 RX ADMIN — DIBASIC SODIUM PHOSPHATE, MONOBASIC POTASSIUM PHOSPHATE AND MONOBASIC SODIUM PHOSPHATE 1 PACKET(S): 852; 155; 130 TABLET ORAL at 10:33

## 2024-10-26 RX ADMIN — IMIPENEM AND CILASTATIN 100 MILLIGRAM(S): 500; 500 INJECTION, POWDER, FOR SOLUTION INTRAVENOUS at 18:06

## 2024-10-26 RX ADMIN — Medication 975 MILLIGRAM(S): at 06:30

## 2024-10-26 RX ADMIN — Medication 975 MILLIGRAM(S): at 12:23

## 2024-10-26 RX ADMIN — Medication 975 MILLIGRAM(S): at 18:02

## 2024-10-26 RX ADMIN — Medication 25 GRAM(S): at 10:32

## 2024-10-26 RX ADMIN — Medication 975 MILLIGRAM(S): at 06:00

## 2024-10-26 RX ADMIN — IMIPENEM AND CILASTATIN 100 MILLIGRAM(S): 500; 500 INJECTION, POWDER, FOR SOLUTION INTRAVENOUS at 12:23

## 2024-10-26 RX ADMIN — Medication 40 MILLIGRAM(S): at 22:35

## 2024-10-26 RX ADMIN — Medication 20 UNIT(S): at 09:01

## 2024-10-26 RX ADMIN — GABAPENTIN 800 MILLIGRAM(S): 300 CAPSULE ORAL at 12:53

## 2024-10-26 RX ADMIN — CHLORHEXIDINE GLUCONATE 1 APPLICATION(S): 40 SOLUTION TOPICAL at 12:23

## 2024-10-26 RX ADMIN — RIVAROXABAN 2.5 MILLIGRAM(S): 20 TABLET, FILM COATED ORAL at 09:00

## 2024-10-26 RX ADMIN — GABAPENTIN 800 MILLIGRAM(S): 300 CAPSULE ORAL at 06:00

## 2024-10-26 NOTE — PROGRESS NOTE ADULT - SUBJECTIVE AND OBJECTIVE BOX
SURGERY DAILY PROGRESS NOTE:     Overnight Events:  No acute events overnight.    SUBJECTIVE: Patient seen and evaluated on AM rounds. Pt is resting comfortably in bed with complaints of right groin pain and small bruise. Denies fever, chills, N/V, chest pain, or shortness of breath. Tolerating diet.    OBJECTIVE:  Vital Signs Last 24 Hrs  T(C): 37.1 (26 Oct 2024 10:00), Max: 37.3 (26 Oct 2024 05:55)  T(F): 98.7 (26 Oct 2024 10:00), Max: 99.2 (26 Oct 2024 05:55)  HR: 100 (26 Oct 2024 10:00) (90 - 100)  BP: 152/70 (26 Oct 2024 10:00) (141/72 - 153/70)  BP(mean): --  RR: 18 (26 Oct 2024 10:00) (16 - 18)  SpO2: 100% (26 Oct 2024 10:00) (97% - 100%)    Parameters below as of 26 Oct 2024 10:00  Patient On (Oxygen Delivery Method): room air      I&O's Detail    25 Oct 2024 07:01  -  26 Oct 2024 07:00  --------------------------------------------------------  IN:    IV PiggyBack: 350 mL    Oral Fluid: 1415 mL  Total IN: 1765 mL    OUT:    Blood Loss (mL): 0 mL    Voided (mL): 1025 mL  Total OUT: 1025 mL    Total NET: 740 mL      26 Oct 2024 07:01  -  26 Oct 2024 11:14  --------------------------------------------------------  IN:    Oral Fluid: 325 mL  Total IN: 325 mL    OUT:    Blood Loss (mL): 0 mL    IV PiggyBack: 0 mL    Voided (mL): 350 mL  Total OUT: 350 mL    Total NET: -25 mL        Daily     Daily     LABS:                        8.0    7.73  )-----------( 357      ( 26 Oct 2024 06:10 )             24.4     10-26    140  |  102  |  15  ----------------------------<  79  4.0   |  25  |  1.34[H]    Ca    8.5      26 Oct 2024 06:10  Phos  2.9     10-26  Mg     1.80     10-26        Urinalysis Basic - ( 26 Oct 2024 06:10 )    Color: x / Appearance: x / SG: x / pH: x  Gluc: 79 mg/dL / Ketone: x  / Bili: x / Urobili: x   Blood: x / Protein: x / Nitrite: x   Leuk Esterase: x / RBC: x / WBC x   Sq Epi: x / Non Sq Epi: x / Bacteria: x                PHYSICAL EXAM:  General: NAD, Lying in bed in no apparent distress  Neuro: A+Ox3  Cardio: HDS  Resp: Good effort, SCR b/L  Vascular: s/p Rt. BKA. Left foot dopplerable signals PT. Intact motor and sensory. Foot covered in kerlix (patient asked not to take it down). Right groin access site tender, c/d/i, small bruise on right groin/hip ~2-3cm diameter  Musculoskeletal: All  extremities moving spontaneously, no limitations.

## 2024-10-26 NOTE — PROGRESS NOTE ADULT - SUBJECTIVE AND OBJECTIVE BOX
Name of Patient : TARA BLAKE  MRN: [id number]  Date of visit: 10-26-24       Subjective: Patient seen and examined. No new events except as noted.   doing okay     REVIEW OF SYSTEMS:    CONSTITUTIONAL: No weakness, fevers or chills  EYES/ENT: No visual changes;  No vertigo or throat pain   NECK: No pain or stiffness  RESPIRATORY: No cough, wheezing, hemoptysis; No shortness of breath  CARDIOVASCULAR: No chest pain or palpitations  GASTROINTESTINAL: No abdominal or epigastric pain. No nausea, vomiting, or hematemesis; No diarrhea or constipation. No melena or hematochezia.  GENITOURINARY: No dysuria, frequency or hematuria  NEUROLOGICAL: No numbness or weakness  SKIN: No itching, burning, rashes, or lesions   All other review of systems is negative unless indicated above.    MEDICATIONS:  MEDICATIONS  (STANDING):  acetaminophen     Tablet .. 975 milliGRAM(s) Oral every 6 hours  aspirin  chewable 81 milliGRAM(s) Oral daily  atorvastatin 40 milliGRAM(s) Oral at bedtime  chlorhexidine 2% Cloths 1 Application(s) Topical daily  dextrose 5%. 1000 milliLiter(s) (50 mL/Hr) IV Continuous <Continuous>  dextrose 5%. 1000 milliLiter(s) (100 mL/Hr) IV Continuous <Continuous>  dextrose 50% Injectable 25 Gram(s) IV Push once  dextrose 50% Injectable 12.5 Gram(s) IV Push once  dextrose 50% Injectable 25 Gram(s) IV Push once  gabapentin 800 milliGRAM(s) Oral three times a day  glucagon  Injectable 1 milliGRAM(s) IntraMuscular once  imipenem/cilastatin  IVPB 500 milliGRAM(s) IV Intermittent every 6 hours  influenza   Vaccine 0.5 milliLiter(s) IntraMuscular once  insulin glargine Injectable (LANTUS) 20 Unit(s) SubCutaneous every morning  insulin lispro (ADMELOG) corrective regimen sliding scale   SubCutaneous Before meals and at bedtime  rivaroxaban 2.5 milliGRAM(s) Oral <User Schedule>      PHYSICAL EXAM:  T(C): 36.9 (10-26-24 @ 17:28), Max: 37.3 (10-26-24 @ 05:55)  HR: 95 (10-26-24 @ 17:28) (93 - 100)  BP: 156/72 (10-26-24 @ 17:28) (137/64 - 156/72)  RR: 18 (10-26-24 @ 17:28) (17 - 18)  SpO2: 99% (10-26-24 @ 17:28) (97% - 100%)  Wt(kg): --  I&O's Summary    25 Oct 2024 07:01  -  26 Oct 2024 07:00  --------------------------------------------------------  IN: 1765 mL / OUT: 1025 mL / NET: 740 mL    26 Oct 2024 07:01  -  26 Oct 2024 22:38  --------------------------------------------------------  IN: 1215 mL / OUT: 350 mL / NET: 865 mL          Appearance: Normal	  HEENT:  PERRLA   Lymphatic: No lymphadenopathy   Cardiovascular: Normal S1 S2, no JVD  Respiratory: normal effort , clear  Gastrointestinal:  Soft, Non-tender  Skin: No rashes,  warm to touch  Psychiatry:  Mood & affect appropriate  Musculuskeletal: No edema    recent labs, Imaging and EKGs personally reviewed     10-25-24 @ 07:01  -  10-26-24 @ 07:00  --------------------------------------------------------  IN: 1765 mL / OUT: 1025 mL / NET: 740 mL    10-26-24 @ 07:01  -  10-26-24 @ 22:38  --------------------------------------------------------  IN: 1215 mL / OUT: 350 mL / NET: 865 mL                          7.6    7.52  )-----------( 324      ( 26 Oct 2024 16:45 )             22.3               10-26    140  |  102  |  15  ----------------------------<  79  4.0   |  25  |  1.34[H]    Ca    8.5      26 Oct 2024 06:10  Phos  2.9     10-26  Mg     1.80     10-26                         Urinalysis Basic - ( 26 Oct 2024 06:10 )    Color: x / Appearance: x / SG: x / pH: x  Gluc: 79 mg/dL / Ketone: x  / Bili: x / Urobili: x   Blood: x / Protein: x / Nitrite: x   Leuk Esterase: x / RBC: x / WBC x   Sq Epi: x / Non Sq Epi: x / Bacteria: x

## 2024-10-26 NOTE — PROGRESS NOTE ADULT - ASSESSMENT
54 year old male with PMHx of DM and stroke not on ASA PSHx of Rt. BKA in 2019 presents with left foot gangrene and web space infection. Now s/p LLE angiogram 10/24. Doing well. Ok for podiatric intervention from vascular standpoint.     Plan:  - Diet: Regular (cc)  - Pain management  - ASA/Xarelto  - Statin  - CHRISTIAN/PVR   - Podiatry consult  - X-Ray of the foot - cortical erosions and destruction of the left fourth metatarsal head and proximal phalanx concerning for acute osteomyelitis.  - ID consult: MRSA swab, Continue empiric vanc/vosyn for now  - Abscess culture with gram + cocci    - VA right groin duplex r/o PSA  - CBC at 2PM    Vascular Surgery   [id number]    54 year old male with PMHx of DM and stroke not on ASA PSHx of Rt. BKA in 2019 presents with left foot gangrene and web space infection. Now s/p LLE angiogram 10/24. Doing well. Ok for podiatric intervention from vascular standpoint.     Plan:  - Diet: Regular (cc)  - Pain management  - ASA/Xarelto  - Statin  - CHRISTIAN/PVR   - Podiatry consult  - X-Ray of the foot - cortical erosions and destruction of the left fourth metatarsal head and proximal phalanx concerning for acute osteomyelitis.  - ID consult: MRSA swab, Continue empiric vanc/vosyn for now  - Abscess culture with gram + cocci  - VA right groin duplex r/o PSA  - CBC at 2PM    Vascular Surgery   [id number]

## 2024-10-26 NOTE — PROGRESS NOTE ADULT - ASSESSMENT
PAD  osteomyelitis of foot  abx  asa  statin   fu with podiatry and vascular     DM I  Monitor finger stick. Insulin coverage. Diabetic education and Diabetic diet. Consider nutrition consultation.    Pre-Operative Cardiac Risk Stratification and Optimization   Based on patient history and physical exam, the patient is considered to have elevated risk   echo shows normal LV function   obtain EKG   if pt needs bypass then will need stress test

## 2024-10-26 NOTE — PROGRESS NOTE ADULT - SUBJECTIVE AND OBJECTIVE BOX
Subjective: Patient seen and examined. No new events except as noted.     SUBJECTIVE/ROS:  MEDICATIONS:  MEDICATIONS  (STANDING):  acetaminophen     Tablet .. 975 milliGRAM(s) Oral every 6 hours  aspirin  chewable 81 milliGRAM(s) Oral daily  atorvastatin 40 milliGRAM(s) Oral at bedtime  chlorhexidine 2% Cloths 1 Application(s) Topical daily  dextrose 5%. 1000 milliLiter(s) (100 mL/Hr) IV Continuous <Continuous>  dextrose 5%. 1000 milliLiter(s) (50 mL/Hr) IV Continuous <Continuous>  dextrose 50% Injectable 25 Gram(s) IV Push once  dextrose 50% Injectable 25 Gram(s) IV Push once  dextrose 50% Injectable 12.5 Gram(s) IV Push once  gabapentin 800 milliGRAM(s) Oral three times a day  glucagon  Injectable 1 milliGRAM(s) IntraMuscular once  imipenem/cilastatin  IVPB 500 milliGRAM(s) IV Intermittent every 6 hours  influenza   Vaccine 0.5 milliLiter(s) IntraMuscular once  insulin glargine Injectable (LANTUS) 20 Unit(s) SubCutaneous every morning  insulin lispro (ADMELOG) corrective regimen sliding scale   SubCutaneous Before meals and at bedtime  rivaroxaban 2.5 milliGRAM(s) Oral <User Schedule>      PHYSICAL EXAM:  T(C): 37.3 (10-26-24 @ 05:55), Max: 37.3 (10-26-24 @ 05:55)  HR: 96 (10-26-24 @ 05:55) (84 - 97)  BP: 142/73 (10-26-24 @ 05:55) (126/65 - 153/70)  RR: 17 (10-26-24 @ 05:55) (16 - 18)  SpO2: 97% (10-26-24 @ 05:55) (97% - 99%)  Wt(kg): --  I&O's Summary    25 Oct 2024 07:01  -  26 Oct 2024 07:00  --------------------------------------------------------  IN: 1765 mL / OUT: 1025 mL / NET: 740 mL            JVP: Normal  Neck: supple  Lung: clear   CV: S1 S2 , Murmur:  Abd: soft  Ext: No edema  neuro: Awake / alert  Psych: flat affect  Skin: normal``    LABS/DATA:    CARDIAC MARKERS:                                8.0    7.73  )-----------( 357      ( 26 Oct 2024 06:10 )             24.4     10-26    140  |  102  |  15  ----------------------------<  79  4.0   |  25  |  1.34[H]    Ca    8.5      26 Oct 2024 06:10  Phos  2.9     10-26  Mg     1.80     10-26      proBNP:   Lipid Profile:   HgA1c:   TSH:     TELE:  EKG:

## 2024-10-27 LAB
ANION GAP SERPL CALC-SCNC: 10 MMOL/L — SIGNIFICANT CHANGE UP (ref 7–14)
ANION GAP SERPL CALC-SCNC: 12 MMOL/L — SIGNIFICANT CHANGE UP (ref 7–14)
BUN SERPL-MCNC: 13 MG/DL — SIGNIFICANT CHANGE UP (ref 7–23)
BUN SERPL-MCNC: 15 MG/DL — SIGNIFICANT CHANGE UP (ref 7–23)
CALCIUM SERPL-MCNC: 8.7 MG/DL — SIGNIFICANT CHANGE UP (ref 8.4–10.5)
CALCIUM SERPL-MCNC: 9.1 MG/DL — SIGNIFICANT CHANGE UP (ref 8.4–10.5)
CHLORIDE SERPL-SCNC: 101 MMOL/L — SIGNIFICANT CHANGE UP (ref 98–107)
CHLORIDE SERPL-SCNC: 99 MMOL/L — SIGNIFICANT CHANGE UP (ref 98–107)
CO2 SERPL-SCNC: 26 MMOL/L — SIGNIFICANT CHANGE UP (ref 22–31)
CO2 SERPL-SCNC: 26 MMOL/L — SIGNIFICANT CHANGE UP (ref 22–31)
CREAT SERPL-MCNC: 1.25 MG/DL — SIGNIFICANT CHANGE UP (ref 0.5–1.3)
CREAT SERPL-MCNC: 1.31 MG/DL — HIGH (ref 0.5–1.3)
CULTURE RESULTS: ABNORMAL
CULTURE RESULTS: SIGNIFICANT CHANGE UP
CULTURE RESULTS: SIGNIFICANT CHANGE UP
EGFR: 65 ML/MIN/1.73M2 — SIGNIFICANT CHANGE UP
EGFR: 69 ML/MIN/1.73M2 — SIGNIFICANT CHANGE UP
GLUCOSE BLDC GLUCOMTR-MCNC: 105 MG/DL — HIGH (ref 70–99)
GLUCOSE BLDC GLUCOMTR-MCNC: 161 MG/DL — HIGH (ref 70–99)
GLUCOSE BLDC GLUCOMTR-MCNC: 163 MG/DL — HIGH (ref 70–99)
GLUCOSE BLDC GLUCOMTR-MCNC: 88 MG/DL — SIGNIFICANT CHANGE UP (ref 70–99)
GLUCOSE SERPL-MCNC: 111 MG/DL — HIGH (ref 70–99)
GLUCOSE SERPL-MCNC: 117 MG/DL — HIGH (ref 70–99)
HCT VFR BLD CALC: 24.5 % — LOW (ref 39–50)
HCT VFR BLD CALC: 28.3 % — LOW (ref 39–50)
HGB BLD-MCNC: 8 G/DL — LOW (ref 13–17)
HGB BLD-MCNC: 9.1 G/DL — LOW (ref 13–17)
MAGNESIUM SERPL-MCNC: 1.9 MG/DL — SIGNIFICANT CHANGE UP (ref 1.6–2.6)
MAGNESIUM SERPL-MCNC: 1.9 MG/DL — SIGNIFICANT CHANGE UP (ref 1.6–2.6)
MCHC RBC-ENTMCNC: 27.6 PG — SIGNIFICANT CHANGE UP (ref 27–34)
MCHC RBC-ENTMCNC: 27.9 PG — SIGNIFICANT CHANGE UP (ref 27–34)
MCHC RBC-ENTMCNC: 32.2 GM/DL — SIGNIFICANT CHANGE UP (ref 32–36)
MCHC RBC-ENTMCNC: 32.7 GM/DL — SIGNIFICANT CHANGE UP (ref 32–36)
MCV RBC AUTO: 85.4 FL — SIGNIFICANT CHANGE UP (ref 80–100)
MCV RBC AUTO: 85.8 FL — SIGNIFICANT CHANGE UP (ref 80–100)
NRBC # BLD: 0 /100 WBCS — SIGNIFICANT CHANGE UP (ref 0–0)
NRBC # BLD: 0 /100 WBCS — SIGNIFICANT CHANGE UP (ref 0–0)
NRBC # FLD: 0 K/UL — SIGNIFICANT CHANGE UP (ref 0–0)
NRBC # FLD: 0 K/UL — SIGNIFICANT CHANGE UP (ref 0–0)
ORGANISM # SPEC MICROSCOPIC CNT: ABNORMAL
PHOSPHATE SERPL-MCNC: 3 MG/DL — SIGNIFICANT CHANGE UP (ref 2.5–4.5)
PHOSPHATE SERPL-MCNC: 3.2 MG/DL — SIGNIFICANT CHANGE UP (ref 2.5–4.5)
PLATELET # BLD AUTO: 367 K/UL — SIGNIFICANT CHANGE UP (ref 150–400)
PLATELET # BLD AUTO: 396 K/UL — SIGNIFICANT CHANGE UP (ref 150–400)
POTASSIUM SERPL-MCNC: 4 MMOL/L — SIGNIFICANT CHANGE UP (ref 3.5–5.3)
POTASSIUM SERPL-MCNC: 4.1 MMOL/L — SIGNIFICANT CHANGE UP (ref 3.5–5.3)
POTASSIUM SERPL-SCNC: 4 MMOL/L — SIGNIFICANT CHANGE UP (ref 3.5–5.3)
POTASSIUM SERPL-SCNC: 4.1 MMOL/L — SIGNIFICANT CHANGE UP (ref 3.5–5.3)
RBC # BLD: 2.87 M/UL — LOW (ref 4.2–5.8)
RBC # BLD: 3.3 M/UL — LOW (ref 4.2–5.8)
RBC # FLD: 12.7 % — SIGNIFICANT CHANGE UP (ref 10.3–14.5)
RBC # FLD: 13 % — SIGNIFICANT CHANGE UP (ref 10.3–14.5)
SODIUM SERPL-SCNC: 135 MMOL/L — SIGNIFICANT CHANGE UP (ref 135–145)
SODIUM SERPL-SCNC: 139 MMOL/L — SIGNIFICANT CHANGE UP (ref 135–145)
SPECIMEN SOURCE: SIGNIFICANT CHANGE UP
WBC # BLD: 5.94 K/UL — SIGNIFICANT CHANGE UP (ref 3.8–10.5)
WBC # BLD: 7.48 K/UL — SIGNIFICANT CHANGE UP (ref 3.8–10.5)
WBC # FLD AUTO: 5.94 K/UL — SIGNIFICANT CHANGE UP (ref 3.8–10.5)
WBC # FLD AUTO: 7.48 K/UL — SIGNIFICANT CHANGE UP (ref 3.8–10.5)

## 2024-10-27 PROCEDURE — 99231 SBSQ HOSP IP/OBS SF/LOW 25: CPT

## 2024-10-27 RX ORDER — ASPIRIN/MAG CARB/ALUMINUM AMIN 325 MG
81 TABLET ORAL DAILY
Refills: 0 | Status: DISCONTINUED | OUTPATIENT
Start: 2024-10-27 | End: 2024-11-01

## 2024-10-27 RX ORDER — RIVAROXABAN 20 MG/1
2.5 TABLET, FILM COATED ORAL
Refills: 0 | Status: DISCONTINUED | OUTPATIENT
Start: 2024-10-27 | End: 2024-10-28

## 2024-10-27 RX ADMIN — Medication 40 MILLIGRAM(S): at 21:59

## 2024-10-27 RX ADMIN — Medication 81 MILLIGRAM(S): at 11:55

## 2024-10-27 RX ADMIN — IMIPENEM AND CILASTATIN 100 MILLIGRAM(S): 500; 500 INJECTION, POWDER, FOR SOLUTION INTRAVENOUS at 19:11

## 2024-10-27 RX ADMIN — GABAPENTIN 800 MILLIGRAM(S): 300 CAPSULE ORAL at 14:12

## 2024-10-27 RX ADMIN — IMIPENEM AND CILASTATIN 100 MILLIGRAM(S): 500; 500 INJECTION, POWDER, FOR SOLUTION INTRAVENOUS at 23:46

## 2024-10-27 RX ADMIN — GABAPENTIN 800 MILLIGRAM(S): 300 CAPSULE ORAL at 06:09

## 2024-10-27 RX ADMIN — IMIPENEM AND CILASTATIN 100 MILLIGRAM(S): 500; 500 INJECTION, POWDER, FOR SOLUTION INTRAVENOUS at 06:14

## 2024-10-27 RX ADMIN — Medication 20 UNIT(S): at 08:59

## 2024-10-27 RX ADMIN — Medication 975 MILLIGRAM(S): at 06:09

## 2024-10-27 RX ADMIN — IMIPENEM AND CILASTATIN 100 MILLIGRAM(S): 500; 500 INJECTION, POWDER, FOR SOLUTION INTRAVENOUS at 01:28

## 2024-10-27 RX ADMIN — IMIPENEM AND CILASTATIN 100 MILLIGRAM(S): 500; 500 INJECTION, POWDER, FOR SOLUTION INTRAVENOUS at 11:55

## 2024-10-27 RX ADMIN — Medication 975 MILLIGRAM(S): at 01:28

## 2024-10-27 RX ADMIN — GABAPENTIN 800 MILLIGRAM(S): 300 CAPSULE ORAL at 21:59

## 2024-10-27 RX ADMIN — RIVAROXABAN 2.5 MILLIGRAM(S): 20 TABLET, FILM COATED ORAL at 19:11

## 2024-10-27 RX ADMIN — Medication 975 MILLIGRAM(S): at 23:30

## 2024-10-27 RX ADMIN — Medication 975 MILLIGRAM(S): at 19:12

## 2024-10-27 RX ADMIN — Medication 1: at 09:00

## 2024-10-27 RX ADMIN — CHLORHEXIDINE GLUCONATE 1 APPLICATION(S): 40 SOLUTION TOPICAL at 11:55

## 2024-10-27 RX ADMIN — Medication 1: at 22:52

## 2024-10-27 RX ADMIN — Medication 975 MILLIGRAM(S): at 11:55

## 2024-10-27 NOTE — PROGRESS NOTE ADULT - ASSESSMENT
54M 10/22 s/p bedside right foot 4th digit disarticulation at the proximal phalangeal joint  -  Pt seen and evaluated.  -  Afebrile, No Leukocytosis   - 10/22 s/p bedside right foot 4th digit disarticulation at the proximal phalangeal joint, fibrotic wound bed, no purulent drainage, no malodor, no fluctuance, no bogginess, no tracking/tunneling, full thickness dry gangrene of the 3rd digit, worsening ischemic changes to the 2nd digit. Right BKA.   - Left foot X-ray: 4th metatarsal head OM  - Left foot wound culture pending  - Vascular recs, appreciated, angio 10/24  - ID recs, appreciated  - In depth discussion about podiatric surgical intervention regarding Right foot partial 4th ray resection, the dry gangrene of the partial 3rd ray resection and worsening ischemic changes to the 2nd digit vs TMA. Highly recommend TMA for healing potential; however, patient against medical advice would solely agree to a partial 3rd and 4th ray resection. Against medical advice, patient does not agree to a 2nd digit amputation. Patient states he and his wife, who is an ICU nurse, will "take care of the 2nd toe." Will discuss final podiatric surgical intervention with him and his wife tomorrow.  - Pod plan:  L foot TMA VS L foot Partial 3rd and 4th ray amputation pending [facility] recs/ Pt decision   - Cards clearance 10/24 documented, appreciated   - Please document medical clearance   - Seen with Attending

## 2024-10-27 NOTE — PROGRESS NOTE ADULT - SUBJECTIVE AND OBJECTIVE BOX
Name of Patient : TARA BLAKE  MRN: [id number]  Date of visit: 10-27-24       Subjective: Patient seen and examined. No new events except as noted.   doing okay     REVIEW OF SYSTEMS:    CONSTITUTIONAL: No weakness, fevers or chills  EYES/ENT: No visual changes;  No vertigo or throat pain   NECK: No pain or stiffness  RESPIRATORY: No cough, wheezing, hemoptysis; No shortness of breath  CARDIOVASCULAR: No chest pain or palpitations  GASTROINTESTINAL: No abdominal or epigastric pain. No nausea, vomiting, or hematemesis; No diarrhea or constipation. No melena or hematochezia.  GENITOURINARY: No dysuria, frequency or hematuria  NEUROLOGICAL: No numbness or weakness  SKIN: No itching, burning, rashes, or lesions   All other review of systems is negative unless indicated above.    MEDICATIONS:  MEDICATIONS  (STANDING):  acetaminophen     Tablet .. 975 milliGRAM(s) Oral every 6 hours  aspirin  chewable 81 milliGRAM(s) Oral daily  atorvastatin 40 milliGRAM(s) Oral at bedtime  chlorhexidine 2% Cloths 1 Application(s) Topical daily  dextrose 5%. 1000 milliLiter(s) (100 mL/Hr) IV Continuous <Continuous>  dextrose 5%. 1000 milliLiter(s) (50 mL/Hr) IV Continuous <Continuous>  dextrose 50% Injectable 25 Gram(s) IV Push once  dextrose 50% Injectable 12.5 Gram(s) IV Push once  dextrose 50% Injectable 25 Gram(s) IV Push once  gabapentin 800 milliGRAM(s) Oral three times a day  glucagon  Injectable 1 milliGRAM(s) IntraMuscular once  imipenem/cilastatin  IVPB 500 milliGRAM(s) IV Intermittent every 6 hours  influenza   Vaccine 0.5 milliLiter(s) IntraMuscular once  insulin glargine Injectable (LANTUS) 20 Unit(s) SubCutaneous every morning  insulin lispro (ADMELOG) corrective regimen sliding scale   SubCutaneous Before meals and at bedtime  rivaroxaban 2.5 milliGRAM(s) Oral two times a day      PHYSICAL EXAM:  T(C): 37.3 (10-27-24 @ 21:00), Max: 37.3 (10-27-24 @ 21:00)  HR: 93 (10-27-24 @ 21:00) (90 - 98)  BP: 142/68 (10-27-24 @ 21:00) (138/80 - 158/78)  RR: 18 (10-27-24 @ 21:00) (18 - 18)  SpO2: 99% (10-27-24 @ 21:00) (94% - 99%)  Wt(kg): --  I&O's Summary    26 Oct 2024 07:01  -  27 Oct 2024 07:00  --------------------------------------------------------  IN: 1215 mL / OUT: 1450 mL / NET: -235 mL          Appearance: Normal	  HEENT:  PERRLA   Lymphatic: No lymphadenopathy   Cardiovascular: Normal S1 S2, no JVD  Respiratory: normal effort , clear  Gastrointestinal:  Soft, Non-tender  Skin: No rashes,  warm to touch  Psychiatry:  Mood & affect appropriate  Musculuskeletal: No edema    recent labs, Imaging and EKGs personally reviewed     10-26-24 @ 07:01  -  10-27-24 @ 07:00  --------------------------------------------------------  IN: 1215 mL / OUT: 1450 mL / NET: -235 mL                            9.1    5.94  )-----------( 396      ( 27 Oct 2024 11:00 )             28.3               10-27    139  |  101  |  13  ----------------------------<  111[H]  4.0   |  26  |  1.25    Ca    9.1      27 Oct 2024 11:00  Phos  3.2     10-27  Mg     1.90     10-27                         Urinalysis Basic - ( 27 Oct 2024 11:00 )    Color: x / Appearance: x / SG: x / pH: x  Gluc: 111 mg/dL / Ketone: x  / Bili: x / Urobili: x   Blood: x / Protein: x / Nitrite: x   Leuk Esterase: x / RBC: x / WBC x   Sq Epi: x / Non Sq Epi: x / Bacteria: x

## 2024-10-27 NOTE — PROGRESS NOTE ADULT - SUBJECTIVE AND OBJECTIVE BOX
Patient is a 54y old  Male who presents with a chief complaint of Foot Wound (27 Oct 2024 10:18)       INTERVAL HPI/OVERNIGHT EVENTS:  Patient seen and evaluated at bedside.  Pt is resting comfortable in NAD. Denies N/V/F/C.      Allergies    No Known Allergies    Intolerances        Vital Signs Last 24 Hrs  T(C): 36.4 (27 Oct 2024 06:05), Max: 37.1 (27 Oct 2024 02:04)  T(F): 97.6 (27 Oct 2024 06:05), Max: 98.8 (27 Oct 2024 02:04)  HR: 90 (27 Oct 2024 06:05) (90 - 98)  BP: 147/78 (27 Oct 2024 06:05) (137/64 - 156/72)  BP(mean): --  RR: 18 (27 Oct 2024 06:05) (18 - 18)  SpO2: 97% (27 Oct 2024 06:05) (94% - 99%)    Parameters below as of 27 Oct 2024 06:05  Patient On (Oxygen Delivery Method): room air        LABS:                        8.0    7.48  )-----------( 367      ( 27 Oct 2024 01:30 )             24.5     10-27    135  |  99  |  15  ----------------------------<  117[H]  4.1   |  26  |  1.31[H]    Ca    8.7      27 Oct 2024 01:30  Phos  3.0     10-27  Mg     1.90     10-27        Urinalysis Basic - ( 27 Oct 2024 01:30 )    Color: x / Appearance: x / SG: x / pH: x  Gluc: 117 mg/dL / Ketone: x  / Bili: x / Urobili: x   Blood: x / Protein: x / Nitrite: x   Leuk Esterase: x / RBC: x / WBC x   Sq Epi: x / Non Sq Epi: x / Bacteria: x      CAPILLARY BLOOD GLUCOSE      POCT Blood Glucose.: 163 mg/dL (27 Oct 2024 08:41)  POCT Blood Glucose.: 141 mg/dL (26 Oct 2024 21:26)  POCT Blood Glucose.: 160 mg/dL (26 Oct 2024 17:55)  POCT Blood Glucose.: 141 mg/dL (26 Oct 2024 12:32)      Lower Extremity Physical Exam:  Vascular: DP 1/4, PT 0/4, B/L, CFT could not be assessed 2/2 gangrene,, Temperature gradient warm to cool , B/L.   Neuro: Epicritic sensation absent to the level of digits, B/L.  Musculoskeletal/Ortho: S/P R BKA  Skin: 10/22 s/p bedside right foot 3rd digit disarticulation at the proximal phalangeal joint, fibrogranular wound bed, no purulent drainage, no malodor, no fluctuance, no bogginess, no tracking/tunneling, full thickness dry gangrene of the 4th digit, worsening ischemic changes to the 2nd digit. Right BKA.     RADIOLOGY & ADDITIONAL TESTS:

## 2024-10-27 NOTE — PROGRESS NOTE ADULT - SUBJECTIVE AND OBJECTIVE BOX
Subjective: Patient seen and examined. No new events except as noted.     SUBJECTIVE/ROS:        MEDICATIONS:  MEDICATIONS  (STANDING):  acetaminophen     Tablet .. 975 milliGRAM(s) Oral every 6 hours  atorvastatin 40 milliGRAM(s) Oral at bedtime  chlorhexidine 2% Cloths 1 Application(s) Topical daily  dextrose 5%. 1000 milliLiter(s) (50 mL/Hr) IV Continuous <Continuous>  dextrose 5%. 1000 milliLiter(s) (100 mL/Hr) IV Continuous <Continuous>  dextrose 50% Injectable 25 Gram(s) IV Push once  dextrose 50% Injectable 12.5 Gram(s) IV Push once  dextrose 50% Injectable 25 Gram(s) IV Push once  gabapentin 800 milliGRAM(s) Oral three times a day  glucagon  Injectable 1 milliGRAM(s) IntraMuscular once  imipenem/cilastatin  IVPB 500 milliGRAM(s) IV Intermittent every 6 hours  influenza   Vaccine 0.5 milliLiter(s) IntraMuscular once  insulin glargine Injectable (LANTUS) 20 Unit(s) SubCutaneous every morning  insulin lispro (ADMELOG) corrective regimen sliding scale   SubCutaneous Before meals and at bedtime      PHYSICAL EXAM:  T(C): 37.1 (10-27-24 @ 02:04), Max: 37.1 (10-26-24 @ 10:00)  HR: 98 (10-27-24 @ 02:04) (95 - 100)  BP: 156/70 (10-27-24 @ 02:04) (137/64 - 156/72)  RR: 18 (10-27-24 @ 02:04) (18 - 18)  SpO2: 94% (10-27-24 @ 02:04) (94% - 100%)  Wt(kg): --  I&O's Summary    26 Oct 2024 07:01  -  27 Oct 2024 07:00  --------------------------------------------------------  IN: 1215 mL / OUT: 350 mL / NET: 865 mL            JVP: Normal  Neck: supple  Lung: clear   CV: S1 S2 , Murmur:  Abd: soft  Ext: No edema  neuro: Awake / alert  Psych: flat affect  Skin: normal``    LABS/DATA:    CARDIAC MARKERS:                                8.0    7.48  )-----------( 367      ( 27 Oct 2024 01:30 )             24.5     10-27    135  |  99  |  15  ----------------------------<  117[H]  4.1   |  26  |  1.31[H]    Ca    8.7      27 Oct 2024 01:30  Phos  3.0     10-27  Mg     1.90     10-27      proBNP:   Lipid Profile:   HgA1c:   TSH:     TELE:  EKG:

## 2024-10-27 NOTE — PROGRESS NOTE ADULT - SUBJECTIVE AND OBJECTIVE BOX
INTERVAL EVENTS: Groin area with no evidence of expanding hematoma or collections. H/H stable   SUBJECTIVE: Patient seen and examined at bedside with surgical team, patient without complaints. Denies fever, chills, CP, SOB nausea, vomiting, abdominal pain.    OBJECTIVE:    Vital Signs Last 24 Hrs  T(C): 36.4 (27 Oct 2024 06:05), Max: 37.1 (27 Oct 2024 02:04)  T(F): 97.6 (27 Oct 2024 06:05), Max: 98.8 (27 Oct 2024 02:04)  HR: 90 (27 Oct 2024 06:05) (90 - 98)  BP: 147/78 (27 Oct 2024 06:05) (137/64 - 156/72)  BP(mean): --  RR: 18 (27 Oct 2024 06:05) (18 - 18)  SpO2: 97% (27 Oct 2024 06:05) (94% - 99%)    Parameters below as of 27 Oct 2024 06:05  Patient On (Oxygen Delivery Method): room air    I&O's Detail    26 Oct 2024 07:01  -  27 Oct 2024 07:00  --------------------------------------------------------  IN:    IV PiggyBack: 200 mL    Oral Fluid: 1015 mL  Total IN: 1215 mL    OUT:    Blood Loss (mL): 0 mL    Voided (mL): 1450 mL  Total OUT: 1450 mL    Total NET: -235 mL      MEDICATIONS  (STANDING):  acetaminophen     Tablet .. 975 milliGRAM(s) Oral every 6 hours  atorvastatin 40 milliGRAM(s) Oral at bedtime  chlorhexidine 2% Cloths 1 Application(s) Topical daily  dextrose 5%. 1000 milliLiter(s) (50 mL/Hr) IV Continuous <Continuous>  dextrose 5%. 1000 milliLiter(s) (100 mL/Hr) IV Continuous <Continuous>  dextrose 50% Injectable 25 Gram(s) IV Push once  dextrose 50% Injectable 12.5 Gram(s) IV Push once  dextrose 50% Injectable 25 Gram(s) IV Push once  gabapentin 800 milliGRAM(s) Oral three times a day  glucagon  Injectable 1 milliGRAM(s) IntraMuscular once  imipenem/cilastatin  IVPB 500 milliGRAM(s) IV Intermittent every 6 hours  influenza   Vaccine 0.5 milliLiter(s) IntraMuscular once  insulin glargine Injectable (LANTUS) 20 Unit(s) SubCutaneous every morning  insulin lispro (ADMELOG) corrective regimen sliding scale   SubCutaneous Before meals and at bedtime    MEDICATIONS  (PRN):  dextrose Oral Gel 15 Gram(s) Oral once PRN Blood Glucose LESS THAN 70 milliGRAM(s)/deciliter      PHYSICAL EXAM:  General: NAD, Lying in bed in no apparent distress  Neuro: A+Ox3  Cardio: HDS  Resp: Good effort, SCR b/L  Vascular: s/p Rt. BKA. Left foot dopplerable signals PT. Intact motor and sensory. Foot covered in kerlix (patient asked not to take it down). Right groin access site tender, c/d/i, small bruise on right groin/hip ~2-3cm diameter  Musculoskeletal: All  extremities moving spontaneously, no limitations.       LABS:                        8.0    7.48  )-----------( 367      ( 27 Oct 2024 01:30 )             24.5     10-27    135  |  99  |  15  ----------------------------<  117[H]  4.1   |  26  |  1.31[H]    Ca    8.7      27 Oct 2024 01:30  Phos  3.0     10-27  Mg     1.90     10-27          Urinalysis Basic - ( 27 Oct 2024 01:30 )    Color: x / Appearance: x / SG: x / pH: x  Gluc: 117 mg/dL / Ketone: x  / Bili: x / Urobili: x   Blood: x / Protein: x / Nitrite: x   Leuk Esterase: x / RBC: x / WBC x   Sq Epi: x / Non Sq Epi: x / Bacteria: x

## 2024-10-28 LAB
ADD ON TEST-SPECIMEN IN LAB: SIGNIFICANT CHANGE UP
ANION GAP SERPL CALC-SCNC: 10 MMOL/L — SIGNIFICANT CHANGE UP (ref 7–14)
APTT BLD: 45.1 SEC — HIGH (ref 24.5–35.6)
BUN SERPL-MCNC: 20 MG/DL — SIGNIFICANT CHANGE UP (ref 7–23)
CALCIUM SERPL-MCNC: 8.8 MG/DL — SIGNIFICANT CHANGE UP (ref 8.4–10.5)
CHLORIDE SERPL-SCNC: 100 MMOL/L — SIGNIFICANT CHANGE UP (ref 98–107)
CO2 SERPL-SCNC: 26 MMOL/L — SIGNIFICANT CHANGE UP (ref 22–31)
CREAT SERPL-MCNC: 1.45 MG/DL — HIGH (ref 0.5–1.3)
CRP SERPL-MCNC: 112.6 MG/L — HIGH
EGFR: 58 ML/MIN/1.73M2 — LOW
GLUCOSE BLDC GLUCOMTR-MCNC: 119 MG/DL — HIGH (ref 70–99)
GLUCOSE BLDC GLUCOMTR-MCNC: 120 MG/DL — HIGH (ref 70–99)
GLUCOSE BLDC GLUCOMTR-MCNC: 191 MG/DL — HIGH (ref 70–99)
GLUCOSE BLDC GLUCOMTR-MCNC: 195 MG/DL — HIGH (ref 70–99)
GLUCOSE SERPL-MCNC: 101 MG/DL — HIGH (ref 70–99)
HCT VFR BLD CALC: 27.2 % — LOW (ref 39–50)
HGB BLD-MCNC: 8.7 G/DL — LOW (ref 13–17)
MAGNESIUM SERPL-MCNC: 1.8 MG/DL — SIGNIFICANT CHANGE UP (ref 1.6–2.6)
MCHC RBC-ENTMCNC: 27.4 PG — SIGNIFICANT CHANGE UP (ref 27–34)
MCHC RBC-ENTMCNC: 32 GM/DL — SIGNIFICANT CHANGE UP (ref 32–36)
MCV RBC AUTO: 85.8 FL — SIGNIFICANT CHANGE UP (ref 80–100)
NRBC # BLD: 0 /100 WBCS — SIGNIFICANT CHANGE UP (ref 0–0)
NRBC # FLD: 0 K/UL — SIGNIFICANT CHANGE UP (ref 0–0)
PHOSPHATE SERPL-MCNC: 3.6 MG/DL — SIGNIFICANT CHANGE UP (ref 2.5–4.5)
PLATELET # BLD AUTO: 385 K/UL — SIGNIFICANT CHANGE UP (ref 150–400)
POTASSIUM SERPL-MCNC: 4.1 MMOL/L — SIGNIFICANT CHANGE UP (ref 3.5–5.3)
POTASSIUM SERPL-SCNC: 4.1 MMOL/L — SIGNIFICANT CHANGE UP (ref 3.5–5.3)
RBC # BLD: 3.17 M/UL — LOW (ref 4.2–5.8)
RBC # FLD: 12.8 % — SIGNIFICANT CHANGE UP (ref 10.3–14.5)
SODIUM SERPL-SCNC: 136 MMOL/L — SIGNIFICANT CHANGE UP (ref 135–145)
WBC # BLD: 5.77 K/UL — SIGNIFICANT CHANGE UP (ref 3.8–10.5)
WBC # FLD AUTO: 5.77 K/UL — SIGNIFICANT CHANGE UP (ref 3.8–10.5)

## 2024-10-28 PROCEDURE — 99232 SBSQ HOSP IP/OBS MODERATE 35: CPT

## 2024-10-28 PROCEDURE — 99231 SBSQ HOSP IP/OBS SF/LOW 25: CPT

## 2024-10-28 RX ORDER — HEPARIN SODIUM 10000 [USP'U]/ML
1400 INJECTION INTRAVENOUS; SUBCUTANEOUS
Qty: 25000 | Refills: 0 | Status: DISCONTINUED | OUTPATIENT
Start: 2024-10-28 | End: 2024-10-28

## 2024-10-28 RX ORDER — HEPARIN SODIUM 10000 [USP'U]/ML
1400 INJECTION INTRAVENOUS; SUBCUTANEOUS
Qty: 25000 | Refills: 0 | Status: DISCONTINUED | OUTPATIENT
Start: 2024-10-28 | End: 2024-10-30

## 2024-10-28 RX ADMIN — Medication 40 MILLIGRAM(S): at 21:57

## 2024-10-28 RX ADMIN — HEPARIN SODIUM 14 UNIT(S)/HR: 10000 INJECTION INTRAVENOUS; SUBCUTANEOUS at 20:45

## 2024-10-28 RX ADMIN — GABAPENTIN 800 MILLIGRAM(S): 300 CAPSULE ORAL at 21:56

## 2024-10-28 RX ADMIN — Medication 1: at 22:57

## 2024-10-28 RX ADMIN — Medication 975 MILLIGRAM(S): at 05:51

## 2024-10-28 RX ADMIN — CHLORHEXIDINE GLUCONATE 1 APPLICATION(S): 40 SOLUTION TOPICAL at 13:28

## 2024-10-28 RX ADMIN — IMIPENEM AND CILASTATIN 100 MILLIGRAM(S): 500; 500 INJECTION, POWDER, FOR SOLUTION INTRAVENOUS at 17:41

## 2024-10-28 RX ADMIN — Medication 20 UNIT(S): at 09:16

## 2024-10-28 RX ADMIN — Medication 975 MILLIGRAM(S): at 12:56

## 2024-10-28 RX ADMIN — HEPARIN SODIUM 14 UNIT(S)/HR: 10000 INJECTION INTRAVENOUS; SUBCUTANEOUS at 18:41

## 2024-10-28 RX ADMIN — Medication 975 MILLIGRAM(S): at 13:56

## 2024-10-28 RX ADMIN — Medication 81 MILLIGRAM(S): at 12:56

## 2024-10-28 RX ADMIN — RIVAROXABAN 2.5 MILLIGRAM(S): 20 TABLET, FILM COATED ORAL at 05:20

## 2024-10-28 RX ADMIN — Medication 1: at 17:41

## 2024-10-28 RX ADMIN — Medication 975 MILLIGRAM(S): at 23:40

## 2024-10-28 RX ADMIN — GABAPENTIN 800 MILLIGRAM(S): 300 CAPSULE ORAL at 13:27

## 2024-10-28 RX ADMIN — IMIPENEM AND CILASTATIN 100 MILLIGRAM(S): 500; 500 INJECTION, POWDER, FOR SOLUTION INTRAVENOUS at 05:19

## 2024-10-28 RX ADMIN — Medication 975 MILLIGRAM(S): at 05:21

## 2024-10-28 RX ADMIN — Medication 100 MILLILITER(S): at 10:11

## 2024-10-28 RX ADMIN — IMIPENEM AND CILASTATIN 100 MILLIGRAM(S): 500; 500 INJECTION, POWDER, FOR SOLUTION INTRAVENOUS at 23:41

## 2024-10-28 RX ADMIN — GABAPENTIN 800 MILLIGRAM(S): 300 CAPSULE ORAL at 05:21

## 2024-10-28 RX ADMIN — IMIPENEM AND CILASTATIN 100 MILLIGRAM(S): 500; 500 INJECTION, POWDER, FOR SOLUTION INTRAVENOUS at 12:56

## 2024-10-28 RX ADMIN — Medication 975 MILLIGRAM(S): at 17:41

## 2024-10-28 NOTE — PROGRESS NOTE ADULT - SUBJECTIVE AND OBJECTIVE BOX
Subjective: Patient seen and examined. No new events except as noted.     SUBJECTIVE/ROS:        MEDICATIONS:  MEDICATIONS  (STANDING):  acetaminophen     Tablet .. 975 milliGRAM(s) Oral every 6 hours  aspirin  chewable 81 milliGRAM(s) Oral daily  atorvastatin 40 milliGRAM(s) Oral at bedtime  chlorhexidine 2% Cloths 1 Application(s) Topical daily  dextrose 5%. 1000 milliLiter(s) (100 mL/Hr) IV Continuous <Continuous>  dextrose 5%. 1000 milliLiter(s) (50 mL/Hr) IV Continuous <Continuous>  dextrose 50% Injectable 25 Gram(s) IV Push once  dextrose 50% Injectable 12.5 Gram(s) IV Push once  dextrose 50% Injectable 25 Gram(s) IV Push once  gabapentin 800 milliGRAM(s) Oral three times a day  glucagon  Injectable 1 milliGRAM(s) IntraMuscular once  imipenem/cilastatin  IVPB 500 milliGRAM(s) IV Intermittent every 6 hours  influenza   Vaccine 0.5 milliLiter(s) IntraMuscular once  insulin glargine Injectable (LANTUS) 20 Unit(s) SubCutaneous every morning  insulin lispro (ADMELOG) corrective regimen sliding scale   SubCutaneous Before meals and at bedtime  rivaroxaban 2.5 milliGRAM(s) Oral two times a day      PHYSICAL EXAM:  T(C): 36.4 (10-28-24 @ 05:31), Max: 37.3 (10-27-24 @ 21:00)  HR: 89 (10-28-24 @ 05:31) (89 - 96)  BP: 150/80 (10-28-24 @ 05:31) (138/80 - 158/78)  RR: 18 (10-28-24 @ 05:31) (16 - 18)  SpO2: 97% (10-28-24 @ 05:31) (97% - 100%)  Wt(kg): --  I&O's Summary    27 Oct 2024 07:01  -  28 Oct 2024 07:00  --------------------------------------------------------  IN: 500 mL / OUT: 0 mL / NET: 500 mL            JVP: Normal  Neck: supple  Lung: clear   CV: S1 S2 , Murmur:  Abd: soft  Ext: No edema  neuro: Awake / alert  Psych: flat affect  Skin: normal``    LABS/DATA:    CARDIAC MARKERS:                                8.7    5.77  )-----------( 385      ( 28 Oct 2024 06:37 )             27.2     10-28    136  |  100  |  x   ----------------------------<  x   4.1   |  x   |  x     Ca    9.1      27 Oct 2024 11:00  Phos  3.6     10-28  Mg     1.80     10-28      proBNP:   Lipid Profile:   HgA1c:   TSH:     TELE:  EKG:

## 2024-10-28 NOTE — PROVIDER CONTACT NOTE (OTHER) - SITUATION
Patient hypertensive 171/62, asymptomatic
patient complaining pain 5/10 on Right groin dressing area

## 2024-10-28 NOTE — PROGRESS NOTE ADULT - ASSESSMENT
PAD  osteomyelitis of foot  abx  asa  statin   plan for podiatry surgery     DM I  Monitor finger stick. Insulin coverage. Diabetic education and Diabetic diet. Consider nutrition consultation.    Pre-Operative Cardiac Risk Stratification and Optimization   Based on patient history and physical exam, the patient is considered to have elevated risk   echo shows normal LV function   CV stable to proceed to planned podiatric surgery

## 2024-10-28 NOTE — PROVIDER CONTACT NOTE (OTHER) - ACTION/TREATMENT ORDERED:
provider aware BP rechecked in 15 min. BP now 155/72
Provider made aware, came to the bedside and saying "It's ok, heating pads are good for patient, keep monitoring."

## 2024-10-28 NOTE — PROGRESS NOTE ADULT - SUBJECTIVE AND OBJECTIVE BOX
Name of Patient : TARA BLAKE  MRN: [id number]  Date of visit: 10-28-24 @ 09:57      Subjective: Patient seen and examined. No new events except as noted.   doing okay       REVIEW OF SYSTEMS:    CONSTITUTIONAL: No weakness, fevers or chills  EYES/ENT: No visual changes;  No vertigo or throat pain   NECK: No pain or stiffness  RESPIRATORY: No cough, wheezing, hemoptysis; No shortness of breath  CARDIOVASCULAR: No chest pain or palpitations  GASTROINTESTINAL: No abdominal or epigastric pain. No nausea, vomiting, or hematemesis; No diarrhea or constipation. No melena or hematochezia.  GENITOURINARY: No dysuria, frequency or hematuria  NEUROLOGICAL: No numbness or weakness  SKIN: No itching, burning, rashes, or lesions   All other review of systems is negative unless indicated above.    MEDICATIONS:  MEDICATIONS  (STANDING):  acetaminophen     Tablet .. 975 milliGRAM(s) Oral every 6 hours  aspirin  chewable 81 milliGRAM(s) Oral daily  atorvastatin 40 milliGRAM(s) Oral at bedtime  chlorhexidine 2% Cloths 1 Application(s) Topical daily  dextrose 5%. 1000 milliLiter(s) (100 mL/Hr) IV Continuous <Continuous>  dextrose 5%. 1000 milliLiter(s) (50 mL/Hr) IV Continuous <Continuous>  dextrose 50% Injectable 25 Gram(s) IV Push once  dextrose 50% Injectable 12.5 Gram(s) IV Push once  dextrose 50% Injectable 25 Gram(s) IV Push once  gabapentin 800 milliGRAM(s) Oral three times a day  glucagon  Injectable 1 milliGRAM(s) IntraMuscular once  heparin  Infusion 1400 Unit(s)/Hr (14 mL/Hr) IV Continuous <Continuous>  imipenem/cilastatin  IVPB 500 milliGRAM(s) IV Intermittent every 6 hours  influenza   Vaccine 0.5 milliLiter(s) IntraMuscular once  insulin glargine Injectable (LANTUS) 20 Unit(s) SubCutaneous every morning  insulin lispro (ADMELOG) corrective regimen sliding scale   SubCutaneous Before meals and at bedtime  lactated ringers. 1000 milliLiter(s) (100 mL/Hr) IV Continuous <Continuous>      PHYSICAL EXAM:  T(C): 36.4 (10-28-24 @ 09:47), Max: 37.3 (10-27-24 @ 21:00)  HR: 92 (10-28-24 @ 09:47) (89 - 96)  BP: 146/73 (10-28-24 @ 09:47) (138/80 - 158/78)  RR: 17 (10-28-24 @ 09:47) (16 - 18)  SpO2: 99% (10-28-24 @ 09:47) (97% - 100%)  Wt(kg): --  I&O's Summary    27 Oct 2024 07:01  -  28 Oct 2024 07:00  --------------------------------------------------------  IN: 500 mL / OUT: 0 mL / NET: 500 mL          Appearance: Normal	  HEENT:  PERRLA   Lymphatic: No lymphadenopathy   Cardiovascular: Normal S1 S2, no JVD  Respiratory: normal effort , clear  Gastrointestinal:  Soft, Non-tender  Skin: No rashes,  warm to touch  Psychiatry:  Mood & affect appropriate  Musculuskeletal: No edema    recent labs, Imaging and EKGs personally reviewed     10-27-24 @ 07:01  -  10-28-24 @ 07:00  --------------------------------------------------------  IN: 500 mL / OUT: 0 mL / NET: 500 mL                          8.7    5.77  )-----------( 385      ( 28 Oct 2024 06:37 )             27.2               10-28    136  |  100  |  20  ----------------------------<  101[H]  4.1   |  26  |  1.45[H]    Ca    8.8      28 Oct 2024 06:37  Phos  3.6     10-28  Mg     1.80     10-28                         Urinalysis Basic - ( 28 Oct 2024 06:37 )    Color: x / Appearance: x / SG: x / pH: x  Gluc: 101 mg/dL / Ketone: x  / Bili: x / Urobili: x   Blood: x / Protein: x / Nitrite: x   Leuk Esterase: x / RBC: x / WBC x   Sq Epi: x / Non Sq Epi: x / Bacteria: x

## 2024-10-28 NOTE — PROVIDER CONTACT NOTE (OTHER) - ASSESSMENT
AOX4, patient complaining pain 5/10 on Right groin dressing area. warm to touch, slight swelling.
Vital signs, /62, O2 100, HR 97, Temp 98.5, resp. 16.

## 2024-10-28 NOTE — PROGRESS NOTE ADULT - SUBJECTIVE AND OBJECTIVE BOX
Patient is a 54y old  Male who presents with a chief complaint of Foot Wound (28 Oct 2024 08:13)    Being followed by ID for OM    Interval history:  No other acute events  Planned for OR on Wednesday     ROS:  No cough,SOB,CP  No N/V/D  No abd pain  No urinary complaints  No HA  No joint or limb pain  No other complaints    Antimicrobials:  imipenem/cilastatin  IVPB 500 milliGRAM(s) IV Intermittent every 6 hours      Vital Signs Last 24 Hrs  T(C): 36.4 (10-28-24 @ 05:31), Max: 37.3 (10-27-24 @ 21:00)  T(F): 97.6 (10-28-24 @ 05:31), Max: 99.1 (10-27-24 @ 21:00)  HR: 89 (10-28-24 @ 05:31) (89 - 96)  BP: 150/80 (10-28-24 @ 05:31) (138/80 - 158/78)  BP(mean): --  RR: 18 (10-28-24 @ 05:31) (16 - 18)  SpO2: 97% (10-28-24 @ 05:31) (97% - 100%)    Physical Exam:  Constitutional:  well preserved, comfortable  Head/Eyes: no icterus, PERRL, EOMI  ENT:  supple; no thrush  LUNGS:  CTA  CVS:  normal S1, S2, no murmur  Abd:  soft, non-tender; non-distended  Ext: Right BKA and Left foot dressing in place +   Vascular:  IV site no erythema tenderness or discharge  MSK:  joints without swelling  Neuro: AAO X 3, non- focal    Lab Data:                        8.7    5.77  )-----------( 385      ( 28 Oct 2024 06:37 )             27.2     10-28    136  |  100  |  20  ----------------------------<  101[H]  4.1   |  26  |  1.45[H]    Ca    8.8      28 Oct 2024 06:37  Phos  3.6     10-28  Mg     1.80     10-28        .Blood BLOOD  10-22-24   No growth at 5 days  --  --      .Abscess  10-22-24   Few Pseudomonas aeruginosa  Moderate Staphylococcus aureus  Rare Citrobacter freundii complex  --  Pseudomonas aeruginosa  Staphylococcus aureus  Citrobacter freundii complex      .Blood BLOOD  10-22-24   No growth at 5 days  --  --                  RADIOLOGY:  below reviewed    IMPRESSION:    Cortical erosions and destruction of the left fourth metatarsal head and   proximal phalanx concerning for acute osteomyelitis.    --- End of Report ---         Patient is a 54y old  Male who presents with a chief complaint of Foot Wound (28 Oct 2024 08:13)    Being followed by ID for OM    Interval history:  No other acute events  Planned for OR on Wednesday     ROS:  No cough,SOB,CP  No N/V/D  No abd pain  No urinary complaints  No HA  No other complaints    Antimicrobials:  imipenem/cilastatin  IVPB 500 milliGRAM(s) IV Intermittent every 6 hours      Vital Signs Last 24 Hrs  T(C): 36.4 (10-28-24 @ 05:31), Max: 37.3 (10-27-24 @ 21:00)  T(F): 97.6 (10-28-24 @ 05:31), Max: 99.1 (10-27-24 @ 21:00)  HR: 89 (10-28-24 @ 05:31) (89 - 96)  BP: 150/80 (10-28-24 @ 05:31) (138/80 - 158/78)  BP(mean): --  RR: 18 (10-28-24 @ 05:31) (16 - 18)  SpO2: 97% (10-28-24 @ 05:31) (97% - 100%)    Physical Exam:  Constitutional:  well preserved, comfortable  Head/Eyes: no icterus, PERRL, EOMI  ENT:  supple; no thrush  LUNGS:  CTA  CVS:  normal S1, S2, no murmur  Abd:  soft, non-tender; non-distended  Ext: Right BKA and Left foot dressing in place +   Vascular:  IV site no erythema tenderness or discharge  MSK:  joints without swelling  Neuro: AAO X 3, non- focal    Lab Data:                        8.7    5.77  )-----------( 385      ( 28 Oct 2024 06:37 )             27.2     10-28    136  |  100  |  20  ----------------------------<  101[H]  4.1   |  26  |  1.45[H]    Ca    8.8      28 Oct 2024 06:37  Phos  3.6     10-28  Mg     1.80     10-28        .Blood BLOOD  10-22-24   No growth at 5 days  --  --      .Abscess  10-22-24   Few Pseudomonas aeruginosa  Moderate Staphylococcus aureus  Rare Citrobacter freundii complex  --  Pseudomonas aeruginosa  Staphylococcus aureus  Citrobacter freundii complex      .Blood BLOOD  10-22-24   No growth at 5 days  --  --        RADIOLOGY:  below reviewed    IMPRESSION:    Cortical erosions and destruction of the left fourth metatarsal head and   proximal phalanx concerning for acute osteomyelitis.    --- End of Report ---

## 2024-10-28 NOTE — PROGRESS NOTE ADULT - ASSESSMENT
Patient is a 54 year old male with PMHx of DM and stroke not on ASA PSHx of Rt. BKA in 2019 presents with left foot gangrene and web space infection.     # LE osteo   LE  osteo L foot   ID eval appreciated   ABx as per ID   follow up Cx   Vascular care appreciated   ASA and statin   check echo and EKG   S/p Angio  plan for TMA , podiatry follow up , plan for amputation ,   patient is medcailly optimized   stress test if need bypass     # DM  Sliding scale   diab diet   A1C 8  adjust insulin regime     # MANN   avoid nephrotoxic medications   IV and oral hydration   monitor urine output   mild elevation, cont hydration       # chronic CVA   ASA and statin   BP control   fall precautions     DVT and GI PPX

## 2024-10-28 NOTE — PROGRESS NOTE ADULT - ASSESSMENT
54 YOM with T1DM s/p R BKA who presents with acute on chronic L foot wound with imaging concerning for acute OM of L 4th metatarsal head.  - Pt afebrile, leukocytosis resolved. ESR 85, .8  - Exam with L foot full thickness gangrene of 3,4 th digits to level of MPJ with wet changes, with partial detachment of 4th digit (removed), and wound to bone. Mild malodor, scant purulence, ischemic changes to 2nd digits, plantar submet 3 eschar  - Xray foot 10/22 with cortical erosions and destruction of the left fourth metatarsal head and proximal phalanx concerning for acute osteomyelitis.    Infectious diseases consultation requested for further management.     # OM left 4th metatarsal head s/p bedside right foot 4th digit disarticulation at the proximal phalangeal joint on 10/22  # Left foot 2nd digit ischemic changes    # Right BKA.   # Diabetes mellitis   # Elevated inflammatory markers     MICRO:   10/22 Wound CX: MSSA, Pseudomonas Few Citrobacter freundii   10/22 BCX: NGTD    RECOMMENDATIONS:   - Continue Imipenem 500 mg Q6H ( will cover Pseudomonas MSSA and Citrobacter + anaerobes )  - F/u blood cultures 10/22 until completion   - Wound culture 10/22 to completion   - Pending final surgical interventions by podiatry. Antibiotic choice and duration based on that.  L foot TMA VS L foot Partial 3rd and 4th ray amputation pending vasc recs/ Pt decision   - Trend CRP weekly.       Discussed with the primary team    Calli Kang MD  Attending Physician, Division of Infectious Diseases  Department of Medicine   [facility]    Contact on TEAMS messaging from 9am - 5pm  Office: [phone] (after 5 PM or weekend) .       54 YOM with T1DM s/p R BKA who presents with acute on chronic L foot wound with imaging concerning for acute OM of L 4th metatarsal head.  - Pt afebrile, leukocytosis resolved. ESR 85, .8  - Exam with L foot full thickness gangrene of 3,4 th digits to level of MPJ with wet changes, with partial detachment of 4th digit (removed), and wound to bone. Mild malodor, scant purulence, ischemic changes to 2nd digits, plantar submet 3 eschar  - Xray foot 10/22 with cortical erosions and destruction of the left fourth metatarsal head and proximal phalanx concerning for acute osteomyelitis.    Infectious diseases consultation requested for further management.     10/22 s/p bedside right foot 4th digit disarticulation at the proximal phalangeal joint, fibrotic wound bed,   He has full thickness dry gangrene of the 3rd digit, worsening ischemic changes to the 2nd digit.   as per podiatry - recommend TMA  Patient only wants 3rd and 4th ray resection   2nd digit -- ischemic changes     # OM left 4th metatarsal head s/p bedside right foot 4th digit disarticulation at the proximal phalangeal joint on 10/22  # full thickness dry gangrene of the 3rd digit  # Left foot 2nd digit ischemic changes    # Right BKA.   # Diabetes mellitis   # Elevated inflammatory markers     MICRO:   10/22 Wound CX: MSSA, Pseudomonas Few Citrobacter freundii   10/22 BCX: NGTD    RECOMMENDATIONS:   - Continue Imipenem 500 mg Q6H ( will cover Pseudomonas MSSA and Citrobacter + anaerobes )  - F/u blood cultures 10/22 until completion   - Wound culture 10/22 to completion   - Pending final surgical interventions by podiatry. Antibiotic choice and duration based on that.  L foot TMA VS L foot Partial 3rd and 4th ray amputation pending [facility] recs/ Pt decision   - Trend CRP weekly.   If 2nd digit remains in place- will need 6 weeks of antibiotics.     Discussed with the primary team    Calli Kang MD  Attending Physician, Division of Infectious Diseases  Department of Medicine   [facility]    Contact on TEAMS messaging from 9am - 5pm  Office: [phone] (after 5 PM or weekend) .

## 2024-10-28 NOTE — PROGRESS NOTE ADULT - SUBJECTIVE AND OBJECTIVE BOX
INTERVAL EVENTS: No acute events overnight.  SUBJECTIVE: Patient seen and examined at bedside with surgical team, patient without complaints. Denies fever, chills, CP, SOB nausea, vomiting, abdominal pain.    OBJECTIVE:    Vital Signs Last 24 Hrs  T(C): 36.4 (28 Oct 2024 05:31), Max: 37.3 (27 Oct 2024 21:00)  T(F): 97.6 (28 Oct 2024 05:31), Max: 99.1 (27 Oct 2024 21:00)  HR: 89 (28 Oct 2024 05:31) (89 - 96)  BP: 150/80 (28 Oct 2024 05:31) (138/80 - 158/78)  BP(mean): --  RR: 18 (28 Oct 2024 05:31) (16 - 18)  SpO2: 97% (28 Oct 2024 05:31) (97% - 100%)    Parameters below as of 28 Oct 2024 05:31  Patient On (Oxygen Delivery Method): room air    I&O's Detail    27 Oct 2024 07:01  -  28 Oct 2024 07:00  --------------------------------------------------------  IN:    Oral Fluid: 500 mL  Total IN: 500 mL    OUT:  Total OUT: 0 mL    Total NET: 500 mL      MEDICATIONS  (STANDING):  acetaminophen     Tablet .. 975 milliGRAM(s) Oral every 6 hours  aspirin  chewable 81 milliGRAM(s) Oral daily  atorvastatin 40 milliGRAM(s) Oral at bedtime  chlorhexidine 2% Cloths 1 Application(s) Topical daily  dextrose 5%. 1000 milliLiter(s) (50 mL/Hr) IV Continuous <Continuous>  dextrose 5%. 1000 milliLiter(s) (100 mL/Hr) IV Continuous <Continuous>  dextrose 50% Injectable 25 Gram(s) IV Push once  dextrose 50% Injectable 12.5 Gram(s) IV Push once  dextrose 50% Injectable 25 Gram(s) IV Push once  gabapentin 800 milliGRAM(s) Oral three times a day  glucagon  Injectable 1 milliGRAM(s) IntraMuscular once  imipenem/cilastatin  IVPB 500 milliGRAM(s) IV Intermittent every 6 hours  influenza   Vaccine 0.5 milliLiter(s) IntraMuscular once  insulin glargine Injectable (LANTUS) 20 Unit(s) SubCutaneous every morning  insulin lispro (ADMELOG) corrective regimen sliding scale   SubCutaneous Before meals and at bedtime  rivaroxaban 2.5 milliGRAM(s) Oral two times a day    MEDICATIONS  (PRN):  dextrose Oral Gel 15 Gram(s) Oral once PRN Blood Glucose LESS THAN 70 milliGRAM(s)/deciliter      PHYSICAL EXAM:  General: NAD, Lying in bed in no apparent distress  Neuro: A+Ox3  Cardio: HDS  Resp: Good effort, SCR b/L  Vascular: s/p Rt. BKA. Left foot dopplerable signals PT. Intact motor and sensory. Foot covered in kerlix (patient asked not to take it down). Right groin access site tender, c/d/i, small bruise on right groin/hip ~2-3cm diameter  Musculoskeletal: All  extremities moving spontaneously, no limitations.         LABS:                        8.7    5.77  )-----------( 385      ( 28 Oct 2024 06:37 )             27.2     10-28    136  |  100  |  20  ----------------------------<  101[H]  4.1   |  26  |  1.45[H]    Ca    8.8      28 Oct 2024 06:37  Phos  3.6     10-28  Mg     1.80     10-28          Urinalysis Basic - ( 28 Oct 2024 06:37 )    Color: x / Appearance: x / SG: x / pH: x  Gluc: 101 mg/dL / Ketone: x  / Bili: x / Urobili: x   Blood: x / Protein: x / Nitrite: x   Leuk Esterase: x / RBC: x / WBC x   Sq Epi: x / Non Sq Epi: x / Bacteria: x

## 2024-10-28 NOTE — PROGRESS NOTE ADULT - ASSESSMENT
54 year old male with PMHx of DM and stroke not on ASA PSHx of Rt. BKA in 2019 presents with left foot gangrene and web space infection. Now s/p LLE angiogram 10/24. Doing well. Ok for podiatric intervention from vascular standpoint.     Plan:  - Diet: Regular (cc)  - Pain management  - ASA/Xarelto - start Heparin ggt today   - Statin  - CHRISTIAN/PVR   - Podiatry - OR Wednesday x foot TMA VS L foot Partial 3rd and 4th ray amputation  - X-Ray of the foot - cortical erosions and destruction of the left fourth metatarsal head and proximal phalanx concerning for acute osteomyelitis.  - ID consult: MRSA swab, Continue empiric vanc/vosyn for now  - Abscess culture with gram + cocci  - VA right groin duplex - Negative   - H/H stable   - Cards cleared documented 10/28    Vascular Surgery   [id number]

## 2024-10-29 LAB
ANION GAP SERPL CALC-SCNC: 12 MMOL/L — SIGNIFICANT CHANGE UP (ref 7–14)
APTT BLD: 51.1 SEC — HIGH (ref 24.5–35.6)
APTT BLD: 54.9 SEC — HIGH (ref 24.5–35.6)
APTT BLD: 61.7 SEC — HIGH (ref 24.5–35.6)
BUN SERPL-MCNC: 20 MG/DL — SIGNIFICANT CHANGE UP (ref 7–23)
CALCIUM SERPL-MCNC: 8.7 MG/DL — SIGNIFICANT CHANGE UP (ref 8.4–10.5)
CHLORIDE SERPL-SCNC: 101 MMOL/L — SIGNIFICANT CHANGE UP (ref 98–107)
CO2 SERPL-SCNC: 23 MMOL/L — SIGNIFICANT CHANGE UP (ref 22–31)
CREAT SERPL-MCNC: 1.15 MG/DL — SIGNIFICANT CHANGE UP (ref 0.5–1.3)
EGFR: 77 ML/MIN/1.73M2 — SIGNIFICANT CHANGE UP
GLUCOSE BLDC GLUCOMTR-MCNC: 175 MG/DL — HIGH (ref 70–99)
GLUCOSE BLDC GLUCOMTR-MCNC: 215 MG/DL — HIGH (ref 70–99)
GLUCOSE BLDC GLUCOMTR-MCNC: 68 MG/DL — LOW (ref 70–99)
GLUCOSE BLDC GLUCOMTR-MCNC: 77 MG/DL — SIGNIFICANT CHANGE UP (ref 70–99)
GLUCOSE BLDC GLUCOMTR-MCNC: 98 MG/DL — SIGNIFICANT CHANGE UP (ref 70–99)
GLUCOSE SERPL-MCNC: 152 MG/DL — HIGH (ref 70–99)
HCT VFR BLD CALC: 25.7 % — LOW (ref 39–50)
HCT VFR BLD CALC: 26.1 % — LOW (ref 39–50)
HGB BLD-MCNC: 8.5 G/DL — LOW (ref 13–17)
HGB BLD-MCNC: 8.6 G/DL — LOW (ref 13–17)
MAGNESIUM SERPL-MCNC: 1.8 MG/DL — SIGNIFICANT CHANGE UP (ref 1.6–2.6)
MCHC RBC-ENTMCNC: 27.6 PG — SIGNIFICANT CHANGE UP (ref 27–34)
MCHC RBC-ENTMCNC: 27.7 PG — SIGNIFICANT CHANGE UP (ref 27–34)
MCHC RBC-ENTMCNC: 33 GM/DL — SIGNIFICANT CHANGE UP (ref 32–36)
MCHC RBC-ENTMCNC: 33.1 GM/DL — SIGNIFICANT CHANGE UP (ref 32–36)
MCV RBC AUTO: 83.7 FL — SIGNIFICANT CHANGE UP (ref 80–100)
MCV RBC AUTO: 83.7 FL — SIGNIFICANT CHANGE UP (ref 80–100)
NRBC # BLD: 0 /100 WBCS — SIGNIFICANT CHANGE UP (ref 0–0)
NRBC # BLD: 0 /100 WBCS — SIGNIFICANT CHANGE UP (ref 0–0)
NRBC # FLD: 0 K/UL — SIGNIFICANT CHANGE UP (ref 0–0)
NRBC # FLD: 0 K/UL — SIGNIFICANT CHANGE UP (ref 0–0)
PHOSPHATE SERPL-MCNC: 3.2 MG/DL — SIGNIFICANT CHANGE UP (ref 2.5–4.5)
PLATELET # BLD AUTO: 382 K/UL — SIGNIFICANT CHANGE UP (ref 150–400)
PLATELET # BLD AUTO: 399 K/UL — SIGNIFICANT CHANGE UP (ref 150–400)
POTASSIUM SERPL-MCNC: 4.3 MMOL/L — SIGNIFICANT CHANGE UP (ref 3.5–5.3)
POTASSIUM SERPL-SCNC: 4.3 MMOL/L — SIGNIFICANT CHANGE UP (ref 3.5–5.3)
RBC # BLD: 3.07 M/UL — LOW (ref 4.2–5.8)
RBC # BLD: 3.12 M/UL — LOW (ref 4.2–5.8)
RBC # FLD: 12.5 % — SIGNIFICANT CHANGE UP (ref 10.3–14.5)
RBC # FLD: 12.5 % — SIGNIFICANT CHANGE UP (ref 10.3–14.5)
SODIUM SERPL-SCNC: 136 MMOL/L — SIGNIFICANT CHANGE UP (ref 135–145)
SURGICAL PATHOLOGY STUDY: SIGNIFICANT CHANGE UP
WBC # BLD: 5.45 K/UL — SIGNIFICANT CHANGE UP (ref 3.8–10.5)
WBC # BLD: 5.58 K/UL — SIGNIFICANT CHANGE UP (ref 3.8–10.5)
WBC # FLD AUTO: 5.45 K/UL — SIGNIFICANT CHANGE UP (ref 3.8–10.5)
WBC # FLD AUTO: 5.58 K/UL — SIGNIFICANT CHANGE UP (ref 3.8–10.5)

## 2024-10-29 PROCEDURE — 99231 SBSQ HOSP IP/OBS SF/LOW 25: CPT

## 2024-10-29 PROCEDURE — 71045 X-RAY EXAM CHEST 1 VIEW: CPT | Mod: 26

## 2024-10-29 RX ORDER — INSULIN GLARGINE,HUM.REC.ANLOG 100/ML
8 VIAL (ML) SUBCUTANEOUS EVERY MORNING
Refills: 0 | Status: DISCONTINUED | OUTPATIENT
Start: 2024-10-29 | End: 2024-10-30

## 2024-10-29 RX ORDER — INSULIN GLARGINE,HUM.REC.ANLOG 100/ML
16 VIAL (ML) SUBCUTANEOUS EVERY MORNING
Refills: 0 | Status: DISCONTINUED | OUTPATIENT
Start: 2024-10-29 | End: 2024-10-29

## 2024-10-29 RX ORDER — GABAPENTIN 300 MG/1
800 CAPSULE ORAL
Refills: 0 | Status: DISCONTINUED | OUTPATIENT
Start: 2024-10-29 | End: 2024-11-01

## 2024-10-29 RX ORDER — INSULIN LISPRO 100/ML
VIAL (ML) SUBCUTANEOUS EVERY 6 HOURS
Refills: 0 | Status: DISCONTINUED | OUTPATIENT
Start: 2024-10-29 | End: 2024-10-30

## 2024-10-29 RX ORDER — MAGNESIUM SULFATE IN 0.9% NACL 2 G/50 ML
1 INTRAVENOUS SOLUTION, PIGGYBACK (ML) INTRAVENOUS ONCE
Refills: 0 | Status: COMPLETED | OUTPATIENT
Start: 2024-10-29 | End: 2024-10-29

## 2024-10-29 RX ADMIN — Medication 81 MILLIGRAM(S): at 13:12

## 2024-10-29 RX ADMIN — GABAPENTIN 800 MILLIGRAM(S): 300 CAPSULE ORAL at 05:47

## 2024-10-29 RX ADMIN — Medication 40 MILLIGRAM(S): at 21:44

## 2024-10-29 RX ADMIN — Medication 1: at 18:10

## 2024-10-29 RX ADMIN — IMIPENEM AND CILASTATIN 100 MILLIGRAM(S): 500; 500 INJECTION, POWDER, FOR SOLUTION INTRAVENOUS at 23:03

## 2024-10-29 RX ADMIN — IMIPENEM AND CILASTATIN 100 MILLIGRAM(S): 500; 500 INJECTION, POWDER, FOR SOLUTION INTRAVENOUS at 05:47

## 2024-10-29 RX ADMIN — CHLORHEXIDINE GLUCONATE 1 APPLICATION(S): 40 SOLUTION TOPICAL at 18:08

## 2024-10-29 RX ADMIN — IMIPENEM AND CILASTATIN 100 MILLIGRAM(S): 500; 500 INJECTION, POWDER, FOR SOLUTION INTRAVENOUS at 13:12

## 2024-10-29 RX ADMIN — Medication 2: at 08:30

## 2024-10-29 RX ADMIN — Medication 975 MILLIGRAM(S): at 00:10

## 2024-10-29 RX ADMIN — IMIPENEM AND CILASTATIN 100 MILLIGRAM(S): 500; 500 INJECTION, POWDER, FOR SOLUTION INTRAVENOUS at 18:06

## 2024-10-29 RX ADMIN — Medication 975 MILLIGRAM(S): at 13:11

## 2024-10-29 RX ADMIN — Medication 975 MILLIGRAM(S): at 23:03

## 2024-10-29 RX ADMIN — Medication 975 MILLIGRAM(S): at 18:10

## 2024-10-29 RX ADMIN — Medication 100 GRAM(S): at 13:19

## 2024-10-29 RX ADMIN — GABAPENTIN 800 MILLIGRAM(S): 300 CAPSULE ORAL at 13:11

## 2024-10-29 RX ADMIN — HEPARIN SODIUM 13 UNIT(S)/HR: 10000 INJECTION INTRAVENOUS; SUBCUTANEOUS at 20:26

## 2024-10-29 RX ADMIN — Medication 20 UNIT(S): at 08:30

## 2024-10-29 RX ADMIN — Medication 975 MILLIGRAM(S): at 23:33

## 2024-10-29 RX ADMIN — GABAPENTIN 800 MILLIGRAM(S): 300 CAPSULE ORAL at 18:07

## 2024-10-29 RX ADMIN — HEPARIN SODIUM 13 UNIT(S)/HR: 10000 INJECTION INTRAVENOUS; SUBCUTANEOUS at 01:28

## 2024-10-29 RX ADMIN — Medication 975 MILLIGRAM(S): at 05:41

## 2024-10-29 RX ADMIN — HEPARIN SODIUM 13 UNIT(S)/HR: 10000 INJECTION INTRAVENOUS; SUBCUTANEOUS at 08:30

## 2024-10-29 RX ADMIN — Medication 975 MILLIGRAM(S): at 06:11

## 2024-10-29 NOTE — PROGRESS NOTE ADULT - ASSESSMENT
Patient is a 54 year old male with PMHx of DM and stroke not on ASA PSHx of Rt. BKA in 2019 presents with left foot gangrene and web space infection.     # LE osteo   LE  osteo L foot   ID eval appreciated   ABx as per ID   follow up Cx   Vascular care appreciated   ASA and statin   check echo and EKG   S/p Angio  plan for TMA , podiatry follow up , plan for amputation ,   patient is medically optimized   stress test if need bypass     # DM  Sliding scale   diab diet   A1C 8  adjust insulin regime     # MANN   avoid nephrotoxic medications   IV and oral hydration   monitor urine output   mild elevation, cont hydration       # chronic CVA   ASA and statin   BP control   fall precautions     DVT and GI PPX

## 2024-10-29 NOTE — DIETITIAN INITIAL EVALUATION ADULT - PERTINENT MEDS FT
atorvastatin   imipenem/cilastatin IV  LANTUS 20U qAM  ADMELOG corrective regimen sliding scale  lactated ringers IV Continuous   magnesium sulfate IV

## 2024-10-29 NOTE — DIETITIAN INITIAL EVALUATION ADULT - ENTER TO (G/KG)
"Returned call to pt spouse, Krystin.   Krystin stated that pt has been nauseated "last couple of days"- has been forcing pt to eat/drink (ate 2 fish sticks yesterday and ensure).   Krystin stated currently has no rx on file for nausea.   Krystin denies pt having any emesis.   Informed Krystin would reach out to Dr. Escobar's NP and see if agreeable to send in rx for anti-emetic.   Krystin verbalized understanding.      Message fwd.        ----- Message from Corey Rodriguez sent at 8/28/2020  8:57 AM CDT -----  Contact: Shawn  Patient Advice/Staff Message     Caller name/title: Krystin (spouse)    Provider: Shawn    Reason for call: Calling to speak with the RN, wants to know if something can be called in for the nausea the pt is experiencing     Do you feel you need to be seen today:: No        Communication Preference: [phone]    Additional Information:         "
1

## 2024-10-29 NOTE — PROGRESS NOTE ADULT - ASSESSMENT
54M 10/22 s/p bedside right foot 4th digit disarticulation at the proximal phalangeal joint  -  Pt seen and evaluated.  -  Afebrile, No Leukocytosis   - 10/22 s/p bedside right foot 4th digit disarticulation at the proximal phalangeal joint, fibrotic wound bed, no purulence, no malodor, no fluctuance, no bogginess, no tracking/tunneling, full thickness dry gangrene of the 3rd digit, progressive ischemic changes to the 2nd digit. s/p Right BKA.   - Left foot X-ray: 4th metatarsal head OM  - Left foot wound culture: Pseudommonas, Staph aureus, citrobacter freundii  - Vascular recs, appreciated   - ID recs, appreciated  - In depth discussion with pt with wife on phone, Highly recommend TMA for healing potential; however, patient against medical advice would solely agree to a partial 3rd and 4th ray resection. Against medical advice, patient does not agree to a Transmetatarsal amputation and will agree to partial second third and fourth ray resections.   - Pod plan: Scheduled for partial second, third, and fourth ray resection tomorrow 9:30am with Dr Calvert.   - Cards clearance 10/24 documented, appreciated   - Discussed with Attending   54M 10/22 s/p bedside right foot 4th digit disarticulation at the proximal phalangeal joint  -  Pt seen and evaluated.  -  Afebrile, No Leukocytosis   - 10/22 s/p bedside right foot 4th digit disarticulation at the proximal phalangeal joint, fibrotic wound bed, no purulence, no malodor, no fluctuance, no bogginess, no tracking/tunneling, full thickness dry gangrene of the 3rd digit, progressive ischemic changes to the 2nd digit. s/p Right BKA.   - Left foot X-ray: 4th metatarsal head OM  - Left foot wound culture: Pseudommonas, Staph aureus, citrobacter freundii  - Vascular recs, appreciated   - ID recs, appreciated  - In depth discussion with pt with wife on phone, Highly recommend TMA for healing potential; however, patient against medical advice would solely agree to a partial 3rd and 4th ray resection. Against medical advice, patient does not agree to a Transmetatarsal amputation and will agree to partial second third and fourth ray resections.   - Pod plan: Scheduled for partial second, third, and fourth ray resection tomorrow 9:30am with Dr Calvert.   - Cards clearance 10/24 documented, appreciated   - NPO at midnight   - CBC BMP Coags Type and screen in AM  - Discussed with Attending   54M 10/22 s/p bedside right foot 4th digit disarticulation at the proximal phalangeal joint  -  Pt seen and evaluated.  -  Afebrile, No Leukocytosis   - 10/22 s/p bedside right foot 4th digit disarticulation at the proximal phalangeal joint, fibrotic wound bed, no purulence, no malodor, no fluctuance, no bogginess, no tracking/tunneling, full thickness dry gangrene of the 3rd digit, progressive ischemic changes to the 2nd digit. s/p Right BKA.   - Left foot X-ray: 4th metatarsal head OM  - Left foot wound culture: Pseudommonas, Staph aureus, citrobacter freundii  - Vascular recs, appreciated   - ID recs, appreciated  - In depth discussion with pt with wife on phone, Highly recommend TMA for healing potential; however, patient against medical advice would solely agree to a partial 3rd and 4th ray resection. Against medical advice, patient does not agree to a Transmetatarsal amputation and will agree to partial second third and fourth ray resections.   - Pod plan: Scheduled for partial second, third, and fourth ray resection tomorrow 9:30am with Dr Calvert.   - Cards clearance 10/24 documented, appreciated   - NPO at midnight   - CBC BMP Coags Type and screen in AM  - please hold heparin drip for procedure tomorrow   - Discussed with Attending   54M 10/22 s/p bedside left foot 4th digit disarticulation at the proximal phalangeal joint  -  Pt seen and evaluated.  -  Afebrile, No Leukocytosis   - 10/22 s/p bedside left foot 4th digit disarticulation at the proximal phalangeal joint, fibrotic wound bed, no purulence, no malodor, no fluctuance, no bogginess, no tracking/tunneling, full thickness dry gangrene of the 3rd digit, progressive ischemic changes to the 2nd digit. s/p Right BKA.   - Left foot X-ray: 4th metatarsal head OM  - Left foot wound culture: Pseudommonas, Staph aureus, citrobacter freundii  - Vascular recs, appreciated   - ID recs, appreciated  - In depth discussion with pt with wife on phone, Highly recommend TMA for healing potential; however, patient against medical advice would solely agree to a partial 3rd and 4th ray resection. Against medical advice, patient does not agree to a Transmetatarsal amputation and will agree to partial second third and fourth ray resections.   - Pod plan: Scheduled for partial second, third, and fourth ray resection tomorrow 9:30am with Dr Calvert.   - Cards clearance 10/24 documented, appreciated   - NPO at midnight   - CBC BMP Coags Type and screen in AM  - please hold heparin drip for procedure tomorrow   - Discussed with Attending

## 2024-10-29 NOTE — PROGRESS NOTE ADULT - SUBJECTIVE AND OBJECTIVE BOX
TEAM [ C ] Surgery Daily Progress Note  =====================================================    SUBJECTIVE: Patient seen and examined at bedside on AM rounds. Patient without complaints. Tolerating diet, denies nausea, vomiting. OOB/Amublating as tolerated. Denies fever, chills.    ALLERGIES:  No Known Allergies      --------------------------------------------------------------------------------------    MEDICATIONS:    Neurologic Medications  acetaminophen     Tablet .. 975 milliGRAM(s) Oral every 6 hours  gabapentin 800 milliGRAM(s) Oral three times a day    Respiratory Medications    Cardiovascular Medications    Gastrointestinal Medications  dextrose 5%. 1000 milliLiter(s) IV Continuous <Continuous>  dextrose 5%. 1000 milliLiter(s) IV Continuous <Continuous>  lactated ringers. 1000 milliLiter(s) IV Continuous <Continuous>    Genitourinary Medications    Hematologic/Oncologic Medications  aspirin  chewable 81 milliGRAM(s) Oral daily  heparin  Infusion 1400 Unit(s)/Hr IV Continuous <Continuous>  influenza   Vaccine 0.5 milliLiter(s) IntraMuscular once    Antimicrobial/Immunologic Medications  imipenem/cilastatin  IVPB 500 milliGRAM(s) IV Intermittent every 6 hours    Endocrine/Metabolic Medications  atorvastatin 40 milliGRAM(s) Oral at bedtime  dextrose 50% Injectable 25 Gram(s) IV Push once  dextrose 50% Injectable 12.5 Gram(s) IV Push once  dextrose 50% Injectable 25 Gram(s) IV Push once  dextrose Oral Gel 15 Gram(s) Oral once PRN Blood Glucose LESS THAN 70 milliGRAM(s)/deciliter  glucagon  Injectable 1 milliGRAM(s) IntraMuscular once  insulin glargine Injectable (LANTUS) 20 Unit(s) SubCutaneous every morning  insulin lispro (ADMELOG) corrective regimen sliding scale   SubCutaneous Before meals and at bedtime    Topical/Other Medications  chlorhexidine 2% Cloths 1 Application(s) Topical daily    --------------------------------------------------------------------------------------    VITAL SIGNS:  T(C): 36.4 (10-29-24 @ 05:39), Max: 36.9 (10-28-24 @ 13:36)  HR: 90 (10-29-24 @ 05:39) (90 - 98)  BP: 154/72 (10-29-24 @ 05:39) (146/73 - 176/82)  RR: 17 (10-29-24 @ 05:39) (16 - 18)  SpO2: 98% (10-29-24 @ 05:39) (98% - 100%)  --------------------------------------------------------------------------------------    EXAM    General: NAD, resting in bed comfortably.  Cardiac: regular rate, warm and well perfused  Respiratory: Nonlabored respirations, normal cw expansion.  Extremities: s/p Rt. BKA. Left foot dopplerable signals PT. Intact motor and sensory. Foot covered in kerlix (patient asked not to take it down). Right groin access site tender, c/d/i, small bruise on right groin/hip    --------------------------------------------------------------------------------------    LABS                        8.5    5.58  )-----------( 399      ( 29 Oct 2024 05:37 )             25.7     --------------------------------------------------------------------------------------    10-28    136  |  100  |  20  ----------------------------<  101[H]  4.1   |  26  |  1.45[H]    Ca    8.8      28 Oct 2024 06:37  Phos  3.6     10-28  Mg     1.80     10-28    INS AND OUTS:    10-28-24 @ 07:01  -  10-29-24 @ 07:00  --------------------------------------------------------  IN: 3093 mL / OUT: 690 mL / NET: 2403 mL      --------------------------------------------------------------------------------------

## 2024-10-29 NOTE — PROGRESS NOTE ADULT - SUBJECTIVE AND OBJECTIVE BOX
Subjective: Patient seen and examined. No new events except as noted.     SUBJECTIVE/ROS:  nad      MEDICATIONS:  MEDICATIONS  (STANDING):  acetaminophen     Tablet .. 975 milliGRAM(s) Oral every 6 hours  aspirin  chewable 81 milliGRAM(s) Oral daily  atorvastatin 40 milliGRAM(s) Oral at bedtime  chlorhexidine 2% Cloths 1 Application(s) Topical daily  dextrose 5%. 1000 milliLiter(s) (100 mL/Hr) IV Continuous <Continuous>  dextrose 5%. 1000 milliLiter(s) (50 mL/Hr) IV Continuous <Continuous>  dextrose 50% Injectable 25 Gram(s) IV Push once  dextrose 50% Injectable 12.5 Gram(s) IV Push once  dextrose 50% Injectable 25 Gram(s) IV Push once  gabapentin 800 milliGRAM(s) Oral three times a day  glucagon  Injectable 1 milliGRAM(s) IntraMuscular once  heparin  Infusion 1400 Unit(s)/Hr (13 mL/Hr) IV Continuous <Continuous>  imipenem/cilastatin  IVPB 500 milliGRAM(s) IV Intermittent every 6 hours  influenza   Vaccine 0.5 milliLiter(s) IntraMuscular once  insulin glargine Injectable (LANTUS) 20 Unit(s) SubCutaneous every morning  insulin lispro (ADMELOG) corrective regimen sliding scale   SubCutaneous Before meals and at bedtime  lactated ringers. 1000 milliLiter(s) (100 mL/Hr) IV Continuous <Continuous>  magnesium sulfate  IVPB 1 Gram(s) IV Intermittent once      PHYSICAL EXAM:  T(C): 36.4 (10-29-24 @ 05:39), Max: 36.9 (10-28-24 @ 13:36)  HR: 90 (10-29-24 @ 05:39) (90 - 98)  BP: 154/72 (10-29-24 @ 05:39) (146/73 - 176/82)  RR: 17 (10-29-24 @ 05:39) (16 - 18)  SpO2: 98% (10-29-24 @ 05:39) (98% - 100%)  Wt(kg): --  I&O's Summary    28 Oct 2024 07:01  -  29 Oct 2024 07:00  --------------------------------------------------------  IN: 3093 mL / OUT: 690 mL / NET: 2403 mL            JVP: Normal  Neck: supple  Lung: clear   CV: S1 S2 , Murmur:  Abd: soft  Ext: No edema  neuro: Awake / alert  Psych: flat affect  Skin: normal``    LABS/DATA:    CARDIAC MARKERS:                                8.5    5.58  )-----------( 399      ( 29 Oct 2024 05:37 )             25.7     10-29    136  |  101  |  20  ----------------------------<  152[H]  4.3   |  23  |  1.15    Ca    8.7      29 Oct 2024 05:37  Phos  3.2     10-29  Mg     1.80     10-29      proBNP:   Lipid Profile:   HgA1c:   TSH:     TELE:  EKG:

## 2024-10-29 NOTE — PROGRESS NOTE ADULT - SUBJECTIVE AND OBJECTIVE BOX
Name of Patient : TARA BLAKE  MRN: [id number]  Date of visit: 10-29-24       Subjective: Patient seen and examined. No new events except as noted.   doing okay     REVIEW OF SYSTEMS:    CONSTITUTIONAL: No weakness, fevers or chills  EYES/ENT: No visual changes;  No vertigo or throat pain   NECK: No pain or stiffness  RESPIRATORY: No cough, wheezing, hemoptysis; No shortness of breath  CARDIOVASCULAR: No chest pain or palpitations  GASTROINTESTINAL: No abdominal or epigastric pain. No nausea, vomiting, or hematemesis; No diarrhea or constipation. No melena or hematochezia.  GENITOURINARY: No dysuria, frequency or hematuria  NEUROLOGICAL: No numbness or weakness  SKIN: No itching, burning, rashes, or lesions   All other review of systems is negative unless indicated above.    MEDICATIONS:  MEDICATIONS  (STANDING):  acetaminophen     Tablet .. 975 milliGRAM(s) Oral every 6 hours  aspirin  chewable 81 milliGRAM(s) Oral daily  atorvastatin 40 milliGRAM(s) Oral at bedtime  chlorhexidine 2% Cloths 1 Application(s) Topical daily  dextrose 5%. 1000 milliLiter(s) (100 mL/Hr) IV Continuous <Continuous>  dextrose 5%. 1000 milliLiter(s) (50 mL/Hr) IV Continuous <Continuous>  dextrose 50% Injectable 25 Gram(s) IV Push once  dextrose 50% Injectable 12.5 Gram(s) IV Push once  dextrose 50% Injectable 25 Gram(s) IV Push once  gabapentin 800 milliGRAM(s) Oral two times a day  glucagon  Injectable 1 milliGRAM(s) IntraMuscular once  heparin  Infusion 1400 Unit(s)/Hr (13 mL/Hr) IV Continuous <Continuous>  imipenem/cilastatin  IVPB 500 milliGRAM(s) IV Intermittent every 6 hours  influenza   Vaccine 0.5 milliLiter(s) IntraMuscular once  insulin glargine Injectable (LANTUS) 16 Unit(s) SubCutaneous every morning  insulin lispro (ADMELOG) corrective regimen sliding scale   SubCutaneous Before meals and at bedtime  lactated ringers. 1000 milliLiter(s) (100 mL/Hr) IV Continuous <Continuous>      PHYSICAL EXAM:  T(C): 36.8 (10-29-24 @ 20:51), Max: 36.8 (10-29-24 @ 02:02)  HR: 95 (10-29-24 @ 20:51) (90 - 98)  BP: 159/87 (10-29-24 @ 20:51) (132/78 - 161/70)  RR: 19 (10-29-24 @ 20:51) (17 - 19)  SpO2: 99% (10-29-24 @ 20:51) (97% - 100%)  Wt(kg): --  I&O's Summary    28 Oct 2024 07:01  -  29 Oct 2024 07:00  --------------------------------------------------------  IN: 3093 mL / OUT: 690 mL / NET: 2403 mL          Appearance: Normal	  HEENT:  PERRLA   Lymphatic: No lymphadenopathy   Cardiovascular: Normal S1 S2, no JVD  Respiratory: normal effort , clear  Gastrointestinal:  Soft, Non-tender  Skin: No rashes,  warm to touch  Psychiatry:  Mood & affect appropriate  Musculuskeletal: No edema    recent labs, Imaging and EKGs personally reviewed     10-28-24 @ 07:01  -  10-29-24 @ 07:00  --------------------------------------------------------  IN: 3093 mL / OUT: 690 mL / NET: 2403 mL                          8.5    5.58  )-----------( 399      ( 29 Oct 2024 05:37 )             25.7               10-29    136  |  101  |  20  ----------------------------<  152[H]  4.3   |  23  |  1.15    Ca    8.7      29 Oct 2024 05:37  Phos  3.2     10-29  Mg     1.80     10-29      PTT - ( 29 Oct 2024 14:29 )  PTT:54.9 sec                   Urinalysis Basic - ( 29 Oct 2024 05:37 )    Color: x / Appearance: x / SG: x / pH: x  Gluc: 152 mg/dL / Ketone: x  / Bili: x / Urobili: x   Blood: x / Protein: x / Nitrite: x   Leuk Esterase: x / RBC: x / WBC x   Sq Epi: x / Non Sq Epi: x / Bacteria: x

## 2024-10-29 NOTE — DIETITIAN INITIAL EVALUATION ADULT - OTHER INFO
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 Podiatry, ID, Vascular Surgery and Cardiology on board. Pending OR.     Pt with limited interest in discussion, comprehensive chart review completed. Pt confirms NKFA. No chewing/swallowing difficulties. History of type 2 DM, HbA1c (10/23) 8.0%. Medications PTA per H&P inclusive of Basaglar 18U qHS and Lispro 1U/3U/3U.     Pt reports good appetite/PO intake, flowsheets note fair PO intake thus far in house. Pt is consuming outside foods. No food preferences vocalized at this time. No noted GI distress, last BM 10/24 per flowsheets; no bowel regimen ordered at this time. Fingersticks elevated.

## 2024-10-29 NOTE — DIETITIAN INITIAL EVALUATION ADULT - OTHER CALCULATIONS
Dosing weight (10/24) 175.9 lbs / 79.8 kg.  Dosing height 76 in / 193 cm.  BMI adjusted for R BKA: 22.5 kg/m^2  IBW adjusted for R BKA: 190 lbs / 86.4 kg  No recent weight history available via chart.

## 2024-10-29 NOTE — PROGRESS NOTE ADULT - SUBJECTIVE AND OBJECTIVE BOX
Patient is a 54y old  Male who presents with a chief complaint of Foot Wound (29 Oct 2024 07:49)       INTERVAL HPI/OVERNIGHT EVENTS:  Patient seen and evaluated at bedside.  Pt is resting comfortable in NAD. Denies N/V/F/C.      Allergies    No Known Allergies    Intolerances        Vital Signs Last 24 Hrs  T(C): 36.4 (29 Oct 2024 05:39), Max: 36.9 (28 Oct 2024 13:36)  T(F): 97.6 (29 Oct 2024 05:39), Max: 98.5 (28 Oct 2024 13:36)  HR: 90 (29 Oct 2024 05:39) (90 - 98)  BP: 154/72 (29 Oct 2024 05:39) (146/73 - 176/82)  BP(mean): --  RR: 17 (29 Oct 2024 05:39) (16 - 18)  SpO2: 98% (29 Oct 2024 05:39) (98% - 100%)    Parameters below as of 29 Oct 2024 05:39  Patient On (Oxygen Delivery Method): room air        LABS:                        8.5    5.58  )-----------( 399      ( 29 Oct 2024 05:37 )             25.7     10-29    136  |  101  |  20  ----------------------------<  152[H]  4.3   |  23  |  1.15    Ca    8.7      29 Oct 2024 05:37  Phos  3.2     10-29  Mg     1.80     10-29      PTT - ( 29 Oct 2024 00:35 )  PTT:61.7 sec  Urinalysis Basic - ( 29 Oct 2024 05:37 )    Color: x / Appearance: x / SG: x / pH: x  Gluc: 152 mg/dL / Ketone: x  / Bili: x / Urobili: x   Blood: x / Protein: x / Nitrite: x   Leuk Esterase: x / RBC: x / WBC x   Sq Epi: x / Non Sq Epi: x / Bacteria: x      CAPILLARY BLOOD GLUCOSE      POCT Blood Glucose.: 191 mg/dL (28 Oct 2024 22:44)  POCT Blood Glucose.: 195 mg/dL (28 Oct 2024 17:28)  POCT Blood Glucose.: 119 mg/dL (28 Oct 2024 12:41)  POCT Blood Glucose.: 120 mg/dL (28 Oct 2024 09:00)      Lower Extremity Physical Exam:  Vascular: DP 1/4, PT 0/4, B/L, CFT could not be assessed 2/2 gangrene,, Temperature gradient warm to cool , B/L.   Neuro: Epicritic sensation absent to the level of digits, B/L.  Musculoskeletal/Ortho: S/P R BKA  Skin: 10/22 s/p bedside right foot 4th digit disarticulation at the proximal phalangeal joint, fibrotic wound bed, no purulence, no malodor, no fluctuance, no bogginess, no tracking/tunneling, full thickness dry gangrene of the 3rd digit, progressive ischemic changes to the 2nd digit. s/p Right BKA.      RADIOLOGY & ADDITIONAL TESTS:

## 2024-10-29 NOTE — DIETITIAN INITIAL EVALUATION ADULT - PERTINENT LABORATORY DATA
(10/29) Na 136, BUN 20, Cr 1.15, [H], K+ 4.3, Phos 3.2, Mg 1.80  (10/23) HbA1c 8.0%[H]  POCT: (10/29) 215, (10/28) 119-195

## 2024-10-29 NOTE — PROGRESS NOTE ADULT - ASSESSMENT
54 year old male with PMHx of DM and stroke not on ASA PSHx of Rt. BKA in 2019 presents with left foot gangrene and web space infection. Now s/p LLE angiogram 10/24. Doing well. Ok for podiatric intervention from vascular standpoint.     Plan:  - Diet: Regular (cc)  - Pain management  - Hep gtt / ASA  - Statin  - Podiatry - OR Wednesday x foot TMA VS L foot Partial 3rd and 4th ray amputation  - X-Ray of the foot - cortical erosions and destruction of the left fourth metatarsal head and proximal phalanx concerning for acute osteomyelitis.  - ID consult: MRSA swab, Continue empiric vanc/vosyn for now  - Abscess culture with gram + cocci  - VA right groin duplex - Negative   - H/H stable   - Cards/Medicine cleared documented 10/28    Vascular Surgery   [id number]

## 2024-10-30 ENCOUNTER — TRANSCRIPTION ENCOUNTER (OUTPATIENT)
Age: 54
End: 2024-10-30

## 2024-10-30 LAB
ANION GAP SERPL CALC-SCNC: 10 MMOL/L — SIGNIFICANT CHANGE UP (ref 7–14)
APTT BLD: 57.9 SEC — HIGH (ref 24.5–35.6)
BLD GP AB SCN SERPL QL: NEGATIVE — SIGNIFICANT CHANGE UP
BUN SERPL-MCNC: 18 MG/DL — SIGNIFICANT CHANGE UP (ref 7–23)
CALCIUM SERPL-MCNC: 8.6 MG/DL — SIGNIFICANT CHANGE UP (ref 8.4–10.5)
CHLORIDE SERPL-SCNC: 101 MMOL/L — SIGNIFICANT CHANGE UP (ref 98–107)
CO2 SERPL-SCNC: 25 MMOL/L — SIGNIFICANT CHANGE UP (ref 22–31)
CREAT SERPL-MCNC: 1.24 MG/DL — SIGNIFICANT CHANGE UP (ref 0.5–1.3)
EGFR: 70 ML/MIN/1.73M2 — SIGNIFICANT CHANGE UP
GLUCOSE BLDC GLUCOMTR-MCNC: 119 MG/DL — HIGH (ref 70–99)
GLUCOSE BLDC GLUCOMTR-MCNC: 133 MG/DL — HIGH (ref 70–99)
GLUCOSE BLDC GLUCOMTR-MCNC: 146 MG/DL — HIGH (ref 70–99)
GLUCOSE BLDC GLUCOMTR-MCNC: 161 MG/DL — HIGH (ref 70–99)
GLUCOSE BLDC GLUCOMTR-MCNC: 175 MG/DL — HIGH (ref 70–99)
GLUCOSE BLDC GLUCOMTR-MCNC: 91 MG/DL — SIGNIFICANT CHANGE UP (ref 70–99)
GLUCOSE SERPL-MCNC: 146 MG/DL — HIGH (ref 70–99)
GRAM STN FLD: SIGNIFICANT CHANGE UP
GRAM STN FLD: SIGNIFICANT CHANGE UP
HCT VFR BLD CALC: 26.7 % — LOW (ref 39–50)
HGB BLD-MCNC: 8.6 G/DL — LOW (ref 13–17)
INR BLD: 1.12 RATIO — SIGNIFICANT CHANGE UP (ref 0.85–1.16)
MAGNESIUM SERPL-MCNC: 1.6 MG/DL — SIGNIFICANT CHANGE UP (ref 1.6–2.6)
MCHC RBC-ENTMCNC: 27.1 PG — SIGNIFICANT CHANGE UP (ref 27–34)
MCHC RBC-ENTMCNC: 32.2 G/DL — SIGNIFICANT CHANGE UP (ref 32–36)
MCV RBC AUTO: 84.2 FL — SIGNIFICANT CHANGE UP (ref 80–100)
NIGHT BLUE STAIN TISS: SIGNIFICANT CHANGE UP
NRBC # BLD: 0 /100 WBCS — SIGNIFICANT CHANGE UP (ref 0–0)
NRBC # FLD: 0 K/UL — SIGNIFICANT CHANGE UP (ref 0–0)
PHOSPHATE SERPL-MCNC: 3.3 MG/DL — SIGNIFICANT CHANGE UP (ref 2.5–4.5)
PLATELET # BLD AUTO: 385 K/UL — SIGNIFICANT CHANGE UP (ref 150–400)
POTASSIUM SERPL-MCNC: 3.9 MMOL/L — SIGNIFICANT CHANGE UP (ref 3.5–5.3)
POTASSIUM SERPL-SCNC: 3.9 MMOL/L — SIGNIFICANT CHANGE UP (ref 3.5–5.3)
PROTHROM AB SERPL-ACNC: 13.3 SEC — SIGNIFICANT CHANGE UP (ref 9.9–13.4)
RBC # BLD: 3.17 M/UL — LOW (ref 4.2–5.8)
RBC # FLD: 12.7 % — SIGNIFICANT CHANGE UP (ref 10.3–14.5)
RH IG SCN BLD-IMP: POSITIVE — SIGNIFICANT CHANGE UP
SODIUM SERPL-SCNC: 136 MMOL/L — SIGNIFICANT CHANGE UP (ref 135–145)
SPECIMEN SOURCE: SIGNIFICANT CHANGE UP
WBC # BLD: 5.51 K/UL — SIGNIFICANT CHANGE UP (ref 3.8–10.5)
WBC # FLD AUTO: 5.51 K/UL — SIGNIFICANT CHANGE UP (ref 3.8–10.5)

## 2024-10-30 PROCEDURE — 88311 DECALCIFY TISSUE: CPT | Mod: 26

## 2024-10-30 PROCEDURE — 99231 SBSQ HOSP IP/OBS SF/LOW 25: CPT

## 2024-10-30 PROCEDURE — 73630 X-RAY EXAM OF FOOT: CPT | Mod: 26,LT

## 2024-10-30 PROCEDURE — 88305 TISSUE EXAM BY PATHOLOGIST: CPT | Mod: 26

## 2024-10-30 RX ORDER — MORPHINE SULFATE 30 MG/1
2 TABLET, EXTENDED RELEASE ORAL EVERY 4 HOURS
Refills: 0 | Status: DISCONTINUED | OUTPATIENT
Start: 2024-10-30 | End: 2024-10-31

## 2024-10-30 RX ORDER — ONDANSETRON HYDROCHLORIDE 2 MG/ML
4 INJECTION, SOLUTION INTRAMUSCULAR; INTRAVENOUS ONCE
Refills: 0 | Status: DISCONTINUED | OUTPATIENT
Start: 2024-10-30 | End: 2024-10-30

## 2024-10-30 RX ORDER — POTASSIUM CHLORIDE 10 MEQ
10 TABLET, EXTENDED RELEASE ORAL ONCE
Refills: 0 | Status: COMPLETED | OUTPATIENT
Start: 2024-10-30 | End: 2024-10-30

## 2024-10-30 RX ORDER — HYDROMORPHONE HCL/0.9% NACL/PF 6 MG/30 ML
0.25 PATIENT CONTROLLED ANALGESIA SYRINGE INTRAVENOUS
Refills: 0 | Status: DISCONTINUED | OUTPATIENT
Start: 2024-10-30 | End: 2024-10-30

## 2024-10-30 RX ORDER — INSULIN LISPRO 100/ML
VIAL (ML) SUBCUTANEOUS
Refills: 0 | Status: DISCONTINUED | OUTPATIENT
Start: 2024-10-30 | End: 2024-11-01

## 2024-10-30 RX ORDER — ACETAMINOPHEN 500 MG
650 TABLET ORAL EVERY 6 HOURS
Refills: 0 | Status: DISCONTINUED | OUTPATIENT
Start: 2024-10-30 | End: 2024-11-01

## 2024-10-30 RX ORDER — INSULIN LISPRO 100/ML
VIAL (ML) SUBCUTANEOUS AT BEDTIME
Refills: 0 | Status: DISCONTINUED | OUTPATIENT
Start: 2024-10-30 | End: 2024-11-01

## 2024-10-30 RX ORDER — MAGNESIUM SULFATE IN 0.9% NACL 2 G/50 ML
2 INTRAVENOUS SOLUTION, PIGGYBACK (ML) INTRAVENOUS ONCE
Refills: 0 | Status: COMPLETED | OUTPATIENT
Start: 2024-10-30 | End: 2024-10-30

## 2024-10-30 RX ORDER — INSULIN GLARGINE,HUM.REC.ANLOG 100/ML
20 VIAL (ML) SUBCUTANEOUS EVERY MORNING
Refills: 0 | Status: DISCONTINUED | OUTPATIENT
Start: 2024-10-30 | End: 2024-11-01

## 2024-10-30 RX ORDER — HYDROMORPHONE HCL/0.9% NACL/PF 6 MG/30 ML
0.5 PATIENT CONTROLLED ANALGESIA SYRINGE INTRAVENOUS
Refills: 0 | Status: DISCONTINUED | OUTPATIENT
Start: 2024-10-30 | End: 2024-10-30

## 2024-10-30 RX ADMIN — Medication 1: at 07:05

## 2024-10-30 RX ADMIN — IMIPENEM AND CILASTATIN 100 MILLIGRAM(S): 500; 500 INJECTION, POWDER, FOR SOLUTION INTRAVENOUS at 18:34

## 2024-10-30 RX ADMIN — GABAPENTIN 800 MILLIGRAM(S): 300 CAPSULE ORAL at 06:12

## 2024-10-30 RX ADMIN — Medication 8 UNIT(S): at 07:08

## 2024-10-30 RX ADMIN — GABAPENTIN 800 MILLIGRAM(S): 300 CAPSULE ORAL at 18:34

## 2024-10-30 RX ADMIN — Medication 10 MILLIEQUIVALENT(S): at 06:12

## 2024-10-30 RX ADMIN — IMIPENEM AND CILASTATIN 100 MILLIGRAM(S): 500; 500 INJECTION, POWDER, FOR SOLUTION INTRAVENOUS at 05:06

## 2024-10-30 RX ADMIN — Medication 100 MILLILITER(S): at 18:33

## 2024-10-30 RX ADMIN — Medication 25 GRAM(S): at 06:12

## 2024-10-30 RX ADMIN — Medication 40 MILLIGRAM(S): at 22:14

## 2024-10-30 RX ADMIN — MORPHINE SULFATE 2 MILLIGRAM(S): 30 TABLET, EXTENDED RELEASE ORAL at 22:14

## 2024-10-30 RX ADMIN — MORPHINE SULFATE 2 MILLIGRAM(S): 30 TABLET, EXTENDED RELEASE ORAL at 16:51

## 2024-10-30 RX ADMIN — MORPHINE SULFATE 2 MILLIGRAM(S): 30 TABLET, EXTENDED RELEASE ORAL at 17:15

## 2024-10-30 RX ADMIN — Medication 975 MILLIGRAM(S): at 05:36

## 2024-10-30 RX ADMIN — Medication 975 MILLIGRAM(S): at 05:06

## 2024-10-30 RX ADMIN — Medication 975 MILLIGRAM(S): at 18:34

## 2024-10-30 RX ADMIN — MORPHINE SULFATE 2 MILLIGRAM(S): 30 TABLET, EXTENDED RELEASE ORAL at 22:44

## 2024-10-30 RX ADMIN — Medication 100 MILLILITER(S): at 22:14

## 2024-10-30 NOTE — PROGRESS NOTE ADULT - SUBJECTIVE AND OBJECTIVE BOX
Subjective: Patient seen and examined. No new events except as noted.     SUBJECTIVE/ROS:        MEDICATIONS:  MEDICATIONS  (STANDING):  acetaminophen     Tablet .. 975 milliGRAM(s) Oral every 6 hours  aspirin  chewable 81 milliGRAM(s) Oral daily  atorvastatin 40 milliGRAM(s) Oral at bedtime  chlorhexidine 2% Cloths 1 Application(s) Topical daily  dextrose 5%. 1000 milliLiter(s) (100 mL/Hr) IV Continuous <Continuous>  dextrose 5%. 1000 milliLiter(s) (50 mL/Hr) IV Continuous <Continuous>  dextrose 50% Injectable 25 Gram(s) IV Push once  dextrose 50% Injectable 12.5 Gram(s) IV Push once  dextrose 50% Injectable 25 Gram(s) IV Push once  gabapentin 800 milliGRAM(s) Oral two times a day  glucagon  Injectable 1 milliGRAM(s) IntraMuscular once  imipenem/cilastatin  IVPB 500 milliGRAM(s) IV Intermittent every 6 hours  influenza   Vaccine 0.5 milliLiter(s) IntraMuscular once  insulin glargine Injectable (LANTUS) 8 Unit(s) SubCutaneous every morning  insulin lispro (ADMELOG) corrective regimen sliding scale   SubCutaneous every 6 hours  lactated ringers. 1000 milliLiter(s) (100 mL/Hr) IV Continuous <Continuous>      PHYSICAL EXAM:  T(C): 36.7 (10-30-24 @ 05:11), Max: 36.8 (10-29-24 @ 10:08)  HR: 94 (10-30-24 @ 05:11) (94 - 99)  BP: 158/86 (10-30-24 @ 05:11) (132/78 - 159/87)  RR: 18 (10-30-24 @ 05:11) (18 - 19)  SpO2: 98% (10-30-24 @ 05:11) (97% - 99%)  Wt(kg): --  I&O's Summary    29 Oct 2024 07:01  -  30 Oct 2024 07:00  --------------------------------------------------------  IN: 1570 mL / OUT: 1200 mL / NET: 370 mL      Height (cm): 193 (10-30 @ 04:37)  Weight (kg): 79.8 (10-30 @ 04:37)  BMI (kg/m2): 21.4 (10-30 @ 04:37)  BSA (m2): 2.1 (10-30 @ 04:37)      JVP: Normal  Neck: supple  Lung: clear   CV: S1 S2 , Murmur:  Abd: soft  Ext: No edema  Psych: flat affect  Skin: normal``    LABS/DATA:    CARDIAC MARKERS:                                8.6    5.51  )-----------( 385      ( 30 Oct 2024 04:00 )             26.7     10-30    136  |  101  |  18  ----------------------------<  146[H]  3.9   |  25  |  1.24    Ca    8.6      30 Oct 2024 04:00  Phos  3.3     10-30  Mg     1.60     10-30      proBNP:   Lipid Profile:   HgA1c:   TSH:     TELE:  EKG:

## 2024-10-30 NOTE — DISCHARGE NOTE PROVIDER - NSDCFUADDINST_GEN_ALL_CORE_FT
Continue your medication regimen and a consistent carbohydrate diet (Meaning eating the same amount of carbohydrates at the same time each day).     Monitor blood glucose levels throughout the day before meals and at bedtime.   Record blood sugars and bring to outpatient providers appointment in order to be reviewed by your doctor for management modifications. Monitor for signs/symptoms of low blood glucose, such as, dizziness, altered mental status, or cool/clammy skin. In addition, monitor for signs/symptoms of high blood glucose, such as, feeling hot, dry, fatigued, or with increased thirst/urination.       Make regular podiatry appointments in order to have feet checked for wounds and uncontrolled toe nail growth to prevent infections, as well as, appointments with an ophthalmologist to monitor your vision.

## 2024-10-30 NOTE — DISCHARGE NOTE PROVIDER - NSDCMRMEDTOKEN_GEN_ALL_CORE_FT
gabapentin 800 mg oral tablet: 1 tab(s) orally 2 times a day   acetaminophen 325 mg oral tablet: 2 tab(s) orally every 6 hours As needed Mild Pain (1 - 3)  gabapentin 800 mg oral tablet: 1 tab(s) orally 2 times a day  Outpatient Physical Therapy: Outpatient Physical Therapy 2-3 times per week for 4 weeks  Rolling Walker: Please dispense 1 for assistance in ADLs. Dx: I96, left foot gangrene   acetaminophen 325 mg oral tablet: 2 tab(s) orally every 6 hours As needed Mild Pain (1 - 3)  gabapentin 800 mg oral tablet: 1 tab(s) orally 2 times a day  insulin glargine 100 units/mL subcutaneous solution: 20 unit(s) subcutaneous once a day (in the morning) Please continue to monitor sugar levels with monitor and use sliding scale  Outpatient Physical Therapy: Outpatient Physical Therapy 2-3 times per week for 4 weeks  Rolling Walker: Please dispense 1 for assistance in ADLs. Dx: I96, left foot gangrene   acetaminophen 325 mg oral tablet: 2 tab(s) orally every 6 hours As needed Mild Pain (1 - 3)  aspirin 81 mg oral tablet, chewable: 1 tab(s) orally once a day  atorvastatin 40 mg oral tablet: 1 tab(s) orally once a day (at bedtime)  gabapentin 800 mg oral tablet: 1 tab(s) orally 2 times a day  insulin glargine 100 units/mL subcutaneous solution: 20 unit(s) subcutaneous once a day (in the morning) Please continue to monitor sugar levels with monitor and use sliding scale  Outpatient Physical Therapy: Outpatient Physical Therapy 2-3 times per week for 4 weeks  Rolling Walker: Please dispense 1 for assistance in ADLs. Dx: I96, left foot gangrene   acetaminophen 325 mg oral tablet: 2 tab(s) orally every 6 hours As needed Mild Pain (1 - 3)  amoxicillin-clavulanate 875 mg-125 mg oral tablet: 875 milligram(s) orally every 12 hours please take 1 tab every 12 hours until 11/9  aspirin 81 mg oral tablet, chewable: 1 tab(s) orally once a day  atorvastatin 40 mg oral tablet: 1 tab(s) orally once a day (at bedtime)  gabapentin 800 mg oral tablet: 1 tab(s) orally 2 times a day  insulin glargine 100 units/mL subcutaneous solution: 20 unit(s) subcutaneous once a day (in the morning) Please continue to monitor sugar levels with monitor and use sliding scale  levoFLOXacin 750 mg oral tablet: 1 tab(s) orally once a day please take one tablet once a day until 11/9  Outpatient Physical Therapy: Outpatient Physical Therapy 2-3 times per week for 4 weeks  Rolling Walker: Please dispense 1 for assistance in ADLs. Dx: I96, left foot gangrene

## 2024-10-30 NOTE — PROGRESS NOTE ADULT - SUBJECTIVE AND OBJECTIVE BOX
Name of Patient : TARA BLAKE  MRN: [id number]  Date of visit: 10-30-24       Subjective: Patient seen and examined. No new events except as noted.   Doing okay     REVIEW OF SYSTEMS:    CONSTITUTIONAL: No weakness, fevers or chills  EYES/ENT: No visual changes;  No vertigo or throat pain   NECK: No pain or stiffness  RESPIRATORY: No cough, wheezing, hemoptysis; No shortness of breath  CARDIOVASCULAR: No chest pain or palpitations  GASTROINTESTINAL: No abdominal or epigastric pain. No nausea, vomiting, or hematemesis; No diarrhea or constipation. No melena or hematochezia.  GENITOURINARY: No dysuria, frequency or hematuria  NEUROLOGICAL: No numbness or weakness  SKIN: No itching, burning, rashes, or lesions   All other review of systems is negative unless indicated above.    MEDICATIONS:  MEDICATIONS  (STANDING):  acetaminophen     Tablet .. 975 milliGRAM(s) Oral every 6 hours  aspirin  chewable 81 milliGRAM(s) Oral daily  atorvastatin 40 milliGRAM(s) Oral at bedtime  chlorhexidine 2% Cloths 1 Application(s) Topical daily  dextrose 5%. 1000 milliLiter(s) (100 mL/Hr) IV Continuous <Continuous>  dextrose 5%. 1000 milliLiter(s) (50 mL/Hr) IV Continuous <Continuous>  dextrose 50% Injectable 25 Gram(s) IV Push once  dextrose 50% Injectable 12.5 Gram(s) IV Push once  dextrose 50% Injectable 25 Gram(s) IV Push once  gabapentin 800 milliGRAM(s) Oral two times a day  glucagon  Injectable 1 milliGRAM(s) IntraMuscular once  imipenem/cilastatin  IVPB 500 milliGRAM(s) IV Intermittent every 6 hours  influenza   Vaccine 0.5 milliLiter(s) IntraMuscular once  insulin glargine Injectable (LANTUS) 8 Unit(s) SubCutaneous every morning  insulin lispro (ADMELOG) corrective regimen sliding scale   SubCutaneous every 6 hours  lactated ringers. 1000 milliLiter(s) (100 mL/Hr) IV Continuous <Continuous>      PHYSICAL EXAM:  T(C): 36.2 (10-30-24 @ 11:45), Max: 36.8 (10-29-24 @ 20:51)  HR: 91 (10-30-24 @ 12:30) (60 - 100)  BP: 193/94 (10-30-24 @ 12:30) (130/84 - 193/94)  RR: 13 (10-30-24 @ 12:30) (12 - 19)  SpO2: 100% (10-30-24 @ 12:30) (95% - 100%)  Wt(kg): --  I&O's Summary    29 Oct 2024 07:01  -  30 Oct 2024 07:00  --------------------------------------------------------  IN: 1570 mL / OUT: 1200 mL / NET: 370 mL      Height (cm): 182.9 (10-30 @ 09:45)  Weight (kg): 79.8 (10-30 @ 09:45)  BMI (kg/m2): 23.9 (10-30 @ 09:45)  BSA (m2): 2.02 (10-30 @ 09:45)    Appearance: Normal	  HEENT:  PERRLA   Lymphatic: No lymphadenopathy   Cardiovascular: Normal S1 S2, no JVD  Respiratory: normal effort , clear  Gastrointestinal:  Soft, Non-tender  Skin: No rashes,  warm to touch  Psychiatry:  Mood & affect appropriate  Musculuskeletal: No edema    recent labs, Imaging and EKGs personally reviewed     10-29-24 @ 07:01  -  10-30-24 @ 07:00  --------------------------------------------------------  IN: 1570 mL / OUT: 1200 mL / NET: 370 mL                          8.6    5.51  )-----------( 385      ( 30 Oct 2024 04:00 )             26.7               10-30    136  |  101  |  18  ----------------------------<  146[H]  3.9   |  25  |  1.24    Ca    8.6      30 Oct 2024 04:00  Phos  3.3     10-30  Mg     1.60     10-30      PT/INR - ( 30 Oct 2024 04:00 )   PT: 13.3 sec;   INR: 1.12 ratio         PTT - ( 30 Oct 2024 04:00 )  PTT:57.9 sec                   Urinalysis Basic - ( 30 Oct 2024 04:00 )    Color: x / Appearance: x / SG: x / pH: x  Gluc: 146 mg/dL / Ketone: x  / Bili: x / Urobili: x   Blood: x / Protein: x / Nitrite: x   Leuk Esterase: x / RBC: x / WBC x   Sq Epi: x / Non Sq Epi: x / Bacteria: x

## 2024-10-30 NOTE — DISCHARGE NOTE PROVIDER - NSDCFUADDAPPT_GEN_ALL_CORE_FT
Podiatry Discharge Instructions:  Follow up: Please follow up with Dr. Calvert within 1 week of discharge from the hospital, please call [phone] for appointment and discuss that you recently were seen in the hospital.  Wound Care: Please leave your dressing clean dry intact until your follow up appointment  Weight bearing: Please remain non-weightbearing to the left lower extremity in surgical shoe  Antibiotics: Please continue as instructed. Podiatry Discharge Instructions:  Follow up: Please follow up with Dr. Calvert within 1 week of discharge from the hospital, please call [phone] for appointment and discuss that you recently were seen in the hospital.  Wound Care: Please leave your dressing clean dry intact until your follow up appointment  Weight bearing: Please remain non-weightbearing to the left lower extremity in surgical shoe  Antibiotics: Please continue as instructed.     Please follow up with your endocrinologist within 2 weeks of discharge for management of Diabetes.

## 2024-10-30 NOTE — PROGRESS NOTE ADULT - ASSESSMENT
54 year old male with PMHx of DM and stroke not on ASA PSHx of Rt. BKA in 2019 presents with left foot gangrene and web space infection. Now s/p LLE angiogram 10/24. Doing well. Ok for podiatric intervention from vascular standpoint. Patient wants 2/3/4 partial ray resection with pods 10/30.    Plan:  - Diet: NPO at midnight  - Pain management  - Hep gtt / ASA - heparin held at 6 am for pods OR 10.30  - Statin  - Podiatry - OR today x foot TMA VS L foot Partial 3rd and 4th ray amputation  - X-Ray of the foot - cortical erosions and destruction of the left fourth metatarsal head and proximal phalanx concerning for acute osteomyelitis.  - ID consult: MRSA swab, Continue empiric vanc/vosyn for now  - Abscess culture with gram + cocci  - VA right groin duplex - Negative   - H/H stable   - Cards/Medicine cleared documented 10/28 appreciated    Vascular Surgery   [id number]

## 2024-10-30 NOTE — DISCHARGE NOTE PROVIDER - NSDCCPTREATMENT_GEN_ALL_CORE_FT
PRINCIPAL PROCEDURE  Procedure: Partial amputation of third ray of left foot by open approach  Findings and Treatment:

## 2024-10-30 NOTE — BRIEF OPERATIVE NOTE - OPERATION/FINDINGS
s/p left foot partial 2nd/3rd/4th ray resection, closed  -Bone at proximal resection was hard and of good quality  -Adequate intraop bleeding, although viability is highly guarded  -Incision primarily closed with 3-0 nylon

## 2024-10-30 NOTE — DISCHARGE NOTE PROVIDER - NSDCCPCAREPLAN_GEN_ALL_CORE_FT
PRINCIPAL DISCHARGE DIAGNOSIS  Diagnosis: Gangrene of left foot  Assessment and Plan of Treatment:

## 2024-10-30 NOTE — BRIEF OPERATIVE NOTE - NSICDXBRIEFPREOP_GEN_ALL_CORE_FT
PRE-OP DIAGNOSIS:  Foot osteomyelitis 30-Oct-2024 11:58:11  Kat Hernandez  Dry gangrene 30-Oct-2024 11:58:23  Kat Hernandez

## 2024-10-30 NOTE — DISCHARGE NOTE PROVIDER - CARE PROVIDER_API CALL
Lexie Ramirez  Vascular Surgery  [address], [address]  [place] [address]  Phone: [phone]  Fax: [phone]  Follow Up Time: 2 weeks

## 2024-10-30 NOTE — BRIEF OPERATIVE NOTE - NSICDXBRIEFPOSTOP_GEN_ALL_CORE_FT
POST-OP DIAGNOSIS:  Foot osteomyelitis 30-Oct-2024 11:58:34  Kat Hernandez  Dry gangrene 30-Oct-2024 11:58:42  Kat Hernandez

## 2024-10-30 NOTE — PROGRESS NOTE ADULT - SUBJECTIVE AND OBJECTIVE BOX
Patient is a 54y old  Male who presents with a chief complaint of Foot Wound (30 Oct 2024 08:17)      INTERVAL HPI/OVERNIGHT EVENTS:   Pt is scheduled for Left foot partial 2nd/3rd/4th ray resection with Dr. Calvert at 9:30am. Patient is aware of procedure and is NPO since midnight.    MEDICATIONS  (STANDING):  acetaminophen     Tablet .. 975 milliGRAM(s) Oral every 6 hours  aspirin  chewable 81 milliGRAM(s) Oral daily  atorvastatin 40 milliGRAM(s) Oral at bedtime  chlorhexidine 2% Cloths 1 Application(s) Topical daily  dextrose 5%. 1000 milliLiter(s) (100 mL/Hr) IV Continuous <Continuous>  dextrose 5%. 1000 milliLiter(s) (50 mL/Hr) IV Continuous <Continuous>  dextrose 50% Injectable 25 Gram(s) IV Push once  dextrose 50% Injectable 12.5 Gram(s) IV Push once  dextrose 50% Injectable 25 Gram(s) IV Push once  gabapentin 800 milliGRAM(s) Oral two times a day  glucagon  Injectable 1 milliGRAM(s) IntraMuscular once  imipenem/cilastatin  IVPB 500 milliGRAM(s) IV Intermittent every 6 hours  influenza   Vaccine 0.5 milliLiter(s) IntraMuscular once  insulin glargine Injectable (LANTUS) 8 Unit(s) SubCutaneous every morning  insulin lispro (ADMELOG) corrective regimen sliding scale   SubCutaneous every 6 hours  lactated ringers. 1000 milliLiter(s) (100 mL/Hr) IV Continuous <Continuous>    MEDICATIONS  (PRN):  dextrose Oral Gel 15 Gram(s) Oral once PRN Blood Glucose LESS THAN 70 milliGRAM(s)/deciliter      Allergies    No Known Allergies    Intolerances        Vital Signs Last 24 Hrs  T(C): 36.7 (30 Oct 2024 05:11), Max: 36.8 (29 Oct 2024 10:08)  T(F): 98.1 (30 Oct 2024 05:11), Max: 98.3 (29 Oct 2024 10:08)  HR: 94 (30 Oct 2024 05:11) (94 - 99)  BP: 158/86 (30 Oct 2024 05:11) (132/78 - 159/87)  BP(mean): --  RR: 18 (30 Oct 2024 05:11) (18 - 19)  SpO2: 98% (30 Oct 2024 05:11) (97% - 99%)    Parameters below as of 30 Oct 2024 05:11  Patient On (Oxygen Delivery Method): room air        LABS:                        8.6    5.51  )-----------( 385      ( 30 Oct 2024 04:00 )             26.7     10-30    136  |  101  |  18  ----------------------------<  146[H]  3.9   |  25  |  1.24    Ca    8.6      30 Oct 2024 04:00  Phos  3.3     10-30  Mg     1.60     10-30      PT/INR - ( 30 Oct 2024 04:00 )   PT: 13.3 sec;   INR: 1.12 ratio         PTT - ( 30 Oct 2024 04:00 )  PTT:57.9 sec  Urinalysis Basic - ( 30 Oct 2024 04:00 )    Color: x / Appearance: x / SG: x / pH: x  Gluc: 146 mg/dL / Ketone: x  / Bili: x / Urobili: x   Blood: x / Protein: x / Nitrite: x   Leuk Esterase: x / RBC: x / WBC x   Sq Epi: x / Non Sq Epi: x / Bacteria: x      CAPILLARY BLOOD GLUCOSE      POCT Blood Glucose.: 133 mg/dL (30 Oct 2024 08:25)  POCT Blood Glucose.: 161 mg/dL (30 Oct 2024 06:42)  POCT Blood Glucose.: 91 mg/dL (30 Oct 2024 00:50)  POCT Blood Glucose.: 77 mg/dL (29 Oct 2024 22:23)  POCT Blood Glucose.: 68 mg/dL (29 Oct 2024 22:21)  POCT Blood Glucose.: 175 mg/dL (29 Oct 2024 17:34)  POCT Blood Glucose.: 98 mg/dL (29 Oct 2024 12:26)  POCT Blood Glucose.: 215 mg/dL (29 Oct 2024 08:46)      RADIOLOGY & ADDITIONAL TESTS:

## 2024-10-30 NOTE — BRIEF OPERATIVE NOTE - NSICDXBRIEFPROCEDURE_GEN_ALL_CORE_FT
PROCEDURES:  Partial amputation of third ray of left foot by open approach 30-Oct-2024 11:57:34  Kat Hernandez  Partial amputation of fourth ray of left foot by open approach 30-Oct-2024 11:57:47  Kat Hernandez  Partial amputation of second ray of left foot by open approach 30-Oct-2024 11:59:21  Kat Hernandez

## 2024-10-30 NOTE — PROGRESS NOTE ADULT - SUBJECTIVE AND OBJECTIVE BOX
SURGERY DAILY PROGRESS NOTE:     Overnight Events:  No acute events overnight.    SUBJECTIVE: Patient seen and examined at bedside on AM rounds. Patient without complaints. Tolerating diet, denies nausea, vomiting. OOB/Amublating as tolerated. Denies fever, chills.      OBJECTIVE:  Vital Signs Last 24 Hrs  T(C): 36.7 (30 Oct 2024 05:11), Max: 36.8 (29 Oct 2024 10:08)  T(F): 98.1 (30 Oct 2024 05:11), Max: 98.3 (29 Oct 2024 10:08)  HR: 94 (30 Oct 2024 05:11) (94 - 99)  BP: 158/86 (30 Oct 2024 05:11) (132/78 - 159/87)  BP(mean): --  RR: 18 (30 Oct 2024 05:11) (18 - 19)  SpO2: 98% (30 Oct 2024 05:11) (97% - 99%)    Parameters below as of 30 Oct 2024 05:11  Patient On (Oxygen Delivery Method): room air      I&O's Detail    29 Oct 2024 07:01  -  30 Oct 2024 07:00  --------------------------------------------------------  IN:    Heparin: 130 mL    Lactated Ringers: 1200 mL    Oral Fluid: 240 mL  Total IN: 1570 mL    OUT:    Voided (mL): 1200 mL  Total OUT: 1200 mL    Total NET: 370 mL        Daily Height in cm: 193 (30 Oct 2024 04:37)    Daily     LABS:                        8.6    5.51  )-----------( 385      ( 30 Oct 2024 04:00 )             26.7     10-30    136  |  101  |  18  ----------------------------<  146[H]  3.9   |  25  |  1.24    Ca    8.6      30 Oct 2024 04:00  Phos  3.3     10-30  Mg     1.60     10-30      PT/INR - ( 30 Oct 2024 04:00 )   PT: 13.3 sec;   INR: 1.12 ratio         PTT - ( 30 Oct 2024 04:00 )  PTT:57.9 sec  Urinalysis Basic - ( 30 Oct 2024 04:00 )    Color: x / Appearance: x / SG: x / pH: x  Gluc: 146 mg/dL / Ketone: x  / Bili: x / Urobili: x   Blood: x / Protein: x / Nitrite: x   Leuk Esterase: x / RBC: x / WBC x   Sq Epi: x / Non Sq Epi: x / Bacteria: x      PHYSICAL EXAM:  General: NAD, resting in bed comfortably.  Cardiac: HDS   Respiratory: Nonlabored respirations, normal cw expansion.  Extremities: s/p Rt. BKA. Left foot dopplerable signals PT. Intact motor and sensory. Foot covered in kerlix  Right groin access site tender, c/d/i, small bruise on right groin/hip, unchanged

## 2024-10-30 NOTE — BRIEF OPERATIVE NOTE - COMMENTS
pt is s/p left foot partial 2nd/3rd/4th ray resection, closed  Low concern for residual infection  High concern for viability  Podiatry plan: follow-up OR data for 48 hours of no growth, follow-up vascular and ID recs, stable for discharge from podiatry standpoint pending 48 hours of no growth and vascular/ID recs

## 2024-10-30 NOTE — DISCHARGE NOTE PROVIDER - HOSPITAL COURSE
54 year old male with PMHx of DM and stroke not on ASA PSHx of Rt. BKA in 2019 presents with left foot gangrene and web space infection. Now s/p LLE angiogram 10/24. S/p left foot partial 2nd/3rd/4th ray resection 10/30.     Patient tolerated operation well and there were no post-operative complications identified. Patient remained hemodynamically stable in the PACU and transferred to the surgical floor. Diet was restarted and advanced as tolerated. Pain control was transitioned from IV to PO pain meds. At this time, patient is currently ambulating, voiding, tolerating a regular diet. Patient was recommended from PT to be discharged to rehab, however patient refused and wants to go home. Patient now recommended for home and outpatient PT with a rolling walker, which have been provided to the patient. 54 year old male with PMHx of DM and stroke not on ASA PSHx of Rt. BKA in 2019 presents with left foot gangrene and web space infection. Now s/p LLE angiogram 10/24. S/p left foot partial 2nd/3rd/4th ray resection 10/30.     Cultures collected from OR on 10/30 resulted negative. ID recommended _____ for discharge.     Patient tolerated operation well and there were no post-operative complications identified. Patient remained hemodynamically stable in the PACU and transferred to the surgical floor. Diet was restarted and advanced as tolerated. Pain control was transitioned from IV to PO pain meds. At this time, patient is currently ambulating, voiding, tolerating a regular diet. Patient was recommended from PT to be discharged to rehab, however patient refused and wants to go home. Patient also declined home and outpatient PT. Prescription for rolling walker was recommended, which has been provided. 54 year old male with PMHx of DM and stroke not on ASA PSHx of Rt. BKA in 2019 presents with left foot gangrene and web space infection. Now s/p LLE angiogram 10/24. S/p left foot partial 2nd/3rd/4th ray resection 10/30.     ID consulted - Cultures collected from OR on 10/30 resulted negative. ID recommended _____ for discharge.     TTE (10/24) - 1. Left ventricular cavity is normal in size. Left ventricular wall thickness is normal. Left ventricular systolic function is normal with an ejection fraction of 58 % by Weaver's method of disks. 2. Normal left ventricular diastolic function. 3. Normal right ventricular cavity size and normal right ventricular systolic function. 4. Left atrium is normal in size. 5. Structurally normal mitral valve with normal leaflet excursion. 6. Tricuspid aortic valve with normal leaflet excursion with normal systolic excursion. 7. No pericardial effusion seen. 8. The inferior vena cava is normal in size measuring 1.42 cm in diameter, (normal <2.1cm) with normal inspiratory collapse (normal >50%) consistent with normal right atrial pressure (~3, range 0-5mmHg). 9. Pulmonary artery systolic pressure could not be estimated.  CHRISTIAN/PVR (10/24) - Right Lower Extremity Arteries: Below knee amputation. Left Lower Extremity Arteries: Resting left ankle-brachial index is within normal range. No evidence of significant left lower extremity arterial disease. The left toe-brachial index is severly reduced.  VA duplex RLE (10/26) - No evidence of pseudoaneurysm visualized in the right inguinal region. The right common femoral, proximal superficial femoral, and proximal deep femoral arteries are otherwise patent, and demonstrate normal flow velocities with triphasic waveforms. No hemodynamically significant stenosis. The right common femoral vein is patent and compressible, without evidence of thrombosis.  CT A/P non con (10/26) - No intra-abdominal collection. Muscular enlargement with superficial and interposed fluid infiltration along the RIGHT rectus/oblique musculature, likely reflecting intramuscular hematoma. No discrete fluid collection is identified. Few enlarged left inguinal lymph nodes, likely reactive.    Patient tolerated operation well and there were no post-operative complications identified. Patient remained hemodynamically stable in the PACU and transferred to the surgical floor. Diet was restarted and advanced as tolerated. Pain control was transitioned from IV to PO pain meds. At this time, patient is currently ambulating, voiding, tolerating a regular diet. Patient was recommended from PT to be discharged to rehab, however patient refused and wants to go home. Patient also declined home and outpatient PT. Prescription for rolling walker was recommended, which has been provided. 54 year old male with PMHx of DM and stroke not on ASA PSHx of Rt. BKA in 2019 presents with left foot gangrene and web space infection. Now s/p LLE angiogram 10/24. S/p left foot partial 2nd/3rd/4th ray resection 10/30.     ID consulted - Cultures collected from OR on 10/30 resulted negative. ID recommended _____ for discharge.     TTE (10/24) - 1. Left ventricular cavity is normal in size. Left ventricular wall thickness is normal. Left ventricular systolic function is normal with an ejection fraction of 58 % by Weaver's method of disks. 2. Normal left ventricular diastolic function. 3. Normal right ventricular cavity size and normal right ventricular systolic function. 4. Left atrium is normal in size. 5. Structurally normal mitral valve with normal leaflet excursion. 6. Tricuspid aortic valve with normal leaflet excursion with normal systolic excursion. 7. No pericardial effusion seen. 8. The inferior vena cava is normal in size measuring 1.42 cm in diameter, (normal <2.1cm) with normal inspiratory collapse (normal >50%) consistent with normal right atrial pressure (~3, range 0-5mmHg). 9. Pulmonary artery systolic pressure could not be estimated.  CHRISTIAN/PVR (10/24) - Right Lower Extremity Arteries: Below knee amputation. Left Lower Extremity Arteries: Resting left ankle-brachial index is within normal range. No evidence of significant left lower extremity arterial disease. The left toe-brachial index is severly reduced.  VA duplex RLE (10/26) - No evidence of pseudoaneurysm visualized in the right inguinal region. The right common femoral, proximal superficial femoral, and proximal deep femoral arteries are otherwise patent, and demonstrate normal flow velocities with triphasic waveforms. No hemodynamically significant stenosis. The right common femoral vein is patent and compressible, without evidence of thrombosis.  CT A/P non con (10/26) - No intra-abdominal collection. Muscular enlargement with superficial and interposed fluid infiltration along the RIGHT rectus/oblique musculature, likely reflecting intramuscular hematoma. No discrete fluid collection is identified. Few enlarged left inguinal lymph nodes, likely reactive.    Patient tolerated operation well and there were no post-operative complications identified. Patient remained hemodynamically stable in the PACU and transferred to the surgical floor. Diet was restarted and advanced as tolerated. Pain control was transitioned from IV to PO pain meds. At this time, patient is currently voiding, tolerating a regular diet. Patient was recommended from PT to be discharged to rehab, however patient refused and wants to go home. Patient also declined home and outpatient PT. Prescription for rolling walker was recommended, which has been provided. 54 year old male with PMHx of DM and stroke not on ASA PSHx of Rt. BKA in 2019 presents with left foot gangrene and web space infection. Now s/p LLE angiogram 10/24. S/p left foot partial 2nd/3rd/4th ray resection 10/30.     ID consulted - Cultures collected from OR on 10/30 resulted negative. Patient was on Primaxin. Now ID recommended Augmentin 875mg BID and Levaquin 750mg QD for discharge. To take 10 days from OR, which will end on 11/9.      TTE (10/24) - 1. Left ventricular cavity is normal in size. Left ventricular wall thickness is normal. Left ventricular systolic function is normal with an ejection fraction of 58 % by Weaver's method of disks. 2. Normal left ventricular diastolic function. 3. Normal right ventricular cavity size and normal right ventricular systolic function. 4. Left atrium is normal in size. 5. Structurally normal mitral valve with normal leaflet excursion. 6. Tricuspid aortic valve with normal leaflet excursion with normal systolic excursion. 7. No pericardial effusion seen. 8. The inferior vena cava is normal in size measuring 1.42 cm in diameter, (normal <2.1cm) with normal inspiratory collapse (normal >50%) consistent with normal right atrial pressure (~3, range 0-5mmHg). 9. Pulmonary artery systolic pressure could not be estimated.  CHRISTIAN/PVR (10/24) - Right Lower Extremity Arteries: Below knee amputation. Left Lower Extremity Arteries: Resting left ankle-brachial index is within normal range. No evidence of significant left lower extremity arterial disease. The left toe-brachial index is severly reduced.  VA duplex RLE (10/26) - No evidence of pseudoaneurysm visualized in the right inguinal region. The right common femoral, proximal superficial femoral, and proximal deep femoral arteries are otherwise patent, and demonstrate normal flow velocities with triphasic waveforms. No hemodynamically significant stenosis. The right common femoral vein is patent and compressible, without evidence of thrombosis.  CT A/P non con (10/26) - No intra-abdominal collection. Muscular enlargement with superficial and interposed fluid infiltration along the RIGHT rectus/oblique musculature, likely reflecting intramuscular hematoma. No discrete fluid collection is identified. Few enlarged left inguinal lymph nodes, likely reactive.    Patient tolerated operation well and there were no post-operative complications identified. Patient remained hemodynamically stable in the PACU and transferred to the surgical floor. Diet was restarted and advanced as tolerated. Pain control was transitioned from IV to PO pain meds. At this time, patient is currently voiding, tolerating a regular diet. Patient was recommended from PT to be discharged to rehab, however patient refused and wants to go home. Patient also declined home and outpatient PT. Prescription for rolling walker was recommended, which has been provided.

## 2024-10-30 NOTE — PROGRESS NOTE ADULT - ASSESSMENT
Plan:   To OR today at 9:30am with Dr. Calvert for Left foot partial 2nd/3rd/4th ray resection.   CXR on sunrise.  EKG on sunrise.  Medical/Cardiac clearance since 10/29 and documented in chart.  Consent signed and in chart.  Procedure was explained to patient in detail. All alternatives, risks and complications were discussed. All questions answered.

## 2024-10-30 NOTE — BRIEF OPERATIVE NOTE - SPECIMENS
Pathology: 1. Left foot 2nd/3rd and 4th digits 2. Left foot clean bone margin 4th metatarsal  Microbiology: 1. Deep wound culture 2. Left foot clean bone margin 4th metatarsal

## 2024-10-30 NOTE — PRE-OP CHECKLIST - SELECT TESTS ORDERED
BMP/CBC/PT/PTT/INR/Type and Screen/POCT Blood Glucose
CBC/CMP/Type and Screen/POCT Blood Glucose
FS- 133 @ 825am/CBC/CMP/PT/PTT/Type and Cross/Type and Screen/POCT Blood Glucose

## 2024-10-31 LAB
ANION GAP SERPL CALC-SCNC: 12 MMOL/L — SIGNIFICANT CHANGE UP (ref 7–14)
BUN SERPL-MCNC: 16 MG/DL — SIGNIFICANT CHANGE UP (ref 7–23)
CALCIUM SERPL-MCNC: 8.8 MG/DL — SIGNIFICANT CHANGE UP (ref 8.4–10.5)
CHLORIDE SERPL-SCNC: 103 MMOL/L — SIGNIFICANT CHANGE UP (ref 98–107)
CO2 SERPL-SCNC: 24 MMOL/L — SIGNIFICANT CHANGE UP (ref 22–31)
CREAT SERPL-MCNC: 1.31 MG/DL — HIGH (ref 0.5–1.3)
EGFR: 65 ML/MIN/1.73M2 — SIGNIFICANT CHANGE UP
GLUCOSE BLDC GLUCOMTR-MCNC: 129 MG/DL — HIGH (ref 70–99)
GLUCOSE BLDC GLUCOMTR-MCNC: 135 MG/DL — HIGH (ref 70–99)
GLUCOSE BLDC GLUCOMTR-MCNC: 165 MG/DL — HIGH (ref 70–99)
GLUCOSE BLDC GLUCOMTR-MCNC: 193 MG/DL — HIGH (ref 70–99)
GLUCOSE SERPL-MCNC: 119 MG/DL — HIGH (ref 70–99)
HCT VFR BLD CALC: 27 % — LOW (ref 39–50)
HGB BLD-MCNC: 8.6 G/DL — LOW (ref 13–17)
MAGNESIUM SERPL-MCNC: 2 MG/DL — SIGNIFICANT CHANGE UP (ref 1.6–2.6)
MCHC RBC-ENTMCNC: 27.5 PG — SIGNIFICANT CHANGE UP (ref 27–34)
MCHC RBC-ENTMCNC: 31.9 G/DL — LOW (ref 32–36)
MCV RBC AUTO: 86.3 FL — SIGNIFICANT CHANGE UP (ref 80–100)
NRBC # BLD: 0 /100 WBCS — SIGNIFICANT CHANGE UP (ref 0–0)
NRBC # FLD: 0 K/UL — SIGNIFICANT CHANGE UP (ref 0–0)
PHOSPHATE SERPL-MCNC: 3.5 MG/DL — SIGNIFICANT CHANGE UP (ref 2.5–4.5)
PLATELET # BLD AUTO: 394 K/UL — SIGNIFICANT CHANGE UP (ref 150–400)
POTASSIUM SERPL-MCNC: 4.3 MMOL/L — SIGNIFICANT CHANGE UP (ref 3.5–5.3)
POTASSIUM SERPL-SCNC: 4.3 MMOL/L — SIGNIFICANT CHANGE UP (ref 3.5–5.3)
RBC # BLD: 3.13 M/UL — LOW (ref 4.2–5.8)
RBC # FLD: 13.2 % — SIGNIFICANT CHANGE UP (ref 10.3–14.5)
SODIUM SERPL-SCNC: 139 MMOL/L — SIGNIFICANT CHANGE UP (ref 135–145)
WBC # BLD: 5.9 K/UL — SIGNIFICANT CHANGE UP (ref 3.8–10.5)
WBC # FLD AUTO: 5.9 K/UL — SIGNIFICANT CHANGE UP (ref 3.8–10.5)

## 2024-10-31 PROCEDURE — 99232 SBSQ HOSP IP/OBS MODERATE 35: CPT

## 2024-10-31 PROCEDURE — 99231 SBSQ HOSP IP/OBS SF/LOW 25: CPT

## 2024-10-31 RX ORDER — OXYCODONE HYDROCHLORIDE 30 MG/1
5 TABLET ORAL EVERY 4 HOURS
Refills: 0 | Status: DISCONTINUED | OUTPATIENT
Start: 2024-10-31 | End: 2024-11-01

## 2024-10-31 RX ORDER — OXYCODONE HYDROCHLORIDE 30 MG/1
2.5 TABLET ORAL EVERY 4 HOURS
Refills: 0 | Status: DISCONTINUED | OUTPATIENT
Start: 2024-10-31 | End: 2024-11-01

## 2024-10-31 RX ORDER — HEPARIN SODIUM 10000 [USP'U]/ML
5000 INJECTION INTRAVENOUS; SUBCUTANEOUS EVERY 8 HOURS
Refills: 0 | Status: DISCONTINUED | OUTPATIENT
Start: 2024-10-31 | End: 2024-11-01

## 2024-10-31 RX ADMIN — IMIPENEM AND CILASTATIN 100 MILLIGRAM(S): 500; 500 INJECTION, POWDER, FOR SOLUTION INTRAVENOUS at 05:54

## 2024-10-31 RX ADMIN — Medication 81 MILLIGRAM(S): at 12:59

## 2024-10-31 RX ADMIN — IMIPENEM AND CILASTATIN 100 MILLIGRAM(S): 500; 500 INJECTION, POWDER, FOR SOLUTION INTRAVENOUS at 00:01

## 2024-10-31 RX ADMIN — GABAPENTIN 800 MILLIGRAM(S): 300 CAPSULE ORAL at 18:14

## 2024-10-31 RX ADMIN — Medication 1: at 12:59

## 2024-10-31 RX ADMIN — IMIPENEM AND CILASTATIN 100 MILLIGRAM(S): 500; 500 INJECTION, POWDER, FOR SOLUTION INTRAVENOUS at 18:28

## 2024-10-31 RX ADMIN — GABAPENTIN 800 MILLIGRAM(S): 300 CAPSULE ORAL at 05:54

## 2024-10-31 RX ADMIN — Medication 40 MILLIGRAM(S): at 22:51

## 2024-10-31 RX ADMIN — HEPARIN SODIUM 5000 UNIT(S): 10000 INJECTION INTRAVENOUS; SUBCUTANEOUS at 22:56

## 2024-10-31 RX ADMIN — HEPARIN SODIUM 5000 UNIT(S): 10000 INJECTION INTRAVENOUS; SUBCUTANEOUS at 13:00

## 2024-10-31 RX ADMIN — Medication 975 MILLIGRAM(S): at 00:01

## 2024-10-31 RX ADMIN — Medication 100 MILLILITER(S): at 05:55

## 2024-10-31 RX ADMIN — Medication 975 MILLIGRAM(S): at 05:54

## 2024-10-31 RX ADMIN — IMIPENEM AND CILASTATIN 100 MILLIGRAM(S): 500; 500 INJECTION, POWDER, FOR SOLUTION INTRAVENOUS at 13:02

## 2024-10-31 RX ADMIN — Medication 20 UNIT(S): at 09:29

## 2024-10-31 RX ADMIN — Medication 1: at 18:15

## 2024-10-31 RX ADMIN — CHLORHEXIDINE GLUCONATE 1 APPLICATION(S): 40 SOLUTION TOPICAL at 13:03

## 2024-10-31 RX ADMIN — IMIPENEM AND CILASTATIN 100 MILLIGRAM(S): 500; 500 INJECTION, POWDER, FOR SOLUTION INTRAVENOUS at 23:02

## 2024-10-31 NOTE — PROGRESS NOTE ADULT - ASSESSMENT
PAD  osteomyelitis of foot  abx  asa  statin   s/p podiatry surgery     DM I  Monitor finger stick. Insulin coverage. Diabetic education and Diabetic diet. Consider nutrition consultation.

## 2024-10-31 NOTE — PROGRESS NOTE ADULT - SUBJECTIVE AND OBJECTIVE BOX
Patient is a 54y old  Male who presents with a chief complaint of Foot Wound (31 Oct 2024 09:45)       INTERVAL HPI/OVERNIGHT EVENTS:  Patient seen and evaluated at bedside.  Pt is resting comfortable in NAD. Denies N/V/F/C.      Allergies    No Known Allergies    Intolerances        Vital Signs Last 24 Hrs  T(C): 36.7 (31 Oct 2024 10:18), Max: 37.4 (30 Oct 2024 17:19)  T(F): 98 (31 Oct 2024 10:18), Max: 99.3 (30 Oct 2024 17:19)  HR: 93 (31 Oct 2024 10:18) (86 - 98)  BP: 140/71 (31 Oct 2024 10:18) (130/84 - 193/94)  BP(mean): 119 (30 Oct 2024 12:30) (97 - 119)  RR: 18 (31 Oct 2024 10:18) (12 - 18)  SpO2: 98% (31 Oct 2024 10:18) (95% - 100%)    Parameters below as of 31 Oct 2024 10:18  Patient On (Oxygen Delivery Method): room air        LABS:                        8.6    5.90  )-----------( 394      ( 31 Oct 2024 06:05 )             27.0     10-31    139  |  103  |  16  ----------------------------<  119[H]  4.3   |  24  |  1.31[H]    Ca    8.8      31 Oct 2024 06:05  Phos  3.5     10-31  Mg     2.00     10-31      PT/INR - ( 30 Oct 2024 04:00 )   PT: 13.3 sec;   INR: 1.12 ratio         PTT - ( 30 Oct 2024 04:00 )  PTT:57.9 sec  Urinalysis Basic - ( 31 Oct 2024 06:05 )    Color: x / Appearance: x / SG: x / pH: x  Gluc: 119 mg/dL / Ketone: x  / Bili: x / Urobili: x   Blood: x / Protein: x / Nitrite: x   Leuk Esterase: x / RBC: x / WBC x   Sq Epi: x / Non Sq Epi: x / Bacteria: x      CAPILLARY BLOOD GLUCOSE      POCT Blood Glucose.: 135 mg/dL (31 Oct 2024 08:43)  POCT Blood Glucose.: 175 mg/dL (30 Oct 2024 22:33)  POCT Blood Glucose.: 146 mg/dL (30 Oct 2024 17:52)  POCT Blood Glucose.: 119 mg/dL (30 Oct 2024 11:49)      Lower Extremity Physical Exam:  Vascular: DP 1/4, PT 0/4, B/L, CFT could not be assessed 2/2 gangrene,, Temperature gradient warm to cool , B/L.   Neuro: Epicritic sensation absent to the level of digits, B/L.  Musculoskeletal/Ortho: S/P R BKA  Skin: 10/30 s/p left foot partial second third fourth ray resections closed, sutures intact, flaps warm and viable, scant sanguinous drainage, sutures intact, no ischemic changes, RLE BKA     RADIOLOGY & ADDITIONAL TESTS:

## 2024-10-31 NOTE — PROGRESS NOTE ADULT - SUBJECTIVE AND OBJECTIVE BOX
Name of Patient : TARA BLAKE  MRN: [id number]  Date of visit: 10-31-24 @ 09:45      Subjective: Patient seen and examined. No new events except as noted.   Doing okay       REVIEW OF SYSTEMS:    CONSTITUTIONAL: No weakness, fevers or chills  EYES/ENT: No visual changes;  No vertigo or throat pain   NECK: No pain or stiffness  RESPIRATORY: No cough, wheezing, hemoptysis; No shortness of breath  CARDIOVASCULAR: No chest pain or palpitations  GASTROINTESTINAL: No abdominal or epigastric pain. No nausea, vomiting, or hematemesis; No diarrhea or constipation. No melena or hematochezia.  GENITOURINARY: No dysuria, frequency or hematuria  NEUROLOGICAL: No numbness or weakness  SKIN: No itching, burning, rashes, or lesions   All other review of systems is negative unless indicated above.    MEDICATIONS:  MEDICATIONS  (STANDING):  aspirin  chewable 81 milliGRAM(s) Oral daily  atorvastatin 40 milliGRAM(s) Oral at bedtime  chlorhexidine 2% Cloths 1 Application(s) Topical daily  dextrose 5%. 1000 milliLiter(s) (100 mL/Hr) IV Continuous <Continuous>  dextrose 5%. 1000 milliLiter(s) (50 mL/Hr) IV Continuous <Continuous>  dextrose 50% Injectable 25 Gram(s) IV Push once  dextrose 50% Injectable 12.5 Gram(s) IV Push once  dextrose 50% Injectable 25 Gram(s) IV Push once  gabapentin 800 milliGRAM(s) Oral two times a day  glucagon  Injectable 1 milliGRAM(s) IntraMuscular once  heparin   Injectable 5000 Unit(s) SubCutaneous every 8 hours  imipenem/cilastatin  IVPB 500 milliGRAM(s) IV Intermittent every 6 hours  influenza   Vaccine 0.5 milliLiter(s) IntraMuscular once  insulin glargine Injectable (LANTUS) 20 Unit(s) SubCutaneous every morning  insulin lispro (ADMELOG) corrective regimen sliding scale   SubCutaneous at bedtime  insulin lispro (ADMELOG) corrective regimen sliding scale   SubCutaneous three times a day before meals      PHYSICAL EXAM:  T(C): 36.5 (10-31-24 @ 05:50), Max: 37.4 (10-30-24 @ 17:19)  HR: 89 (10-31-24 @ 05:50) (86 - 98)  BP: 140/88 (10-31-24 @ 05:50) (130/84 - 193/94)  RR: 18 (10-31-24 @ 05:50) (12 - 18)  SpO2: 100% (10-31-24 @ 05:50) (95% - 100%)  Wt(kg): --  I&O's Summary    30 Oct 2024 07:01  -  31 Oct 2024 07:00  --------------------------------------------------------  IN: 1880 mL / OUT: 200 mL / NET: 1680 mL          Appearance: Normal	  HEENT:  PERRLA   Lymphatic: No lymphadenopathy   Cardiovascular: Normal S1 S2, no JVD  Respiratory: normal effort , clear  Gastrointestinal:  Soft, Non-tender  Skin: No rashes,  warm to touch  Psychiatry:  Mood & affect appropriate  Musculuskeletal: No edema    recent labs, Imaging and EKGs personally reviewed     Culture - Acid Fast - Tissue w/Smear (collected 10-30-24 @ 12:25)  Source: Tissue    Culture - Tissue with Gram Stain (collected 10-30-24 @ 12:25)  Source: Tissue  Gram Stain (10-30-24 @ 21:17):    No polymorphonuclear cells seen    No organisms seen        10-30-24 @ 07:01  -  10-31-24 @ 07:00  --------------------------------------------------------  IN: 1880 mL / OUT: 200 mL / NET: 1680 mL      CBC:            8.6    5.90  )-----------( 394      ( 10-31-24 @ 06:05 )             27.0         Chem:         ( 10-31-24 @ 06:05 )    139  |  103  |  16  ----------------------------<  119[H]  4.3   |  24  |  1.31[H]        Liver Functions:     Type & Screen: ( 10-30-24 @ 04:33 )    ABO/Rh/Arsenio:  O Positive

## 2024-10-31 NOTE — PHYSICAL THERAPY INITIAL EVALUATION ADULT - TRANSFER SAFETY CONCERNS NOTED: SIT/STAND, REHAB EVAL
Requiring repeated cues to maintain non-weightbearing. Patient not adhering to weight bearing precautions./inability to maintain weight-bearing restrictions w/o assist

## 2024-10-31 NOTE — PROGRESS NOTE ADULT - SUBJECTIVE AND OBJECTIVE BOX
Subjective: Patient seen and examined. No new events except as noted.     SUBJECTIVE/ROS:  nad      MEDICATIONS:  MEDICATIONS  (STANDING):  aspirin  chewable 81 milliGRAM(s) Oral daily  atorvastatin 40 milliGRAM(s) Oral at bedtime  chlorhexidine 2% Cloths 1 Application(s) Topical daily  dextrose 5%. 1000 milliLiter(s) (100 mL/Hr) IV Continuous <Continuous>  dextrose 5%. 1000 milliLiter(s) (50 mL/Hr) IV Continuous <Continuous>  dextrose 50% Injectable 25 Gram(s) IV Push once  dextrose 50% Injectable 12.5 Gram(s) IV Push once  dextrose 50% Injectable 25 Gram(s) IV Push once  gabapentin 800 milliGRAM(s) Oral two times a day  glucagon  Injectable 1 milliGRAM(s) IntraMuscular once  imipenem/cilastatin  IVPB 500 milliGRAM(s) IV Intermittent every 6 hours  influenza   Vaccine 0.5 milliLiter(s) IntraMuscular once  insulin glargine Injectable (LANTUS) 20 Unit(s) SubCutaneous every morning  insulin lispro (ADMELOG) corrective regimen sliding scale   SubCutaneous three times a day before meals  insulin lispro (ADMELOG) corrective regimen sliding scale   SubCutaneous at bedtime      PHYSICAL EXAM:  T(C): 36.5 (10-31-24 @ 05:50), Max: 37.4 (10-30-24 @ 17:19)  HR: 89 (10-31-24 @ 05:50) (60 - 100)  BP: 140/88 (10-31-24 @ 05:50) (130/84 - 193/94)  RR: 18 (10-31-24 @ 05:50) (12 - 18)  SpO2: 100% (10-31-24 @ 05:50) (95% - 100%)  Wt(kg): --  I&O's Summary    30 Oct 2024 07:01  -  31 Oct 2024 07:00  --------------------------------------------------------  IN: 1880 mL / OUT: 200 mL / NET: 1680 mL      Height (cm): 182.9 (10-30 @ 09:45)  Weight (kg): 79.8 (10-30 @ 09:45)  BMI (kg/m2): 23.9 (10-30 @ 09:45)  BSA (m2): 2.02 (10-30 @ 09:45)      JVP: Normal  Neck: supple  Lung: clear   CV: S1 S2 , Murmur:  Abd: soft  Ext: No edema  neuro: Awake / alert  Psych: flat affect  Skin: normal``    LABS/DATA:    CARDIAC MARKERS:                                8.6    5.90  )-----------( 394      ( 31 Oct 2024 06:05 )             27.0     10-31    139  |  103  |  16  ----------------------------<  119[H]  4.3   |  24  |  1.31[H]    Ca    8.8      31 Oct 2024 06:05  Phos  3.5     10-31  Mg     2.00     10-31      proBNP:   Lipid Profile:   HgA1c:   TSH:     TELE:  EKG:

## 2024-10-31 NOTE — PROGRESS NOTE ADULT - ASSESSMENT
Patient is a 54 year old male with PMHx of DM and stroke not on ASA PSHx of Rt. BKA in 2019 presents with left foot gangrene and web space infection.     # LE osteo   LE  osteo L foot   ID eval appreciated   ABx as per ID   follow up Cx   Vascular care appreciated   ASA and statin   check echo and EKG noted  S/p Angio  S/P TMA   patient is medically optimized   S/P OR  D/C planing per surg team     # DM  Sliding scale   diab diet   A1C 8  adjust insulin regime     # MANN   avoid nephrotoxic medications   IV and oral hydration   monitor urine output   mild elevation, cont hydration       # chronic CVA   ASA and statin   BP control   fall precautions     DVT and GI PPX

## 2024-10-31 NOTE — PROGRESS NOTE ADULT - ASSESSMENT
54 year old male with PMHx of DM and stroke not on ASA PSHx of Rt. BKA in 2019 presents with left foot gangrene and web space infection. Now s/p LLE angiogram 10/24. S/p left foot partial 2nd/3rd/4th ray resection 10/30. Recovering well.     Plan:  - Diet: Regular   - Pain management  - ASA/SQH  - Statin  - Podiatry - stable for DC after 48 hours of negative culture   - X-Ray of the foot - cortical erosions and destruction of the left fourth metatarsal head and proximal phalanx concerning for acute osteomyelitis.  - ID consult: Continue Imipenem 500 mg Q6H   - Abscess culture with gram + cocci  - VA right groin duplex - Negative   - Cards/Medicine cleared documented 10/28 appreciated  - Dispo: DC tomorrow if cultures are negative     Vascular Surgery   [id number]

## 2024-10-31 NOTE — PROGRESS NOTE ADULT - SUBJECTIVE AND OBJECTIVE BOX
INTERVAL EVENTS: S/p left foot partial 2nd/3rd/4th ray resection, pain management.   SUBJECTIVE: Patient seen and examined at bedside with surgical team, patient without complaints. Denies fever, chills, CP, SOB nausea, vomiting, abdominal pain.    OBJECTIVE:    Vital Signs Last 24 Hrs  T(C): 36.5 (31 Oct 2024 05:50), Max: 37.4 (30 Oct 2024 17:19)  T(F): 97.7 (31 Oct 2024 05:50), Max: 99.3 (30 Oct 2024 17:19)  HR: 89 (31 Oct 2024 05:50) (60 - 100)  BP: 140/88 (31 Oct 2024 05:50) (130/84 - 193/94)  BP(mean): 119 (30 Oct 2024 12:30) (97 - 119)  RR: 18 (31 Oct 2024 05:50) (12 - 18)  SpO2: 100% (31 Oct 2024 05:50) (95% - 100%)    Parameters below as of 31 Oct 2024 05:50  Patient On (Oxygen Delivery Method): room air    I&O's Detail    30 Oct 2024 07:01  -  31 Oct 2024 07:00  --------------------------------------------------------  IN:    IV PiggyBack: 200 mL    Lactated Ringers: 1100 mL    Oral Fluid: 580 mL  Total IN: 1880 mL    OUT:    Blood Loss (mL): 0 mL    Voided (mL): 200 mL  Total OUT: 200 mL    Total NET: 1680 mL      MEDICATIONS  (STANDING):  aspirin  chewable 81 milliGRAM(s) Oral daily  atorvastatin 40 milliGRAM(s) Oral at bedtime  chlorhexidine 2% Cloths 1 Application(s) Topical daily  dextrose 5%. 1000 milliLiter(s) (100 mL/Hr) IV Continuous <Continuous>  dextrose 5%. 1000 milliLiter(s) (50 mL/Hr) IV Continuous <Continuous>  dextrose 50% Injectable 25 Gram(s) IV Push once  dextrose 50% Injectable 12.5 Gram(s) IV Push once  dextrose 50% Injectable 25 Gram(s) IV Push once  gabapentin 800 milliGRAM(s) Oral two times a day  glucagon  Injectable 1 milliGRAM(s) IntraMuscular once  heparin   Injectable 5000 Unit(s) SubCutaneous every 8 hours  imipenem/cilastatin  IVPB 500 milliGRAM(s) IV Intermittent every 6 hours  influenza   Vaccine 0.5 milliLiter(s) IntraMuscular once  insulin glargine Injectable (LANTUS) 20 Unit(s) SubCutaneous every morning  insulin lispro (ADMELOG) corrective regimen sliding scale   SubCutaneous three times a day before meals  insulin lispro (ADMELOG) corrective regimen sliding scale   SubCutaneous at bedtime    MEDICATIONS  (PRN):  acetaminophen     Tablet .. 650 milliGRAM(s) Oral every 6 hours PRN Mild Pain (1 - 3)  dextrose Oral Gel 15 Gram(s) Oral once PRN Blood Glucose LESS THAN 70 milliGRAM(s)/deciliter  oxyCODONE    IR 2.5 milliGRAM(s) Oral every 4 hours PRN Moderate Pain (4 - 6)  oxyCODONE    IR 5 milliGRAM(s) Oral every 4 hours PRN Severe Pain (7 - 10)      PHYSICAL EXAM:  General: NAD, resting in bed comfortably.  Cardiac: HDS   Respiratory: Nonlabored respirations, normal cw expansion.  Extremities: s/p Rt. BKA. Left foot dopplerable signals PT. Intact motor and sensory. Foot covered in kerlix  Right groin access site tender, c/d/i, small bruise on right groin/hip, unchanged    LABS:                        8.6    5.90  )-----------( 394      ( 31 Oct 2024 06:05 )             27.0     10-31    139  |  103  |  16  ----------------------------<  119[H]  4.3   |  24  |  1.31[H]    Ca    8.8      31 Oct 2024 06:05  Phos  3.5     10-31  Mg     2.00     10-31      PT/INR - ( 30 Oct 2024 04:00 )   PT: 13.3 sec;   INR: 1.12 ratio         PTT - ( 30 Oct 2024 04:00 )  PTT:57.9 sec    Urinalysis Basic - ( 31 Oct 2024 06:05 )    Color: x / Appearance: x / SG: x / pH: x  Gluc: 119 mg/dL / Ketone: x  / Bili: x / Urobili: x   Blood: x / Protein: x / Nitrite: x   Leuk Esterase: x / RBC: x / WBC x   Sq Epi: x / Non Sq Epi: x / Bacteria: x

## 2024-10-31 NOTE — PROGRESS NOTE ADULT - ASSESSMENT
54M 10/30 s/p left foot partial second third and fourth ray resections, closed   -  Pt seen and evaluated.  -  Afebrile, No Leukocytosis   - 10/30 s/p left foot partial second third fourth ray resections closed, sutures intact, flaps warm and viable, scant sanguinous drainage, sutures intact, no ischemic changes, RLE BKA  - Intra op findinds low concern for residual infection and viability   - OR data pending   - Vascular recs, appreciated   - ID recs, appreciated  - Stable for d/c from podiatry standpoint pending 48h of no growth and final ID and vasc recs  - Follow up and wound care recs in discharge note provider   - Discussed with Attending

## 2024-10-31 NOTE — PHYSICAL THERAPY INITIAL EVALUATION ADULT - LEVEL OF INDEPENDENCE: GAIT, REHAB EVAL
Patient unable to hop with Right LE prosthesis. Unable to maintain NWB with repeated cues. Returned to bed for safety reasons./unable to perform

## 2024-10-31 NOTE — PROGRESS NOTE ADULT - ASSESSMENT
54 YOM with T1DM s/p R BKA who presents with acute on chronic L foot wound with imaging concerning for acute OM of L 4th metatarsal head.  - Pt afebrile, leukocytosis resolved. ESR 85, .8  - Exam with L foot full thickness gangrene of 3,4 th digits to level of MPJ with wet changes, with partial detachment of 4th digit (removed), and wound to bone. Mild malodor, scant purulence, ischemic changes to 2nd digits, plantar submet 3 eschar  - Xray foot 10/22 with cortical erosions and destruction of the left fourth metatarsal head and proximal phalanx concerning for acute osteomyelitis.    Infectious diseases consultation requested for further management.     - 10/22 s/p bedside right foot 4th digit disarticulation at the proximal phalangeal joint, fibrotic wound bed,   He has full thickness dry gangrene of the 3rd digit, worsening ischemic changes to the 2nd digit.   - On 10/30 s/p left foot partial second third fourth ray resections closed, sutures intact, flaps warm and viable, scant sanguinous drainage, sutures intact, no ischemic changes, Intra op findinds low concern for residual infection and viability        # OM left 4th metatarsal head s/p bedside right foot 4th digit disarticulation at the proximal phalangeal joint on 10/22  # full thickness dry gangrene of the 3rd digit  # Left foot 2nd digit ischemic changes    # Right BKA.   # Diabetes mellitis   # Elevated inflammatory markers     MICRO:   10/30 Wound CX: pending   10/22 Wound CX: MSSA, Pseudomonas Few Citrobacter freundii   10/22 BCX: NGTD    RECOMMENDATIONS:   - Continue Imipenem 500 mg Q6H ( will cover Pseudomonas MSSA and Citrobacter + anaerobes )  - Wound culture 10/30 to completion   - as per podiatry, the proximal margin was of good quality and low concern for residual infection   - Pending OR cultures now. Final duration and choice based on the OR cultures.   - Discussed with Wife at the bedside.   - Trend CRP weekly.       Discussed with the primary team    Calli Kang MD  Attending Physician, Division of Infectious Diseases  Department of Medicine   [facility]    Contact on TEAMS messaging from 9am - 5pm  Office: [phone] (after 5 PM or weekend) .

## 2024-10-31 NOTE — PROGRESS NOTE ADULT - SUBJECTIVE AND OBJECTIVE BOX
Patient is a 54y old  Male who presents with a chief complaint of Foot Wound (31 Oct 2024 10:24)    Being followed by ID for OM    Interval history:  No other acute events  OR cultures pending     ROS:  No cough,SOB,CP  No N/V/D  No abd pain  No urinary complaints  No HA  No other complaints    Antimicrobials:  imipenem/cilastatin  IVPB 500 milliGRAM(s) IV Intermittent every 6 hours      Vital Signs Last 24 Hrs  T(C): 36.7 (10-31-24 @ 10:18), Max: 37.4 (10-30-24 @ 17:19)  T(F): 98 (10-31-24 @ 10:18), Max: 99.3 (10-30-24 @ 17:19)  HR: 93 (10-31-24 @ 10:18) (89 - 98)  BP: 140/71 (10-31-24 @ 10:18) (135/73 - 149/72)  BP(mean): --  RR: 18 (10-31-24 @ 10:18) (17 - 18)  SpO2: 98% (10-31-24 @ 10:18) (96% - 100%)    Physical Exam:  Constitutional:  well preserved, comfortable  Head/Eyes: no icterus, PERRL, EOMI  ENT:  supple; no thrush  LUNGS:  CTA  CVS:  normal S1, S2, no murmur  Abd:  soft, non-tender; non-distended  Ext: Right BKA and Left foot dressing in place +   Vascular:  IV site no erythema tenderness or discharge  MSK:  joints without swelling  Neuro: AAO X 3, non- focal      Lab Data:                        8.6    5.90  )-----------( 394      ( 31 Oct 2024 06:05 )             27.0     10-31    139  |  103  |  16  ----------------------------<  119[H]  4.3   |  24  |  1.31[H]    Ca    8.8      31 Oct 2024 06:05  Phos  3.5     10-31  Mg     2.00     10-31        Tissue  10-30-24   Testing in progress  --    No polymorphonuclear cells seen  No organisms seen      .Smear  10-30-24   Testing in progress  --    Few polymorphonuclear leukocytes per low power field  No organisms seen      RADIOLOGY:  below reviewed      IMPRESSION:    Cortical erosions and destruction of the left fourth metatarsal head and   proximal phalanx concerning for acute osteomyelitis.    --- End of Report ---

## 2024-10-31 NOTE — PHYSICAL THERAPY INITIAL EVALUATION ADULT - NSPTDMEREC_GEN_A_CORE
pt. will require a rolling walker to be able to perform their MRADLs within their home./rolling walker

## 2024-10-31 NOTE — PHYSICAL THERAPY INITIAL EVALUATION ADULT - ADDITIONAL COMMENTS
Patient lives in a house with his spouse. 4 stairs to enter. Independent with ambulation (with Right prosthesis). Independent with ADLs.    After session, patient left semi supine in bed with all lines intact in NAD. AZALEA Krueger aware fo session

## 2024-11-01 ENCOUNTER — TRANSCRIPTION ENCOUNTER (OUTPATIENT)
Age: 54
End: 2024-11-01

## 2024-11-01 VITALS
TEMPERATURE: 99 F | OXYGEN SATURATION: 100 % | SYSTOLIC BLOOD PRESSURE: 140 MMHG | RESPIRATION RATE: 18 BRPM | DIASTOLIC BLOOD PRESSURE: 78 MMHG | HEART RATE: 90 BPM

## 2024-11-01 LAB
ANION GAP SERPL CALC-SCNC: 10 MMOL/L — SIGNIFICANT CHANGE UP (ref 7–14)
BUN SERPL-MCNC: 14 MG/DL — SIGNIFICANT CHANGE UP (ref 7–23)
CALCIUM SERPL-MCNC: 8.7 MG/DL — SIGNIFICANT CHANGE UP (ref 8.4–10.5)
CHLORIDE SERPL-SCNC: 103 MMOL/L — SIGNIFICANT CHANGE UP (ref 98–107)
CO2 SERPL-SCNC: 26 MMOL/L — SIGNIFICANT CHANGE UP (ref 22–31)
CREAT SERPL-MCNC: 1.27 MG/DL — SIGNIFICANT CHANGE UP (ref 0.5–1.3)
EGFR: 68 ML/MIN/1.73M2 — SIGNIFICANT CHANGE UP
GLUCOSE BLDC GLUCOMTR-MCNC: 143 MG/DL — HIGH (ref 70–99)
GLUCOSE SERPL-MCNC: 142 MG/DL — HIGH (ref 70–99)
HCT VFR BLD CALC: 25.6 % — LOW (ref 39–50)
HGB BLD-MCNC: 8.4 G/DL — LOW (ref 13–17)
MAGNESIUM SERPL-MCNC: 1.8 MG/DL — SIGNIFICANT CHANGE UP (ref 1.6–2.6)
MCHC RBC-ENTMCNC: 27.7 PG — SIGNIFICANT CHANGE UP (ref 27–34)
MCHC RBC-ENTMCNC: 32.8 G/DL — SIGNIFICANT CHANGE UP (ref 32–36)
MCV RBC AUTO: 84.5 FL — SIGNIFICANT CHANGE UP (ref 80–100)
NRBC # BLD: 0 /100 WBCS — SIGNIFICANT CHANGE UP (ref 0–0)
NRBC # FLD: 0 K/UL — SIGNIFICANT CHANGE UP (ref 0–0)
PHOSPHATE SERPL-MCNC: 3.2 MG/DL — SIGNIFICANT CHANGE UP (ref 2.5–4.5)
PLATELET # BLD AUTO: 347 K/UL — SIGNIFICANT CHANGE UP (ref 150–400)
POTASSIUM SERPL-MCNC: 4 MMOL/L — SIGNIFICANT CHANGE UP (ref 3.5–5.3)
POTASSIUM SERPL-SCNC: 4 MMOL/L — SIGNIFICANT CHANGE UP (ref 3.5–5.3)
RBC # BLD: 3.03 M/UL — LOW (ref 4.2–5.8)
RBC # FLD: 12.9 % — SIGNIFICANT CHANGE UP (ref 10.3–14.5)
SODIUM SERPL-SCNC: 139 MMOL/L — SIGNIFICANT CHANGE UP (ref 135–145)
WBC # BLD: 4.66 K/UL — SIGNIFICANT CHANGE UP (ref 3.8–10.5)
WBC # FLD AUTO: 4.66 K/UL — SIGNIFICANT CHANGE UP (ref 3.8–10.5)

## 2024-11-01 PROCEDURE — 99232 SBSQ HOSP IP/OBS MODERATE 35: CPT

## 2024-11-01 PROCEDURE — 99231 SBSQ HOSP IP/OBS SF/LOW 25: CPT

## 2024-11-01 RX ORDER — ACETAMINOPHEN 500 MG
2 TABLET ORAL
Qty: 0 | Refills: 0 | DISCHARGE
Start: 2024-11-01

## 2024-11-01 RX ORDER — INSULIN GLARGINE,HUM.REC.ANLOG 100/ML
20 VIAL (ML) SUBCUTANEOUS
Qty: 0 | Refills: 0 | DISCHARGE
Start: 2024-11-01

## 2024-11-01 RX ORDER — AMOXICILLIN AND CLAVULANATE POTASSIUM 600; 42.9 MG/5ML; MG/5ML
875 POWDER, FOR SUSPENSION ORAL
Qty: 16 | Refills: 0
Start: 2024-11-01 | End: 2024-11-08

## 2024-11-01 RX ORDER — LEVOFLOXACIN 750 MG/1
1 TABLET, FILM COATED ORAL
Qty: 8 | Refills: 0
Start: 2024-11-01 | End: 2024-11-08

## 2024-11-01 RX ORDER — ASPIRIN/MAG CARB/ALUMINUM AMIN 325 MG
1 TABLET ORAL
Qty: 0 | Refills: 0 | DISCHARGE
Start: 2024-11-01

## 2024-11-01 RX ADMIN — Medication 20 UNIT(S): at 08:45

## 2024-11-01 RX ADMIN — HEPARIN SODIUM 5000 UNIT(S): 10000 INJECTION INTRAVENOUS; SUBCUTANEOUS at 05:10

## 2024-11-01 RX ADMIN — IMIPENEM AND CILASTATIN 100 MILLIGRAM(S): 500; 500 INJECTION, POWDER, FOR SOLUTION INTRAVENOUS at 05:10

## 2024-11-01 RX ADMIN — GABAPENTIN 800 MILLIGRAM(S): 300 CAPSULE ORAL at 05:10

## 2024-11-01 RX ADMIN — Medication 81 MILLIGRAM(S): at 12:36

## 2024-11-01 NOTE — PROGRESS NOTE ADULT - PROVIDER SPECIALTY LIST ADULT
Cardiology
Infectious Disease
Infectious Disease
Podiatry
Vascular Surgery
Cardiology
Internal Medicine
Internal Medicine
Podiatry
Podiatry
Cardiology
Infectious Disease
Internal Medicine
Podiatry
Vascular Surgery
Vascular Surgery
Infectious Disease
Internal Medicine
Podiatry
Podiatry
Vascular Surgery

## 2024-11-01 NOTE — CHART NOTE - NSCHARTNOTEFT_GEN_A_CORE
Discussion again had at bedside with pt and wife in regards to podiatric surgical plan for tomorrow 10/30 @930am. Again addressed concerns for surgical site healing and recommended transmetatarsal amputation for optimal healing potential and functional limb. Discussed with pt that transmetatarsal amputation would lead to a better prognosis of limb. At this time pt again refusing TMA and will only consent to amputations of digits 2 and 3 with debridement of fourth metatarsal and soft tissue. Discussed at length with pt that if he decides on partial 2,3,4 ray resection rather than TMA it is highly unlikely to be able to primarily close the surgical wound given lack of viable soft tissue and pt's vascular history. Pt and wife again demonstrated understanding but continuing to refuse transmetatarsal amputation against medical advice. Discussed need for close outpt follow up in wound care center and possibility of graft applications and hyperbarics without guarantee that these wound care modalities will lead to wound closure and place pt at higher risk for future complications that could lead to loss of limb or loss of life. Pt is electing for Partial 2,3,4 amputation against all medical advice. Pt demonstrated understanding of risks.
Patient will require rolling walker at home due to their dx of left foot gangrene, Dx: I96. Rolling walker will help complete the MRADLs.
SURGERY PROGRESS NOTE    Procedure:  Left lower extremity angiogram  Surgeon: Dr. Ramirez    SUBJECTIVE: : 54y Male  s/p  Left lower extremity angiogram    Patient examined bedside. ADRY since OR. Patient recovering uneventfully. Vitals WNR. Afebrile. Pain score 3/10. Patient denies fever, chills, nausea, vomiting.     Objective:  Gen: NAD, AAOx3  Pulm: No work on breathing  Card: RRR  abdomen: soft, non-distended.   vascular: dressing in place, with minimal strikethrough. site soft.     Vital Signs Last 24 Hrs  T(C): 37.1 (24 Oct 2024 21:44), Max: 37.1 (24 Oct 2024 12:35)  T(F): 98.8 (24 Oct 2024 21:44), Max: 98.8 (24 Oct 2024 21:44)  HR: 96 (24 Oct 2024 21:44) (78 - 98)  BP: 140/70 (24 Oct 2024 21:44) (137/78 - 167/91)  BP(mean): --  RR: 18 (24 Oct 2024 21:44) (13 - 18)  SpO2: 100% (24 Oct 2024 21:44) (96% - 100%)    Parameters below as of 24 Oct 2024 21:44  Patient On (Oxygen Delivery Method): room air      I&O's Summary    I&O's Detail        LABS:                        9.3    7.83  )-----------( 420      ( 24 Oct 2024 04:00 )             28.5     10-24    138  |  102  |  20  ----------------------------<  168[H]  4.1   |  26  |  1.42[H]    Ca    8.8      24 Oct 2024 04:00  Phos  3.0     10-24  Mg     1.90     10-24      PT/INR - ( 24 Oct 2024 04:00 )   PT: 12.5 sec;   INR: 1.08 ratio         PTT - ( 24 Oct 2024 04:00 )  PTT:37.0 sec  Urinalysis Basic - ( 24 Oct 2024 04:00 )    Color: x / Appearance: x / SG: x / pH: x  Gluc: 168 mg/dL / Ketone: x  / Bili: x / Urobili: x   Blood: x / Protein: x / Nitrite: x   Leuk Esterase: x / RBC: x / WBC x   Sq Epi: x / Non Sq Epi: x / Bacteria: x          A/P:     54y Male  s/p Left lower extremity angiogram    - ADRY since OR, recovering well from procedure post operatively.   - C/w Pain control   - Monitor i/o's + Vitals
s/p LLE angiogram via RFA access with mynx closure device; assessed groin post procedure as right groin noted to be firm, upon arrival to bedside patient was hemodynamically stable, without complaints, denied pain, right groin firm and firm abdomen upon flank however patient agreed this was his typical anatomy and upon abd/groin examination it was equal bilateral   vascular team called and resident and fellow who were in cath case agree this is patients anatomy and no change and site stable.   bladder scan with >700cc urine and patient advised to urinate even tho he stated that he did not feel he had to go   return to inpatient bed when recovery complete

## 2024-11-01 NOTE — DISCHARGE NOTE NURSING/CASE MANAGEMENT/SOCIAL WORK - NSTRANSFERBELONGINGSRESP_GEN_A_NUR
From: Fox Nuñez  To: Steven J Heyden, MD  Sent: 6/26/2019 10:10 AM CDT  Subject: Medication Question    Good day,    I did not receive my auto-refill of Atorvastatin for cholesterol. When I go into my medications online, it says “You should not start taking this medication until July 10, 2019” and that I cannot refill it. I’ve been out for about a week now.    Can we fix this? Thank you!    Fox Nuñez   yes

## 2024-11-01 NOTE — PROGRESS NOTE ADULT - ATTENDING COMMENTS
awaiting podiatric intervention scheduled for tomorrow
pt is optimized for toe amputations today
pt is stable for dispo
pt is stable for podiatric procedure
Vascular surgery attending covering for Dr. Ramirez.     PAD  s/p endo revasc  awaiting podiatry intervention  right groin ecchymosis, RLE art duplex wo pseudoaneurysm.
Vascular surgery attending covering for Dr. Ramirez.     PAD  s/p endo revasc  awaiting podiatry intervention  right groin ecchymosis, RLE arteral duplex for evaluation
awaiting PVRs  cont wound care and IVAbx
pt is doing well s/p endo revasc  ok for podiatry to proceed with their interventions

## 2024-11-01 NOTE — PROGRESS NOTE ADULT - ASSESSMENT
54M 10/30 s/p left foot partial second third and fourth ray resections, closed   -  Pt seen and evaluated.  -  Afebrile, No Leukocytosis   - 10/30 s/p left foot partial second third fourth ray resections closed, sutures intact, flaps warm and viable, scant sanguinous drainage, sutures intact, no ischemic changes, RLE BKA  - Intra op findinds low concern for residual infection and viability   - OR culture swab: no growth (prelim)  - Clean bone margin: no growth (prelim)  - Vascular recs, appreciated   - ID recs, appreciated  - Stable for d/c from podiatry standpoint pending 48h of no growth and final ID recs  - Follow up and wound care recs in discharge note provider   - Seen with Attending

## 2024-11-01 NOTE — PROGRESS NOTE ADULT - ASSESSMENT
54 YOM with T1DM s/p R BKA who presents with acute on chronic L foot wound with imaging concerning for acute OM of L 4th metatarsal head.  - Pt afebrile, leukocytosis resolved. ESR 85, .8  - Exam with L foot full thickness gangrene of 3,4 th digits to level of MPJ with wet changes, with partial detachment of 4th digit (removed), and wound to bone. Mild malodor, scant purulence, ischemic changes to 2nd digits, plantar submet 3 eschar  - Xray foot 10/22 with cortical erosions and destruction of the left fourth metatarsal head and proximal phalanx concerning for acute osteomyelitis.    Infectious diseases consultation requested for further management.     - 10/22 s/p bedside right foot 4th digit disarticulation at the proximal phalangeal joint, fibrotic wound bed,   He has full thickness dry gangrene of the 3rd digit, worsening ischemic changes to the 2nd digit.   - On 10/30 s/p left foot partial second third fourth ray resections closed, sutures intact, flaps warm and viable, scant sanguinous drainage, sutures intact, no ischemic changes, Intra op findinds low concern for residual infection and viability        # OM left 4th metatarsal head s/p bedside right foot 4th digit disarticulation at the proximal phalangeal joint on 10/22  # full thickness dry gangrene of the 3rd digit  # Left foot 2nd digit ischemic changes    # Right BKA.   # Diabetes mellitis   # Elevated inflammatory markers     MICRO:   10/30 Wound CX: NGTD  10/22 Wound CX: MSSA, Pseudomonas Few Citrobacter freundii   10/22 BCX: NGTD    RECOMMENDATIONS:   - Wound culture 10/30 to completion   - as per podiatry, the proximal margin was of good quality and low concern for residual infection   - can discharge on Augmentin 875 mg Q12H + Levofloxacin 750 mg Q24H for ( total 10 days from 10/30 until 11/08)       Discussed with the primary team.   ID will sign off today. Thank you for the consultation. Please feel free to reach out with any questions or concerns.       Calli Kang MD  Attending Physician, Division of Infectious Diseases  Department of Medicine   [facility]    Contact on TEAMS messaging from 9am - 5pm  Office: [phone] (after 5 PM or weekend) .

## 2024-11-01 NOTE — PROGRESS NOTE ADULT - SUBJECTIVE AND OBJECTIVE BOX
Patient is a 54y old  Male who presents with a chief complaint of Foot Wound (01 Nov 2024 10:20)    Being followed by ID for OM    Interval history:  No other acute events  OR cultures with NGTD  Spoke to podiatry on 10/30- no concern for residual infection     ROS:  No cough,SOB,CP  No N/V/D  No abd pain  No urinary complaints  No HA  No other complaints      Antimicrobials:  imipenem/cilastatin  IVPB 500 milliGRAM(s) IV Intermittent every 6 hours      Vital Signs Last 24 Hrs  T(C): 37.1 (11-01-24 @ 09:55), Max: 37.3 (11-01-24 @ 01:47)  T(F): 98.7 (11-01-24 @ 09:55), Max: 99.1 (11-01-24 @ 01:47)  HR: 90 (11-01-24 @ 09:55) (90 - 100)  BP: 140/78 (11-01-24 @ 09:55) (131/81 - 145/84)  BP(mean): --  RR: 18 (11-01-24 @ 09:55) (16 - 19)  SpO2: 100% (11-01-24 @ 09:55) (97% - 100%)    Physical Exam:  Constitutional:  well preserved, comfortable  Head/Eyes: no icterus, PERRL, EOMI  ENT:  supple; no thrush  LUNGS:  CTA  CVS:  normal S1, S2, no murmur  Abd:  soft, non-tender; non-distended  Ext: Right BKA and Left foot dressing in place +   Vascular:  IV site no erythema tenderness or discharge  MSK:  joints without swelling  Neuro: AAO X 3, non- focal      Lab Data:                        8.4    4.66  )-----------( 347      ( 01 Nov 2024 09:06 )             25.6     11-01    139  |  103  |  14  ----------------------------<  142[H]  4.0   |  26  |  1.27    Ca    8.7      01 Nov 2024 09:06  Phos  3.2     11-01  Mg     1.80     11-01        Tissue  10-30-24   No growth  --    No polymorphonuclear cells seen  No organisms seen      .Surgical Swab  10-30-24   No growth  --    Few polymorphonuclear leukocytes per low power field  No organisms seen                RADIOLOGY:  below reviewed      IMPRESSION:    Cortical erosions and destruction of the left fourth metatarsal head and   proximal phalanx concerning for acute osteomyelitis.    --- End of Report ---

## 2024-11-01 NOTE — PROGRESS NOTE ADULT - SUBJECTIVE AND OBJECTIVE BOX
Name of Patient : TARA BLAKE  MRN: [id number]  Date of visit: 11-01-24       Subjective: Patient seen and examined. No new events except as noted.     REVIEW OF SYSTEMS:    CONSTITUTIONAL: No weakness, fevers or chills  EYES/ENT: No visual changes;  No vertigo or throat pain   NECK: No pain or stiffness  RESPIRATORY: No cough, wheezing, hemoptysis; No shortness of breath  CARDIOVASCULAR: No chest pain or palpitations  GASTROINTESTINAL: No abdominal or epigastric pain. No nausea, vomiting, or hematemesis; No diarrhea or constipation. No melena or hematochezia.  GENITOURINARY: No dysuria, frequency or hematuria  NEUROLOGICAL: No numbness or weakness  SKIN: No itching, burning, rashes, or lesions   All other review of systems is negative unless indicated above.    MEDICATIONS:  MEDICATIONS  (STANDING):  aspirin  chewable 81 milliGRAM(s) Oral daily  atorvastatin 40 milliGRAM(s) Oral at bedtime  chlorhexidine 2% Cloths 1 Application(s) Topical daily  dextrose 5%. 1000 milliLiter(s) (100 mL/Hr) IV Continuous <Continuous>  dextrose 5%. 1000 milliLiter(s) (50 mL/Hr) IV Continuous <Continuous>  dextrose 50% Injectable 25 Gram(s) IV Push once  dextrose 50% Injectable 12.5 Gram(s) IV Push once  dextrose 50% Injectable 25 Gram(s) IV Push once  gabapentin 800 milliGRAM(s) Oral two times a day  glucagon  Injectable 1 milliGRAM(s) IntraMuscular once  heparin   Injectable 5000 Unit(s) SubCutaneous every 8 hours  imipenem/cilastatin  IVPB 500 milliGRAM(s) IV Intermittent every 6 hours  influenza   Vaccine 0.5 milliLiter(s) IntraMuscular once  insulin glargine Injectable (LANTUS) 20 Unit(s) SubCutaneous every morning  insulin lispro (ADMELOG) corrective regimen sliding scale   SubCutaneous three times a day before meals  insulin lispro (ADMELOG) corrective regimen sliding scale   SubCutaneous at bedtime      PHYSICAL EXAM:  T(C): 37.1 (11-01-24 @ 09:55), Max: 37.3 (11-01-24 @ 01:47)  HR: 90 (11-01-24 @ 09:55) (90 - 100)  BP: 140/78 (11-01-24 @ 09:55) (131/81 - 145/84)  RR: 18 (11-01-24 @ 09:55) (16 - 19)  SpO2: 100% (11-01-24 @ 09:55) (97% - 100%)  Wt(kg): --  I&O's Summary    31 Oct 2024 07:01  -  01 Nov 2024 07:00  --------------------------------------------------------  IN: 240 mL / OUT: 700 mL / NET: -460 mL          Appearance: Normal	  HEENT:  PERRLA   Lymphatic: No lymphadenopathy   Cardiovascular: Normal S1 S2, no JVD  Respiratory: normal effort , clear  Gastrointestinal:  Soft, Non-tender  Skin: No rashes,  warm to touch  Psychiatry:  Mood & affect appropriate  Musculuskeletal: No edema    recent labs, Imaging and EKGs personally reviewed     10-31-24 @ 07:01  -  11-01-24 @ 07:00  --------------------------------------------------------  IN: 240 mL / OUT: 700 mL / NET: -460 mL                          8.4    4.66  )-----------( 347      ( 01 Nov 2024 09:06 )             25.6               11-01    139  |  103  |  14  ----------------------------<  142[H]  4.0   |  26  |  1.27    Ca    8.7      01 Nov 2024 09:06  Phos  3.2     11-01  Mg     1.80     11-01                         Urinalysis Basic - ( 01 Nov 2024 09:06 )    Color: x / Appearance: x / SG: x / pH: x  Gluc: 142 mg/dL / Ketone: x  / Bili: x / Urobili: x   Blood: x / Protein: x / Nitrite: x   Leuk Esterase: x / RBC: x / WBC x   Sq Epi: x / Non Sq Epi: x / Bacteria: x

## 2024-11-01 NOTE — PROGRESS NOTE ADULT - SUBJECTIVE AND OBJECTIVE BOX
INTERVAL EVENTS: No acute events overnight.  SUBJECTIVE: Patient seen and examined at bedside with surgical team, patient without complaints. Denies fever, chills, CP, SOB nausea, vomiting, abdominal pain.    OBJECTIVE:    Vital Signs Last 24 Hrs  T(C): 36.7 (01 Nov 2024 05:00), Max: 37.3 (01 Nov 2024 01:47)  T(F): 98.1 (01 Nov 2024 05:00), Max: 99.1 (01 Nov 2024 01:47)  HR: 95 (01 Nov 2024 05:00) (93 - 100)  BP: 131/81 (01 Nov 2024 05:00) (131/81 - 145/84)  BP(mean): --  RR: 16 (01 Nov 2024 05:00) (16 - 19)  SpO2: 97% (01 Nov 2024 05:00) (97% - 100%)    Parameters below as of 01 Nov 2024 05:00  Patient On (Oxygen Delivery Method): room air    I&O's Detail    31 Oct 2024 07:01  -  01 Nov 2024 07:00  --------------------------------------------------------  IN:    Oral Fluid: 240 mL  Total IN: 240 mL    OUT:    Blood Loss (mL): 0 mL    IV PiggyBack: 0 mL    Voided (mL): 700 mL  Total OUT: 700 mL    Total NET: -460 mL      MEDICATIONS  (STANDING):  aspirin  chewable 81 milliGRAM(s) Oral daily  atorvastatin 40 milliGRAM(s) Oral at bedtime  chlorhexidine 2% Cloths 1 Application(s) Topical daily  dextrose 5%. 1000 milliLiter(s) (100 mL/Hr) IV Continuous <Continuous>  dextrose 5%. 1000 milliLiter(s) (50 mL/Hr) IV Continuous <Continuous>  dextrose 50% Injectable 25 Gram(s) IV Push once  dextrose 50% Injectable 12.5 Gram(s) IV Push once  dextrose 50% Injectable 25 Gram(s) IV Push once  gabapentin 800 milliGRAM(s) Oral two times a day  glucagon  Injectable 1 milliGRAM(s) IntraMuscular once  heparin   Injectable 5000 Unit(s) SubCutaneous every 8 hours  imipenem/cilastatin  IVPB 500 milliGRAM(s) IV Intermittent every 6 hours  influenza   Vaccine 0.5 milliLiter(s) IntraMuscular once  insulin glargine Injectable (LANTUS) 20 Unit(s) SubCutaneous every morning  insulin lispro (ADMELOG) corrective regimen sliding scale   SubCutaneous three times a day before meals  insulin lispro (ADMELOG) corrective regimen sliding scale   SubCutaneous at bedtime    MEDICATIONS  (PRN):  acetaminophen     Tablet .. 650 milliGRAM(s) Oral every 6 hours PRN Mild Pain (1 - 3)  dextrose Oral Gel 15 Gram(s) Oral once PRN Blood Glucose LESS THAN 70 milliGRAM(s)/deciliter  oxyCODONE    IR 2.5 milliGRAM(s) Oral every 4 hours PRN Moderate Pain (4 - 6)  oxyCODONE    IR 5 milliGRAM(s) Oral every 4 hours PRN Severe Pain (7 - 10)      PHYSICAL EXAM:  General: NAD, resting in bed comfortably.  Cardiac: HDS   Respiratory: Nonlabored respirations, normal cw expansion.  Extremities: s/p Rt. BKA. Left foot dopplerable signals PT. Intact motor and sensory. Foot covered in kerlix  Right groin access site tender, c/d/i, small bruise on right groin/hip, unchanged      LABS:                        8.6    5.90  )-----------( 394      ( 31 Oct 2024 06:05 )             27.0     10-31    139  |  103  |  16  ----------------------------<  119[H]  4.3   |  24  |  1.31[H]    Ca    8.8      31 Oct 2024 06:05  Phos  3.5     10-31  Mg     2.00     10-31          Urinalysis Basic - ( 31 Oct 2024 06:05 )    Color: x / Appearance: x / SG: x / pH: x  Gluc: 119 mg/dL / Ketone: x  / Bili: x / Urobili: x   Blood: x / Protein: x / Nitrite: x   Leuk Esterase: x / RBC: x / WBC x   Sq Epi: x / Non Sq Epi: x / Bacteria: x

## 2024-11-01 NOTE — PROGRESS NOTE ADULT - SUBJECTIVE AND OBJECTIVE BOX
Patient is a 54y old  Male who presents with a chief complaint of Foot Wound (31 Oct 2024 13:58)       INTERVAL HPI/OVERNIGHT EVENTS:  Patient seen and evaluated at bedside.  Pt is resting comfortable in NAD. Denies N/V/F/C.  Pain rated at X/10    Allergies    No Known Allergies    Intolerances        Vital Signs Last 24 Hrs  T(C): 36.7 (01 Nov 2024 05:00), Max: 37.3 (01 Nov 2024 01:47)  T(F): 98.1 (01 Nov 2024 05:00), Max: 99.1 (01 Nov 2024 01:47)  HR: 95 (01 Nov 2024 05:00) (93 - 100)  BP: 131/81 (01 Nov 2024 05:00) (131/81 - 145/84)  BP(mean): --  RR: 16 (01 Nov 2024 05:00) (16 - 19)  SpO2: 97% (01 Nov 2024 05:00) (97% - 100%)    Parameters below as of 01 Nov 2024 05:00  Patient On (Oxygen Delivery Method): room air        LABS:                        8.6    5.90  )-----------( 394      ( 31 Oct 2024 06:05 )             27.0     10-31    139  |  103  |  16  ----------------------------<  119[H]  4.3   |  24  |  1.31[H]    Ca    8.8      31 Oct 2024 06:05  Phos  3.5     10-31  Mg     2.00     10-31        Urinalysis Basic - ( 31 Oct 2024 06:05 )    Color: x / Appearance: x / SG: x / pH: x  Gluc: 119 mg/dL / Ketone: x  / Bili: x / Urobili: x   Blood: x / Protein: x / Nitrite: x   Leuk Esterase: x / RBC: x / WBC x   Sq Epi: x / Non Sq Epi: x / Bacteria: x      CAPILLARY BLOOD GLUCOSE      POCT Blood Glucose.: 129 mg/dL (31 Oct 2024 22:15)  POCT Blood Glucose.: 193 mg/dL (31 Oct 2024 17:36)  POCT Blood Glucose.: 165 mg/dL (31 Oct 2024 12:29)  POCT Blood Glucose.: 135 mg/dL (31 Oct 2024 08:43)      Lower Extremity Physical Exam:    Vascular: DP 1/4, PT 0/4, B/L, CFT could not be assessed 2/2 gangrene,, Temperature gradient warm to cool , B/L.   Neuro: Epicritic sensation absent to the level of digits, B/L.  Musculoskeletal/Ortho: S/P R BKA  Skin: 10/30 s/p left foot partial second third fourth ray resections closed, sutures intact, flaps warm and viable, scant sanguinous drainage, sutures intact, no ischemic changes, RLE BKA     RADIOLOGY & ADDITIONAL TESTS:

## 2024-11-01 NOTE — PROGRESS NOTE ADULT - SUBJECTIVE AND OBJECTIVE BOX
Subjective: Patient seen and examined. No new events except as noted.     SUBJECTIVE/ROS:  nad    MEDICATIONS:  MEDICATIONS  (STANDING):  aspirin  chewable 81 milliGRAM(s) Oral daily  atorvastatin 40 milliGRAM(s) Oral at bedtime  chlorhexidine 2% Cloths 1 Application(s) Topical daily  dextrose 5%. 1000 milliLiter(s) (100 mL/Hr) IV Continuous <Continuous>  dextrose 5%. 1000 milliLiter(s) (50 mL/Hr) IV Continuous <Continuous>  dextrose 50% Injectable 12.5 Gram(s) IV Push once  dextrose 50% Injectable 25 Gram(s) IV Push once  dextrose 50% Injectable 25 Gram(s) IV Push once  gabapentin 800 milliGRAM(s) Oral two times a day  glucagon  Injectable 1 milliGRAM(s) IntraMuscular once  heparin   Injectable 5000 Unit(s) SubCutaneous every 8 hours  imipenem/cilastatin  IVPB 500 milliGRAM(s) IV Intermittent every 6 hours  influenza   Vaccine 0.5 milliLiter(s) IntraMuscular once  insulin glargine Injectable (LANTUS) 20 Unit(s) SubCutaneous every morning  insulin lispro (ADMELOG) corrective regimen sliding scale   SubCutaneous three times a day before meals  insulin lispro (ADMELOG) corrective regimen sliding scale   SubCutaneous at bedtime      PHYSICAL EXAM:  T(C): 36.7 (11-01-24 @ 05:00), Max: 37.3 (11-01-24 @ 01:47)  HR: 95 (11-01-24 @ 05:00) (93 - 100)  BP: 131/81 (11-01-24 @ 05:00) (131/81 - 145/84)  RR: 16 (11-01-24 @ 05:00) (16 - 19)  SpO2: 97% (11-01-24 @ 05:00) (97% - 100%)  Wt(kg): --  I&O's Summary    31 Oct 2024 07:01  -  01 Nov 2024 07:00  --------------------------------------------------------  IN: 240 mL / OUT: 700 mL / NET: -460 mL            JVP: Normal  Neck: supple  Lung: clear   CV: S1 S2 , Murmur:  Abd: soft  Ext: No edema  neuro: Awake / alert  Psych: flat affect  Skin: normal``    LABS/DATA:    CARDIAC MARKERS:                                8.6    5.90  )-----------( 394      ( 31 Oct 2024 06:05 )             27.0     10-31    139  |  103  |  16  ----------------------------<  119[H]  4.3   |  24  |  1.31[H]    Ca    8.8      31 Oct 2024 06:05  Phos  3.5     10-31  Mg     2.00     10-31      proBNP:   Lipid Profile:   HgA1c:   TSH:     TELE:  EKG:

## 2024-11-01 NOTE — PROGRESS NOTE ADULT - ASSESSMENT
Patient is a 54 year old male with PMHx of DM and stroke not on ASA PSHx of Rt. BKA in 2019 presents with left foot gangrene and web space infection.     # LE osteo   LE  osteo L foot   ID eval appreciated   ABx as per ID   follow up Cx   Vascular care appreciated   ASA and statin   check echo and EKG noted  S/p Angio  S/P TMA   S/P OR  D/C planing per surg team     # DM  Sliding scale   diab diet   A1C 8  adjust insulin regime     # MANN   avoid nephrotoxic medications   IV and oral hydration   monitor urine output   mild elevation, cont hydration       # chronic CVA   ASA and statin   BP control   fall precautions     DVT and GI PPX       D/C planing

## 2024-11-01 NOTE — DISCHARGE NOTE NURSING/CASE MANAGEMENT/SOCIAL WORK - NSDCFUADDAPPT_GEN_ALL_CORE_FT
Podiatry Discharge Instructions:  Follow up: Please follow up with Dr. Calvert within 1 week of discharge from the hospital, please call [phone] for appointment and discuss that you recently were seen in the hospital.  Wound Care: Please leave your dressing clean dry intact until your follow up appointment  Weight bearing: Please remain non-weightbearing to the left lower extremity in surgical shoe  Antibiotics: Please continue as instructed.     Please follow up with your endocrinologist within 2 weeks of discharge for management of Diabetes.

## 2024-11-01 NOTE — PROGRESS NOTE ADULT - ASSESSMENT
54 year old male with PMHx of DM and stroke not on ASA PSHx of Rt. BKA in 2019 presents with left foot gangrene and web space infection. Now s/p LLE angiogram 10/24. S/p left foot partial 2nd/3rd/4th ray resection 10/30. Recovering well.     Plan:  - Diet: Regular   - Pain management  - ASA/SQH  - Statin  - Podiatry - stable for DC after 48 hours of negative culture   - X-Ray of the foot - cortical erosions and destruction of the left fourth metatarsal head and proximal phalanx concerning for acute osteomyelitis.  - ID consult: Continue Imipenem 500 mg Q6H, PD final recs   - Abscess culture with gram + cocci  - PT: Home PT, outpatient PT, rolling walker   - Dispo: DC today if cultures are negative     Vascular Surgery   [id number]

## 2024-11-01 NOTE — PROGRESS NOTE ADULT - REASON FOR ADMISSION
Foot Wound

## 2024-11-01 NOTE — DISCHARGE NOTE NURSING/CASE MANAGEMENT/SOCIAL WORK - PATIENT PORTAL LINK FT
You can access the FollowMyHealth Patient Portal offered by [facility] by registering at the following website: [email or web address]. By joining [employer]’s FollowMyHealth portal, you will also be able to view your health information using other applications (apps) compatible with our system.

## 2024-11-01 NOTE — DISCHARGE NOTE NURSING/CASE MANAGEMENT/SOCIAL WORK - FINANCIAL ASSISTANCE
[facility] provides services at a reduced cost to those who are determined to be eligible through [facility]’s financial assistance program. Information regarding [facility]’s financial assistance program can be found by going to [email or web address] or by calling [phone].

## 2024-11-03 LAB
-  CLINDAMYCIN: SIGNIFICANT CHANGE UP
-  CLINDAMYCIN: SIGNIFICANT CHANGE UP
-  ERYTHROMYCIN: SIGNIFICANT CHANGE UP
-  ERYTHROMYCIN: SIGNIFICANT CHANGE UP
-  GENTAMICIN: SIGNIFICANT CHANGE UP
-  GENTAMICIN: SIGNIFICANT CHANGE UP
-  OXACILLIN: SIGNIFICANT CHANGE UP
-  OXACILLIN: SIGNIFICANT CHANGE UP
-  PENICILLIN: SIGNIFICANT CHANGE UP
-  RIFAMPIN: SIGNIFICANT CHANGE UP
-  RIFAMPIN: SIGNIFICANT CHANGE UP
-  TETRACYCLINE: SIGNIFICANT CHANGE UP
-  TETRACYCLINE: SIGNIFICANT CHANGE UP
-  TRIMETHOPRIM/SULFAMETHOXAZOLE: SIGNIFICANT CHANGE UP
-  TRIMETHOPRIM/SULFAMETHOXAZOLE: SIGNIFICANT CHANGE UP
-  VANCOMYCIN: SIGNIFICANT CHANGE UP
-  VANCOMYCIN: SIGNIFICANT CHANGE UP
METHOD TYPE: SIGNIFICANT CHANGE UP
METHOD TYPE: SIGNIFICANT CHANGE UP

## 2024-11-04 LAB
CULTURE RESULTS: ABNORMAL
CULTURE RESULTS: SIGNIFICANT CHANGE UP
ORGANISM # SPEC MICROSCOPIC CNT: ABNORMAL
SPECIMEN SOURCE: SIGNIFICANT CHANGE UP
SPECIMEN SOURCE: SIGNIFICANT CHANGE UP

## 2024-11-12 LAB — SURGICAL PATHOLOGY STUDY: SIGNIFICANT CHANGE UP

## 2024-11-13 ENCOUNTER — APPOINTMENT (OUTPATIENT)
Dept: VASCULAR SURGERY | Facility: CLINIC | Age: 54
End: 2024-11-13
Payer: COMMERCIAL

## 2024-11-13 VITALS
HEART RATE: 94 BPM | BODY MASS INDEX: 23.7 KG/M2 | SYSTOLIC BLOOD PRESSURE: 163 MMHG | TEMPERATURE: 98 F | WEIGHT: 175 LBS | DIASTOLIC BLOOD PRESSURE: 89 MMHG | HEIGHT: 72 IN

## 2024-11-13 DIAGNOSIS — I73.9 PERIPHERAL VASCULAR DISEASE, UNSPECIFIED: ICD-10-CM

## 2024-11-13 PROCEDURE — 99213 OFFICE O/P EST LOW 20 MIN: CPT

## 2024-11-13 RX ORDER — RIVAROXABAN 2.5 MG/1
2.5 TABLET, FILM COATED ORAL
Qty: 180 | Refills: 3 | Status: ACTIVE | COMMUNITY
Start: 2024-11-13 | End: 1900-01-01

## 2024-12-18 ENCOUNTER — APPOINTMENT (OUTPATIENT)
Dept: VASCULAR SURGERY | Facility: CLINIC | Age: 54
End: 2024-12-18
Payer: COMMERCIAL

## 2024-12-18 VITALS
BODY MASS INDEX: 24.11 KG/M2 | SYSTOLIC BLOOD PRESSURE: 159 MMHG | HEART RATE: 85 BPM | TEMPERATURE: 97.7 F | HEIGHT: 72 IN | WEIGHT: 178 LBS | DIASTOLIC BLOOD PRESSURE: 82 MMHG

## 2024-12-18 DIAGNOSIS — I73.9 PERIPHERAL VASCULAR DISEASE, UNSPECIFIED: ICD-10-CM

## 2024-12-18 PROCEDURE — 93926 LOWER EXTREMITY STUDY: CPT | Mod: LT

## 2024-12-18 PROCEDURE — 99214 OFFICE O/P EST MOD 30 MIN: CPT

## 2025-01-06 ENCOUNTER — OUTPATIENT (OUTPATIENT)
Dept: OUTPATIENT SERVICES | Facility: HOSPITAL | Age: 55
LOS: 1 days | Discharge: HOME | End: 2025-01-06
Payer: COMMERCIAL

## 2025-01-06 ENCOUNTER — RESULT REVIEW (OUTPATIENT)
Age: 55
End: 2025-01-06

## 2025-01-06 DIAGNOSIS — I70.209 UNSPECIFIED ATHEROSCLEROSIS OF NATIVE ARTERIES OF EXTREMITIES, UNSPECIFIED EXTREMITY: Chronic | ICD-10-CM

## 2025-01-06 DIAGNOSIS — L89.90 PRESSURE ULCER OF UNSPECIFIED SITE, UNSPECIFIED STAGE: ICD-10-CM

## 2025-01-06 PROCEDURE — 99204 OFFICE O/P NEW MOD 45 MIN: CPT | Mod: 25

## 2025-01-06 PROCEDURE — 11043 DBRDMT MUSC&/FSCA 1ST 20/<: CPT | Mod: LT

## 2025-01-06 PROCEDURE — 88305 TISSUE EXAM BY PATHOLOGIST: CPT | Mod: 26

## 2025-01-07 DIAGNOSIS — Z87.39 PERSONAL HISTORY OF OTHER DISEASES OF THE MUSCULOSKELETAL SYSTEM AND CONNECTIVE TISSUE: ICD-10-CM

## 2025-01-07 DIAGNOSIS — Z89.422 ACQUIRED ABSENCE OF OTHER LEFT TOE(S): ICD-10-CM

## 2025-01-07 DIAGNOSIS — L97.424 NON-PRESSURE CHRONIC ULCER OF LEFT HEEL AND MIDFOOT WITH NECROSIS OF BONE: ICD-10-CM

## 2025-01-07 DIAGNOSIS — E11.621 TYPE 2 DIABETES MELLITUS WITH FOOT ULCER: ICD-10-CM

## 2025-01-07 DIAGNOSIS — I73.9 PERIPHERAL VASCULAR DISEASE, UNSPECIFIED: ICD-10-CM

## 2025-01-07 DIAGNOSIS — Z89.511 ACQUIRED ABSENCE OF RIGHT LEG BELOW KNEE: ICD-10-CM

## 2025-01-07 DIAGNOSIS — E11.40 TYPE 2 DIABETES MELLITUS WITH DIABETIC NEUROPATHY, UNSPECIFIED: ICD-10-CM

## 2025-01-07 DIAGNOSIS — Z79.4 LONG TERM (CURRENT) USE OF INSULIN: ICD-10-CM

## 2025-01-07 LAB — SURGICAL PATHOLOGY STUDY: SIGNIFICANT CHANGE UP

## 2025-01-13 ENCOUNTER — OUTPATIENT (OUTPATIENT)
Dept: OUTPATIENT SERVICES | Facility: HOSPITAL | Age: 55
LOS: 1 days | Discharge: HOME | End: 2025-01-13
Payer: COMMERCIAL

## 2025-01-13 DIAGNOSIS — L89.90 PRESSURE ULCER OF UNSPECIFIED SITE, UNSPECIFIED STAGE: ICD-10-CM

## 2025-01-13 DIAGNOSIS — I70.209 UNSPECIFIED ATHEROSCLEROSIS OF NATIVE ARTERIES OF EXTREMITIES, UNSPECIFIED EXTREMITY: Chronic | ICD-10-CM

## 2025-01-13 LAB
GRAM STN FLD: SIGNIFICANT CHANGE UP
SPECIMEN SOURCE: SIGNIFICANT CHANGE UP

## 2025-01-13 PROCEDURE — 99214 OFFICE O/P EST MOD 30 MIN: CPT

## 2025-01-14 DIAGNOSIS — Z89.511 ACQUIRED ABSENCE OF RIGHT LEG BELOW KNEE: ICD-10-CM

## 2025-01-14 DIAGNOSIS — I73.9 PERIPHERAL VASCULAR DISEASE, UNSPECIFIED: ICD-10-CM

## 2025-01-14 DIAGNOSIS — E11.40 TYPE 2 DIABETES MELLITUS WITH DIABETIC NEUROPATHY, UNSPECIFIED: ICD-10-CM

## 2025-01-14 DIAGNOSIS — Z89.422 ACQUIRED ABSENCE OF OTHER LEFT TOE(S): ICD-10-CM

## 2025-01-14 DIAGNOSIS — Z79.4 LONG TERM (CURRENT) USE OF INSULIN: ICD-10-CM

## 2025-01-14 DIAGNOSIS — L97.424 NON-PRESSURE CHRONIC ULCER OF LEFT HEEL AND MIDFOOT WITH NECROSIS OF BONE: ICD-10-CM

## 2025-01-14 DIAGNOSIS — Z87.39 PERSONAL HISTORY OF OTHER DISEASES OF THE MUSCULOSKELETAL SYSTEM AND CONNECTIVE TISSUE: ICD-10-CM

## 2025-01-14 DIAGNOSIS — E11.621 TYPE 2 DIABETES MELLITUS WITH FOOT ULCER: ICD-10-CM

## 2025-01-15 LAB
-  AMIKACIN: SIGNIFICANT CHANGE UP
-  AZTREONAM: SIGNIFICANT CHANGE UP
-  CEFEPIME: SIGNIFICANT CHANGE UP
-  CEFTAZIDIME: SIGNIFICANT CHANGE UP
-  CIPROFLOXACIN: SIGNIFICANT CHANGE UP
-  CLINDAMYCIN: SIGNIFICANT CHANGE UP
-  ERYTHROMYCIN: SIGNIFICANT CHANGE UP
-  GENTAMICIN: SIGNIFICANT CHANGE UP
-  IMIPENEM: SIGNIFICANT CHANGE UP
-  LEVOFLOXACIN: SIGNIFICANT CHANGE UP
-  MEROPENEM: SIGNIFICANT CHANGE UP
-  OXACILLIN: SIGNIFICANT CHANGE UP
-  PIPERACILLIN/TAZOBACTAM: SIGNIFICANT CHANGE UP
-  RIFAMPIN: SIGNIFICANT CHANGE UP
-  TETRACYCLINE: SIGNIFICANT CHANGE UP
-  TRIMETHOPRIM/SULFAMETHOXAZOLE: SIGNIFICANT CHANGE UP
-  VANCOMYCIN: SIGNIFICANT CHANGE UP
METHOD TYPE: SIGNIFICANT CHANGE UP
METHOD TYPE: SIGNIFICANT CHANGE UP

## 2025-01-18 LAB
CULTURE RESULTS: ABNORMAL
GRAM STN FLD: ABNORMAL
ORGANISM # SPEC MICROSCOPIC CNT: ABNORMAL
ORGANISM # SPEC MICROSCOPIC CNT: ABNORMAL
ORGANISM # SPEC MICROSCOPIC CNT: SIGNIFICANT CHANGE UP
SPECIMEN SOURCE: SIGNIFICANT CHANGE UP

## 2025-01-22 ENCOUNTER — OUTPATIENT (OUTPATIENT)
Dept: OUTPATIENT SERVICES | Facility: HOSPITAL | Age: 55
LOS: 1 days | Discharge: HOME | End: 2025-01-22
Payer: COMMERCIAL

## 2025-01-22 DIAGNOSIS — I70.209 UNSPECIFIED ATHEROSCLEROSIS OF NATIVE ARTERIES OF EXTREMITIES, UNSPECIFIED EXTREMITY: Chronic | ICD-10-CM

## 2025-01-22 DIAGNOSIS — L89.90 PRESSURE ULCER OF UNSPECIFIED SITE, UNSPECIFIED STAGE: ICD-10-CM

## 2025-01-22 PROCEDURE — 99213 OFFICE O/P EST LOW 20 MIN: CPT | Mod: 25

## 2025-01-22 PROCEDURE — 11042 DBRDMT SUBQ TIS 1ST 20SQCM/<: CPT | Mod: 59,LT

## 2025-01-23 DIAGNOSIS — I73.9 PERIPHERAL VASCULAR DISEASE, UNSPECIFIED: ICD-10-CM

## 2025-01-23 DIAGNOSIS — Z89.511 ACQUIRED ABSENCE OF RIGHT LEG BELOW KNEE: ICD-10-CM

## 2025-01-23 DIAGNOSIS — E11.40 TYPE 2 DIABETES MELLITUS WITH DIABETIC NEUROPATHY, UNSPECIFIED: ICD-10-CM

## 2025-01-23 DIAGNOSIS — Z87.39 PERSONAL HISTORY OF OTHER DISEASES OF THE MUSCULOSKELETAL SYSTEM AND CONNECTIVE TISSUE: ICD-10-CM

## 2025-01-23 DIAGNOSIS — Z79.4 LONG TERM (CURRENT) USE OF INSULIN: ICD-10-CM

## 2025-01-23 DIAGNOSIS — L97.424 NON-PRESSURE CHRONIC ULCER OF LEFT HEEL AND MIDFOOT WITH NECROSIS OF BONE: ICD-10-CM

## 2025-01-23 DIAGNOSIS — E11.621 TYPE 2 DIABETES MELLITUS WITH FOOT ULCER: ICD-10-CM

## 2025-01-27 ENCOUNTER — OUTPATIENT (OUTPATIENT)
Dept: OUTPATIENT SERVICES | Facility: HOSPITAL | Age: 55
LOS: 1 days | Discharge: HOME | End: 2025-01-27
Payer: COMMERCIAL

## 2025-01-27 DIAGNOSIS — L89.90 PRESSURE ULCER OF UNSPECIFIED SITE, UNSPECIFIED STAGE: ICD-10-CM

## 2025-01-27 DIAGNOSIS — I70.209 UNSPECIFIED ATHEROSCLEROSIS OF NATIVE ARTERIES OF EXTREMITIES, UNSPECIFIED EXTREMITY: Chronic | ICD-10-CM

## 2025-01-27 PROCEDURE — 99213 OFFICE O/P EST LOW 20 MIN: CPT

## 2025-01-28 DIAGNOSIS — L97.424 NON-PRESSURE CHRONIC ULCER OF LEFT HEEL AND MIDFOOT WITH NECROSIS OF BONE: ICD-10-CM

## 2025-01-28 DIAGNOSIS — Z79.4 LONG TERM (CURRENT) USE OF INSULIN: ICD-10-CM

## 2025-01-28 DIAGNOSIS — I73.9 PERIPHERAL VASCULAR DISEASE, UNSPECIFIED: ICD-10-CM

## 2025-01-28 DIAGNOSIS — E11.621 TYPE 2 DIABETES MELLITUS WITH FOOT ULCER: ICD-10-CM

## 2025-01-28 DIAGNOSIS — Z89.511 ACQUIRED ABSENCE OF RIGHT LEG BELOW KNEE: ICD-10-CM

## 2025-01-28 DIAGNOSIS — E11.40 TYPE 2 DIABETES MELLITUS WITH DIABETIC NEUROPATHY, UNSPECIFIED: ICD-10-CM

## 2025-02-03 ENCOUNTER — OUTPATIENT (OUTPATIENT)
Dept: OUTPATIENT SERVICES | Facility: HOSPITAL | Age: 55
LOS: 1 days | Discharge: HOME | End: 2025-02-03
Payer: COMMERCIAL

## 2025-02-03 DIAGNOSIS — L89.90 PRESSURE ULCER OF UNSPECIFIED SITE, UNSPECIFIED STAGE: ICD-10-CM

## 2025-02-03 DIAGNOSIS — I70.209 UNSPECIFIED ATHEROSCLEROSIS OF NATIVE ARTERIES OF EXTREMITIES, UNSPECIFIED EXTREMITY: Chronic | ICD-10-CM

## 2025-02-03 PROCEDURE — 99213 OFFICE O/P EST LOW 20 MIN: CPT

## 2025-02-04 DIAGNOSIS — Z89.511 ACQUIRED ABSENCE OF RIGHT LEG BELOW KNEE: ICD-10-CM

## 2025-02-04 DIAGNOSIS — E11.621 TYPE 2 DIABETES MELLITUS WITH FOOT ULCER: ICD-10-CM

## 2025-02-04 DIAGNOSIS — Z79.4 LONG TERM (CURRENT) USE OF INSULIN: ICD-10-CM

## 2025-02-04 DIAGNOSIS — L97.424 NON-PRESSURE CHRONIC ULCER OF LEFT HEEL AND MIDFOOT WITH NECROSIS OF BONE: ICD-10-CM

## 2025-02-04 DIAGNOSIS — E11.40 TYPE 2 DIABETES MELLITUS WITH DIABETIC NEUROPATHY, UNSPECIFIED: ICD-10-CM

## 2025-02-04 DIAGNOSIS — I73.9 PERIPHERAL VASCULAR DISEASE, UNSPECIFIED: ICD-10-CM

## 2025-02-06 ENCOUNTER — OUTPATIENT (OUTPATIENT)
Dept: OUTPATIENT SERVICES | Facility: HOSPITAL | Age: 55
LOS: 1 days | Discharge: HOME | End: 2025-02-06
Payer: COMMERCIAL

## 2025-02-06 DIAGNOSIS — L89.90 PRESSURE ULCER OF UNSPECIFIED SITE, UNSPECIFIED STAGE: ICD-10-CM

## 2025-02-06 DIAGNOSIS — I70.209 UNSPECIFIED ATHEROSCLEROSIS OF NATIVE ARTERIES OF EXTREMITIES, UNSPECIFIED EXTREMITY: Chronic | ICD-10-CM

## 2025-02-06 PROCEDURE — 99183 HYPERBARIC OXYGEN THERAPY: CPT

## 2025-02-07 ENCOUNTER — OUTPATIENT (OUTPATIENT)
Dept: OUTPATIENT SERVICES | Facility: HOSPITAL | Age: 55
LOS: 1 days | Discharge: HOME | End: 2025-02-07
Payer: COMMERCIAL

## 2025-02-07 DIAGNOSIS — L89.90 PRESSURE ULCER OF UNSPECIFIED SITE, UNSPECIFIED STAGE: ICD-10-CM

## 2025-02-07 DIAGNOSIS — I70.209 UNSPECIFIED ATHEROSCLEROSIS OF NATIVE ARTERIES OF EXTREMITIES, UNSPECIFIED EXTREMITY: Chronic | ICD-10-CM

## 2025-02-07 PROCEDURE — 99183 HYPERBARIC OXYGEN THERAPY: CPT

## 2025-02-10 ENCOUNTER — OUTPATIENT (OUTPATIENT)
Dept: OUTPATIENT SERVICES | Facility: HOSPITAL | Age: 55
LOS: 1 days | Discharge: HOME | End: 2025-02-10
Payer: COMMERCIAL

## 2025-02-10 DIAGNOSIS — L89.90 PRESSURE ULCER OF UNSPECIFIED SITE, UNSPECIFIED STAGE: ICD-10-CM

## 2025-02-10 LAB — PREALB SERPL-MCNC: 20 MG/DL — SIGNIFICANT CHANGE UP (ref 20–40)

## 2025-02-10 PROCEDURE — 11042 DBRDMT SUBQ TIS 1ST 20SQCM/<: CPT | Mod: LT

## 2025-02-10 PROCEDURE — 99183 HYPERBARIC OXYGEN THERAPY: CPT

## 2025-02-11 ENCOUNTER — OUTPATIENT (OUTPATIENT)
Dept: OUTPATIENT SERVICES | Facility: HOSPITAL | Age: 55
LOS: 1 days | Discharge: HOME | End: 2025-02-11
Payer: COMMERCIAL

## 2025-02-11 DIAGNOSIS — E11.621 TYPE 2 DIABETES MELLITUS WITH FOOT ULCER: ICD-10-CM

## 2025-02-11 DIAGNOSIS — Z79.4 LONG TERM (CURRENT) USE OF INSULIN: ICD-10-CM

## 2025-02-11 DIAGNOSIS — I70.209 UNSPECIFIED ATHEROSCLEROSIS OF NATIVE ARTERIES OF EXTREMITIES, UNSPECIFIED EXTREMITY: Chronic | ICD-10-CM

## 2025-02-11 DIAGNOSIS — Z89.511 ACQUIRED ABSENCE OF RIGHT LEG BELOW KNEE: ICD-10-CM

## 2025-02-11 DIAGNOSIS — L97.424 NON-PRESSURE CHRONIC ULCER OF LEFT HEEL AND MIDFOOT WITH NECROSIS OF BONE: ICD-10-CM

## 2025-02-11 DIAGNOSIS — L89.90 PRESSURE ULCER OF UNSPECIFIED SITE, UNSPECIFIED STAGE: ICD-10-CM

## 2025-02-11 DIAGNOSIS — E11.40 TYPE 2 DIABETES MELLITUS WITH DIABETIC NEUROPATHY, UNSPECIFIED: ICD-10-CM

## 2025-02-11 DIAGNOSIS — I73.9 PERIPHERAL VASCULAR DISEASE, UNSPECIFIED: ICD-10-CM

## 2025-02-11 PROCEDURE — 99183 HYPERBARIC OXYGEN THERAPY: CPT

## 2025-02-12 ENCOUNTER — OUTPATIENT (OUTPATIENT)
Dept: OUTPATIENT SERVICES | Facility: HOSPITAL | Age: 55
LOS: 1 days | Discharge: HOME | End: 2025-02-12
Payer: COMMERCIAL

## 2025-02-12 DIAGNOSIS — I70.209 UNSPECIFIED ATHEROSCLEROSIS OF NATIVE ARTERIES OF EXTREMITIES, UNSPECIFIED EXTREMITY: Chronic | ICD-10-CM

## 2025-02-12 DIAGNOSIS — L89.90 PRESSURE ULCER OF UNSPECIFIED SITE, UNSPECIFIED STAGE: ICD-10-CM

## 2025-02-12 PROCEDURE — 99183 HYPERBARIC OXYGEN THERAPY: CPT

## 2025-02-14 ENCOUNTER — OUTPATIENT (OUTPATIENT)
Dept: OUTPATIENT SERVICES | Facility: HOSPITAL | Age: 55
LOS: 1 days | Discharge: HOME | End: 2025-02-14
Payer: COMMERCIAL

## 2025-02-14 DIAGNOSIS — L97.424 NON-PRESSURE CHRONIC ULCER OF LEFT HEEL AND MIDFOOT WITH NECROSIS OF BONE: ICD-10-CM

## 2025-02-14 DIAGNOSIS — L89.90 PRESSURE ULCER OF UNSPECIFIED SITE, UNSPECIFIED STAGE: ICD-10-CM

## 2025-02-14 DIAGNOSIS — E11.621 TYPE 2 DIABETES MELLITUS WITH FOOT ULCER: ICD-10-CM

## 2025-02-14 DIAGNOSIS — I70.209 UNSPECIFIED ATHEROSCLEROSIS OF NATIVE ARTERIES OF EXTREMITIES, UNSPECIFIED EXTREMITY: Chronic | ICD-10-CM

## 2025-02-14 PROCEDURE — 99183 HYPERBARIC OXYGEN THERAPY: CPT

## 2025-02-18 ENCOUNTER — OUTPATIENT (OUTPATIENT)
Dept: OUTPATIENT SERVICES | Facility: HOSPITAL | Age: 55
LOS: 1 days | Discharge: HOME | End: 2025-02-18

## 2025-02-18 DIAGNOSIS — I70.209 UNSPECIFIED ATHEROSCLEROSIS OF NATIVE ARTERIES OF EXTREMITIES, UNSPECIFIED EXTREMITY: Chronic | ICD-10-CM

## 2025-02-18 DIAGNOSIS — L89.90 PRESSURE ULCER OF UNSPECIFIED SITE, UNSPECIFIED STAGE: ICD-10-CM

## 2025-02-19 ENCOUNTER — OUTPATIENT (OUTPATIENT)
Dept: OUTPATIENT SERVICES | Facility: HOSPITAL | Age: 55
LOS: 1 days | Discharge: HOME | End: 2025-02-19

## 2025-02-19 DIAGNOSIS — E11.621 TYPE 2 DIABETES MELLITUS WITH FOOT ULCER: ICD-10-CM

## 2025-02-19 DIAGNOSIS — Z79.4 LONG TERM (CURRENT) USE OF INSULIN: ICD-10-CM

## 2025-02-19 DIAGNOSIS — E11.40 TYPE 2 DIABETES MELLITUS WITH DIABETIC NEUROPATHY, UNSPECIFIED: ICD-10-CM

## 2025-02-19 DIAGNOSIS — L97.424 NON-PRESSURE CHRONIC ULCER OF LEFT HEEL AND MIDFOOT WITH NECROSIS OF BONE: ICD-10-CM

## 2025-02-19 DIAGNOSIS — I73.9 PERIPHERAL VASCULAR DISEASE, UNSPECIFIED: ICD-10-CM

## 2025-02-19 DIAGNOSIS — I70.209 UNSPECIFIED ATHEROSCLEROSIS OF NATIVE ARTERIES OF EXTREMITIES, UNSPECIFIED EXTREMITY: Chronic | ICD-10-CM

## 2025-02-19 DIAGNOSIS — L89.90 PRESSURE ULCER OF UNSPECIFIED SITE, UNSPECIFIED STAGE: ICD-10-CM

## 2025-02-19 DIAGNOSIS — Z89.511 ACQUIRED ABSENCE OF RIGHT LEG BELOW KNEE: ICD-10-CM

## 2025-02-21 DIAGNOSIS — E11.621 TYPE 2 DIABETES MELLITUS WITH FOOT ULCER: ICD-10-CM

## 2025-02-21 DIAGNOSIS — L97.424 NON-PRESSURE CHRONIC ULCER OF LEFT HEEL AND MIDFOOT WITH NECROSIS OF BONE: ICD-10-CM

## 2025-02-24 ENCOUNTER — OUTPATIENT (OUTPATIENT)
Dept: OUTPATIENT SERVICES | Facility: HOSPITAL | Age: 55
LOS: 1 days | Discharge: HOME | End: 2025-02-24
Payer: COMMERCIAL

## 2025-02-24 DIAGNOSIS — L89.90 PRESSURE ULCER OF UNSPECIFIED SITE, UNSPECIFIED STAGE: ICD-10-CM

## 2025-02-24 DIAGNOSIS — I70.209 UNSPECIFIED ATHEROSCLEROSIS OF NATIVE ARTERIES OF EXTREMITIES, UNSPECIFIED EXTREMITY: Chronic | ICD-10-CM

## 2025-02-24 PROCEDURE — 99183 HYPERBARIC OXYGEN THERAPY: CPT

## 2025-02-25 ENCOUNTER — OUTPATIENT (OUTPATIENT)
Dept: OUTPATIENT SERVICES | Facility: HOSPITAL | Age: 55
LOS: 1 days | Discharge: HOME | End: 2025-02-25
Payer: COMMERCIAL

## 2025-02-25 DIAGNOSIS — L89.90 PRESSURE ULCER OF UNSPECIFIED SITE, UNSPECIFIED STAGE: ICD-10-CM

## 2025-02-25 PROCEDURE — 99183 HYPERBARIC OXYGEN THERAPY: CPT

## 2025-02-26 ENCOUNTER — OUTPATIENT (OUTPATIENT)
Dept: OUTPATIENT SERVICES | Facility: HOSPITAL | Age: 55
LOS: 1 days | Discharge: HOME | End: 2025-02-26

## 2025-02-26 DIAGNOSIS — L89.90 PRESSURE ULCER OF UNSPECIFIED SITE, UNSPECIFIED STAGE: ICD-10-CM

## 2025-02-26 DIAGNOSIS — I70.209 UNSPECIFIED ATHEROSCLEROSIS OF NATIVE ARTERIES OF EXTREMITIES, UNSPECIFIED EXTREMITY: Chronic | ICD-10-CM

## 2025-02-26 LAB
-  AZTREONAM: SIGNIFICANT CHANGE UP
-  CEFEPIME: SIGNIFICANT CHANGE UP
-  CEFTAZIDIME: SIGNIFICANT CHANGE UP
-  CIPROFLOXACIN: SIGNIFICANT CHANGE UP
-  IMIPENEM: SIGNIFICANT CHANGE UP
-  LEVOFLOXACIN: SIGNIFICANT CHANGE UP
-  MEROPENEM: SIGNIFICANT CHANGE UP
-  PIPERACILLIN/TAZOBACTAM: SIGNIFICANT CHANGE UP
CULTURE RESULTS: ABNORMAL
METHOD TYPE: SIGNIFICANT CHANGE UP
ORGANISM # SPEC MICROSCOPIC CNT: ABNORMAL
ORGANISM # SPEC MICROSCOPIC CNT: SIGNIFICANT CHANGE UP
SPECIMEN SOURCE: SIGNIFICANT CHANGE UP

## 2025-02-27 ENCOUNTER — OUTPATIENT (OUTPATIENT)
Dept: OUTPATIENT SERVICES | Facility: HOSPITAL | Age: 55
LOS: 1 days | Discharge: HOME | End: 2025-02-27
Payer: COMMERCIAL

## 2025-02-27 DIAGNOSIS — L89.90 PRESSURE ULCER OF UNSPECIFIED SITE, UNSPECIFIED STAGE: ICD-10-CM

## 2025-02-27 PROCEDURE — 99183 HYPERBARIC OXYGEN THERAPY: CPT

## 2025-02-28 ENCOUNTER — OUTPATIENT (OUTPATIENT)
Dept: OUTPATIENT SERVICES | Facility: HOSPITAL | Age: 55
LOS: 1 days | Discharge: HOME | End: 2025-02-28
Payer: COMMERCIAL

## 2025-02-28 DIAGNOSIS — I70.209 UNSPECIFIED ATHEROSCLEROSIS OF NATIVE ARTERIES OF EXTREMITIES, UNSPECIFIED EXTREMITY: Chronic | ICD-10-CM

## 2025-02-28 DIAGNOSIS — L89.90 PRESSURE ULCER OF UNSPECIFIED SITE, UNSPECIFIED STAGE: ICD-10-CM

## 2025-02-28 PROCEDURE — 99183 HYPERBARIC OXYGEN THERAPY: CPT

## 2025-03-03 ENCOUNTER — OUTPATIENT (OUTPATIENT)
Dept: OUTPATIENT SERVICES | Facility: HOSPITAL | Age: 55
LOS: 1 days | Discharge: HOME | End: 2025-03-03
Payer: COMMERCIAL

## 2025-03-03 DIAGNOSIS — I70.209 UNSPECIFIED ATHEROSCLEROSIS OF NATIVE ARTERIES OF EXTREMITIES, UNSPECIFIED EXTREMITY: Chronic | ICD-10-CM

## 2025-03-03 DIAGNOSIS — L89.90 PRESSURE ULCER OF UNSPECIFIED SITE, UNSPECIFIED STAGE: ICD-10-CM

## 2025-03-03 PROCEDURE — 99183 HYPERBARIC OXYGEN THERAPY: CPT

## 2025-03-04 ENCOUNTER — OUTPATIENT (OUTPATIENT)
Dept: OUTPATIENT SERVICES | Facility: HOSPITAL | Age: 55
LOS: 1 days | Discharge: HOME | End: 2025-03-04
Payer: COMMERCIAL

## 2025-03-04 DIAGNOSIS — L97.424 NON-PRESSURE CHRONIC ULCER OF LEFT HEEL AND MIDFOOT WITH NECROSIS OF BONE: ICD-10-CM

## 2025-03-04 DIAGNOSIS — E11.621 TYPE 2 DIABETES MELLITUS WITH FOOT ULCER: ICD-10-CM

## 2025-03-04 DIAGNOSIS — I70.209 UNSPECIFIED ATHEROSCLEROSIS OF NATIVE ARTERIES OF EXTREMITIES, UNSPECIFIED EXTREMITY: Chronic | ICD-10-CM

## 2025-03-04 DIAGNOSIS — L89.90 PRESSURE ULCER OF UNSPECIFIED SITE, UNSPECIFIED STAGE: ICD-10-CM

## 2025-03-04 PROCEDURE — 99183 HYPERBARIC OXYGEN THERAPY: CPT

## 2025-03-05 ENCOUNTER — OUTPATIENT (OUTPATIENT)
Dept: OUTPATIENT SERVICES | Facility: HOSPITAL | Age: 55
LOS: 1 days | Discharge: HOME | End: 2025-03-05
Payer: COMMERCIAL

## 2025-03-05 DIAGNOSIS — I70.209 UNSPECIFIED ATHEROSCLEROSIS OF NATIVE ARTERIES OF EXTREMITIES, UNSPECIFIED EXTREMITY: Chronic | ICD-10-CM

## 2025-03-05 DIAGNOSIS — L89.90 PRESSURE ULCER OF UNSPECIFIED SITE, UNSPECIFIED STAGE: ICD-10-CM

## 2025-03-05 PROCEDURE — 99183 HYPERBARIC OXYGEN THERAPY: CPT

## 2025-03-06 ENCOUNTER — OUTPATIENT (OUTPATIENT)
Dept: OUTPATIENT SERVICES | Facility: HOSPITAL | Age: 55
LOS: 1 days | Discharge: HOME | End: 2025-03-06
Payer: COMMERCIAL

## 2025-03-06 DIAGNOSIS — L89.90 PRESSURE ULCER OF UNSPECIFIED SITE, UNSPECIFIED STAGE: ICD-10-CM

## 2025-03-06 PROCEDURE — 99183 HYPERBARIC OXYGEN THERAPY: CPT

## 2025-03-07 ENCOUNTER — OUTPATIENT (OUTPATIENT)
Dept: OUTPATIENT SERVICES | Facility: HOSPITAL | Age: 55
LOS: 1 days | Discharge: HOME | End: 2025-03-07

## 2025-03-07 DIAGNOSIS — L89.90 PRESSURE ULCER OF UNSPECIFIED SITE, UNSPECIFIED STAGE: ICD-10-CM

## 2025-03-07 DIAGNOSIS — I70.209 UNSPECIFIED ATHEROSCLEROSIS OF NATIVE ARTERIES OF EXTREMITIES, UNSPECIFIED EXTREMITY: Chronic | ICD-10-CM

## 2025-03-10 ENCOUNTER — OUTPATIENT (OUTPATIENT)
Dept: OUTPATIENT SERVICES | Facility: HOSPITAL | Age: 55
LOS: 1 days | Discharge: HOME | End: 2025-03-10
Payer: COMMERCIAL

## 2025-03-10 DIAGNOSIS — E11.621 TYPE 2 DIABETES MELLITUS WITH FOOT ULCER: ICD-10-CM

## 2025-03-10 DIAGNOSIS — L97.424 NON-PRESSURE CHRONIC ULCER OF LEFT HEEL AND MIDFOOT WITH NECROSIS OF BONE: ICD-10-CM

## 2025-03-10 DIAGNOSIS — I70.209 UNSPECIFIED ATHEROSCLEROSIS OF NATIVE ARTERIES OF EXTREMITIES, UNSPECIFIED EXTREMITY: Chronic | ICD-10-CM

## 2025-03-10 DIAGNOSIS — L89.90 PRESSURE ULCER OF UNSPECIFIED SITE, UNSPECIFIED STAGE: ICD-10-CM

## 2025-03-10 PROCEDURE — 99183 HYPERBARIC OXYGEN THERAPY: CPT

## 2025-03-10 PROCEDURE — 17250 CHEM CAUT OF GRANLTJ TISSUE: CPT | Mod: LT

## 2025-03-12 ENCOUNTER — OUTPATIENT (OUTPATIENT)
Dept: OUTPATIENT SERVICES | Facility: HOSPITAL | Age: 55
LOS: 1 days | Discharge: HOME | End: 2025-03-12
Payer: COMMERCIAL

## 2025-03-12 DIAGNOSIS — L89.90 PRESSURE ULCER OF UNSPECIFIED SITE, UNSPECIFIED STAGE: ICD-10-CM

## 2025-03-12 PROCEDURE — 99183 HYPERBARIC OXYGEN THERAPY: CPT

## 2025-03-13 ENCOUNTER — OUTPATIENT (OUTPATIENT)
Dept: OUTPATIENT SERVICES | Facility: HOSPITAL | Age: 55
LOS: 1 days | Discharge: HOME | End: 2025-03-13
Payer: COMMERCIAL

## 2025-03-13 DIAGNOSIS — L89.90 PRESSURE ULCER OF UNSPECIFIED SITE, UNSPECIFIED STAGE: ICD-10-CM

## 2025-03-13 DIAGNOSIS — I70.209 UNSPECIFIED ATHEROSCLEROSIS OF NATIVE ARTERIES OF EXTREMITIES, UNSPECIFIED EXTREMITY: Chronic | ICD-10-CM

## 2025-03-13 PROCEDURE — 99183 HYPERBARIC OXYGEN THERAPY: CPT

## 2025-03-14 ENCOUNTER — OUTPATIENT (OUTPATIENT)
Dept: OUTPATIENT SERVICES | Facility: HOSPITAL | Age: 55
LOS: 1 days | Discharge: HOME | End: 2025-03-14

## 2025-03-14 DIAGNOSIS — E11.621 TYPE 2 DIABETES MELLITUS WITH FOOT ULCER: ICD-10-CM

## 2025-03-14 DIAGNOSIS — L97.424 NON-PRESSURE CHRONIC ULCER OF LEFT HEEL AND MIDFOOT WITH NECROSIS OF BONE: ICD-10-CM

## 2025-03-14 DIAGNOSIS — I70.209 UNSPECIFIED ATHEROSCLEROSIS OF NATIVE ARTERIES OF EXTREMITIES, UNSPECIFIED EXTREMITY: Chronic | ICD-10-CM

## 2025-03-14 DIAGNOSIS — L89.90 PRESSURE ULCER OF UNSPECIFIED SITE, UNSPECIFIED STAGE: ICD-10-CM

## 2025-03-17 ENCOUNTER — OUTPATIENT (OUTPATIENT)
Dept: OUTPATIENT SERVICES | Facility: HOSPITAL | Age: 55
LOS: 1 days | Discharge: HOME | End: 2025-03-17
Payer: COMMERCIAL

## 2025-03-17 DIAGNOSIS — L89.90 PRESSURE ULCER OF UNSPECIFIED SITE, UNSPECIFIED STAGE: ICD-10-CM

## 2025-03-17 DIAGNOSIS — I70.209 UNSPECIFIED ATHEROSCLEROSIS OF NATIVE ARTERIES OF EXTREMITIES, UNSPECIFIED EXTREMITY: Chronic | ICD-10-CM

## 2025-03-17 PROCEDURE — 99183 HYPERBARIC OXYGEN THERAPY: CPT

## 2025-03-18 ENCOUNTER — OUTPATIENT (OUTPATIENT)
Dept: OUTPATIENT SERVICES | Facility: HOSPITAL | Age: 55
LOS: 1 days | Discharge: HOME | End: 2025-03-18
Payer: COMMERCIAL

## 2025-03-18 DIAGNOSIS — I70.209 UNSPECIFIED ATHEROSCLEROSIS OF NATIVE ARTERIES OF EXTREMITIES, UNSPECIFIED EXTREMITY: Chronic | ICD-10-CM

## 2025-03-18 DIAGNOSIS — L89.90 PRESSURE ULCER OF UNSPECIFIED SITE, UNSPECIFIED STAGE: ICD-10-CM

## 2025-03-18 PROCEDURE — 99183 HYPERBARIC OXYGEN THERAPY: CPT

## 2025-03-19 ENCOUNTER — OUTPATIENT (OUTPATIENT)
Dept: OUTPATIENT SERVICES | Facility: HOSPITAL | Age: 55
LOS: 1 days | Discharge: HOME | End: 2025-03-19
Payer: COMMERCIAL

## 2025-03-19 DIAGNOSIS — L89.90 PRESSURE ULCER OF UNSPECIFIED SITE, UNSPECIFIED STAGE: ICD-10-CM

## 2025-03-19 DIAGNOSIS — I70.209 UNSPECIFIED ATHEROSCLEROSIS OF NATIVE ARTERIES OF EXTREMITIES, UNSPECIFIED EXTREMITY: Chronic | ICD-10-CM

## 2025-03-19 PROCEDURE — 99183 HYPERBARIC OXYGEN THERAPY: CPT

## 2025-03-20 ENCOUNTER — OUTPATIENT (OUTPATIENT)
Dept: OUTPATIENT SERVICES | Facility: HOSPITAL | Age: 55
LOS: 1 days | Discharge: HOME | End: 2025-03-20

## 2025-03-20 DIAGNOSIS — L89.90 PRESSURE ULCER OF UNSPECIFIED SITE, UNSPECIFIED STAGE: ICD-10-CM

## 2025-03-24 ENCOUNTER — OUTPATIENT (OUTPATIENT)
Dept: OUTPATIENT SERVICES | Facility: HOSPITAL | Age: 55
LOS: 1 days | Discharge: HOME | End: 2025-03-24
Payer: COMMERCIAL

## 2025-03-24 DIAGNOSIS — E11.621 TYPE 2 DIABETES MELLITUS WITH FOOT ULCER: ICD-10-CM

## 2025-03-24 DIAGNOSIS — L89.90 PRESSURE ULCER OF UNSPECIFIED SITE, UNSPECIFIED STAGE: ICD-10-CM

## 2025-03-24 DIAGNOSIS — L97.424 NON-PRESSURE CHRONIC ULCER OF LEFT HEEL AND MIDFOOT WITH NECROSIS OF BONE: ICD-10-CM

## 2025-03-24 DIAGNOSIS — I70.209 UNSPECIFIED ATHEROSCLEROSIS OF NATIVE ARTERIES OF EXTREMITIES, UNSPECIFIED EXTREMITY: Chronic | ICD-10-CM

## 2025-03-24 PROCEDURE — 99183 HYPERBARIC OXYGEN THERAPY: CPT

## 2025-03-25 DIAGNOSIS — I73.9 PERIPHERAL VASCULAR DISEASE, UNSPECIFIED: ICD-10-CM

## 2025-03-25 DIAGNOSIS — L97.424 NON-PRESSURE CHRONIC ULCER OF LEFT HEEL AND MIDFOOT WITH NECROSIS OF BONE: ICD-10-CM

## 2025-03-25 DIAGNOSIS — E11.40 TYPE 2 DIABETES MELLITUS WITH DIABETIC NEUROPATHY, UNSPECIFIED: ICD-10-CM

## 2025-03-25 DIAGNOSIS — L89.890 PRESSURE ULCER OF OTHER SITE, UNSTAGEABLE: ICD-10-CM

## 2025-03-25 DIAGNOSIS — Z79.4 LONG TERM (CURRENT) USE OF INSULIN: ICD-10-CM

## 2025-03-25 DIAGNOSIS — E11.621 TYPE 2 DIABETES MELLITUS WITH FOOT ULCER: ICD-10-CM

## 2025-03-25 DIAGNOSIS — Z89.511 ACQUIRED ABSENCE OF RIGHT LEG BELOW KNEE: ICD-10-CM

## 2025-03-26 ENCOUNTER — OUTPATIENT (OUTPATIENT)
Dept: OUTPATIENT SERVICES | Facility: HOSPITAL | Age: 55
LOS: 1 days | Discharge: HOME | End: 2025-03-26
Payer: COMMERCIAL

## 2025-03-26 DIAGNOSIS — L89.90 PRESSURE ULCER OF UNSPECIFIED SITE, UNSPECIFIED STAGE: ICD-10-CM

## 2025-03-26 DIAGNOSIS — I70.209 UNSPECIFIED ATHEROSCLEROSIS OF NATIVE ARTERIES OF EXTREMITIES, UNSPECIFIED EXTREMITY: Chronic | ICD-10-CM

## 2025-03-26 PROCEDURE — 99183 HYPERBARIC OXYGEN THERAPY: CPT

## 2025-03-27 ENCOUNTER — OUTPATIENT (OUTPATIENT)
Dept: OUTPATIENT SERVICES | Facility: HOSPITAL | Age: 55
LOS: 1 days | Discharge: HOME | End: 2025-03-27
Payer: COMMERCIAL

## 2025-03-27 DIAGNOSIS — L89.90 PRESSURE ULCER OF UNSPECIFIED SITE, UNSPECIFIED STAGE: ICD-10-CM

## 2025-03-27 PROCEDURE — 99183 HYPERBARIC OXYGEN THERAPY: CPT

## 2025-03-28 ENCOUNTER — OUTPATIENT (OUTPATIENT)
Dept: OUTPATIENT SERVICES | Facility: HOSPITAL | Age: 55
LOS: 1 days | Discharge: HOME | End: 2025-03-28

## 2025-03-28 DIAGNOSIS — L89.90 PRESSURE ULCER OF UNSPECIFIED SITE, UNSPECIFIED STAGE: ICD-10-CM

## 2025-03-31 ENCOUNTER — OUTPATIENT (OUTPATIENT)
Dept: OUTPATIENT SERVICES | Facility: HOSPITAL | Age: 55
LOS: 1 days | Discharge: HOME | End: 2025-03-31
Payer: COMMERCIAL

## 2025-03-31 DIAGNOSIS — E11.621 TYPE 2 DIABETES MELLITUS WITH FOOT ULCER: ICD-10-CM

## 2025-03-31 DIAGNOSIS — L97.424 NON-PRESSURE CHRONIC ULCER OF LEFT HEEL AND MIDFOOT WITH NECROSIS OF BONE: ICD-10-CM

## 2025-03-31 DIAGNOSIS — L89.90 PRESSURE ULCER OF UNSPECIFIED SITE, UNSPECIFIED STAGE: ICD-10-CM

## 2025-03-31 PROCEDURE — 99183 HYPERBARIC OXYGEN THERAPY: CPT

## 2025-04-01 ENCOUNTER — OUTPATIENT (OUTPATIENT)
Dept: OUTPATIENT SERVICES | Facility: HOSPITAL | Age: 55
LOS: 1 days | Discharge: HOME | End: 2025-04-01

## 2025-04-01 DIAGNOSIS — L89.90 PRESSURE ULCER OF UNSPECIFIED SITE, UNSPECIFIED STAGE: ICD-10-CM

## 2025-04-01 DIAGNOSIS — I70.209 UNSPECIFIED ATHEROSCLEROSIS OF NATIVE ARTERIES OF EXTREMITIES, UNSPECIFIED EXTREMITY: Chronic | ICD-10-CM

## 2025-04-02 ENCOUNTER — OUTPATIENT (OUTPATIENT)
Dept: OUTPATIENT SERVICES | Facility: HOSPITAL | Age: 55
LOS: 1 days | Discharge: HOME | End: 2025-04-02
Payer: COMMERCIAL

## 2025-04-02 DIAGNOSIS — I70.209 UNSPECIFIED ATHEROSCLEROSIS OF NATIVE ARTERIES OF EXTREMITIES, UNSPECIFIED EXTREMITY: Chronic | ICD-10-CM

## 2025-04-02 DIAGNOSIS — L89.90 PRESSURE ULCER OF UNSPECIFIED SITE, UNSPECIFIED STAGE: ICD-10-CM

## 2025-04-02 PROCEDURE — 99183 HYPERBARIC OXYGEN THERAPY: CPT

## 2025-04-03 ENCOUNTER — OUTPATIENT (OUTPATIENT)
Dept: OUTPATIENT SERVICES | Facility: HOSPITAL | Age: 55
LOS: 1 days | Discharge: HOME | End: 2025-04-03
Payer: COMMERCIAL

## 2025-04-03 DIAGNOSIS — L89.90 PRESSURE ULCER OF UNSPECIFIED SITE, UNSPECIFIED STAGE: ICD-10-CM

## 2025-04-03 DIAGNOSIS — I70.209 UNSPECIFIED ATHEROSCLEROSIS OF NATIVE ARTERIES OF EXTREMITIES, UNSPECIFIED EXTREMITY: Chronic | ICD-10-CM

## 2025-04-03 PROCEDURE — 99183 HYPERBARIC OXYGEN THERAPY: CPT

## 2025-04-04 ENCOUNTER — OUTPATIENT (OUTPATIENT)
Dept: OUTPATIENT SERVICES | Facility: HOSPITAL | Age: 55
LOS: 1 days | Discharge: HOME | End: 2025-04-04

## 2025-04-04 DIAGNOSIS — L89.90 PRESSURE ULCER OF UNSPECIFIED SITE, UNSPECIFIED STAGE: ICD-10-CM

## 2025-04-07 ENCOUNTER — RESULT REVIEW (OUTPATIENT)
Age: 55
End: 2025-04-07

## 2025-04-07 ENCOUNTER — OUTPATIENT (OUTPATIENT)
Dept: OUTPATIENT SERVICES | Facility: HOSPITAL | Age: 55
LOS: 1 days | End: 2025-04-07
Payer: COMMERCIAL

## 2025-04-07 DIAGNOSIS — E11.621 TYPE 2 DIABETES MELLITUS WITH FOOT ULCER: ICD-10-CM

## 2025-04-07 DIAGNOSIS — I70.209 UNSPECIFIED ATHEROSCLEROSIS OF NATIVE ARTERIES OF EXTREMITIES, UNSPECIFIED EXTREMITY: Chronic | ICD-10-CM

## 2025-04-07 DIAGNOSIS — L97.424 NON-PRESSURE CHRONIC ULCER OF LEFT HEEL AND MIDFOOT WITH NECROSIS OF BONE: ICD-10-CM

## 2025-04-07 DIAGNOSIS — L89.90 PRESSURE ULCER OF UNSPECIFIED SITE, UNSPECIFIED STAGE: ICD-10-CM

## 2025-04-07 PROCEDURE — 99183 HYPERBARIC OXYGEN THERAPY: CPT

## 2025-04-07 PROCEDURE — 11042 DBRDMT SUBQ TIS 1ST 20SQCM/<: CPT | Mod: LT

## 2025-04-07 PROCEDURE — 88304 TISSUE EXAM BY PATHOLOGIST: CPT | Mod: 26

## 2025-04-08 ENCOUNTER — OUTPATIENT (OUTPATIENT)
Dept: OUTPATIENT SERVICES | Facility: HOSPITAL | Age: 55
LOS: 1 days | End: 2025-04-08

## 2025-04-08 DIAGNOSIS — I73.9 PERIPHERAL VASCULAR DISEASE, UNSPECIFIED: ICD-10-CM

## 2025-04-08 DIAGNOSIS — L89.90 PRESSURE ULCER OF UNSPECIFIED SITE, UNSPECIFIED STAGE: ICD-10-CM

## 2025-04-08 DIAGNOSIS — E11.40 TYPE 2 DIABETES MELLITUS WITH DIABETIC NEUROPATHY, UNSPECIFIED: ICD-10-CM

## 2025-04-08 DIAGNOSIS — Z89.511 ACQUIRED ABSENCE OF RIGHT LEG BELOW KNEE: ICD-10-CM

## 2025-04-08 DIAGNOSIS — E11.621 TYPE 2 DIABETES MELLITUS WITH FOOT ULCER: ICD-10-CM

## 2025-04-08 DIAGNOSIS — I70.209 UNSPECIFIED ATHEROSCLEROSIS OF NATIVE ARTERIES OF EXTREMITIES, UNSPECIFIED EXTREMITY: Chronic | ICD-10-CM

## 2025-04-08 DIAGNOSIS — L97.424 NON-PRESSURE CHRONIC ULCER OF LEFT HEEL AND MIDFOOT WITH NECROSIS OF BONE: ICD-10-CM

## 2025-04-08 DIAGNOSIS — L89.890 PRESSURE ULCER OF OTHER SITE, UNSTAGEABLE: ICD-10-CM

## 2025-04-08 DIAGNOSIS — Z79.4 LONG TERM (CURRENT) USE OF INSULIN: ICD-10-CM

## 2025-04-09 ENCOUNTER — OUTPATIENT (OUTPATIENT)
Dept: OUTPATIENT SERVICES | Facility: HOSPITAL | Age: 55
LOS: 1 days | End: 2025-04-09
Payer: COMMERCIAL

## 2025-04-09 DIAGNOSIS — L89.90 PRESSURE ULCER OF UNSPECIFIED SITE, UNSPECIFIED STAGE: ICD-10-CM

## 2025-04-09 DIAGNOSIS — I70.209 UNSPECIFIED ATHEROSCLEROSIS OF NATIVE ARTERIES OF EXTREMITIES, UNSPECIFIED EXTREMITY: Chronic | ICD-10-CM

## 2025-04-09 LAB — SURGICAL PATHOLOGY STUDY: SIGNIFICANT CHANGE UP

## 2025-04-09 PROCEDURE — 99183 HYPERBARIC OXYGEN THERAPY: CPT

## 2025-04-10 ENCOUNTER — OUTPATIENT (OUTPATIENT)
Dept: OUTPATIENT SERVICES | Facility: HOSPITAL | Age: 55
LOS: 1 days | End: 2025-04-10
Payer: COMMERCIAL

## 2025-04-10 DIAGNOSIS — I70.209 UNSPECIFIED ATHEROSCLEROSIS OF NATIVE ARTERIES OF EXTREMITIES, UNSPECIFIED EXTREMITY: Chronic | ICD-10-CM

## 2025-04-10 DIAGNOSIS — L89.90 PRESSURE ULCER OF UNSPECIFIED SITE, UNSPECIFIED STAGE: ICD-10-CM

## 2025-04-10 PROCEDURE — 99183 HYPERBARIC OXYGEN THERAPY: CPT

## 2025-04-11 ENCOUNTER — OUTPATIENT (OUTPATIENT)
Dept: OUTPATIENT SERVICES | Facility: HOSPITAL | Age: 55
LOS: 1 days | End: 2025-04-11

## 2025-04-11 DIAGNOSIS — L89.90 PRESSURE ULCER OF UNSPECIFIED SITE, UNSPECIFIED STAGE: ICD-10-CM

## 2025-04-14 ENCOUNTER — RESULT REVIEW (OUTPATIENT)
Age: 55
End: 2025-04-14

## 2025-04-14 ENCOUNTER — OUTPATIENT (OUTPATIENT)
Dept: OUTPATIENT SERVICES | Facility: HOSPITAL | Age: 55
LOS: 1 days | End: 2025-04-14
Payer: COMMERCIAL

## 2025-04-14 DIAGNOSIS — E11.621 TYPE 2 DIABETES MELLITUS WITH FOOT ULCER: ICD-10-CM

## 2025-04-14 DIAGNOSIS — L97.424 NON-PRESSURE CHRONIC ULCER OF LEFT HEEL AND MIDFOOT WITH NECROSIS OF BONE: ICD-10-CM

## 2025-04-14 DIAGNOSIS — I70.209 UNSPECIFIED ATHEROSCLEROSIS OF NATIVE ARTERIES OF EXTREMITIES, UNSPECIFIED EXTREMITY: Chronic | ICD-10-CM

## 2025-04-14 DIAGNOSIS — L89.90 PRESSURE ULCER OF UNSPECIFIED SITE, UNSPECIFIED STAGE: ICD-10-CM

## 2025-04-14 PROCEDURE — 99183 HYPERBARIC OXYGEN THERAPY: CPT

## 2025-04-14 PROCEDURE — 73630 X-RAY EXAM OF FOOT: CPT | Mod: 26,LT

## 2025-04-15 DIAGNOSIS — E11.40 TYPE 2 DIABETES MELLITUS WITH DIABETIC NEUROPATHY, UNSPECIFIED: ICD-10-CM

## 2025-04-15 DIAGNOSIS — L89.890 PRESSURE ULCER OF OTHER SITE, UNSTAGEABLE: ICD-10-CM

## 2025-04-15 DIAGNOSIS — L97.424 NON-PRESSURE CHRONIC ULCER OF LEFT HEEL AND MIDFOOT WITH NECROSIS OF BONE: ICD-10-CM

## 2025-04-15 DIAGNOSIS — I73.9 PERIPHERAL VASCULAR DISEASE, UNSPECIFIED: ICD-10-CM

## 2025-04-15 DIAGNOSIS — L92.8 OTHER GRANULOMATOUS DISORDERS OF THE SKIN AND SUBCUTANEOUS TISSUE: ICD-10-CM

## 2025-04-15 DIAGNOSIS — Z89.511 ACQUIRED ABSENCE OF RIGHT LEG BELOW KNEE: ICD-10-CM

## 2025-04-15 DIAGNOSIS — Z79.4 LONG TERM (CURRENT) USE OF INSULIN: ICD-10-CM

## 2025-04-15 DIAGNOSIS — E11.621 TYPE 2 DIABETES MELLITUS WITH FOOT ULCER: ICD-10-CM

## 2025-04-16 ENCOUNTER — OUTPATIENT (OUTPATIENT)
Dept: OUTPATIENT SERVICES | Facility: HOSPITAL | Age: 55
LOS: 1 days | End: 2025-04-16
Payer: COMMERCIAL

## 2025-04-16 DIAGNOSIS — L89.90 PRESSURE ULCER OF UNSPECIFIED SITE, UNSPECIFIED STAGE: ICD-10-CM

## 2025-04-16 DIAGNOSIS — I70.209 UNSPECIFIED ATHEROSCLEROSIS OF NATIVE ARTERIES OF EXTREMITIES, UNSPECIFIED EXTREMITY: Chronic | ICD-10-CM

## 2025-04-16 PROCEDURE — 99183 HYPERBARIC OXYGEN THERAPY: CPT

## 2025-04-17 ENCOUNTER — OUTPATIENT (OUTPATIENT)
Dept: OUTPATIENT SERVICES | Facility: HOSPITAL | Age: 55
LOS: 1 days | End: 2025-04-17

## 2025-04-17 DIAGNOSIS — I70.209 UNSPECIFIED ATHEROSCLEROSIS OF NATIVE ARTERIES OF EXTREMITIES, UNSPECIFIED EXTREMITY: Chronic | ICD-10-CM

## 2025-04-17 DIAGNOSIS — L89.90 PRESSURE ULCER OF UNSPECIFIED SITE, UNSPECIFIED STAGE: ICD-10-CM

## 2025-04-18 ENCOUNTER — OUTPATIENT (OUTPATIENT)
Dept: OUTPATIENT SERVICES | Facility: HOSPITAL | Age: 55
LOS: 1 days | End: 2025-04-18

## 2025-04-18 DIAGNOSIS — L89.90 PRESSURE ULCER OF UNSPECIFIED SITE, UNSPECIFIED STAGE: ICD-10-CM

## 2025-04-18 DIAGNOSIS — I70.209 UNSPECIFIED ATHEROSCLEROSIS OF NATIVE ARTERIES OF EXTREMITIES, UNSPECIFIED EXTREMITY: Chronic | ICD-10-CM

## 2025-04-21 ENCOUNTER — OUTPATIENT (OUTPATIENT)
Dept: OUTPATIENT SERVICES | Facility: HOSPITAL | Age: 55
LOS: 1 days | End: 2025-04-21
Payer: COMMERCIAL

## 2025-04-21 DIAGNOSIS — L89.90 PRESSURE ULCER OF UNSPECIFIED SITE, UNSPECIFIED STAGE: ICD-10-CM

## 2025-04-21 DIAGNOSIS — I70.209 UNSPECIFIED ATHEROSCLEROSIS OF NATIVE ARTERIES OF EXTREMITIES, UNSPECIFIED EXTREMITY: Chronic | ICD-10-CM

## 2025-04-21 PROCEDURE — 99183 HYPERBARIC OXYGEN THERAPY: CPT

## 2025-04-22 DIAGNOSIS — E11.621 TYPE 2 DIABETES MELLITUS WITH FOOT ULCER: ICD-10-CM

## 2025-04-22 DIAGNOSIS — L97.424 NON-PRESSURE CHRONIC ULCER OF LEFT HEEL AND MIDFOOT WITH NECROSIS OF BONE: ICD-10-CM

## 2025-04-28 ENCOUNTER — OUTPATIENT (OUTPATIENT)
Dept: OUTPATIENT SERVICES | Facility: HOSPITAL | Age: 55
LOS: 1 days | End: 2025-04-28
Payer: COMMERCIAL

## 2025-04-28 DIAGNOSIS — I70.209 UNSPECIFIED ATHEROSCLEROSIS OF NATIVE ARTERIES OF EXTREMITIES, UNSPECIFIED EXTREMITY: Chronic | ICD-10-CM

## 2025-04-28 DIAGNOSIS — L89.90 PRESSURE ULCER OF UNSPECIFIED SITE, UNSPECIFIED STAGE: ICD-10-CM

## 2025-04-28 PROCEDURE — 99213 OFFICE O/P EST LOW 20 MIN: CPT

## 2025-04-29 DIAGNOSIS — Z89.511 ACQUIRED ABSENCE OF RIGHT LEG BELOW KNEE: ICD-10-CM

## 2025-04-29 DIAGNOSIS — I73.9 PERIPHERAL VASCULAR DISEASE, UNSPECIFIED: ICD-10-CM

## 2025-04-29 DIAGNOSIS — E11.40 TYPE 2 DIABETES MELLITUS WITH DIABETIC NEUROPATHY, UNSPECIFIED: ICD-10-CM

## 2025-04-29 DIAGNOSIS — E11.621 TYPE 2 DIABETES MELLITUS WITH FOOT ULCER: ICD-10-CM

## 2025-04-29 DIAGNOSIS — L97.522 NON-PRESSURE CHRONIC ULCER OF OTHER PART OF LEFT FOOT WITH FAT LAYER EXPOSED: ICD-10-CM

## 2025-04-29 DIAGNOSIS — Z79.4 LONG TERM (CURRENT) USE OF INSULIN: ICD-10-CM

## 2025-04-30 DIAGNOSIS — L97.424 NON-PRESSURE CHRONIC ULCER OF LEFT HEEL AND MIDFOOT WITH NECROSIS OF BONE: ICD-10-CM

## 2025-04-30 DIAGNOSIS — E11.621 TYPE 2 DIABETES MELLITUS WITH FOOT ULCER: ICD-10-CM

## 2025-05-05 ENCOUNTER — OUTPATIENT (OUTPATIENT)
Dept: OUTPATIENT SERVICES | Facility: HOSPITAL | Age: 55
LOS: 1 days | End: 2025-05-05
Payer: COMMERCIAL

## 2025-05-05 DIAGNOSIS — L89.90 PRESSURE ULCER OF UNSPECIFIED SITE, UNSPECIFIED STAGE: ICD-10-CM

## 2025-05-05 PROCEDURE — 99213 OFFICE O/P EST LOW 20 MIN: CPT

## 2025-05-07 DIAGNOSIS — Z89.511 ACQUIRED ABSENCE OF RIGHT LEG BELOW KNEE: ICD-10-CM

## 2025-05-07 DIAGNOSIS — L97.522 NON-PRESSURE CHRONIC ULCER OF OTHER PART OF LEFT FOOT WITH FAT LAYER EXPOSED: ICD-10-CM

## 2025-05-07 DIAGNOSIS — E11.40 TYPE 2 DIABETES MELLITUS WITH DIABETIC NEUROPATHY, UNSPECIFIED: ICD-10-CM

## 2025-05-07 DIAGNOSIS — I73.9 PERIPHERAL VASCULAR DISEASE, UNSPECIFIED: ICD-10-CM

## 2025-05-07 DIAGNOSIS — E11.621 TYPE 2 DIABETES MELLITUS WITH FOOT ULCER: ICD-10-CM

## 2025-05-07 DIAGNOSIS — Z79.4 LONG TERM (CURRENT) USE OF INSULIN: ICD-10-CM

## 2025-05-12 ENCOUNTER — OUTPATIENT (OUTPATIENT)
Dept: OUTPATIENT SERVICES | Facility: HOSPITAL | Age: 55
LOS: 1 days | End: 2025-05-12
Payer: COMMERCIAL

## 2025-05-12 DIAGNOSIS — I70.209 UNSPECIFIED ATHEROSCLEROSIS OF NATIVE ARTERIES OF EXTREMITIES, UNSPECIFIED EXTREMITY: Chronic | ICD-10-CM

## 2025-05-12 DIAGNOSIS — E11.621 TYPE 2 DIABETES MELLITUS WITH FOOT ULCER: ICD-10-CM

## 2025-05-12 DIAGNOSIS — L97.424 NON-PRESSURE CHRONIC ULCER OF LEFT HEEL AND MIDFOOT WITH NECROSIS OF BONE: ICD-10-CM

## 2025-05-12 DIAGNOSIS — I73.9 PERIPHERAL VASCULAR DISEASE, UNSPECIFIED: ICD-10-CM

## 2025-05-12 DIAGNOSIS — E11.40 TYPE 2 DIABETES MELLITUS WITH DIABETIC NEUROPATHY, UNSPECIFIED: ICD-10-CM

## 2025-05-12 DIAGNOSIS — Z89.422 ACQUIRED ABSENCE OF OTHER LEFT TOE(S): ICD-10-CM

## 2025-05-12 DIAGNOSIS — L89.90 PRESSURE ULCER OF UNSPECIFIED SITE, UNSPECIFIED STAGE: ICD-10-CM

## 2025-05-12 DIAGNOSIS — Z79.4 LONG TERM (CURRENT) USE OF INSULIN: ICD-10-CM

## 2025-05-12 DIAGNOSIS — Z89.511 ACQUIRED ABSENCE OF RIGHT LEG BELOW KNEE: ICD-10-CM

## 2025-05-12 PROCEDURE — 99213 OFFICE O/P EST LOW 20 MIN: CPT

## 2025-05-14 ENCOUNTER — APPOINTMENT (OUTPATIENT)
Dept: VASCULAR SURGERY | Facility: CLINIC | Age: 55
End: 2025-05-14
Payer: COMMERCIAL

## 2025-05-14 VITALS
BODY MASS INDEX: 23.8 KG/M2 | SYSTOLIC BLOOD PRESSURE: 159 MMHG | HEIGHT: 71 IN | DIASTOLIC BLOOD PRESSURE: 89 MMHG | HEART RATE: 92 BPM | TEMPERATURE: 97.8 F | WEIGHT: 170 LBS

## 2025-05-14 DIAGNOSIS — I73.9 PERIPHERAL VASCULAR DISEASE, UNSPECIFIED: ICD-10-CM

## 2025-05-14 PROCEDURE — 93926 LOWER EXTREMITY STUDY: CPT

## 2025-05-14 PROCEDURE — 99214 OFFICE O/P EST MOD 30 MIN: CPT

## 2025-05-14 PROCEDURE — 93923 UPR/LXTR ART STDY 3+ LVLS: CPT

## 2025-05-19 ENCOUNTER — OUTPATIENT (OUTPATIENT)
Dept: OUTPATIENT SERVICES | Facility: HOSPITAL | Age: 55
LOS: 1 days | End: 2025-05-19
Payer: COMMERCIAL

## 2025-05-19 DIAGNOSIS — L89.90 PRESSURE ULCER OF UNSPECIFIED SITE, UNSPECIFIED STAGE: ICD-10-CM

## 2025-05-19 DIAGNOSIS — I70.209 UNSPECIFIED ATHEROSCLEROSIS OF NATIVE ARTERIES OF EXTREMITIES, UNSPECIFIED EXTREMITY: Chronic | ICD-10-CM

## 2025-05-19 PROCEDURE — 99213 OFFICE O/P EST LOW 20 MIN: CPT

## 2025-05-27 ENCOUNTER — OUTPATIENT (OUTPATIENT)
Dept: OUTPATIENT SERVICES | Facility: HOSPITAL | Age: 55
LOS: 1 days | End: 2025-05-27
Payer: COMMERCIAL

## 2025-05-27 ENCOUNTER — APPOINTMENT (OUTPATIENT)
Dept: VASCULAR SURGERY | Facility: HOSPITAL | Age: 55
End: 2025-05-27

## 2025-05-27 ENCOUNTER — TRANSCRIPTION ENCOUNTER (OUTPATIENT)
Age: 55
End: 2025-05-27

## 2025-05-27 VITALS
HEIGHT: 72 IN | TEMPERATURE: 98 F | OXYGEN SATURATION: 99 % | SYSTOLIC BLOOD PRESSURE: 177 MMHG | WEIGHT: 177.91 LBS | HEART RATE: 48 BPM | DIASTOLIC BLOOD PRESSURE: 70 MMHG | RESPIRATION RATE: 14 BRPM

## 2025-05-27 VITALS
OXYGEN SATURATION: 96 % | HEART RATE: 91 BPM | DIASTOLIC BLOOD PRESSURE: 86 MMHG | RESPIRATION RATE: 15 BRPM | SYSTOLIC BLOOD PRESSURE: 159 MMHG

## 2025-05-27 DIAGNOSIS — Z89.511 ACQUIRED ABSENCE OF RIGHT LEG BELOW KNEE: Chronic | ICD-10-CM

## 2025-05-27 DIAGNOSIS — I73.9 PERIPHERAL VASCULAR DISEASE, UNSPECIFIED: ICD-10-CM

## 2025-05-27 DIAGNOSIS — I70.209 UNSPECIFIED ATHEROSCLEROSIS OF NATIVE ARTERIES OF EXTREMITIES, UNSPECIFIED EXTREMITY: Chronic | ICD-10-CM

## 2025-05-27 DIAGNOSIS — Z89.422 ACQUIRED ABSENCE OF OTHER LEFT TOE(S): Chronic | ICD-10-CM

## 2025-05-27 LAB
ANION GAP SERPL CALC-SCNC: 13 MMOL/L — SIGNIFICANT CHANGE UP (ref 7–14)
BUN SERPL-MCNC: 23 MG/DL — SIGNIFICANT CHANGE UP (ref 7–23)
CALCIUM SERPL-MCNC: 8.6 MG/DL — SIGNIFICANT CHANGE UP (ref 8.4–10.5)
CHLORIDE SERPL-SCNC: 102 MMOL/L — SIGNIFICANT CHANGE UP (ref 98–107)
CO2 SERPL-SCNC: 23 MMOL/L — SIGNIFICANT CHANGE UP (ref 22–31)
CREAT SERPL-MCNC: 1.64 MG/DL — HIGH (ref 0.5–1.3)
EGFR: 50 ML/MIN/1.73M2 — LOW
EGFR: 50 ML/MIN/1.73M2 — LOW
GLUCOSE BLDC GLUCOMTR-MCNC: 101 MG/DL — HIGH (ref 70–99)
GLUCOSE BLDC GLUCOMTR-MCNC: 96 MG/DL — SIGNIFICANT CHANGE UP (ref 70–99)
GLUCOSE SERPL-MCNC: 95 MG/DL — SIGNIFICANT CHANGE UP (ref 70–99)
HCT VFR BLD CALC: 39.2 % — SIGNIFICANT CHANGE UP (ref 39–50)
HGB BLD-MCNC: 13 G/DL — SIGNIFICANT CHANGE UP (ref 13–17)
MAGNESIUM SERPL-MCNC: 1.9 MG/DL — SIGNIFICANT CHANGE UP (ref 1.6–2.6)
MCHC RBC-ENTMCNC: 28.3 PG — SIGNIFICANT CHANGE UP (ref 27–34)
MCHC RBC-ENTMCNC: 33.2 G/DL — SIGNIFICANT CHANGE UP (ref 32–36)
MCV RBC AUTO: 85.4 FL — SIGNIFICANT CHANGE UP (ref 80–100)
NRBC # BLD AUTO: 0 K/UL — SIGNIFICANT CHANGE UP (ref 0–0)
NRBC # FLD: 0 K/UL — SIGNIFICANT CHANGE UP (ref 0–0)
NRBC BLD AUTO-RTO: 0 /100 WBCS — SIGNIFICANT CHANGE UP (ref 0–0)
PHOSPHATE SERPL-MCNC: 3.8 MG/DL — SIGNIFICANT CHANGE UP (ref 2.5–4.5)
PLATELET # BLD AUTO: 298 K/UL — SIGNIFICANT CHANGE UP (ref 150–400)
POTASSIUM SERPL-MCNC: 4.7 MMOL/L — SIGNIFICANT CHANGE UP (ref 3.5–5.3)
POTASSIUM SERPL-SCNC: 4.7 MMOL/L — SIGNIFICANT CHANGE UP (ref 3.5–5.3)
RBC # BLD: 4.59 M/UL — SIGNIFICANT CHANGE UP (ref 4.2–5.8)
RBC # FLD: 13.7 % — SIGNIFICANT CHANGE UP (ref 10.3–14.5)
SODIUM SERPL-SCNC: 138 MMOL/L — SIGNIFICANT CHANGE UP (ref 135–145)
WBC # BLD: 4.61 K/UL — SIGNIFICANT CHANGE UP (ref 3.8–10.5)
WBC # FLD AUTO: 4.61 K/UL — SIGNIFICANT CHANGE UP (ref 3.8–10.5)

## 2025-05-27 PROCEDURE — 75625 CONTRAST EXAM ABDOMINL AORTA: CPT | Mod: 26,59

## 2025-05-27 PROCEDURE — 75710 ARTERY X-RAYS ARM/LEG: CPT | Mod: 26,59

## 2025-05-27 PROCEDURE — 37224: CPT | Mod: LT

## 2025-05-27 PROCEDURE — 99152 MOD SED SAME PHYS/QHP 5/>YRS: CPT

## 2025-05-27 RX ORDER — AMLODIPINE BESYLATE 10 MG/1
1 TABLET ORAL
Refills: 0 | DISCHARGE

## 2025-05-27 RX ORDER — ASPIRIN 325 MG
1 TABLET ORAL
Refills: 0 | DISCHARGE

## 2025-05-27 RX ORDER — RIVAROXABAN 10 MG/1
1 TABLET, FILM COATED ORAL
Qty: 0 | Refills: 0 | DISCHARGE

## 2025-05-27 RX ORDER — ACETAMINOPHEN 500 MG/5ML
2 LIQUID (ML) ORAL
Refills: 0 | DISCHARGE

## 2025-05-27 RX ADMIN — Medication 50 MILLILITER(S): at 12:00

## 2025-05-27 NOTE — ASU DISCHARGE PLAN (ADULT/PEDIATRIC) - CARE PROVIDER_API CALL
Lexie Ramirez  Vascular Surgery  [address], [address]  [place] [address]  Phone: [phone]  Fax: [phone]  Follow Up Time:

## 2025-05-27 NOTE — CHART NOTE - NSCHARTNOTEFT_GEN_A_CORE
Pt s/p LLE angio with balloon angioplasty of SFA. As per vascular, bedrest x 2 hours. Resume Xarelto tonight. Continue ASA. Plan to DC home later today.

## 2025-05-27 NOTE — H&P CARDIOLOGY - NSICDXPASTMEDICALHX_GEN_ALL_CORE_FT
PAST MEDICAL HISTORY:  CVA (cerebrovascular accident)     DM (diabetes mellitus) DM 1    HLD (hyperlipidemia)     HTN (hypertension)

## 2025-05-27 NOTE — H&P CARDIOLOGY - HISTORY OF PRESENT ILLNESS
Patient is a 55  year old male, PMHx of DM, HTN, HLD, Stroke (4/2024, no residual deficits per patient). Right BKA in 2019.  s/p LLE angiogram 10/2024 iso left foot gangrene and web space infection, s/p left foot partial 2nd/3rd/4th ray resection 10/30/24.  Pt is currently followed by Podiatrist, Dr Mago North and vascular physican, Dr Gini German.  He presents today with c/o worsening LLE pain and tenderness.  Currently denies any LE ulceration, temperature changes. CP, SOB, dizziness, pre-syncope, syncope,  headache, visual disturbances, PE, DVT, JACKSON, abdominal pain, N/V/D/C, hematochezia, melena, dysuria, hematuria, fever, chills, LE swelling or any other complaints at this time.      In light of the known risk factors, pt now presents for recommended LLE Angiogram with possible intervention with Dr Ramirez.    Patient is a 55  year old male, PMHx of DM, HTN, HLD, Stroke (4/2024, no residual deficits per patient). Known PAD, s/p Lower Extremity Stents x 3, h/o Right BKA in 2019.  s/p LLE angiogram 10/2024 iso left foot gangrene and web space infection, s/p left foot partial 2nd/3rd/4th ray resection 10/30/24.  Pt is currently followed by Podiatrist, Dr Mago North and Vascular Physican, Dr Gini German.  Per patient wife, patient recently had CHRISTIAN on 5/15/25, done in vascular office, showing narrowing of LLE stent (no report available).  Patient presents today with c/o worsening LLE pain and tenderness, sometimes excruciating, preventing him to ambulate at times. Currently denies any  temperature changes. CP, SOB, dizziness, pre-syncope, syncope,  headache, visual disturbances, PE, DVT, JACKSON, abdominal pain, N/V/D/C, hematochezia, melena, dysuria, hematuria, fever, chills, LE swelling or any other complaints at this time.      In light of the known risk factors and recent abnormal CHRISTIAN, pt now presents for recommended LLE Angiogram with possible intervention with Dr Ramirez.     Referring Physician: Dr Lexie Ramirez

## 2025-05-27 NOTE — ASU DISCHARGE PLAN (ADULT/PEDIATRIC) - FINANCIAL ASSISTANCE
[facility] provides services at a reduced cost to those who are determined to be eligible through [facility]’s financial assistance program. Information regarding [facility]’s financial assistance program can be found by going to [email or web address] or by calling [phone].

## 2025-05-27 NOTE — H&P CARDIOLOGY - NSICDXPASTSURGICALHX_GEN_ALL_CORE_FT
PAST SURGICAL HISTORY:  History of amputation of lesser toe of left foot     S/P BKA (below knee amputation), right     Stenosis of popliteal artery Left stent placed 2017

## 2025-05-27 NOTE — H&P CARDIOLOGY - COMMENTS
Pre-sedation Evaluation  Anticoagulation last dose:  none  Dentures: None  Last PO intake: 5/26/25  Obstructive sleep apnea: No  Aspiration risk: No  Mallampati score: 2  ASA Classification: 2  Prior Sedative or Anesthesia Experience: No complications  Informed consent by responsible adult: Yes  Based on today's assessment, anesthesia consult requested: No

## 2025-05-27 NOTE — ASU DISCHARGE PLAN (ADULT/PEDIATRIC) - NS MD DC FALL RISK RISK
For information on Fall & Injury Prevention, visit: [email or web address] OR  [email or web address] OR  https://www.cdc.gov/steadi/patient.html

## 2025-05-27 NOTE — ASU DISCHARGE PLAN (ADULT/PEDIATRIC) - ASU DC SPECIAL INSTRUCTIONSFT
WOUND CARE:  The day after your procedure:  - Remove the bandage at the site gently, clean with a small amount of soap and water, and pat try. Leave open to air.  - You may shower.  - Do not apply lotions, creams, ointments, powder, or perfumes to your incision site.  - Check your wrist or groin daily. A small amount of bruising or soreness is normal.  - Do not soak the procedural site for 1 week (no baths, pools, tubs, etc).    ACTIVITY:  For the next 24 hours:  - Do not drive or operate heavy machinery.  - Do not drink any alcoholic beverages.  - Do not make any important decisions or sign legal documents.    If your procedure was performed through the groin,  For the next 5 days:  - Limit climbing stairs.  - Do not soak in a bathtub or pool.  - Avoid strenuous activities (pushing, pulling, straining).  - Do not lift anything more than 10 pounds.    MEDICATION:   - Take your medications as explained (see attached medication reconciliation on discharge paperwork).  - Resume Xarelto this evening, May 27, at the time you normally take it. Monitor the site of your procedure for bleeding.     ADDITIONAL INSTRUCTIONS:  - Follow a heart healthy diet recommended by your doctor.   - If you smoke, we encourage smoking cessation. You may call [phone] for center of tobacco control if you need assistance.  - Call your doctor to make appointment within 2 weeks.    ***CALL YOUR DOCTOR***  - If you experience fever, chills, body aches, or severe pain, swelling, redness, heat, or yellow discharge from your incision site.  - If you notice bleeding or significant swelling at the procedural site.  - If you experience chest pain.  - If you experience extremity numbness, tingling, or temperature change.    If you are unable to get in contact with your doctor, you may contact the Interventional Recovery Suite at [phone].  Please reference your discharge instructions for any questions or concerns.

## 2025-05-27 NOTE — H&P CARDIOLOGY - CARDIOVASCULAR DETAILS
[facility]        Basic Information:  Patient: Brayan Bell Age: 50 year old : 1971 Sex: male  MRN: [id number] Encounter: 2021    PCP:  Jake Amado eye pain  ENT Problem(s): No sore throat or congestion.  Loss of sense of smell  Cardiovascular problem(s): Chest pain onset today, worse with deep inspiration  Respiratory problem(s): Cough and dyspnea  Gastro-intestinal problem(s): No abdominal pain, vomiting or diarrhea  Genito-urinary problem(s): Denies urinary symptoms  Musculoskeletal problem(s): Denies  Integumentary problem(s): No rash  Neurological problem(s): Headache    Other than systems discussed in HPI, 10 point review of systems is negative    PHYSICAL EXAM:   ED Triage Vitals [12/31/21 1607]   BP (!) 119/98   Heart Rate 90   Resp (!) 22   Temp 100.4 °F (38 °C)   SpO2 95 %      General:  Awake, Alert, patient appears pale and ill  Skin:  Warm, dry.  Head:  Normocephalic, atraumatic.  Neck: Supple, trachea midline. Normal cervical ROM  Eye:  EOMI, vision grossly normal.  Ears, nose, mouth and throat:  Oral mucosa moist, no pharyngeal erythema or exudate, Normal appearing nose  Cardiovascular:  RRR, No murmur, Normal peripheral pulses bilaterally. No edema  Respiratory: Tachypneic with coarse breath sounds.  Respirations are labored with nonrebreather in place.    Gastrointestinal:  Soft, Nontender, Non distended, No guarding  Musculoskeletal:  Normal ROM, normal strength, no tenderness, no swelling, no deformity.  Neurological:  A/Ox4, No focal neurological deficit observed, normal speech observed  Psychological: Appropriate mood and affect, cooperative        Lab Results:    Results for orders placed or performed during the hospital encounter of 12/31/21   Comprehensive Metabolic Panel   Result Value    Fasting Status     Sodium 132 (L)    Potassium 4.3    Chloride 97 (L)    Carbon Dioxide 30    Anion Gap 9 (L)    Glucose 136 (H)    BUN 13    Creatinine 1.00    Glomerular Filtration Rate 89     Comment: eGFR results = or >60 mL/min/1.73m2 = Normal kidney function. Estimated GFR calculated using the 2009 CKD-EPI creatinine equation.      BUN/  Electronically Signed by: MARIYA VARGAS M.D.    Signed on: 12/31/2021 7:12 PM          XR CHEST PA OR AP 1 VIEW   Final Result      Bilateral airspace opacities which are highly suggestive of atypical/viral   pneumonia in the setting of Covid 19 infection.         Electronically Signed by: MARIYA VARGAS M.D.    Signed on: 12/31/2021 5:30 PM              EKG:   Time: 1647  Rate: 84  EKG Interpretation  Rhythm: NSR  Abnormality: no     Medications:  Medications   remdesivir (VEKLURY) 200 mg in sodium chloride 0.9 % 250 mL total volume infusion (0 mg Intravenous Completed 12/31/21 2222)     Followed by   remdesivir (VEKLURY) 100 mg in sodium chloride 0.9 % 250 mL total volume infusion (has no administration in time range)   SODIUM CHLORIDE 0.9 % IV SOLN Pyxis Override (has no administration in time range)   dexamethasone (DECADRON) injection 10 mg (10 mg Intravenous Given 12/31/21 1848)   acetaminophen (TYLENOL) tablet 1,000 mg (1,000 mg Oral Given 12/31/21 1847)   albuterol inhaler 4 puff (4 puffs Inhalation Given 12/31/21 1848)   sodium chloride (NORMAL SALINE) 0.9 % bolus 500 mL (0 mLs Intravenous Completed 12/31/21 1947)   iohexol (OMNIPAQUE 350 INJECT) contrast solution 60 mL (60 mLs Intravenous Given 12/31/21 1906)       Reevaluations:  ED Course as of 12/31/21 2304   Fri Dec 31, 2021   2115 Video  was used update the patient on work-up results.  He is aware of plan for hospital stay.  Patient reports he is feeling better with O2 in place.  Discussed use of remdesivir with the patient, he is agreeable. [AS]      ED Course User Index  [AS] Mariely Peña PA-C     Vitals:    12/31/21 2040 12/31/21 2121 12/31/21 2155 12/31/21 2251   BP:  104/74  101/65   Pulse:  77  72   Resp:  (!) 27  (!) 25   Temp: 100 °F (37.8 °C)      TempSrc: Oral      SpO2:  95% 95% 96%   Weight: 58.1 kg (128 lb 1.4 oz)      Height: 5' 2\" (1.575 m)        Vitals:    12/31/21 2251   Temp:    Pulse: 72   Resp: (!) 25   SpO2: 96%    2371     positive S1/positive S2

## 2025-05-27 NOTE — H&P CARDIOLOGY - EXTREMITIES COMMENTS
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

## 2025-05-29 DIAGNOSIS — Z79.4 LONG TERM (CURRENT) USE OF INSULIN: ICD-10-CM

## 2025-05-29 DIAGNOSIS — L97.424 NON-PRESSURE CHRONIC ULCER OF LEFT HEEL AND MIDFOOT WITH NECROSIS OF BONE: ICD-10-CM

## 2025-05-29 DIAGNOSIS — E11.40 TYPE 2 DIABETES MELLITUS WITH DIABETIC NEUROPATHY, UNSPECIFIED: ICD-10-CM

## 2025-05-29 DIAGNOSIS — Z89.511 ACQUIRED ABSENCE OF RIGHT LEG BELOW KNEE: ICD-10-CM

## 2025-05-29 DIAGNOSIS — E11.621 TYPE 2 DIABETES MELLITUS WITH FOOT ULCER: ICD-10-CM

## 2025-05-29 DIAGNOSIS — I73.9 PERIPHERAL VASCULAR DISEASE, UNSPECIFIED: ICD-10-CM

## 2025-05-29 PROBLEM — I63.9 CEREBRAL INFARCTION, UNSPECIFIED: Chronic | Status: ACTIVE | Noted: 2025-05-27

## 2025-05-29 PROBLEM — I10 ESSENTIAL (PRIMARY) HYPERTENSION: Chronic | Status: ACTIVE | Noted: 2025-05-27

## 2025-05-29 PROBLEM — E78.5 HYPERLIPIDEMIA, UNSPECIFIED: Chronic | Status: ACTIVE | Noted: 2025-05-27

## 2025-06-16 ENCOUNTER — RESULT REVIEW (OUTPATIENT)
Age: 55
End: 2025-06-16

## 2025-06-16 ENCOUNTER — OUTPATIENT (OUTPATIENT)
Dept: OUTPATIENT SERVICES | Facility: HOSPITAL | Age: 55
LOS: 1 days | End: 2025-06-16
Payer: COMMERCIAL

## 2025-06-16 DIAGNOSIS — I70.209 UNSPECIFIED ATHEROSCLEROSIS OF NATIVE ARTERIES OF EXTREMITIES, UNSPECIFIED EXTREMITY: Chronic | ICD-10-CM

## 2025-06-16 DIAGNOSIS — Z89.511 ACQUIRED ABSENCE OF RIGHT LEG BELOW KNEE: Chronic | ICD-10-CM

## 2025-06-16 DIAGNOSIS — L89.90 PRESSURE ULCER OF UNSPECIFIED SITE, UNSPECIFIED STAGE: ICD-10-CM

## 2025-06-16 DIAGNOSIS — Z89.422 ACQUIRED ABSENCE OF OTHER LEFT TOE(S): Chronic | ICD-10-CM

## 2025-06-16 LAB
GRAM STN FLD: SIGNIFICANT CHANGE UP
SPECIMEN SOURCE: SIGNIFICANT CHANGE UP

## 2025-06-16 PROCEDURE — 99213 OFFICE O/P EST LOW 20 MIN: CPT

## 2025-06-16 PROCEDURE — 88311 DECALCIFY TISSUE: CPT | Mod: 26

## 2025-06-16 PROCEDURE — 88307 TISSUE EXAM BY PATHOLOGIST: CPT | Mod: 26

## 2025-06-17 DIAGNOSIS — Z79.4 LONG TERM (CURRENT) USE OF INSULIN: ICD-10-CM

## 2025-06-17 DIAGNOSIS — E11.40 TYPE 2 DIABETES MELLITUS WITH DIABETIC NEUROPATHY, UNSPECIFIED: ICD-10-CM

## 2025-06-17 DIAGNOSIS — E11.621 TYPE 2 DIABETES MELLITUS WITH FOOT ULCER: ICD-10-CM

## 2025-06-17 DIAGNOSIS — Z89.511 ACQUIRED ABSENCE OF RIGHT LEG BELOW KNEE: ICD-10-CM

## 2025-06-17 DIAGNOSIS — L97.424 NON-PRESSURE CHRONIC ULCER OF LEFT HEEL AND MIDFOOT WITH NECROSIS OF BONE: ICD-10-CM

## 2025-06-17 DIAGNOSIS — Z89.422 ACQUIRED ABSENCE OF OTHER LEFT TOE(S): ICD-10-CM

## 2025-06-17 DIAGNOSIS — I73.9 PERIPHERAL VASCULAR DISEASE, UNSPECIFIED: ICD-10-CM

## 2025-06-18 LAB — SURGICAL PATHOLOGY STUDY: SIGNIFICANT CHANGE UP

## 2025-06-21 LAB
CULTURE RESULTS: SIGNIFICANT CHANGE UP
GRAM STN FLD: SIGNIFICANT CHANGE UP
SPECIMEN SOURCE: SIGNIFICANT CHANGE UP

## 2025-06-23 ENCOUNTER — OUTPATIENT (OUTPATIENT)
Dept: OUTPATIENT SERVICES | Facility: HOSPITAL | Age: 55
LOS: 1 days | End: 2025-06-23
Payer: COMMERCIAL

## 2025-06-23 DIAGNOSIS — L89.90 PRESSURE ULCER OF UNSPECIFIED SITE, UNSPECIFIED STAGE: ICD-10-CM

## 2025-06-23 DIAGNOSIS — Z89.511 ACQUIRED ABSENCE OF RIGHT LEG BELOW KNEE: Chronic | ICD-10-CM

## 2025-06-23 DIAGNOSIS — I70.209 UNSPECIFIED ATHEROSCLEROSIS OF NATIVE ARTERIES OF EXTREMITIES, UNSPECIFIED EXTREMITY: Chronic | ICD-10-CM

## 2025-06-23 DIAGNOSIS — Z89.422 ACQUIRED ABSENCE OF OTHER LEFT TOE(S): Chronic | ICD-10-CM

## 2025-06-23 PROCEDURE — 99213 OFFICE O/P EST LOW 20 MIN: CPT

## 2025-06-24 DIAGNOSIS — L97.424 NON-PRESSURE CHRONIC ULCER OF LEFT HEEL AND MIDFOOT WITH NECROSIS OF BONE: ICD-10-CM

## 2025-06-24 DIAGNOSIS — Z89.511 ACQUIRED ABSENCE OF RIGHT LEG BELOW KNEE: ICD-10-CM

## 2025-06-24 DIAGNOSIS — Z79.4 LONG TERM (CURRENT) USE OF INSULIN: ICD-10-CM

## 2025-06-24 DIAGNOSIS — E11.621 TYPE 2 DIABETES MELLITUS WITH FOOT ULCER: ICD-10-CM

## 2025-06-24 DIAGNOSIS — M86.672 OTHER CHRONIC OSTEOMYELITIS, LEFT ANKLE AND FOOT: ICD-10-CM

## 2025-06-24 DIAGNOSIS — I73.9 PERIPHERAL VASCULAR DISEASE, UNSPECIFIED: ICD-10-CM

## 2025-06-24 DIAGNOSIS — E11.69 TYPE 2 DIABETES MELLITUS WITH OTHER SPECIFIED COMPLICATION: ICD-10-CM

## 2025-06-24 DIAGNOSIS — E11.40 TYPE 2 DIABETES MELLITUS WITH DIABETIC NEUROPATHY, UNSPECIFIED: ICD-10-CM

## 2025-06-25 ENCOUNTER — APPOINTMENT (OUTPATIENT)
Dept: VASCULAR SURGERY | Facility: CLINIC | Age: 55
End: 2025-06-25

## 2025-07-14 ENCOUNTER — OUTPATIENT (OUTPATIENT)
Dept: OUTPATIENT SERVICES | Facility: HOSPITAL | Age: 55
LOS: 1 days | End: 2025-07-14
Payer: COMMERCIAL

## 2025-07-14 ENCOUNTER — RESULT REVIEW (OUTPATIENT)
Age: 55
End: 2025-07-14

## 2025-07-14 DIAGNOSIS — L89.90 PRESSURE ULCER OF UNSPECIFIED SITE, UNSPECIFIED STAGE: ICD-10-CM

## 2025-07-14 DIAGNOSIS — Z89.511 ACQUIRED ABSENCE OF RIGHT LEG BELOW KNEE: Chronic | ICD-10-CM

## 2025-07-14 DIAGNOSIS — Z89.422 ACQUIRED ABSENCE OF OTHER LEFT TOE(S): Chronic | ICD-10-CM

## 2025-07-14 DIAGNOSIS — I70.209 UNSPECIFIED ATHEROSCLEROSIS OF NATIVE ARTERIES OF EXTREMITIES, UNSPECIFIED EXTREMITY: Chronic | ICD-10-CM

## 2025-07-14 PROCEDURE — 17250 CHEM CAUT OF GRANLTJ TISSUE: CPT | Mod: LT

## 2025-07-14 PROCEDURE — 73630 X-RAY EXAM OF FOOT: CPT | Mod: 26,LT

## 2025-07-15 DIAGNOSIS — Z89.511 ACQUIRED ABSENCE OF RIGHT LEG BELOW KNEE: ICD-10-CM

## 2025-07-15 DIAGNOSIS — M86.672 OTHER CHRONIC OSTEOMYELITIS, LEFT ANKLE AND FOOT: ICD-10-CM

## 2025-07-15 DIAGNOSIS — E11.621 TYPE 2 DIABETES MELLITUS WITH FOOT ULCER: ICD-10-CM

## 2025-07-15 DIAGNOSIS — L97.424 NON-PRESSURE CHRONIC ULCER OF LEFT HEEL AND MIDFOOT WITH NECROSIS OF BONE: ICD-10-CM

## 2025-07-15 DIAGNOSIS — Z89.422 ACQUIRED ABSENCE OF OTHER LEFT TOE(S): ICD-10-CM

## 2025-07-15 DIAGNOSIS — I73.9 PERIPHERAL VASCULAR DISEASE, UNSPECIFIED: ICD-10-CM

## 2025-07-15 DIAGNOSIS — E11.40 TYPE 2 DIABETES MELLITUS WITH DIABETIC NEUROPATHY, UNSPECIFIED: ICD-10-CM

## 2025-07-15 DIAGNOSIS — E11.69 TYPE 2 DIABETES MELLITUS WITH OTHER SPECIFIED COMPLICATION: ICD-10-CM

## 2025-07-15 DIAGNOSIS — L92.8 OTHER GRANULOMATOUS DISORDERS OF THE SKIN AND SUBCUTANEOUS TISSUE: ICD-10-CM

## 2025-07-15 DIAGNOSIS — Z79.4 LONG TERM (CURRENT) USE OF INSULIN: ICD-10-CM

## 2025-07-21 ENCOUNTER — RESULT REVIEW (OUTPATIENT)
Age: 55
End: 2025-07-21

## 2025-07-21 ENCOUNTER — OUTPATIENT (OUTPATIENT)
Dept: OUTPATIENT SERVICES | Facility: HOSPITAL | Age: 55
LOS: 1 days | End: 2025-07-21
Payer: COMMERCIAL

## 2025-07-21 ENCOUNTER — INPATIENT (INPATIENT)
Facility: HOSPITAL | Age: 55
LOS: 7 days | Discharge: HOME | End: 2025-07-29
Attending: STUDENT IN AN ORGANIZED HEALTH CARE EDUCATION/TRAINING PROGRAM | Admitting: STUDENT IN AN ORGANIZED HEALTH CARE EDUCATION/TRAINING PROGRAM
Payer: COMMERCIAL

## 2025-07-21 VITALS
RESPIRATION RATE: 18 BRPM | OXYGEN SATURATION: 96 % | WEIGHT: 169.98 LBS | SYSTOLIC BLOOD PRESSURE: 159 MMHG | HEART RATE: 91 BPM | TEMPERATURE: 98 F | DIASTOLIC BLOOD PRESSURE: 78 MMHG | HEIGHT: 72 IN

## 2025-07-21 DIAGNOSIS — Z89.511 ACQUIRED ABSENCE OF RIGHT LEG BELOW KNEE: Chronic | ICD-10-CM

## 2025-07-21 DIAGNOSIS — L89.90 PRESSURE ULCER OF UNSPECIFIED SITE, UNSPECIFIED STAGE: ICD-10-CM

## 2025-07-21 DIAGNOSIS — Z89.422 ACQUIRED ABSENCE OF OTHER LEFT TOE(S): Chronic | ICD-10-CM

## 2025-07-21 DIAGNOSIS — I70.209 UNSPECIFIED ATHEROSCLEROSIS OF NATIVE ARTERIES OF EXTREMITIES, UNSPECIFIED EXTREMITY: Chronic | ICD-10-CM

## 2025-07-21 LAB
ALBUMIN SERPL ELPH-MCNC: 2 G/DL — LOW (ref 3.3–5)
ALP SERPL-CCNC: 131 U/L — HIGH (ref 40–120)
ALT FLD-CCNC: 17 U/L — SIGNIFICANT CHANGE UP (ref 12–78)
ANION GAP SERPL CALC-SCNC: 8 MMOL/L — SIGNIFICANT CHANGE UP (ref 5–17)
APTT BLD: 40.5 SEC — HIGH (ref 26.1–36.8)
AST SERPL-CCNC: 18 U/L — SIGNIFICANT CHANGE UP (ref 15–37)
BASOPHILS # BLD AUTO: 0.04 K/UL — SIGNIFICANT CHANGE UP (ref 0–0.2)
BASOPHILS # BLD AUTO: 0.05 K/UL — SIGNIFICANT CHANGE UP (ref 0–0.2)
BASOPHILS NFR BLD AUTO: 0.6 % — SIGNIFICANT CHANGE UP (ref 0–2)
BASOPHILS NFR BLD AUTO: 0.7 % — SIGNIFICANT CHANGE UP (ref 0–2)
BILIRUB SERPL-MCNC: 0.1 MG/DL — LOW (ref 0.2–1.2)
BLD GP AB SCN SERPL QL: SIGNIFICANT CHANGE UP
BUN SERPL-MCNC: 31 MG/DL — HIGH (ref 7–23)
CALCIUM SERPL-MCNC: 9 MG/DL — SIGNIFICANT CHANGE UP (ref 8.5–10.1)
CHLORIDE SERPL-SCNC: 101 MMOL/L — SIGNIFICANT CHANGE UP (ref 96–108)
CO2 SERPL-SCNC: 26 MMOL/L — SIGNIFICANT CHANGE UP (ref 22–31)
CREAT SERPL-MCNC: 2.23 MG/DL — HIGH (ref 0.5–1.3)
EGFR: 34 ML/MIN/1.73M2 — LOW
EGFR: 34 ML/MIN/1.73M2 — LOW
EOSINOPHIL # BLD AUTO: 0.35 K/UL — SIGNIFICANT CHANGE UP (ref 0–0.5)
EOSINOPHIL # BLD AUTO: 0.35 K/UL — SIGNIFICANT CHANGE UP (ref 0–0.5)
EOSINOPHIL NFR BLD AUTO: 4.9 % — SIGNIFICANT CHANGE UP (ref 0–6)
EOSINOPHIL NFR BLD AUTO: 5.4 % — SIGNIFICANT CHANGE UP (ref 0–6)
ERYTHROCYTE [SEDIMENTATION RATE] IN BLOOD: 98 MM/HR — HIGH (ref 0–15)
GLUCOSE SERPL-MCNC: 141 MG/DL — HIGH (ref 70–99)
HCT VFR BLD CALC: 32.9 % — LOW (ref 39–50)
HCT VFR BLD CALC: 34.2 % — LOW (ref 39–50)
HGB BLD-MCNC: 11.2 G/DL — LOW (ref 13–17)
HGB BLD-MCNC: 11.4 G/DL — LOW (ref 13–17)
IMM GRANULOCYTES NFR BLD AUTO: 0.1 % — SIGNIFICANT CHANGE UP (ref 0–0.9)
IMM GRANULOCYTES NFR BLD AUTO: 0.2 % — SIGNIFICANT CHANGE UP (ref 0–0.9)
INR BLD: 1.19 RATIO — HIGH (ref 0.85–1.16)
LACTATE SERPL-SCNC: 0.6 MMOL/L — LOW (ref 0.7–2)
LYMPHOCYTES # BLD AUTO: 1.37 K/UL — SIGNIFICANT CHANGE UP (ref 1–3.3)
LYMPHOCYTES # BLD AUTO: 1.38 K/UL — SIGNIFICANT CHANGE UP (ref 1–3.3)
LYMPHOCYTES # BLD AUTO: 19.2 % — SIGNIFICANT CHANGE UP (ref 13–44)
LYMPHOCYTES # BLD AUTO: 21.2 % — SIGNIFICANT CHANGE UP (ref 13–44)
MCHC RBC-ENTMCNC: 28 PG — SIGNIFICANT CHANGE UP (ref 27–34)
MCHC RBC-ENTMCNC: 28.3 PG — SIGNIFICANT CHANGE UP (ref 27–34)
MCHC RBC-ENTMCNC: 33.3 G/DL — SIGNIFICANT CHANGE UP (ref 32–36)
MCHC RBC-ENTMCNC: 34 G/DL — SIGNIFICANT CHANGE UP (ref 32–36)
MCV RBC AUTO: 83.1 FL — SIGNIFICANT CHANGE UP (ref 80–100)
MCV RBC AUTO: 84 FL — SIGNIFICANT CHANGE UP (ref 80–100)
MONOCYTES # BLD AUTO: 0.46 K/UL — SIGNIFICANT CHANGE UP (ref 0–0.9)
MONOCYTES # BLD AUTO: 0.64 K/UL — SIGNIFICANT CHANGE UP (ref 0–0.9)
MONOCYTES NFR BLD AUTO: 7.1 % — SIGNIFICANT CHANGE UP (ref 2–14)
MONOCYTES NFR BLD AUTO: 8.9 % — SIGNIFICANT CHANGE UP (ref 2–14)
NEUTROPHILS # BLD AUTO: 4.24 K/UL — SIGNIFICANT CHANGE UP (ref 1.8–7.4)
NEUTROPHILS # BLD AUTO: 4.75 K/UL — SIGNIFICANT CHANGE UP (ref 1.8–7.4)
NEUTROPHILS NFR BLD AUTO: 65.5 % — SIGNIFICANT CHANGE UP (ref 43–77)
NEUTROPHILS NFR BLD AUTO: 66.2 % — SIGNIFICANT CHANGE UP (ref 43–77)
NRBC BLD AUTO-RTO: 0 /100 WBCS — SIGNIFICANT CHANGE UP (ref 0–0)
NRBC BLD AUTO-RTO: 0 /100 WBCS — SIGNIFICANT CHANGE UP (ref 0–0)
PLATELET # BLD AUTO: 320 K/UL — SIGNIFICANT CHANGE UP (ref 150–400)
PLATELET # BLD AUTO: 323 K/UL — SIGNIFICANT CHANGE UP (ref 150–400)
POTASSIUM SERPL-MCNC: 4.5 MMOL/L — SIGNIFICANT CHANGE UP (ref 3.5–5.3)
POTASSIUM SERPL-SCNC: 4.5 MMOL/L — SIGNIFICANT CHANGE UP (ref 3.5–5.3)
PROT SERPL-MCNC: 8 GM/DL — SIGNIFICANT CHANGE UP (ref 6–8.3)
PROTHROM AB SERPL-ACNC: 13.8 SEC — HIGH (ref 9.9–13.4)
RBC # BLD: 3.96 M/UL — LOW (ref 4.2–5.8)
RBC # BLD: 4.07 M/UL — LOW (ref 4.2–5.8)
RBC # FLD: 12.5 % — SIGNIFICANT CHANGE UP (ref 10.3–14.5)
RBC # FLD: 12.5 % — SIGNIFICANT CHANGE UP (ref 10.3–14.5)
SODIUM SERPL-SCNC: 135 MMOL/L — SIGNIFICANT CHANGE UP (ref 135–145)
WBC # BLD: 6.47 K/UL — SIGNIFICANT CHANGE UP (ref 3.8–10.5)
WBC # BLD: 7.18 K/UL — SIGNIFICANT CHANGE UP (ref 3.8–10.5)
WBC # FLD AUTO: 6.47 K/UL — SIGNIFICANT CHANGE UP (ref 3.8–10.5)
WBC # FLD AUTO: 7.18 K/UL — SIGNIFICANT CHANGE UP (ref 3.8–10.5)

## 2025-07-21 PROCEDURE — 99285 EMERGENCY DEPT VISIT HI MDM: CPT

## 2025-07-21 PROCEDURE — 20225 BONE BIOPSY TROCAR/NDL DEEP: CPT | Mod: LT

## 2025-07-21 PROCEDURE — 73630 X-RAY EXAM OF FOOT: CPT | Mod: 26,LT

## 2025-07-21 PROCEDURE — 88311 DECALCIFY TISSUE: CPT | Mod: 26

## 2025-07-21 PROCEDURE — 93010 ELECTROCARDIOGRAM REPORT: CPT

## 2025-07-21 PROCEDURE — 71045 X-RAY EXAM CHEST 1 VIEW: CPT | Mod: 26

## 2025-07-21 PROCEDURE — 99213 OFFICE O/P EST LOW 20 MIN: CPT | Mod: 25

## 2025-07-21 PROCEDURE — 88307 TISSUE EXAM BY PATHOLOGIST: CPT | Mod: 26

## 2025-07-21 RX ORDER — INSULIN LISPRO 100 U/ML
0 INJECTION, SOLUTION INTRAVENOUS; SUBCUTANEOUS
Qty: 0 | Refills: 0 | DISCHARGE

## 2025-07-21 RX ORDER — ROSUVASTATIN CALCIUM 20 MG/1
20 TABLET, FILM COATED ORAL AT BEDTIME
Refills: 0 | Status: DISCONTINUED | OUTPATIENT
Start: 2025-07-21 | End: 2025-07-29

## 2025-07-21 RX ORDER — AMLODIPINE BESYLATE 10 MG/1
10 TABLET ORAL DAILY
Refills: 0 | Status: DISCONTINUED | OUTPATIENT
Start: 2025-07-21 | End: 2025-07-29

## 2025-07-21 RX ORDER — ACETAMINOPHEN 500 MG/5ML
650 LIQUID (ML) ORAL EVERY 6 HOURS
Refills: 0 | Status: DISCONTINUED | OUTPATIENT
Start: 2025-07-21 | End: 2025-07-29

## 2025-07-21 RX ORDER — GABAPENTIN 400 MG/1
300 CAPSULE ORAL EVERY 8 HOURS
Refills: 0 | Status: DISCONTINUED | OUTPATIENT
Start: 2025-07-21 | End: 2025-07-29

## 2025-07-21 RX ORDER — GABAPENTIN 400 MG/1
0 CAPSULE ORAL
Qty: 0 | Refills: 0 | DISCHARGE

## 2025-07-21 RX ORDER — GABAPENTIN 400 MG/1
600 CAPSULE ORAL ONCE
Refills: 0 | Status: COMPLETED | OUTPATIENT
Start: 2025-07-21 | End: 2025-07-21

## 2025-07-21 RX ORDER — PIPERACILLIN-TAZO-DEXTROSE,ISO 3.375G/5
3.38 IV SOLUTION, PIGGYBACK PREMIX FROZEN(ML) INTRAVENOUS ONCE
Refills: 0 | Status: COMPLETED | OUTPATIENT
Start: 2025-07-21 | End: 2025-07-21

## 2025-07-21 RX ORDER — INSULIN GLARGINE-YFGN 100 [IU]/ML
0 INJECTION, SOLUTION SUBCUTANEOUS
Qty: 0 | Refills: 0 | DISCHARGE

## 2025-07-21 RX ORDER — AMLODIPINE BESYLATE 10 MG/1
0 TABLET ORAL
Qty: 0 | Refills: 0 | DISCHARGE

## 2025-07-21 RX ORDER — VANCOMYCIN HCL IN 5 % DEXTROSE 1.5G/250ML
1000 PLASTIC BAG, INJECTION (ML) INTRAVENOUS ONCE
Refills: 0 | Status: COMPLETED | OUTPATIENT
Start: 2025-07-21 | End: 2025-07-21

## 2025-07-21 RX ORDER — PIPERACILLIN-TAZO-DEXTROSE,ISO 3.375G/5
3.38 IV SOLUTION, PIGGYBACK PREMIX FROZEN(ML) INTRAVENOUS EVERY 8 HOURS
Refills: 0 | Status: DISCONTINUED | OUTPATIENT
Start: 2025-07-22 | End: 2025-07-25

## 2025-07-21 RX ORDER — ERGOCALCIFEROL 1.25 MG/1
0 CAPSULE ORAL
Qty: 0 | Refills: 0 | DISCHARGE

## 2025-07-21 RX ORDER — ASPIRIN 325 MG
81 TABLET ORAL DAILY
Refills: 0 | Status: DISCONTINUED | OUTPATIENT
Start: 2025-07-21 | End: 2025-07-29

## 2025-07-21 RX ADMIN — Medication 250 MILLIGRAM(S): at 23:38

## 2025-07-21 RX ADMIN — Medication 3.38 GRAM(S): at 22:45

## 2025-07-21 RX ADMIN — Medication 200 GRAM(S): at 22:15

## 2025-07-21 RX ADMIN — ROSUVASTATIN CALCIUM 20 MILLIGRAM(S): 20 TABLET, FILM COATED ORAL at 22:46

## 2025-07-21 RX ADMIN — AMLODIPINE BESYLATE 10 MILLIGRAM(S): 10 TABLET ORAL at 22:46

## 2025-07-21 RX ADMIN — GABAPENTIN 600 MILLIGRAM(S): 400 CAPSULE ORAL at 22:15

## 2025-07-22 DIAGNOSIS — E11.621 TYPE 2 DIABETES MELLITUS WITH FOOT ULCER: ICD-10-CM

## 2025-07-22 DIAGNOSIS — E11.69 TYPE 2 DIABETES MELLITUS WITH OTHER SPECIFIED COMPLICATION: ICD-10-CM

## 2025-07-22 DIAGNOSIS — S91.302A UNSPECIFIED OPEN WOUND, LEFT FOOT, INITIAL ENCOUNTER: ICD-10-CM

## 2025-07-22 DIAGNOSIS — N17.9 ACUTE KIDNEY FAILURE, UNSPECIFIED: ICD-10-CM

## 2025-07-22 DIAGNOSIS — L97.424 NON-PRESSURE CHRONIC ULCER OF LEFT HEEL AND MIDFOOT WITH NECROSIS OF BONE: ICD-10-CM

## 2025-07-22 DIAGNOSIS — Z79.4 LONG TERM (CURRENT) USE OF INSULIN: ICD-10-CM

## 2025-07-22 DIAGNOSIS — E10.9 TYPE 1 DIABETES MELLITUS WITHOUT COMPLICATIONS: ICD-10-CM

## 2025-07-22 DIAGNOSIS — M86.372 CHRONIC MULTIFOCAL OSTEOMYELITIS, LEFT ANKLE AND FOOT: ICD-10-CM

## 2025-07-22 DIAGNOSIS — I73.9 PERIPHERAL VASCULAR DISEASE, UNSPECIFIED: ICD-10-CM

## 2025-07-22 DIAGNOSIS — E78.5 HYPERLIPIDEMIA, UNSPECIFIED: ICD-10-CM

## 2025-07-22 DIAGNOSIS — E10.65 TYPE 1 DIABETES MELLITUS WITH HYPERGLYCEMIA: ICD-10-CM

## 2025-07-22 DIAGNOSIS — Z29.9 ENCOUNTER FOR PROPHYLACTIC MEASURES, UNSPECIFIED: ICD-10-CM

## 2025-07-22 DIAGNOSIS — Z89.511 ACQUIRED ABSENCE OF RIGHT LEG BELOW KNEE: ICD-10-CM

## 2025-07-22 DIAGNOSIS — I10 ESSENTIAL (PRIMARY) HYPERTENSION: ICD-10-CM

## 2025-07-22 DIAGNOSIS — N28.9 DISORDER OF KIDNEY AND URETER, UNSPECIFIED: ICD-10-CM

## 2025-07-22 DIAGNOSIS — E11.40 TYPE 2 DIABETES MELLITUS WITH DIABETIC NEUROPATHY, UNSPECIFIED: ICD-10-CM

## 2025-07-22 LAB
A1C WITH ESTIMATED AVERAGE GLUCOSE RESULT: 9.2 % — HIGH (ref 4–5.6)
ALBUMIN SERPL ELPH-MCNC: 1.9 G/DL — LOW (ref 3.3–5)
ALP SERPL-CCNC: 138 U/L — HIGH (ref 40–120)
ALT FLD-CCNC: 17 U/L — SIGNIFICANT CHANGE UP (ref 12–78)
ANION GAP SERPL CALC-SCNC: 6 MMOL/L — SIGNIFICANT CHANGE UP (ref 5–17)
AST SERPL-CCNC: 16 U/L — SIGNIFICANT CHANGE UP (ref 15–37)
BILIRUB SERPL-MCNC: 0.2 MG/DL — SIGNIFICANT CHANGE UP (ref 0.2–1.2)
BUN SERPL-MCNC: 30 MG/DL — HIGH (ref 7–23)
CALCIUM SERPL-MCNC: 9 MG/DL — SIGNIFICANT CHANGE UP (ref 8.5–10.1)
CHLORIDE SERPL-SCNC: 99 MMOL/L — SIGNIFICANT CHANGE UP (ref 96–108)
CO2 SERPL-SCNC: 27 MMOL/L — SIGNIFICANT CHANGE UP (ref 22–31)
CREAT SERPL-MCNC: 2.25 MG/DL — HIGH (ref 0.5–1.3)
CRP SERPL-MCNC: 130 MG/L — HIGH
CRP SERPL-MCNC: 161 MG/L — HIGH
EGFR: 34 ML/MIN/1.73M2 — LOW
EGFR: 34 ML/MIN/1.73M2 — LOW
ESTIMATED AVERAGE GLUCOSE: 217 MG/DL — HIGH (ref 68–114)
GLUCOSE BLDC GLUCOMTR-MCNC: 115 MG/DL — HIGH (ref 70–99)
GLUCOSE BLDC GLUCOMTR-MCNC: 121 MG/DL — HIGH (ref 70–99)
GLUCOSE BLDC GLUCOMTR-MCNC: 186 MG/DL — HIGH (ref 70–99)
GLUCOSE BLDC GLUCOMTR-MCNC: 301 MG/DL — HIGH (ref 70–99)
GLUCOSE SERPL-MCNC: 225 MG/DL — HIGH (ref 70–99)
GRAM STN FLD: SIGNIFICANT CHANGE UP
GRAM STN FLD: SIGNIFICANT CHANGE UP
POTASSIUM SERPL-MCNC: 4.5 MMOL/L — SIGNIFICANT CHANGE UP (ref 3.5–5.3)
POTASSIUM SERPL-SCNC: 4.5 MMOL/L — SIGNIFICANT CHANGE UP (ref 3.5–5.3)
PROT SERPL-MCNC: 8.1 GM/DL — SIGNIFICANT CHANGE UP (ref 6–8.3)
SODIUM SERPL-SCNC: 132 MMOL/L — LOW (ref 135–145)
SPECIMEN SOURCE: SIGNIFICANT CHANGE UP
SPECIMEN SOURCE: SIGNIFICANT CHANGE UP
VANCOMYCIN FLD-MCNC: 9.6 UG/ML — SIGNIFICANT CHANGE UP

## 2025-07-22 PROCEDURE — 99232 SBSQ HOSP IP/OBS MODERATE 35: CPT | Mod: 25

## 2025-07-22 PROCEDURE — 93923 UPR/LXTR ART STDY 3+ LVLS: CPT | Mod: 26

## 2025-07-22 PROCEDURE — 73720 MRI LWR EXTREMITY W/O&W/DYE: CPT | Mod: 26,LT

## 2025-07-22 PROCEDURE — 99222 1ST HOSP IP/OBS MODERATE 55: CPT

## 2025-07-22 PROCEDURE — G0545: CPT

## 2025-07-22 PROCEDURE — 99233 SBSQ HOSP IP/OBS HIGH 50: CPT

## 2025-07-22 RX ORDER — DEXTROSE 50 % IN WATER 50 %
25 SYRINGE (ML) INTRAVENOUS ONCE
Refills: 0 | Status: DISCONTINUED | OUTPATIENT
Start: 2025-07-22 | End: 2025-07-29

## 2025-07-22 RX ORDER — SODIUM CHLORIDE 9 G/1000ML
1000 INJECTION, SOLUTION INTRAVENOUS
Refills: 0 | Status: DISCONTINUED | OUTPATIENT
Start: 2025-07-22 | End: 2025-07-29

## 2025-07-22 RX ORDER — INSULIN LISPRO 100 U/ML
INJECTION, SOLUTION INTRAVENOUS; SUBCUTANEOUS
Refills: 0 | Status: DISCONTINUED | OUTPATIENT
Start: 2025-07-22 | End: 2025-07-29

## 2025-07-22 RX ORDER — GLUCAGON 3 MG/1
1 POWDER NASAL ONCE
Refills: 0 | Status: DISCONTINUED | OUTPATIENT
Start: 2025-07-22 | End: 2025-07-29

## 2025-07-22 RX ORDER — INSULIN LISPRO 100 U/ML
6 INJECTION, SOLUTION INTRAVENOUS; SUBCUTANEOUS
Refills: 0 | Status: DISCONTINUED | OUTPATIENT
Start: 2025-07-22 | End: 2025-07-26

## 2025-07-22 RX ORDER — INSULIN GLARGINE-YFGN 100 [IU]/ML
15 INJECTION, SOLUTION SUBCUTANEOUS EVERY MORNING
Refills: 0 | Status: DISCONTINUED | OUTPATIENT
Start: 2025-07-22 | End: 2025-07-25

## 2025-07-22 RX ORDER — ENOXAPARIN SODIUM 100 MG/ML
40 INJECTION SUBCUTANEOUS ONCE
Refills: 0 | Status: COMPLETED | OUTPATIENT
Start: 2025-07-22 | End: 2025-07-22

## 2025-07-22 RX ORDER — DEXTROSE 50 % IN WATER 50 %
12.5 SYRINGE (ML) INTRAVENOUS ONCE
Refills: 0 | Status: DISCONTINUED | OUTPATIENT
Start: 2025-07-22 | End: 2025-07-29

## 2025-07-22 RX ORDER — DEXTROSE 50 % IN WATER 50 %
15 SYRINGE (ML) INTRAVENOUS ONCE
Refills: 0 | Status: DISCONTINUED | OUTPATIENT
Start: 2025-07-22 | End: 2025-07-29

## 2025-07-22 RX ADMIN — GABAPENTIN 300 MILLIGRAM(S): 400 CAPSULE ORAL at 22:06

## 2025-07-22 RX ADMIN — INSULIN LISPRO 6 UNIT(S): 100 INJECTION, SOLUTION INTRAVENOUS; SUBCUTANEOUS at 16:57

## 2025-07-22 RX ADMIN — INSULIN LISPRO 4: 100 INJECTION, SOLUTION INTRAVENOUS; SUBCUTANEOUS at 08:18

## 2025-07-22 RX ADMIN — Medication 25 GRAM(S): at 05:32

## 2025-07-22 RX ADMIN — Medication 25 GRAM(S): at 22:08

## 2025-07-22 RX ADMIN — INSULIN LISPRO 1: 100 INJECTION, SOLUTION INTRAVENOUS; SUBCUTANEOUS at 11:42

## 2025-07-22 RX ADMIN — Medication 81 MILLIGRAM(S): at 11:43

## 2025-07-22 RX ADMIN — AMLODIPINE BESYLATE 10 MILLIGRAM(S): 10 TABLET ORAL at 22:06

## 2025-07-22 RX ADMIN — INSULIN LISPRO 6 UNIT(S): 100 INJECTION, SOLUTION INTRAVENOUS; SUBCUTANEOUS at 11:42

## 2025-07-22 RX ADMIN — ROSUVASTATIN CALCIUM 20 MILLIGRAM(S): 20 TABLET, FILM COATED ORAL at 22:06

## 2025-07-22 RX ADMIN — INSULIN GLARGINE-YFGN 15 UNIT(S): 100 INJECTION, SOLUTION SUBCUTANEOUS at 09:22

## 2025-07-22 RX ADMIN — Medication 25 GRAM(S): at 13:12

## 2025-07-22 RX ADMIN — ENOXAPARIN SODIUM 40 MILLIGRAM(S): 100 INJECTION SUBCUTANEOUS at 13:12

## 2025-07-22 RX ADMIN — GABAPENTIN 800 MILLIGRAM(S): 400 CAPSULE ORAL at 05:29

## 2025-07-23 ENCOUNTER — TRANSCRIPTION ENCOUNTER (OUTPATIENT)
Age: 55
End: 2025-07-23

## 2025-07-23 ENCOUNTER — RESULT REVIEW (OUTPATIENT)
Age: 55
End: 2025-07-23

## 2025-07-23 LAB
-  CLINDAMYCIN: SIGNIFICANT CHANGE UP
-  ERYTHROMYCIN: SIGNIFICANT CHANGE UP
-  GENTAMICIN: SIGNIFICANT CHANGE UP
-  OXACILLIN: SIGNIFICANT CHANGE UP
-  PENICILLIN: SIGNIFICANT CHANGE UP
-  RIFAMPIN: SIGNIFICANT CHANGE UP
-  TETRACYCLINE: SIGNIFICANT CHANGE UP
-  TRIMETHOPRIM/SULFAMETHOXAZOLE: SIGNIFICANT CHANGE UP
-  VANCOMYCIN: SIGNIFICANT CHANGE UP
A1C WITH ESTIMATED AVERAGE GLUCOSE RESULT: 9.1 % — HIGH (ref 4–5.6)
ANION GAP SERPL CALC-SCNC: 9 MMOL/L — SIGNIFICANT CHANGE UP (ref 5–17)
APTT BLD: 39.4 SEC — HIGH (ref 26.1–36.8)
BUN SERPL-MCNC: 27 MG/DL — HIGH (ref 7–23)
CALCIUM SERPL-MCNC: 9.1 MG/DL — SIGNIFICANT CHANGE UP (ref 8.5–10.1)
CHLORIDE SERPL-SCNC: 100 MMOL/L — SIGNIFICANT CHANGE UP (ref 96–108)
CO2 SERPL-SCNC: 27 MMOL/L — SIGNIFICANT CHANGE UP (ref 22–31)
CREAT SERPL-MCNC: 2.13 MG/DL — HIGH (ref 0.5–1.3)
EGFR: 36 ML/MIN/1.73M2 — LOW
EGFR: 36 ML/MIN/1.73M2 — LOW
ESTIMATED AVERAGE GLUCOSE: 214 MG/DL — HIGH (ref 68–114)
GLUCOSE BLDC GLUCOMTR-MCNC: 106 MG/DL — HIGH (ref 70–99)
GLUCOSE BLDC GLUCOMTR-MCNC: 118 MG/DL — HIGH (ref 70–99)
GLUCOSE BLDC GLUCOMTR-MCNC: 147 MG/DL — HIGH (ref 70–99)
GLUCOSE BLDC GLUCOMTR-MCNC: 151 MG/DL — HIGH (ref 70–99)
GLUCOSE BLDC GLUCOMTR-MCNC: 214 MG/DL — HIGH (ref 70–99)
GLUCOSE BLDC GLUCOMTR-MCNC: 66 MG/DL — LOW (ref 70–99)
GLUCOSE BLDC GLUCOMTR-MCNC: 69 MG/DL — LOW (ref 70–99)
GLUCOSE SERPL-MCNC: 173 MG/DL — HIGH (ref 70–99)
HCT VFR BLD CALC: 34.2 % — LOW (ref 39–50)
HCT VFR BLD CALC: 34.7 % — LOW (ref 39–50)
HGB BLD-MCNC: 11.5 G/DL — LOW (ref 13–17)
HGB BLD-MCNC: 11.6 G/DL — LOW (ref 13–17)
INR BLD: 1.07 RATIO — SIGNIFICANT CHANGE UP (ref 0.85–1.16)
MCHC RBC-ENTMCNC: 27.8 PG — SIGNIFICANT CHANGE UP (ref 27–34)
MCHC RBC-ENTMCNC: 28.1 PG — SIGNIFICANT CHANGE UP (ref 27–34)
MCHC RBC-ENTMCNC: 33.4 G/DL — SIGNIFICANT CHANGE UP (ref 32–36)
MCHC RBC-ENTMCNC: 33.6 G/DL — SIGNIFICANT CHANGE UP (ref 32–36)
MCV RBC AUTO: 83 FL — SIGNIFICANT CHANGE UP (ref 80–100)
MCV RBC AUTO: 83.6 FL — SIGNIFICANT CHANGE UP (ref 80–100)
METHOD TYPE: SIGNIFICANT CHANGE UP
NRBC BLD AUTO-RTO: 0 /100 WBCS — SIGNIFICANT CHANGE UP (ref 0–0)
NRBC BLD AUTO-RTO: 0 /100 WBCS — SIGNIFICANT CHANGE UP (ref 0–0)
ORGANISM # SPEC MICROSCOPIC CNT: SIGNIFICANT CHANGE UP
PLATELET # BLD AUTO: 331 K/UL — SIGNIFICANT CHANGE UP (ref 150–400)
PLATELET # BLD AUTO: 343 K/UL — SIGNIFICANT CHANGE UP (ref 150–400)
POTASSIUM SERPL-MCNC: 4.2 MMOL/L — SIGNIFICANT CHANGE UP (ref 3.5–5.3)
POTASSIUM SERPL-SCNC: 4.2 MMOL/L — SIGNIFICANT CHANGE UP (ref 3.5–5.3)
PROTHROM AB SERPL-ACNC: 12.4 SEC — SIGNIFICANT CHANGE UP (ref 9.9–13.4)
RBC # BLD: 4.09 M/UL — LOW (ref 4.2–5.8)
RBC # BLD: 4.18 M/UL — LOW (ref 4.2–5.8)
RBC # FLD: 12.3 % — SIGNIFICANT CHANGE UP (ref 10.3–14.5)
RBC # FLD: 12.3 % — SIGNIFICANT CHANGE UP (ref 10.3–14.5)
SODIUM SERPL-SCNC: 136 MMOL/L — SIGNIFICANT CHANGE UP (ref 135–145)
SURGICAL PATHOLOGY STUDY: SIGNIFICANT CHANGE UP
WBC # BLD: 3.87 K/UL — SIGNIFICANT CHANGE UP (ref 3.8–10.5)
WBC # BLD: 3.95 K/UL — SIGNIFICANT CHANGE UP (ref 3.8–10.5)
WBC # FLD AUTO: 3.87 K/UL — SIGNIFICANT CHANGE UP (ref 3.8–10.5)
WBC # FLD AUTO: 3.95 K/UL — SIGNIFICANT CHANGE UP (ref 3.8–10.5)

## 2025-07-23 PROCEDURE — 73630 X-RAY EXAM OF FOOT: CPT | Mod: 26,LT

## 2025-07-23 PROCEDURE — 88305 TISSUE EXAM BY PATHOLOGIST: CPT | Mod: 26

## 2025-07-23 PROCEDURE — 88311 DECALCIFY TISSUE: CPT | Mod: 26

## 2025-07-23 PROCEDURE — 99233 SBSQ HOSP IP/OBS HIGH 50: CPT

## 2025-07-23 PROCEDURE — G0545: CPT

## 2025-07-23 PROCEDURE — 99222 1ST HOSP IP/OBS MODERATE 55: CPT

## 2025-07-23 DEVICE — SURGICEL NU-KNIT 6 X 9": Type: IMPLANTABLE DEVICE | Site: LEFT | Status: FUNCTIONAL

## 2025-07-23 RX ORDER — ACETAMINOPHEN 500 MG/5ML
650 LIQUID (ML) ORAL EVERY 6 HOURS
Refills: 0 | Status: DISCONTINUED | OUTPATIENT
Start: 2025-07-23 | End: 2025-07-29

## 2025-07-23 RX ORDER — DEXTROSE 50 % IN WATER 50 %
12.5 SYRINGE (ML) INTRAVENOUS ONCE
Refills: 0 | Status: COMPLETED | OUTPATIENT
Start: 2025-07-23 | End: 2025-07-23

## 2025-07-23 RX ORDER — SODIUM CHLORIDE 9 G/1000ML
1000 INJECTION, SOLUTION INTRAVENOUS
Refills: 0 | Status: DISCONTINUED | OUTPATIENT
Start: 2025-07-23 | End: 2025-07-23

## 2025-07-23 RX ORDER — HYDROMORPHONE/SOD CHLOR,ISO/PF 2 MG/10 ML
1 SYRINGE (ML) INJECTION
Refills: 0 | Status: DISCONTINUED | OUTPATIENT
Start: 2025-07-23 | End: 2025-07-23

## 2025-07-23 RX ORDER — ACETAMINOPHEN 500 MG/5ML
1000 LIQUID (ML) ORAL ONCE
Refills: 0 | Status: COMPLETED | OUTPATIENT
Start: 2025-07-23 | End: 2025-07-23

## 2025-07-23 RX ORDER — HYDROMORPHONE/SOD CHLOR,ISO/PF 2 MG/10 ML
0.5 SYRINGE (ML) INJECTION
Refills: 0 | Status: DISCONTINUED | OUTPATIENT
Start: 2025-07-23 | End: 2025-07-23

## 2025-07-23 RX ORDER — ONDANSETRON HCL/PF 4 MG/2 ML
4 VIAL (ML) INJECTION ONCE
Refills: 0 | Status: DISCONTINUED | OUTPATIENT
Start: 2025-07-23 | End: 2025-07-23

## 2025-07-23 RX ORDER — OXYCODONE HYDROCHLORIDE AND ACETAMINOPHEN 10; 325 MG/1; MG/1
1 TABLET ORAL EVERY 4 HOURS
Refills: 0 | Status: DISCONTINUED | OUTPATIENT
Start: 2025-07-23 | End: 2025-07-29

## 2025-07-23 RX ADMIN — INSULIN LISPRO 2: 100 INJECTION, SOLUTION INTRAVENOUS; SUBCUTANEOUS at 08:24

## 2025-07-23 RX ADMIN — Medication 400 MILLIGRAM(S): at 21:42

## 2025-07-23 RX ADMIN — INSULIN GLARGINE-YFGN 15 UNIT(S): 100 INJECTION, SOLUTION SUBCUTANEOUS at 08:46

## 2025-07-23 RX ADMIN — Medication 12.5 GRAM(S): at 15:19

## 2025-07-23 RX ADMIN — GABAPENTIN 300 MILLIGRAM(S): 400 CAPSULE ORAL at 05:33

## 2025-07-23 RX ADMIN — SODIUM CHLORIDE 100 MILLILITER(S): 9 INJECTION, SOLUTION INTRAVENOUS at 19:46

## 2025-07-23 RX ADMIN — Medication 2 MILLIGRAM(S): at 22:52

## 2025-07-23 RX ADMIN — Medication 1000 MILLIGRAM(S): at 22:50

## 2025-07-23 RX ADMIN — Medication 25 GRAM(S): at 21:55

## 2025-07-23 RX ADMIN — ROSUVASTATIN CALCIUM 20 MILLIGRAM(S): 20 TABLET, FILM COATED ORAL at 21:43

## 2025-07-23 RX ADMIN — GABAPENTIN 300 MILLIGRAM(S): 400 CAPSULE ORAL at 14:47

## 2025-07-23 RX ADMIN — AMLODIPINE BESYLATE 10 MILLIGRAM(S): 10 TABLET ORAL at 21:58

## 2025-07-23 RX ADMIN — GABAPENTIN 300 MILLIGRAM(S): 400 CAPSULE ORAL at 21:42

## 2025-07-23 RX ADMIN — Medication 25 GRAM(S): at 14:47

## 2025-07-23 RX ADMIN — Medication 25 GRAM(S): at 05:32

## 2025-07-24 LAB
-  CLINDAMYCIN: SIGNIFICANT CHANGE UP
-  ERYTHROMYCIN: SIGNIFICANT CHANGE UP
-  GENTAMICIN: SIGNIFICANT CHANGE UP
-  OXACILLIN: SIGNIFICANT CHANGE UP
-  PENICILLIN: SIGNIFICANT CHANGE UP
-  RIFAMPIN: SIGNIFICANT CHANGE UP
-  TETRACYCLINE: SIGNIFICANT CHANGE UP
-  TRIMETHOPRIM/SULFAMETHOXAZOLE: SIGNIFICANT CHANGE UP
-  VANCOMYCIN: SIGNIFICANT CHANGE UP
ALBUMIN SERPL ELPH-MCNC: 1.9 G/DL — LOW (ref 3.3–5)
ALP SERPL-CCNC: 107 U/L — SIGNIFICANT CHANGE UP (ref 40–120)
ALT FLD-CCNC: 14 U/L — SIGNIFICANT CHANGE UP (ref 12–78)
ANION GAP SERPL CALC-SCNC: 4 MMOL/L — LOW (ref 5–17)
AST SERPL-CCNC: 12 U/L — LOW (ref 15–37)
BILIRUB SERPL-MCNC: 0.1 MG/DL — LOW (ref 0.2–1.2)
BUN SERPL-MCNC: 30 MG/DL — HIGH (ref 7–23)
CALCIUM SERPL-MCNC: 8.8 MG/DL — SIGNIFICANT CHANGE UP (ref 8.5–10.1)
CHLORIDE SERPL-SCNC: 102 MMOL/L — SIGNIFICANT CHANGE UP (ref 96–108)
CO2 SERPL-SCNC: 28 MMOL/L — SIGNIFICANT CHANGE UP (ref 22–31)
CREAT SERPL-MCNC: 2.58 MG/DL — HIGH (ref 0.5–1.3)
EGFR: 29 ML/MIN/1.73M2 — LOW
EGFR: 29 ML/MIN/1.73M2 — LOW
GLUCOSE BLDC GLUCOMTR-MCNC: 127 MG/DL — HIGH (ref 70–99)
GLUCOSE BLDC GLUCOMTR-MCNC: 156 MG/DL — HIGH (ref 70–99)
GLUCOSE BLDC GLUCOMTR-MCNC: 165 MG/DL — HIGH (ref 70–99)
GLUCOSE BLDC GLUCOMTR-MCNC: 190 MG/DL — HIGH (ref 70–99)
GLUCOSE SERPL-MCNC: 170 MG/DL — HIGH (ref 70–99)
HCT VFR BLD CALC: 29.5 % — LOW (ref 39–50)
HGB BLD-MCNC: 10 G/DL — LOW (ref 13–17)
MAGNESIUM SERPL-MCNC: 2.1 MG/DL — SIGNIFICANT CHANGE UP (ref 1.6–2.6)
MCHC RBC-ENTMCNC: 28.1 PG — SIGNIFICANT CHANGE UP (ref 27–34)
MCHC RBC-ENTMCNC: 33.9 G/DL — SIGNIFICANT CHANGE UP (ref 32–36)
MCV RBC AUTO: 82.9 FL — SIGNIFICANT CHANGE UP (ref 80–100)
METHOD TYPE: SIGNIFICANT CHANGE UP
NRBC BLD AUTO-RTO: 0 /100 WBCS — SIGNIFICANT CHANGE UP (ref 0–0)
PHOSPHATE SERPL-MCNC: 4.3 MG/DL — SIGNIFICANT CHANGE UP (ref 2.5–4.5)
PLATELET # BLD AUTO: 333 K/UL — SIGNIFICANT CHANGE UP (ref 150–400)
POTASSIUM SERPL-MCNC: 4.3 MMOL/L — SIGNIFICANT CHANGE UP (ref 3.5–5.3)
POTASSIUM SERPL-SCNC: 4.3 MMOL/L — SIGNIFICANT CHANGE UP (ref 3.5–5.3)
PROT SERPL-MCNC: 7.5 GM/DL — SIGNIFICANT CHANGE UP (ref 6–8.3)
RBC # BLD: 3.56 M/UL — LOW (ref 4.2–5.8)
RBC # FLD: 12.4 % — SIGNIFICANT CHANGE UP (ref 10.3–14.5)
SODIUM SERPL-SCNC: 134 MMOL/L — LOW (ref 135–145)
WBC # BLD: 6.33 K/UL — SIGNIFICANT CHANGE UP (ref 3.8–10.5)
WBC # FLD AUTO: 6.33 K/UL — SIGNIFICANT CHANGE UP (ref 3.8–10.5)

## 2025-07-24 PROCEDURE — G0545: CPT

## 2025-07-24 PROCEDURE — 99233 SBSQ HOSP IP/OBS HIGH 50: CPT

## 2025-07-24 PROCEDURE — 99232 SBSQ HOSP IP/OBS MODERATE 35: CPT

## 2025-07-24 RX ADMIN — Medication 2 MILLIGRAM(S): at 01:23

## 2025-07-24 RX ADMIN — INSULIN LISPRO 6 UNIT(S): 100 INJECTION, SOLUTION INTRAVENOUS; SUBCUTANEOUS at 11:39

## 2025-07-24 RX ADMIN — Medication 25 GRAM(S): at 13:33

## 2025-07-24 RX ADMIN — INSULIN GLARGINE-YFGN 15 UNIT(S): 100 INJECTION, SOLUTION SUBCUTANEOUS at 08:39

## 2025-07-24 RX ADMIN — INSULIN LISPRO 1: 100 INJECTION, SOLUTION INTRAVENOUS; SUBCUTANEOUS at 11:39

## 2025-07-24 RX ADMIN — INSULIN LISPRO 6 UNIT(S): 100 INJECTION, SOLUTION INTRAVENOUS; SUBCUTANEOUS at 17:36

## 2025-07-24 RX ADMIN — INSULIN LISPRO 6 UNIT(S): 100 INJECTION, SOLUTION INTRAVENOUS; SUBCUTANEOUS at 08:38

## 2025-07-24 RX ADMIN — INSULIN LISPRO 1: 100 INJECTION, SOLUTION INTRAVENOUS; SUBCUTANEOUS at 08:38

## 2025-07-24 RX ADMIN — Medication 2 MILLIGRAM(S): at 17:10

## 2025-07-24 RX ADMIN — GABAPENTIN 300 MILLIGRAM(S): 400 CAPSULE ORAL at 22:02

## 2025-07-24 RX ADMIN — Medication 2 MILLIGRAM(S): at 02:04

## 2025-07-24 RX ADMIN — Medication 25 GRAM(S): at 05:50

## 2025-07-24 RX ADMIN — GABAPENTIN 300 MILLIGRAM(S): 400 CAPSULE ORAL at 05:50

## 2025-07-24 RX ADMIN — Medication 2 MILLIGRAM(S): at 16:10

## 2025-07-24 RX ADMIN — AMLODIPINE BESYLATE 10 MILLIGRAM(S): 10 TABLET ORAL at 22:02

## 2025-07-24 RX ADMIN — Medication 25 GRAM(S): at 22:02

## 2025-07-24 RX ADMIN — Medication 2 MILLIGRAM(S): at 14:54

## 2025-07-24 RX ADMIN — Medication 81 MILLIGRAM(S): at 11:40

## 2025-07-24 RX ADMIN — GABAPENTIN 300 MILLIGRAM(S): 400 CAPSULE ORAL at 13:33

## 2025-07-24 RX ADMIN — Medication 75 MILLILITER(S): at 17:37

## 2025-07-24 RX ADMIN — ROSUVASTATIN CALCIUM 20 MILLIGRAM(S): 20 TABLET, FILM COATED ORAL at 22:02

## 2025-07-24 RX ADMIN — Medication 2 MILLIGRAM(S): at 20:02

## 2025-07-25 LAB
ALBUMIN SERPL ELPH-MCNC: 2 G/DL — LOW (ref 3.3–5)
ALP SERPL-CCNC: 95 U/L — SIGNIFICANT CHANGE UP (ref 40–120)
ALT FLD-CCNC: 15 U/L — SIGNIFICANT CHANGE UP (ref 12–78)
ANION GAP SERPL CALC-SCNC: 6 MMOL/L — SIGNIFICANT CHANGE UP (ref 5–17)
AST SERPL-CCNC: 13 U/L — LOW (ref 15–37)
BILIRUB SERPL-MCNC: 0.3 MG/DL — SIGNIFICANT CHANGE UP (ref 0.2–1.2)
BUN SERPL-MCNC: 26 MG/DL — HIGH (ref 7–23)
CALCIUM SERPL-MCNC: 8.9 MG/DL — SIGNIFICANT CHANGE UP (ref 8.5–10.1)
CHLORIDE SERPL-SCNC: 103 MMOL/L — SIGNIFICANT CHANGE UP (ref 96–108)
CO2 SERPL-SCNC: 28 MMOL/L — SIGNIFICANT CHANGE UP (ref 22–31)
CREAT SERPL-MCNC: 2.39 MG/DL — HIGH (ref 0.5–1.3)
EGFR: 32 ML/MIN/1.73M2 — LOW
EGFR: 32 ML/MIN/1.73M2 — LOW
GLUCOSE BLDC GLUCOMTR-MCNC: 111 MG/DL — HIGH (ref 70–99)
GLUCOSE BLDC GLUCOMTR-MCNC: 149 MG/DL — HIGH (ref 70–99)
GLUCOSE BLDC GLUCOMTR-MCNC: 162 MG/DL — HIGH (ref 70–99)
GLUCOSE BLDC GLUCOMTR-MCNC: 204 MG/DL — HIGH (ref 70–99)
GLUCOSE SERPL-MCNC: 174 MG/DL — HIGH (ref 70–99)
HCT VFR BLD CALC: 25.3 % — LOW (ref 39–50)
HGB BLD-MCNC: 8.4 G/DL — LOW (ref 13–17)
MAGNESIUM SERPL-MCNC: 2.3 MG/DL — SIGNIFICANT CHANGE UP (ref 1.6–2.6)
MCHC RBC-ENTMCNC: 27.6 PG — SIGNIFICANT CHANGE UP (ref 27–34)
MCHC RBC-ENTMCNC: 33.2 G/DL — SIGNIFICANT CHANGE UP (ref 32–36)
MCV RBC AUTO: 83.2 FL — SIGNIFICANT CHANGE UP (ref 80–100)
NRBC BLD AUTO-RTO: 0 /100 WBCS — SIGNIFICANT CHANGE UP (ref 0–0)
PHOSPHATE SERPL-MCNC: 3.4 MG/DL — SIGNIFICANT CHANGE UP (ref 2.5–4.5)
PLATELET # BLD AUTO: 271 K/UL — SIGNIFICANT CHANGE UP (ref 150–400)
POTASSIUM SERPL-MCNC: 4.3 MMOL/L — SIGNIFICANT CHANGE UP (ref 3.5–5.3)
POTASSIUM SERPL-SCNC: 4.3 MMOL/L — SIGNIFICANT CHANGE UP (ref 3.5–5.3)
PROT SERPL-MCNC: 7.2 GM/DL — SIGNIFICANT CHANGE UP (ref 6–8.3)
RBC # BLD: 3.04 M/UL — LOW (ref 4.2–5.8)
RBC # FLD: 12.5 % — SIGNIFICANT CHANGE UP (ref 10.3–14.5)
SODIUM SERPL-SCNC: 137 MMOL/L — SIGNIFICANT CHANGE UP (ref 135–145)
WBC # BLD: 6.1 K/UL — SIGNIFICANT CHANGE UP (ref 3.8–10.5)
WBC # FLD AUTO: 6.1 K/UL — SIGNIFICANT CHANGE UP (ref 3.8–10.5)

## 2025-07-25 PROCEDURE — 99232 SBSQ HOSP IP/OBS MODERATE 35: CPT

## 2025-07-25 PROCEDURE — G0545: CPT

## 2025-07-25 RX ORDER — PIPERACILLIN-TAZO-DEXTROSE,ISO 3.375G/5
3.38 IV SOLUTION, PIGGYBACK PREMIX FROZEN(ML) INTRAVENOUS EVERY 8 HOURS
Refills: 0 | Status: DISCONTINUED | OUTPATIENT
Start: 2025-07-25 | End: 2025-07-28

## 2025-07-25 RX ORDER — INSULIN GLARGINE-YFGN 100 [IU]/ML
17 INJECTION, SOLUTION SUBCUTANEOUS EVERY MORNING
Refills: 0 | Status: DISCONTINUED | OUTPATIENT
Start: 2025-07-26 | End: 2025-07-26

## 2025-07-25 RX ORDER — INSULIN GLARGINE-YFGN 100 [IU]/ML
17 INJECTION, SOLUTION SUBCUTANEOUS AT BEDTIME
Refills: 0 | Status: DISCONTINUED | OUTPATIENT
Start: 2025-07-25 | End: 2025-07-25

## 2025-07-25 RX ADMIN — Medication 2 MILLIGRAM(S): at 00:07

## 2025-07-25 RX ADMIN — Medication 2 MILLIGRAM(S): at 02:09

## 2025-07-25 RX ADMIN — Medication 2 MILLIGRAM(S): at 21:43

## 2025-07-25 RX ADMIN — Medication 2 MILLIGRAM(S): at 22:43

## 2025-07-25 RX ADMIN — GABAPENTIN 300 MILLIGRAM(S): 400 CAPSULE ORAL at 21:36

## 2025-07-25 RX ADMIN — Medication 81 MILLIGRAM(S): at 11:02

## 2025-07-25 RX ADMIN — INSULIN GLARGINE-YFGN 15 UNIT(S): 100 INJECTION, SOLUTION SUBCUTANEOUS at 08:02

## 2025-07-25 RX ADMIN — Medication 2 MILLIGRAM(S): at 13:03

## 2025-07-25 RX ADMIN — Medication 2 MILLIGRAM(S): at 17:38

## 2025-07-25 RX ADMIN — INSULIN LISPRO 6 UNIT(S): 100 INJECTION, SOLUTION INTRAVENOUS; SUBCUTANEOUS at 08:04

## 2025-07-25 RX ADMIN — Medication 25 GRAM(S): at 21:36

## 2025-07-25 RX ADMIN — AMLODIPINE BESYLATE 10 MILLIGRAM(S): 10 TABLET ORAL at 21:36

## 2025-07-25 RX ADMIN — INSULIN LISPRO 6 UNIT(S): 100 INJECTION, SOLUTION INTRAVENOUS; SUBCUTANEOUS at 11:25

## 2025-07-25 RX ADMIN — Medication 25 GRAM(S): at 13:00

## 2025-07-25 RX ADMIN — ROSUVASTATIN CALCIUM 20 MILLIGRAM(S): 20 TABLET, FILM COATED ORAL at 21:36

## 2025-07-25 RX ADMIN — INSULIN LISPRO 6 UNIT(S): 100 INJECTION, SOLUTION INTRAVENOUS; SUBCUTANEOUS at 16:07

## 2025-07-25 RX ADMIN — GABAPENTIN 300 MILLIGRAM(S): 400 CAPSULE ORAL at 05:24

## 2025-07-25 RX ADMIN — Medication 2 MILLIGRAM(S): at 18:11

## 2025-07-25 RX ADMIN — Medication 2 MILLIGRAM(S): at 14:03

## 2025-07-25 RX ADMIN — Medication 25 GRAM(S): at 05:24

## 2025-07-25 RX ADMIN — INSULIN LISPRO 2: 100 INJECTION, SOLUTION INTRAVENOUS; SUBCUTANEOUS at 08:02

## 2025-07-25 RX ADMIN — GABAPENTIN 300 MILLIGRAM(S): 400 CAPSULE ORAL at 13:03

## 2025-07-26 LAB
ANION GAP SERPL CALC-SCNC: 7 MMOL/L — SIGNIFICANT CHANGE UP (ref 5–17)
BUN SERPL-MCNC: 27 MG/DL — HIGH (ref 7–23)
CALCIUM SERPL-MCNC: 8.6 MG/DL — SIGNIFICANT CHANGE UP (ref 8.5–10.1)
CHLORIDE SERPL-SCNC: 106 MMOL/L — SIGNIFICANT CHANGE UP (ref 96–108)
CO2 SERPL-SCNC: 25 MMOL/L — SIGNIFICANT CHANGE UP (ref 22–31)
CREAT SERPL-MCNC: 2.31 MG/DL — HIGH (ref 0.5–1.3)
CULTURE RESULTS: ABNORMAL
CULTURE RESULTS: NO GROWTH — SIGNIFICANT CHANGE UP
EGFR: 33 ML/MIN/1.73M2 — LOW
EGFR: 33 ML/MIN/1.73M2 — LOW
GLUCOSE BLDC GLUCOMTR-MCNC: 160 MG/DL — HIGH (ref 70–99)
GLUCOSE BLDC GLUCOMTR-MCNC: 241 MG/DL — HIGH (ref 70–99)
GLUCOSE BLDC GLUCOMTR-MCNC: 307 MG/DL — HIGH (ref 70–99)
GLUCOSE BLDC GLUCOMTR-MCNC: 70 MG/DL — SIGNIFICANT CHANGE UP (ref 70–99)
GLUCOSE SERPL-MCNC: 142 MG/DL — HIGH (ref 70–99)
GRAM STN FLD: ABNORMAL
ORGANISM # SPEC MICROSCOPIC CNT: ABNORMAL
ORGANISM # SPEC MICROSCOPIC CNT: SIGNIFICANT CHANGE UP
POTASSIUM SERPL-MCNC: 4.1 MMOL/L — SIGNIFICANT CHANGE UP (ref 3.5–5.3)
POTASSIUM SERPL-SCNC: 4.1 MMOL/L — SIGNIFICANT CHANGE UP (ref 3.5–5.3)
SODIUM SERPL-SCNC: 138 MMOL/L — SIGNIFICANT CHANGE UP (ref 135–145)
SPECIMEN SOURCE: SIGNIFICANT CHANGE UP
SPECIMEN SOURCE: SIGNIFICANT CHANGE UP

## 2025-07-26 PROCEDURE — 99232 SBSQ HOSP IP/OBS MODERATE 35: CPT

## 2025-07-26 RX ORDER — INSULIN GLARGINE-YFGN 100 [IU]/ML
20 INJECTION, SOLUTION SUBCUTANEOUS AT BEDTIME
Refills: 0 | Status: DISCONTINUED | OUTPATIENT
Start: 2025-07-26 | End: 2025-07-29

## 2025-07-26 RX ORDER — INSULIN LISPRO 100 U/ML
4 INJECTION, SOLUTION INTRAVENOUS; SUBCUTANEOUS
Refills: 0 | Status: DISCONTINUED | OUTPATIENT
Start: 2025-07-26 | End: 2025-07-29

## 2025-07-26 RX ADMIN — INSULIN LISPRO 4 UNIT(S): 100 INJECTION, SOLUTION INTRAVENOUS; SUBCUTANEOUS at 11:26

## 2025-07-26 RX ADMIN — Medication 25 GRAM(S): at 13:42

## 2025-07-26 RX ADMIN — Medication 75 MILLILITER(S): at 21:58

## 2025-07-26 RX ADMIN — GABAPENTIN 300 MILLIGRAM(S): 400 CAPSULE ORAL at 05:42

## 2025-07-26 RX ADMIN — INSULIN GLARGINE-YFGN 17 UNIT(S): 100 INJECTION, SOLUTION SUBCUTANEOUS at 08:24

## 2025-07-26 RX ADMIN — ROSUVASTATIN CALCIUM 20 MILLIGRAM(S): 20 TABLET, FILM COATED ORAL at 21:52

## 2025-07-26 RX ADMIN — GABAPENTIN 300 MILLIGRAM(S): 400 CAPSULE ORAL at 13:42

## 2025-07-26 RX ADMIN — Medication 25 GRAM(S): at 21:52

## 2025-07-26 RX ADMIN — INSULIN LISPRO 1: 100 INJECTION, SOLUTION INTRAVENOUS; SUBCUTANEOUS at 11:26

## 2025-07-26 RX ADMIN — GABAPENTIN 300 MILLIGRAM(S): 400 CAPSULE ORAL at 21:52

## 2025-07-26 RX ADMIN — INSULIN LISPRO 6 UNIT(S): 100 INJECTION, SOLUTION INTRAVENOUS; SUBCUTANEOUS at 08:23

## 2025-07-26 RX ADMIN — Medication 25 GRAM(S): at 05:42

## 2025-07-26 RX ADMIN — INSULIN GLARGINE-YFGN 20 UNIT(S): 100 INJECTION, SOLUTION SUBCUTANEOUS at 21:53

## 2025-07-26 RX ADMIN — AMLODIPINE BESYLATE 10 MILLIGRAM(S): 10 TABLET ORAL at 21:52

## 2025-07-26 RX ADMIN — INSULIN LISPRO 4: 100 INJECTION, SOLUTION INTRAVENOUS; SUBCUTANEOUS at 08:22

## 2025-07-27 LAB
CULTURE RESULTS: ABNORMAL
GLUCOSE BLDC GLUCOMTR-MCNC: 112 MG/DL — HIGH (ref 70–99)
GLUCOSE BLDC GLUCOMTR-MCNC: 187 MG/DL — HIGH (ref 70–99)
GLUCOSE BLDC GLUCOMTR-MCNC: 189 MG/DL — HIGH (ref 70–99)
GLUCOSE BLDC GLUCOMTR-MCNC: 200 MG/DL — HIGH (ref 70–99)
GLUCOSE BLDC GLUCOMTR-MCNC: 203 MG/DL — HIGH (ref 70–99)
GRAM STN FLD: ABNORMAL
ORGANISM # SPEC MICROSCOPIC CNT: ABNORMAL
ORGANISM # SPEC MICROSCOPIC CNT: SIGNIFICANT CHANGE UP
SPECIMEN SOURCE: SIGNIFICANT CHANGE UP

## 2025-07-27 PROCEDURE — 99232 SBSQ HOSP IP/OBS MODERATE 35: CPT

## 2025-07-27 RX ADMIN — INSULIN GLARGINE-YFGN 20 UNIT(S): 100 INJECTION, SOLUTION SUBCUTANEOUS at 21:06

## 2025-07-27 RX ADMIN — INSULIN LISPRO 4 UNIT(S): 100 INJECTION, SOLUTION INTRAVENOUS; SUBCUTANEOUS at 08:21

## 2025-07-27 RX ADMIN — Medication 25 GRAM(S): at 06:19

## 2025-07-27 RX ADMIN — Medication 25 GRAM(S): at 21:06

## 2025-07-27 RX ADMIN — AMLODIPINE BESYLATE 10 MILLIGRAM(S): 10 TABLET ORAL at 21:06

## 2025-07-27 RX ADMIN — INSULIN LISPRO 4 UNIT(S): 100 INJECTION, SOLUTION INTRAVENOUS; SUBCUTANEOUS at 17:38

## 2025-07-27 RX ADMIN — INSULIN LISPRO 1: 100 INJECTION, SOLUTION INTRAVENOUS; SUBCUTANEOUS at 11:39

## 2025-07-27 RX ADMIN — INSULIN LISPRO 1: 100 INJECTION, SOLUTION INTRAVENOUS; SUBCUTANEOUS at 17:37

## 2025-07-27 RX ADMIN — GABAPENTIN 300 MILLIGRAM(S): 400 CAPSULE ORAL at 13:24

## 2025-07-27 RX ADMIN — Medication 25 GRAM(S): at 13:24

## 2025-07-27 RX ADMIN — ROSUVASTATIN CALCIUM 20 MILLIGRAM(S): 20 TABLET, FILM COATED ORAL at 21:06

## 2025-07-27 RX ADMIN — GABAPENTIN 300 MILLIGRAM(S): 400 CAPSULE ORAL at 21:05

## 2025-07-27 RX ADMIN — INSULIN LISPRO 2: 100 INJECTION, SOLUTION INTRAVENOUS; SUBCUTANEOUS at 08:20

## 2025-07-27 RX ADMIN — INSULIN LISPRO 4 UNIT(S): 100 INJECTION, SOLUTION INTRAVENOUS; SUBCUTANEOUS at 11:40

## 2025-07-27 RX ADMIN — GABAPENTIN 300 MILLIGRAM(S): 400 CAPSULE ORAL at 06:18

## 2025-07-28 LAB
ALBUMIN SERPL ELPH-MCNC: 2 G/DL — LOW (ref 3.3–5)
ALP SERPL-CCNC: 97 U/L — SIGNIFICANT CHANGE UP (ref 40–120)
ALT FLD-CCNC: 20 U/L — SIGNIFICANT CHANGE UP (ref 12–78)
ANION GAP SERPL CALC-SCNC: 6 MMOL/L — SIGNIFICANT CHANGE UP (ref 5–17)
AST SERPL-CCNC: 19 U/L — SIGNIFICANT CHANGE UP (ref 15–37)
BILIRUB SERPL-MCNC: 0.2 MG/DL — SIGNIFICANT CHANGE UP (ref 0.2–1.2)
BUN SERPL-MCNC: 22 MG/DL — SIGNIFICANT CHANGE UP (ref 7–23)
CALCIUM SERPL-MCNC: 8.9 MG/DL — SIGNIFICANT CHANGE UP (ref 8.5–10.1)
CHLORIDE SERPL-SCNC: 105 MMOL/L — SIGNIFICANT CHANGE UP (ref 96–108)
CK SERPL-CCNC: 212 U/L — SIGNIFICANT CHANGE UP (ref 26–308)
CO2 SERPL-SCNC: 25 MMOL/L — SIGNIFICANT CHANGE UP (ref 22–31)
CREAT SERPL-MCNC: 1.99 MG/DL — HIGH (ref 0.5–1.3)
EGFR: 39 ML/MIN/1.73M2 — LOW
EGFR: 39 ML/MIN/1.73M2 — LOW
GLUCOSE BLDC GLUCOMTR-MCNC: 138 MG/DL — HIGH (ref 70–99)
GLUCOSE BLDC GLUCOMTR-MCNC: 208 MG/DL — HIGH (ref 70–99)
GLUCOSE BLDC GLUCOMTR-MCNC: 232 MG/DL — HIGH (ref 70–99)
GLUCOSE BLDC GLUCOMTR-MCNC: 88 MG/DL — SIGNIFICANT CHANGE UP (ref 70–99)
GLUCOSE SERPL-MCNC: 175 MG/DL — HIGH (ref 70–99)
HCT VFR BLD CALC: 22.2 % — LOW (ref 39–50)
HGB BLD-MCNC: 7.5 G/DL — LOW (ref 13–17)
MCHC RBC-ENTMCNC: 27.9 PG — SIGNIFICANT CHANGE UP (ref 27–34)
MCHC RBC-ENTMCNC: 33.8 G/DL — SIGNIFICANT CHANGE UP (ref 32–36)
MCV RBC AUTO: 82.5 FL — SIGNIFICANT CHANGE UP (ref 80–100)
NRBC BLD AUTO-RTO: 0 /100 WBCS — SIGNIFICANT CHANGE UP (ref 0–0)
PLATELET # BLD AUTO: 341 K/UL — SIGNIFICANT CHANGE UP (ref 150–400)
POTASSIUM SERPL-MCNC: 3.7 MMOL/L — SIGNIFICANT CHANGE UP (ref 3.5–5.3)
POTASSIUM SERPL-SCNC: 3.7 MMOL/L — SIGNIFICANT CHANGE UP (ref 3.5–5.3)
PROT SERPL-MCNC: 7.7 GM/DL — SIGNIFICANT CHANGE UP (ref 6–8.3)
RBC # BLD: 2.69 M/UL — LOW (ref 4.2–5.8)
RBC # FLD: 12.8 % — SIGNIFICANT CHANGE UP (ref 10.3–14.5)
SODIUM SERPL-SCNC: 136 MMOL/L — SIGNIFICANT CHANGE UP (ref 135–145)
SURGICAL PATHOLOGY STUDY: SIGNIFICANT CHANGE UP
WBC # BLD: 5.99 K/UL — SIGNIFICANT CHANGE UP (ref 3.8–10.5)
WBC # FLD AUTO: 5.99 K/UL — SIGNIFICANT CHANGE UP (ref 3.8–10.5)

## 2025-07-28 PROCEDURE — G0545: CPT

## 2025-07-28 PROCEDURE — 99232 SBSQ HOSP IP/OBS MODERATE 35: CPT

## 2025-07-28 RX ORDER — DAPTOMYCIN 500 MG/10ML
450 INJECTION, POWDER, LYOPHILIZED, FOR SOLUTION INTRAVENOUS
Qty: 36 | Refills: 0
Start: 2025-07-28 | End: 2025-09-07

## 2025-07-28 RX ORDER — DAPTOMYCIN 500 MG/10ML
450 INJECTION, POWDER, LYOPHILIZED, FOR SOLUTION INTRAVENOUS EVERY 24 HOURS
Refills: 0 | Status: DISCONTINUED | OUTPATIENT
Start: 2025-07-28 | End: 2025-07-29

## 2025-07-28 RX ADMIN — INSULIN GLARGINE-YFGN 20 UNIT(S): 100 INJECTION, SOLUTION SUBCUTANEOUS at 22:03

## 2025-07-28 RX ADMIN — INSULIN LISPRO 2: 100 INJECTION, SOLUTION INTRAVENOUS; SUBCUTANEOUS at 08:04

## 2025-07-28 RX ADMIN — INSULIN LISPRO 4 UNIT(S): 100 INJECTION, SOLUTION INTRAVENOUS; SUBCUTANEOUS at 08:05

## 2025-07-28 RX ADMIN — AMLODIPINE BESYLATE 10 MILLIGRAM(S): 10 TABLET ORAL at 22:02

## 2025-07-28 RX ADMIN — INSULIN LISPRO 4 UNIT(S): 100 INJECTION, SOLUTION INTRAVENOUS; SUBCUTANEOUS at 16:19

## 2025-07-28 RX ADMIN — Medication 25 GRAM(S): at 05:13

## 2025-07-28 RX ADMIN — Medication 75 MILLILITER(S): at 05:13

## 2025-07-28 RX ADMIN — ROSUVASTATIN CALCIUM 20 MILLIGRAM(S): 20 TABLET, FILM COATED ORAL at 22:02

## 2025-07-28 RX ADMIN — GABAPENTIN 300 MILLIGRAM(S): 400 CAPSULE ORAL at 13:21

## 2025-07-28 RX ADMIN — DAPTOMYCIN 118 MILLIGRAM(S): 500 INJECTION, POWDER, LYOPHILIZED, FOR SOLUTION INTRAVENOUS at 13:26

## 2025-07-28 RX ADMIN — INSULIN LISPRO 2: 100 INJECTION, SOLUTION INTRAVENOUS; SUBCUTANEOUS at 16:18

## 2025-07-28 RX ADMIN — GABAPENTIN 300 MILLIGRAM(S): 400 CAPSULE ORAL at 22:02

## 2025-07-28 RX ADMIN — GABAPENTIN 300 MILLIGRAM(S): 400 CAPSULE ORAL at 05:14

## 2025-07-29 ENCOUNTER — TRANSCRIPTION ENCOUNTER (OUTPATIENT)
Age: 55
End: 2025-07-29

## 2025-07-29 VITALS
RESPIRATION RATE: 17 BRPM | HEART RATE: 85 BPM | SYSTOLIC BLOOD PRESSURE: 118 MMHG | OXYGEN SATURATION: 100 % | TEMPERATURE: 98 F | DIASTOLIC BLOOD PRESSURE: 75 MMHG

## 2025-07-29 LAB
ANION GAP SERPL CALC-SCNC: 6 MMOL/L — SIGNIFICANT CHANGE UP (ref 5–17)
BLD GP AB SCN SERPL QL: SIGNIFICANT CHANGE UP
BUN SERPL-MCNC: 24 MG/DL — HIGH (ref 7–23)
CALCIUM SERPL-MCNC: 8.9 MG/DL — SIGNIFICANT CHANGE UP (ref 8.5–10.1)
CHLORIDE SERPL-SCNC: 106 MMOL/L — SIGNIFICANT CHANGE UP (ref 96–108)
CO2 SERPL-SCNC: 28 MMOL/L — SIGNIFICANT CHANGE UP (ref 22–31)
CREAT SERPL-MCNC: 1.83 MG/DL — HIGH (ref 0.5–1.3)
EGFR: 44 ML/MIN/1.73M2 — LOW
EGFR: 44 ML/MIN/1.73M2 — LOW
GLUCOSE BLDC GLUCOMTR-MCNC: 109 MG/DL — HIGH (ref 70–99)
GLUCOSE BLDC GLUCOMTR-MCNC: 85 MG/DL — SIGNIFICANT CHANGE UP (ref 70–99)
GLUCOSE SERPL-MCNC: 83 MG/DL — SIGNIFICANT CHANGE UP (ref 70–99)
HCT VFR BLD CALC: 21.8 % — LOW (ref 39–50)
HGB BLD-MCNC: 7.3 G/DL — LOW (ref 13–17)
MCHC RBC-ENTMCNC: 27.9 PG — SIGNIFICANT CHANGE UP (ref 27–34)
MCHC RBC-ENTMCNC: 33.5 G/DL — SIGNIFICANT CHANGE UP (ref 32–36)
MCV RBC AUTO: 83.2 FL — SIGNIFICANT CHANGE UP (ref 80–100)
NRBC BLD AUTO-RTO: 0 /100 WBCS — SIGNIFICANT CHANGE UP (ref 0–0)
PLATELET # BLD AUTO: 362 K/UL — SIGNIFICANT CHANGE UP (ref 150–400)
POTASSIUM SERPL-MCNC: 3.6 MMOL/L — SIGNIFICANT CHANGE UP (ref 3.5–5.3)
POTASSIUM SERPL-SCNC: 3.6 MMOL/L — SIGNIFICANT CHANGE UP (ref 3.5–5.3)
RBC # BLD: 2.62 M/UL — LOW (ref 4.2–5.8)
RBC # FLD: 12.9 % — SIGNIFICANT CHANGE UP (ref 10.3–14.5)
SODIUM SERPL-SCNC: 140 MMOL/L — SIGNIFICANT CHANGE UP (ref 135–145)
WBC # BLD: 5.62 K/UL — SIGNIFICANT CHANGE UP (ref 3.8–10.5)
WBC # FLD AUTO: 5.62 K/UL — SIGNIFICANT CHANGE UP (ref 3.8–10.5)

## 2025-07-29 PROCEDURE — 99239 HOSP IP/OBS DSCHRG MGMT >30: CPT

## 2025-07-29 PROCEDURE — 99231 SBSQ HOSP IP/OBS SF/LOW 25: CPT

## 2025-07-29 PROCEDURE — G0545: CPT

## 2025-07-29 PROCEDURE — 36573 INSJ PICC RS&I 5 YR+: CPT | Mod: LT

## 2025-07-29 RX ORDER — SILDENAFIL 50 MG/1
0 TABLET, FILM COATED ORAL
Qty: 0 | Refills: 0 | DISCHARGE

## 2025-07-29 RX ORDER — APIXABAN 5 MG/1
1 TABLET, FILM COATED ORAL
Refills: 0 | DISCHARGE

## 2025-07-29 RX ORDER — ROSUVASTATIN CALCIUM 20 MG/1
1 TABLET, FILM COATED ORAL
Qty: 0 | Refills: 0 | DISCHARGE
Start: 2025-07-29

## 2025-07-29 RX ORDER — GABAPENTIN 400 MG/1
1 CAPSULE ORAL
Qty: 0 | Refills: 0 | DISCHARGE
Start: 2025-07-29

## 2025-07-29 RX ORDER — ASPIRIN 325 MG
0 TABLET ORAL
Qty: 0 | Refills: 0 | DISCHARGE

## 2025-07-29 RX ORDER — OXYCODONE HYDROCHLORIDE AND ACETAMINOPHEN 10; 325 MG/1; MG/1
1 TABLET ORAL
Qty: 15 | Refills: 0
Start: 2025-07-29

## 2025-07-29 RX ORDER — ASPIRIN 325 MG
1 TABLET ORAL
Qty: 0 | Refills: 0 | DISCHARGE
Start: 2025-07-29

## 2025-07-29 RX ORDER — ROSUVASTATIN CALCIUM 20 MG/1
0 TABLET, FILM COATED ORAL
Qty: 0 | Refills: 0 | DISCHARGE

## 2025-07-29 RX ORDER — AMLODIPINE BESYLATE 10 MG/1
1 TABLET ORAL
Qty: 0 | Refills: 0 | DISCHARGE
Start: 2025-07-29

## 2025-07-29 RX ORDER — INSULIN GLARGINE-YFGN 100 [IU]/ML
20 INJECTION, SOLUTION SUBCUTANEOUS
Qty: 0 | Refills: 0 | DISCHARGE
Start: 2025-07-29

## 2025-07-29 RX ORDER — CLOPIDOGREL BISULFATE 75 MG/1
0 TABLET, FILM COATED ORAL
Qty: 0 | Refills: 0 | DISCHARGE

## 2025-07-29 RX ORDER — INSULIN GLARGINE-YFGN 100 [IU]/ML
20 INJECTION, SOLUTION SUBCUTANEOUS
Refills: 0 | DISCHARGE

## 2025-07-29 RX ORDER — OXYCODONE HYDROCHLORIDE AND ACETAMINOPHEN 10; 325 MG/1; MG/1
1 TABLET ORAL
Refills: 0 | DISCHARGE

## 2025-07-29 RX ADMIN — GABAPENTIN 300 MILLIGRAM(S): 400 CAPSULE ORAL at 05:46

## 2025-07-29 RX ADMIN — GABAPENTIN 300 MILLIGRAM(S): 400 CAPSULE ORAL at 13:20

## 2025-07-29 RX ADMIN — DAPTOMYCIN 118 MILLIGRAM(S): 500 INJECTION, POWDER, LYOPHILIZED, FOR SOLUTION INTRAVENOUS at 13:19

## 2025-07-30 ENCOUNTER — NON-APPOINTMENT (OUTPATIENT)
Age: 55
End: 2025-07-30

## 2025-08-05 ENCOUNTER — OUTPATIENT (OUTPATIENT)
Dept: OUTPATIENT SERVICES | Facility: HOSPITAL | Age: 55
LOS: 1 days | End: 2025-08-05
Payer: COMMERCIAL

## 2025-08-05 DIAGNOSIS — I70.209 UNSPECIFIED ATHEROSCLEROSIS OF NATIVE ARTERIES OF EXTREMITIES, UNSPECIFIED EXTREMITY: Chronic | ICD-10-CM

## 2025-08-05 DIAGNOSIS — N17.9 ACUTE KIDNEY FAILURE, UNSPECIFIED: ICD-10-CM

## 2025-08-05 DIAGNOSIS — E10.51 TYPE 1 DIABETES MELLITUS WITH DIABETIC PERIPHERAL ANGIOPATHY WITHOUT GANGRENE: ICD-10-CM

## 2025-08-05 DIAGNOSIS — I12.9 HYPERTENSIVE CHRONIC KIDNEY DISEASE WITH STAGE 1 THROUGH STAGE 4 CHRONIC KIDNEY DISEASE, OR UNSPECIFIED CHRONIC KIDNEY DISEASE: ICD-10-CM

## 2025-08-05 DIAGNOSIS — E10.22 TYPE 1 DIABETES MELLITUS WITH DIABETIC CHRONIC KIDNEY DISEASE: ICD-10-CM

## 2025-08-05 DIAGNOSIS — E10.69 TYPE 1 DIABETES MELLITUS WITH OTHER SPECIFIED COMPLICATION: ICD-10-CM

## 2025-08-05 DIAGNOSIS — Z89.422 ACQUIRED ABSENCE OF OTHER LEFT TOE(S): Chronic | ICD-10-CM

## 2025-08-05 DIAGNOSIS — L89.90 PRESSURE ULCER OF UNSPECIFIED SITE, UNSPECIFIED STAGE: ICD-10-CM

## 2025-08-05 DIAGNOSIS — M86.172 OTHER ACUTE OSTEOMYELITIS, LEFT ANKLE AND FOOT: ICD-10-CM

## 2025-08-05 DIAGNOSIS — N18.30 CHRONIC KIDNEY DISEASE, STAGE 3 UNSPECIFIED: ICD-10-CM

## 2025-08-05 DIAGNOSIS — E10.42 TYPE 1 DIABETES MELLITUS WITH DIABETIC POLYNEUROPATHY: ICD-10-CM

## 2025-08-05 DIAGNOSIS — E78.5 HYPERLIPIDEMIA, UNSPECIFIED: ICD-10-CM

## 2025-08-05 DIAGNOSIS — Z86.73 PERSONAL HISTORY OF TRANSIENT ISCHEMIC ATTACK (TIA), AND CEREBRAL INFARCTION WITHOUT RESIDUAL DEFICITS: ICD-10-CM

## 2025-08-05 DIAGNOSIS — Z79.82 LONG TERM (CURRENT) USE OF ASPIRIN: ICD-10-CM

## 2025-08-05 DIAGNOSIS — Z89.511 ACQUIRED ABSENCE OF RIGHT LEG BELOW KNEE: ICD-10-CM

## 2025-08-05 DIAGNOSIS — Z89.511 ACQUIRED ABSENCE OF RIGHT LEG BELOW KNEE: Chronic | ICD-10-CM

## 2025-08-05 DIAGNOSIS — E10.65 TYPE 1 DIABETES MELLITUS WITH HYPERGLYCEMIA: ICD-10-CM

## 2025-08-05 LAB
ALBUMIN SERPL ELPH-MCNC: 2.1 G/DL — LOW (ref 3.3–5)
ALP SERPL-CCNC: 129 U/L — HIGH (ref 40–120)
ALT FLD-CCNC: 26 U/L — SIGNIFICANT CHANGE UP (ref 12–78)
ANION GAP SERPL CALC-SCNC: 4 MMOL/L — LOW (ref 5–17)
AST SERPL-CCNC: 26 U/L — SIGNIFICANT CHANGE UP (ref 15–37)
BILIRUB SERPL-MCNC: 0.1 MG/DL — LOW (ref 0.2–1.2)
BUN SERPL-MCNC: 29 MG/DL — HIGH (ref 7–23)
CALCIUM SERPL-MCNC: 9.5 MG/DL — SIGNIFICANT CHANGE UP (ref 8.5–10.1)
CHLORIDE SERPL-SCNC: 103 MMOL/L — SIGNIFICANT CHANGE UP (ref 96–108)
CO2 SERPL-SCNC: 28 MMOL/L — SIGNIFICANT CHANGE UP (ref 22–31)
CREAT SERPL-MCNC: 2.82 MG/DL — HIGH (ref 0.5–1.3)
EGFR: 26 ML/MIN/1.73M2 — LOW
EGFR: 26 ML/MIN/1.73M2 — LOW
GLUCOSE SERPL-MCNC: 106 MG/DL — HIGH (ref 70–99)
HCT VFR BLD CALC: 24.2 % — LOW (ref 39–50)
HGB BLD-MCNC: 7.8 G/DL — LOW (ref 13–17)
MCHC RBC-ENTMCNC: 26.9 PG — LOW (ref 27–34)
MCHC RBC-ENTMCNC: 32.2 G/DL — SIGNIFICANT CHANGE UP (ref 32–36)
MCV RBC AUTO: 83.4 FL — SIGNIFICANT CHANGE UP (ref 80–100)
NRBC BLD AUTO-RTO: 0 /100 WBCS — SIGNIFICANT CHANGE UP (ref 0–0)
PLATELET # BLD AUTO: 490 K/UL — HIGH (ref 150–400)
POTASSIUM SERPL-MCNC: 4 MMOL/L — SIGNIFICANT CHANGE UP (ref 3.5–5.3)
POTASSIUM SERPL-SCNC: 4 MMOL/L — SIGNIFICANT CHANGE UP (ref 3.5–5.3)
PROT SERPL-MCNC: 8.6 GM/DL — HIGH (ref 6–8.3)
RBC # BLD: 2.9 M/UL — LOW (ref 4.2–5.8)
RBC # FLD: 13.8 % — SIGNIFICANT CHANGE UP (ref 10.3–14.5)
SODIUM SERPL-SCNC: 135 MMOL/L — SIGNIFICANT CHANGE UP (ref 135–145)
WBC # BLD: 5.67 K/UL — SIGNIFICANT CHANGE UP (ref 3.8–10.5)
WBC # FLD AUTO: 5.67 K/UL — SIGNIFICANT CHANGE UP (ref 3.8–10.5)

## 2025-08-05 PROCEDURE — 11046 DBRDMT MUSC&/FSCA EA ADDL: CPT | Mod: LT

## 2025-08-05 PROCEDURE — 11043 DBRDMT MUSC&/FSCA 1ST 20/<: CPT | Mod: LT

## 2025-08-05 PROCEDURE — 99213 OFFICE O/P EST LOW 20 MIN: CPT | Mod: 25

## 2025-08-06 DIAGNOSIS — I73.9 PERIPHERAL VASCULAR DISEASE, UNSPECIFIED: ICD-10-CM

## 2025-08-06 DIAGNOSIS — E11.40 TYPE 2 DIABETES MELLITUS WITH DIABETIC NEUROPATHY, UNSPECIFIED: ICD-10-CM

## 2025-08-06 DIAGNOSIS — L97.424 NON-PRESSURE CHRONIC ULCER OF LEFT HEEL AND MIDFOOT WITH NECROSIS OF BONE: ICD-10-CM

## 2025-08-06 DIAGNOSIS — Z79.4 LONG TERM (CURRENT) USE OF INSULIN: ICD-10-CM

## 2025-08-06 DIAGNOSIS — E11.621 TYPE 2 DIABETES MELLITUS WITH FOOT ULCER: ICD-10-CM

## 2025-08-06 DIAGNOSIS — E11.69 TYPE 2 DIABETES MELLITUS WITH OTHER SPECIFIED COMPLICATION: ICD-10-CM

## 2025-08-06 DIAGNOSIS — M86.372 CHRONIC MULTIFOCAL OSTEOMYELITIS, LEFT ANKLE AND FOOT: ICD-10-CM

## 2025-08-06 LAB
CRP SERPL-MCNC: 105 MG/L — HIGH
PREALB SERPL-MCNC: 14 MG/DL — LOW (ref 20–40)

## 2025-08-07 ENCOUNTER — NON-APPOINTMENT (OUTPATIENT)
Age: 55
End: 2025-08-07

## 2025-08-21 ENCOUNTER — APPOINTMENT (OUTPATIENT)
Dept: INFECTIOUS DISEASE | Facility: CLINIC | Age: 55
End: 2025-08-21

## 2025-08-21 ENCOUNTER — NON-APPOINTMENT (OUTPATIENT)
Age: 55
End: 2025-08-21

## 2025-08-29 ENCOUNTER — NON-APPOINTMENT (OUTPATIENT)
Age: 55
End: 2025-08-29

## (undated) DEVICE — TOURNIQUET ESMARK 6"

## (undated) DEVICE — DRAPE TOWEL BLUE 17" X 24"

## (undated) DEVICE — DRAPE SURGICAL #1010

## (undated) DEVICE — PACK LIJ BASIC ORTHO

## (undated) DEVICE — SOL IRR POUR NS 0.9% 1000ML

## (undated) DEVICE — HANDPIECE INTERPULSE W/ COAXIAL FAN SPRAY TIP

## (undated) DEVICE — FRA-ESU BOVIE FORCE TRIAD T7J19717DX: Type: DURABLE MEDICAL EQUIPMENT

## (undated) DEVICE — NDL HYPO SAFE 25G X 1.5" (ORANGE)

## (undated) DEVICE — Device

## (undated) DEVICE — DRAPE EXTREMITY 87" X 128.5"

## (undated) DEVICE — DRSG ACE BANDAGE 4" NS

## (undated) DEVICE — DRSG XEROFORM 1 X 8"

## (undated) DEVICE — VENODYNE/SCD SLEEVE CALF MEDIUM

## (undated) DEVICE — DRAPE 1/2 SHEET 40X57"

## (undated) DEVICE — LABELS BLANK W PEN

## (undated) DEVICE — FRAZIER SUCTION TIP 8FR

## (undated) DEVICE — WARMING BLANKET UPPER ADULT

## (undated) DEVICE — ANESTHESIA CIRCUIT ADULT HMEF

## (undated) DEVICE — TOURNIQUET CUFF 18" DUAL PORT DUAL BLADDER W PLC (BLACK)

## (undated) DEVICE — ELCTR GROUNDING PAD ADULT COVIDIEN

## (undated) DEVICE — WARMING BLANKET FULL ADULT

## (undated) DEVICE — SUT ETHILON 4-0 18" PS-2

## (undated) DEVICE — TOURNIQUET ESMARK 4"

## (undated) DEVICE — ZIMMER PULSAVAC PLUS FAN KIT

## (undated) DEVICE — DRSG KLING 4"

## (undated) DEVICE — SYR LUER LOK 10CC

## (undated) DEVICE — DRSG STOCKINETTE IMPERVIOUS XL

## (undated) DEVICE — PREP CHLORAPREP HI-LITE ORANGE 26ML

## (undated) DEVICE — CANISTER DISPOSABLE THIN WALL 3000CC

## (undated) DEVICE — VISITEC 4X4

## (undated) DEVICE — NDL HYPO REGULAR BEVEL 25G X 1.5" (BLUE)

## (undated) DEVICE — PACK ORTHO

## (undated) DEVICE — BLADE SURGICAL #15 CARBON

## (undated) DEVICE — PACKING STRIP PLAIN 1"

## (undated) DEVICE — SOL IRR BAG NS 0.9% 3000ML

## (undated) DEVICE — SAW BLADE MICROAIRE SAGITTAL 9.4MMX25.4MMX0.6MM

## (undated) DEVICE — NDL HYPO SAFE 18G X 1.5" (PINK)

## (undated) DEVICE — DRSG STOCKINETTE TUBULAR 6"